# Patient Record
Sex: FEMALE | Race: WHITE | Employment: OTHER | URBAN - METROPOLITAN AREA
[De-identification: names, ages, dates, MRNs, and addresses within clinical notes are randomized per-mention and may not be internally consistent; named-entity substitution may affect disease eponyms.]

---

## 2019-04-21 ENCOUNTER — HOSPITAL ENCOUNTER (EMERGENCY)
Age: 54
Discharge: HOME | End: 2019-04-21
Attending: EMERGENCY MEDICINE

## 2019-04-21 ENCOUNTER — APPOINTMENT (OUTPATIENT)
Dept: GENERAL RADIOLOGY | Age: 54
End: 2019-04-21

## 2019-04-21 VITALS
DIASTOLIC BLOOD PRESSURE: 69 MMHG | HEIGHT: 68 IN | RESPIRATION RATE: 18 BRPM | SYSTOLIC BLOOD PRESSURE: 92 MMHG | TEMPERATURE: 97.9 F | BODY MASS INDEX: 24.1 KG/M2 | OXYGEN SATURATION: 92 % | WEIGHT: 159 LBS | HEART RATE: 92 BPM

## 2019-04-21 DIAGNOSIS — Z72.0 TOBACCO USE: ICD-10-CM

## 2019-04-21 DIAGNOSIS — J20.9 ACUTE BRONCHITIS WITH BRONCHOSPASM: Primary | ICD-10-CM

## 2019-04-21 PROCEDURE — 36415 COLL VENOUS BLD VENIPUNCTURE: CPT

## 2019-04-21 PROCEDURE — 71045 X-RAY EXAM CHEST 1 VIEW: CPT

## 2019-04-21 PROCEDURE — 6370000000 HC RX 637 (ALT 250 FOR IP): Performed by: EMERGENCY MEDICINE

## 2019-04-21 PROCEDURE — 93005 ELECTROCARDIOGRAM TRACING: CPT | Performed by: EMERGENCY MEDICINE

## 2019-04-21 PROCEDURE — 6360000002 HC RX W HCPCS: Performed by: EMERGENCY MEDICINE

## 2019-04-21 PROCEDURE — 94640 AIRWAY INHALATION TREATMENT: CPT

## 2019-04-21 PROCEDURE — 99284 EMERGENCY DEPT VISIT MOD MDM: CPT

## 2019-04-21 RX ORDER — ALBUTEROL SULFATE 2.5 MG/3ML
SOLUTION RESPIRATORY (INHALATION)
Status: DISCONTINUED
Start: 2019-04-21 | End: 2019-04-21 | Stop reason: HOSPADM

## 2019-04-21 RX ORDER — PREDNISONE 20 MG/1
60 TABLET ORAL DAILY
Qty: 12 TABLET | Refills: 0 | Status: SHIPPED | OUTPATIENT
Start: 2019-04-21 | End: 2019-04-25

## 2019-04-21 RX ORDER — ALBUTEROL SULFATE 2.5 MG/3ML
15 SOLUTION RESPIRATORY (INHALATION) ONCE
Status: COMPLETED | OUTPATIENT
Start: 2019-04-21 | End: 2019-04-21

## 2019-04-21 RX ORDER — ALBUTEROL SULFATE 90 UG/1
2-4 AEROSOL, METERED RESPIRATORY (INHALATION) EVERY 4 HOURS PRN
Qty: 1 INHALER | Refills: 0 | Status: ON HOLD | OUTPATIENT
Start: 2019-04-21 | End: 2022-08-24 | Stop reason: SDUPTHER

## 2019-04-21 RX ORDER — PREDNISONE 20 MG/1
60 TABLET ORAL ONCE
Status: COMPLETED | OUTPATIENT
Start: 2019-04-21 | End: 2019-04-21

## 2019-04-21 RX ADMIN — PREDNISONE 60 MG: 20 TABLET ORAL at 15:37

## 2019-04-21 RX ADMIN — ALBUTEROL SULFATE 15 MG/HR: 2.5 SOLUTION RESPIRATORY (INHALATION) at 15:25

## 2019-04-21 RX ADMIN — IPRATROPIUM BROMIDE 0.5 MG: 0.5 SOLUTION RESPIRATORY (INHALATION) at 15:25

## 2019-04-21 NOTE — ED PROVIDER NOTES
Triage Chief Complaint:   Shortness of Breath (cough, SOB, wheezing for a couple of days)      HPI:  Dexter Irene is a 49 y.o. female that presents complaining of cough and shortness of breath for 2 days. Patient reports a history of asthma, smokes regularly for 30 years although just tried to switch to vaping. States has now had 2 days of cough productive of white and yellow sputum with shortness of breath and wheezing. States gets some relief from albuterol. Gets worse with walking. Denies chest pain, fevers, congestion, leg swelling, calf pain, vomiting or diarrhea. Has had previous pneumothorax but states this feels nothing like that. Patient reports her normal blood pressures are 90 systolic. Past Medical History:   Diagnosis Date    Asthma     Pneumothorax      Past Surgical History:   Procedure Laterality Date    ABDOMEN SURGERY       SECTION      HYSTERECTOMY      INNER EAR SURGERY      MASTOIDECTOMY       History reviewed. No pertinent family history.   Social History     Socioeconomic History    Marital status: Single     Spouse name: Not on file    Number of children: Not on file    Years of education: Not on file    Highest education level: Not on file   Occupational History    Not on file   Social Needs    Financial resource strain: Not on file    Food insecurity:     Worry: Not on file     Inability: Not on file    Transportation needs:     Medical: Not on file     Non-medical: Not on file   Tobacco Use    Smoking status: Current Every Day Smoker     Packs/day: 0.50     Types: Cigarettes    Smokeless tobacco: Never Used   Substance and Sexual Activity    Alcohol use: No    Drug use: No    Sexual activity: Not on file   Lifestyle    Physical activity:     Days per week: Not on file     Minutes per session: Not on file    Stress: Not on file   Relationships    Social connections:     Talks on phone: Not on file     Gets together: Not on file     Attends Rastafari service: Not on file     Active member of club or organization: Not on file     Attends meetings of clubs or organizations: Not on file     Relationship status: Not on file    Intimate partner violence:     Fear of current or ex partner: Not on file     Emotionally abused: Not on file     Physically abused: Not on file     Forced sexual activity: Not on file   Other Topics Concern    Not on file   Social History Narrative    Not on file     Current Facility-Administered Medications   Medication Dose Route Frequency Provider Last Rate Last Dose    albuterol (PROVENTIL) (2.5 MG/3ML) 0.083% nebulizer solution              Current Outpatient Medications   Medication Sig Dispense Refill    albuterol sulfate HFA (PROVENTIL HFA) 108 (90 Base) MCG/ACT inhaler Inhale 2-4 puffs into the lungs every 4 hours as needed for Wheezing or Shortness of Breath (Space out to every 6 hours as symptoms improve) Space out to every 6 hours as symptoms improve.  1 Inhaler 0    predniSONE (DELTASONE) 20 MG tablet Take 3 tablets by mouth daily for 4 days 12 tablet 0    METHADONE HCL PO Take 60 mg by mouth daily      ALBUTEROL IN Inhale into the lungs       Allergies   Allergen Reactions    Penicillins Shortness Of Breath       Nursing Notes Reviewed    ROS:  General: no fevers/chills  HEENT: no congestion, no sore throat, no ear pain  Eyes: no discharge, no vision changes  Cardiac: no chest pain, no palpitations  Pulmonary: + SOB, + cough  GI: no N/V/D, no abdominal pain  : no dysuria  Musculoskeletal: no joint pain or swelling  Skin: no rash or unusual bruising  Neurologic: no headaches, no dizziness    Physical Exam:  ED Triage Vitals [04/21/19 1514]   Enc Vitals Group      /65      Pulse 62      Resp 24      Temp 99 °F (37.2 °C)      Temp Source Infrared      SpO2 (!) 88 %      Weight 159 lb (72.1 kg)      Height 5' 7.5\" (1.715 m)      Head Circumference       Peak Flow       Pain Score       Pain Loc       Pain Edu?       Excl. in [address]? My pulse oximetry interpretation is which is abnormal    GENERAL APPEARANCE: Awake and alert. Cooperative. Well appearing. HEAD: Normocephalic. Atraumatic. EYES: PERRL, normal conjunctiva  ENT: Mucous membranes are moist. Normal oropharynx  NECK: Supple. HEART: RRR. LUNGS: Respirations tachypneic, no retractions. Speaks 8-10 word sentences. Breath sounds equal bilaterally but wheezing in all lung fields  ABDOMEN: Soft. Non-tender, nondistended. No guarding or rebound. EXTREMITIES: No acute deformities. No swelling or edema  SKIN: Warm and dry. NEUROLOGICAL: Alert at baseline mental status. Fluent speech  PSYCHIATRIC: Normal mood. I have reviewed and interpreted all of the currently available lab results from this visit (if applicable):  Results for orders placed or performed during the hospital encounter of 04/21/19   EKG 12 Lead   Result Value Ref Range    Ventricular Rate 74 BPM    Atrial Rate 74 BPM    P-R Interval 132 ms    QRS Duration 80 ms    Q-T Interval 394 ms    QTc Calculation (Bazett) 437 ms    P Axis 42 degrees    R Axis 61 degrees    T Axis 56 degrees    Diagnosis Normal sinus rhythmNormal ECG         Radiographs:  [x] Radiologist's Report Reviewed:   XR CHEST PORTABLE (Preliminary result)   Result time 04/21/19 15:40:44   Preliminary result by Delma Alcantar MD (04/21/19 15:40:44)                Impression:    No evidence of edema or pneumonia. Mild left basilar atelectasis or scarring. EKG: (All EKG's are interpreted by myself in the absence of a cardiologist) normal sinus rhythm at 74 bpm.  Normal axis. Normal intervals. No ectopy. No ST or T-wave changes. As compared to January 14, 2018 there is no significant change. MDM:  52-year-old female who presents with cough and shortness of breath. Consider CHF, COPD, pneumonia, PE.   Patient has audible wheezing with decreased air exchange and hypoxia on arrival.  She was given oral prednisone and a continuous albuterol nebulizer treatment. On reevaluation she states \"I feel great\". Wheezing has improved. She does remain mildly hypoxic but was able to be weaned off oxygen with saturations at 90-93% on room air. Patient will have episodes of oxygenation saturation of 88% but then will improve to 9092% and she states does not feel short of breath and feels \"100% better\". She was ambulatory without any shortness of breath or dizziness. She has had some low blood pressures but she states this is her normal and she was asymptomatic. Patient does not want to be admitted and would like to go home. I advised albuterol 4 puffs every 4 hours as needed until symptoms improve and will be given a course of prednisone. She is instructed to follow-up with a primary care doctor for recheck or to return immediately for any new or worsening symptoms. We also discussed the benefits of smoking cessation. Clinical Impression:  1. Acute bronchitis with bronchospasm    2. Tobacco use          Disposition referral (if applicable):  [facility]  [address]  [phone]    As needed, If symptoms worsen, needing inhaler more than every 4 hours or does not help or any new symptoms such as chest pain, fevers or other concerns    [facility]) Pre-Services  [phone]  Call   For primary care doctor    [address] [place] [address]    Call   [phone] for follow-up appointment to recheck today complaints      Disposition medications (if applicable):  New Prescriptions    PREDNISONE (DELTASONE) 20 MG TABLET    Take 3 tablets by mouth daily for 4 days         Comment: Please note this report has been produced using speech recognition software and may contain errors related to that system including errors in grammar, punctuation, and spelling, as well as words and phrases that may be inappropriate.  If there are any questions or concerns please feel free to contact the dictating physician for clarification      MD Letitia Murrieta MD  04/21/19 8472

## 2019-04-21 NOTE — ED NOTES
Pt reporting post breathing tx \"I feel 100% better, almost completely normal.\" No audible wheezing heard. Oxygen saturation on room air continues to be 84-88%. Pt. Denies SOB at this time. Provider notified.       Yaa Edouard RN  04/21/19 9664

## 2019-04-21 NOTE — ED NOTES
Pt. discharged in stable position. Pt. provided with discharge instructions and prescriptions. Given opportunity to ask questions if needed and verbalized understanding. Pt. Ambulated to exit per self.         Alexander España RN  04/21/19 9631

## 2019-04-21 NOTE — ED NOTES
Pt. 82% on room air. Pt. Denies supplemental oxygen use at home. Pt. Placed on 4L nasal cannula with increased saturations of 92%. Pt. Denies COPD hx but current smoker of a pack/day with efforts of trying to quit. Pt. States she is trying to use the vap pen but has been SOB over the last week. Provider notified.       Destiny Dickson RN  04/21/19 4501

## 2019-04-21 NOTE — ED NOTES
Patient resting comfortably with no signs of distress. Denies any needs at this time. Bed locked and in lowest position with both side rails raised. Call light within reach.         Yaa Edouard RN  04/21/19 2470

## 2019-04-21 NOTE — ED NOTES
Pt. Placed on 4L nasal cannula with increased saturations of 94%     Rhona Amezquita RN  04/21/19 7868

## 2019-04-21 NOTE — ED NOTES
Pt. placed in gown and on telemetry monitor per protocol. EKG completed and shown to MD at triage. Updated vs obtained. Bed locked with side rails up x2. Call light within reach. No other needs verbalized at this time. Will continue to monitor.           Tiff Phillip RN  04/21/19 1753

## 2019-04-22 LAB
EKG ATRIAL RATE: 74 BPM
EKG DIAGNOSIS: NORMAL
EKG P AXIS: 42 DEGREES
EKG P-R INTERVAL: 132 MS
EKG Q-T INTERVAL: 394 MS
EKG QRS DURATION: 80 MS
EKG QTC CALCULATION (BAZETT): 437 MS
EKG R AXIS: 61 DEGREES
EKG T AXIS: 56 DEGREES
EKG VENTRICULAR RATE: 74 BPM

## 2019-04-22 PROCEDURE — 93010 ELECTROCARDIOGRAM REPORT: CPT | Performed by: INTERNAL MEDICINE

## 2022-08-20 ENCOUNTER — HOSPITAL ENCOUNTER (INPATIENT)
Age: 57
LOS: 3 days | Discharge: HOME | DRG: 871 | End: 2022-08-24
Attending: EMERGENCY MEDICINE | Admitting: INTERNAL MEDICINE

## 2022-08-20 ENCOUNTER — APPOINTMENT (OUTPATIENT)
Dept: CT IMAGING | Age: 57
DRG: 871 | End: 2022-08-20

## 2022-08-20 ENCOUNTER — APPOINTMENT (OUTPATIENT)
Dept: GENERAL RADIOLOGY | Age: 57
DRG: 871 | End: 2022-08-20

## 2022-08-20 DIAGNOSIS — R09.02 HYPOXIA: Primary | ICD-10-CM

## 2022-08-20 DIAGNOSIS — J44.1 COPD EXACERBATION (HCC): ICD-10-CM

## 2022-08-20 DIAGNOSIS — U07.1 COVID: ICD-10-CM

## 2022-08-20 DIAGNOSIS — R91.1 PULMONARY NODULE: ICD-10-CM

## 2022-08-20 LAB
A/G RATIO: 1.1 (ref 1.1–2.2)
ALBUMIN SERPL-MCNC: 3.7 G/DL (ref 3.4–5)
ALP BLD-CCNC: 61 U/L (ref 40–129)
ALT SERPL-CCNC: 14 U/L (ref 10–40)
ANION GAP SERPL CALCULATED.3IONS-SCNC: 9 MMOL/L (ref 3–16)
AST SERPL-CCNC: 25 U/L (ref 15–37)
BASOPHILS ABSOLUTE: 0 K/UL (ref 0–0.2)
BASOPHILS RELATIVE PERCENT: 0.5 %
BILIRUB SERPL-MCNC: <0.2 MG/DL (ref 0–1)
BUN BLDV-MCNC: 16 MG/DL (ref 7–20)
CALCIUM SERPL-MCNC: 8.8 MG/DL (ref 8.3–10.6)
CHLORIDE BLD-SCNC: 102 MMOL/L (ref 99–110)
CO2: 22 MMOL/L (ref 21–32)
CREAT SERPL-MCNC: 0.9 MG/DL (ref 0.6–1.1)
EOSINOPHILS ABSOLUTE: 0 K/UL (ref 0–0.6)
EOSINOPHILS RELATIVE PERCENT: 0.5 %
GFR AFRICAN AMERICAN: >60
GFR NON-AFRICAN AMERICAN: >60
GLUCOSE BLD-MCNC: 127 MG/DL (ref 70–99)
HCT VFR BLD CALC: 43.5 % (ref 36–48)
HEMOGLOBIN: 14.1 G/DL (ref 12–16)
LYMPHOCYTES ABSOLUTE: 1 K/UL (ref 1–5.1)
LYMPHOCYTES RELATIVE PERCENT: 30.2 %
MCH RBC QN AUTO: 29.4 PG (ref 26–34)
MCHC RBC AUTO-ENTMCNC: 32.4 G/DL (ref 31–36)
MCV RBC AUTO: 90.6 FL (ref 80–100)
MONOCYTES ABSOLUTE: 0.3 K/UL (ref 0–1.3)
MONOCYTES RELATIVE PERCENT: 9 %
NEUTROPHILS ABSOLUTE: 2 K/UL (ref 1.7–7.7)
NEUTROPHILS RELATIVE PERCENT: 59.8 %
PDW BLD-RTO: 14.3 % (ref 12.4–15.4)
PLATELET # BLD: 144 K/UL (ref 135–450)
PMV BLD AUTO: 8.6 FL (ref 5–10.5)
POTASSIUM SERPL-SCNC: 4.1 MMOL/L (ref 3.5–5.1)
PRO-BNP: 15 PG/ML (ref 0–124)
PROCALCITONIN: 0.06 NG/ML (ref 0–0.15)
RBC # BLD: 4.8 M/UL (ref 4–5.2)
REASON FOR REJECTION: NORMAL
REJECTED TEST: NORMAL
SODIUM BLD-SCNC: 133 MMOL/L (ref 136–145)
TOTAL PROTEIN: 7 G/DL (ref 6.4–8.2)
TROPONIN: <0.01 NG/ML
WBC # BLD: 3.4 K/UL (ref 4–11)

## 2022-08-20 PROCEDURE — 80053 COMPREHEN METABOLIC PANEL: CPT

## 2022-08-20 PROCEDURE — 84484 ASSAY OF TROPONIN QUANT: CPT

## 2022-08-20 PROCEDURE — 6370000000 HC RX 637 (ALT 250 FOR IP): Performed by: PHYSICIAN ASSISTANT

## 2022-08-20 PROCEDURE — 71045 X-RAY EXAM CHEST 1 VIEW: CPT

## 2022-08-20 PROCEDURE — 6360000004 HC RX CONTRAST MEDICATION: Performed by: EMERGENCY MEDICINE

## 2022-08-20 PROCEDURE — 6360000002 HC RX W HCPCS: Performed by: PHYSICIAN ASSISTANT

## 2022-08-20 PROCEDURE — 94640 AIRWAY INHALATION TREATMENT: CPT

## 2022-08-20 PROCEDURE — 99285 EMERGENCY DEPT VISIT HI MDM: CPT

## 2022-08-20 PROCEDURE — 96374 THER/PROPH/DIAG INJ IV PUSH: CPT

## 2022-08-20 PROCEDURE — 84145 PROCALCITONIN (PCT): CPT

## 2022-08-20 PROCEDURE — 71260 CT THORAX DX C+: CPT | Performed by: EMERGENCY MEDICINE

## 2022-08-20 PROCEDURE — 83880 ASSAY OF NATRIURETIC PEPTIDE: CPT

## 2022-08-20 PROCEDURE — 93005 ELECTROCARDIOGRAM TRACING: CPT | Performed by: PHYSICIAN ASSISTANT

## 2022-08-20 PROCEDURE — 85025 COMPLETE CBC W/AUTO DIFF WBC: CPT

## 2022-08-20 RX ORDER — ALBUTEROL SULFATE 2.5 MG/3ML
5 SOLUTION RESPIRATORY (INHALATION) ONCE
Status: COMPLETED | OUTPATIENT
Start: 2022-08-20 | End: 2022-08-20

## 2022-08-20 RX ORDER — IPRATROPIUM BROMIDE AND ALBUTEROL SULFATE 2.5; .5 MG/3ML; MG/3ML
1 SOLUTION RESPIRATORY (INHALATION) ONCE
Status: COMPLETED | OUTPATIENT
Start: 2022-08-20 | End: 2022-08-20

## 2022-08-20 RX ORDER — METHYLPREDNISOLONE SODIUM SUCCINATE 125 MG/2ML
125 INJECTION, POWDER, LYOPHILIZED, FOR SOLUTION INTRAMUSCULAR; INTRAVENOUS ONCE
Status: COMPLETED | OUTPATIENT
Start: 2022-08-20 | End: 2022-08-20

## 2022-08-20 RX ADMIN — ALBUTEROL SULFATE 5 MG: 2.5 SOLUTION RESPIRATORY (INHALATION) at 22:22

## 2022-08-20 RX ADMIN — IPRATROPIUM BROMIDE AND ALBUTEROL SULFATE 1 AMPULE: 2.5; .5 SOLUTION RESPIRATORY (INHALATION) at 22:22

## 2022-08-20 RX ADMIN — METHYLPREDNISOLONE SODIUM SUCCINATE 125 MG: 125 INJECTION, POWDER, FOR SOLUTION INTRAMUSCULAR; INTRAVENOUS at 22:10

## 2022-08-20 RX ADMIN — IOPAMIDOL 75 ML: 755 INJECTION, SOLUTION INTRAVENOUS at 23:22

## 2022-08-20 ASSESSMENT — PAIN DESCRIPTION - LOCATION: LOCATION: HEAD

## 2022-08-20 ASSESSMENT — PAIN - FUNCTIONAL ASSESSMENT: PAIN_FUNCTIONAL_ASSESSMENT: 0-10

## 2022-08-20 ASSESSMENT — PAIN DESCRIPTION - FREQUENCY: FREQUENCY: CONTINUOUS

## 2022-08-20 ASSESSMENT — PAIN DESCRIPTION - PAIN TYPE: TYPE: ACUTE PAIN

## 2022-08-20 ASSESSMENT — PAIN SCALES - GENERAL: PAINLEVEL_OUTOF10: 2

## 2022-08-20 ASSESSMENT — LIFESTYLE VARIABLES
HOW OFTEN DO YOU HAVE A DRINK CONTAINING ALCOHOL: NEVER
HOW MANY STANDARD DRINKS CONTAINING ALCOHOL DO YOU HAVE ON A TYPICAL DAY: PATIENT DOES NOT DRINK

## 2022-08-20 ASSESSMENT — PAIN DESCRIPTION - DESCRIPTORS: DESCRIPTORS: ACHING

## 2022-08-21 ENCOUNTER — APPOINTMENT (OUTPATIENT)
Dept: GENERAL RADIOLOGY | Age: 57
DRG: 871 | End: 2022-08-21

## 2022-08-21 PROBLEM — U07.1 COVID-19 VIRUS INFECTION: Status: ACTIVE | Noted: 2022-08-21

## 2022-08-21 PROBLEM — J44.1 COPD EXACERBATION (HCC): Status: ACTIVE | Noted: 2022-08-21

## 2022-08-21 PROBLEM — R07.9 CHEST PAIN: Status: ACTIVE | Noted: 2022-08-21

## 2022-08-21 PROBLEM — J96.01 ACUTE RESPIRATORY FAILURE WITH HYPOXIA (HCC): Status: ACTIVE | Noted: 2022-08-21

## 2022-08-21 PROBLEM — G89.4 PAIN SYNDROME, CHRONIC: Status: ACTIVE | Noted: 2022-08-21

## 2022-08-21 LAB
C-REACTIVE PROTEIN: 5.3 MG/L (ref 0–5.1)
D DIMER: 0.29 UG/ML FEU (ref 0–0.6)
EKG ATRIAL RATE: 97 BPM
EKG DIAGNOSIS: NORMAL
EKG P AXIS: 75 DEGREES
EKG P-R INTERVAL: 146 MS
EKG Q-T INTERVAL: 356 MS
EKG QRS DURATION: 80 MS
EKG QTC CALCULATION (BAZETT): 452 MS
EKG R AXIS: 52 DEGREES
EKG T AXIS: 53 DEGREES
EKG VENTRICULAR RATE: 97 BPM
GLUCOSE BLD-MCNC: 124 MG/DL (ref 70–99)
GLUCOSE BLD-MCNC: 155 MG/DL (ref 70–99)
GLUCOSE BLD-MCNC: 233 MG/DL (ref 70–99)
GLUCOSE BLD-MCNC: 469 MG/DL (ref 70–99)
LACTIC ACID, SEPSIS: 0.9 MMOL/L (ref 0.4–1.9)
PERFORMED ON: ABNORMAL
SARS-COV-2, NAAT: DETECTED

## 2022-08-21 PROCEDURE — G0378 HOSPITAL OBSERVATION PER HR: HCPCS

## 2022-08-21 PROCEDURE — 6360000002 HC RX W HCPCS: Performed by: INTERNAL MEDICINE

## 2022-08-21 PROCEDURE — 1200000000 HC SEMI PRIVATE

## 2022-08-21 PROCEDURE — 94640 AIRWAY INHALATION TREATMENT: CPT

## 2022-08-21 PROCEDURE — 83036 HEMOGLOBIN GLYCOSYLATED A1C: CPT

## 2022-08-21 PROCEDURE — 6370000000 HC RX 637 (ALT 250 FOR IP): Performed by: INTERNAL MEDICINE

## 2022-08-21 PROCEDURE — 93010 ELECTROCARDIOGRAM REPORT: CPT | Performed by: INTERNAL MEDICINE

## 2022-08-21 PROCEDURE — 86140 C-REACTIVE PROTEIN: CPT

## 2022-08-21 PROCEDURE — 2580000003 HC RX 258: Performed by: INTERNAL MEDICINE

## 2022-08-21 PROCEDURE — 2700000000 HC OXYGEN THERAPY PER DAY

## 2022-08-21 PROCEDURE — 36415 COLL VENOUS BLD VENIPUNCTURE: CPT

## 2022-08-21 PROCEDURE — 74018 RADEX ABDOMEN 1 VIEW: CPT

## 2022-08-21 PROCEDURE — 94761 N-INVAS EAR/PLS OXIMETRY MLT: CPT

## 2022-08-21 PROCEDURE — 87635 SARS-COV-2 COVID-19 AMP PRB: CPT

## 2022-08-21 PROCEDURE — 85379 FIBRIN DEGRADATION QUANT: CPT

## 2022-08-21 PROCEDURE — 83605 ASSAY OF LACTIC ACID: CPT

## 2022-08-21 RX ORDER — IPRATROPIUM BROMIDE AND ALBUTEROL SULFATE 2.5; .5 MG/3ML; MG/3ML
1 SOLUTION RESPIRATORY (INHALATION) EVERY 4 HOURS
Status: DISCONTINUED | OUTPATIENT
Start: 2022-08-21 | End: 2022-08-22

## 2022-08-21 RX ORDER — ONDANSETRON 4 MG/1
4 TABLET, ORALLY DISINTEGRATING ORAL EVERY 8 HOURS PRN
Status: DISCONTINUED | OUTPATIENT
Start: 2022-08-21 | End: 2022-08-21

## 2022-08-21 RX ORDER — ACETAMINOPHEN 650 MG/1
650 SUPPOSITORY RECTAL EVERY 6 HOURS PRN
Status: DISCONTINUED | OUTPATIENT
Start: 2022-08-21 | End: 2022-08-24 | Stop reason: HOSPADM

## 2022-08-21 RX ORDER — POLYETHYLENE GLYCOL 3350 17 G/17G
17 POWDER, FOR SOLUTION ORAL DAILY PRN
Status: DISCONTINUED | OUTPATIENT
Start: 2022-08-21 | End: 2022-08-21

## 2022-08-21 RX ORDER — ACETAMINOPHEN 325 MG/1
650 TABLET ORAL EVERY 6 HOURS PRN
Status: DISCONTINUED | OUTPATIENT
Start: 2022-08-21 | End: 2022-08-21

## 2022-08-21 RX ORDER — ONDANSETRON 2 MG/ML
4 INJECTION INTRAMUSCULAR; INTRAVENOUS EVERY 6 HOURS PRN
Status: DISCONTINUED | OUTPATIENT
Start: 2022-08-21 | End: 2022-08-24 | Stop reason: HOSPADM

## 2022-08-21 RX ORDER — ONDANSETRON 2 MG/ML
4 INJECTION INTRAMUSCULAR; INTRAVENOUS EVERY 6 HOURS PRN
Status: DISCONTINUED | OUTPATIENT
Start: 2022-08-21 | End: 2022-08-21

## 2022-08-21 RX ORDER — SODIUM CHLORIDE 9 MG/ML
INJECTION, SOLUTION INTRAVENOUS PRN
Status: DISCONTINUED | OUTPATIENT
Start: 2022-08-21 | End: 2022-08-21

## 2022-08-21 RX ORDER — METHADONE HYDROCHLORIDE 10 MG/ML
60 CONCENTRATE ORAL DAILY
Status: DISCONTINUED | OUTPATIENT
Start: 2022-08-21 | End: 2022-08-24 | Stop reason: HOSPADM

## 2022-08-21 RX ORDER — ALBUTEROL SULFATE 2.5 MG/3ML
5 SOLUTION RESPIRATORY (INHALATION) ONCE
Status: DISCONTINUED | OUTPATIENT
Start: 2022-08-21 | End: 2022-08-21

## 2022-08-21 RX ORDER — METHYLPREDNISOLONE SODIUM SUCCINATE 40 MG/ML
40 INJECTION, POWDER, LYOPHILIZED, FOR SOLUTION INTRAMUSCULAR; INTRAVENOUS EVERY 6 HOURS
Status: COMPLETED | OUTPATIENT
Start: 2022-08-21 | End: 2022-08-22

## 2022-08-21 RX ORDER — ENOXAPARIN SODIUM 100 MG/ML
40 INJECTION SUBCUTANEOUS DAILY
Status: DISCONTINUED | OUTPATIENT
Start: 2022-08-21 | End: 2022-08-21

## 2022-08-21 RX ORDER — NITROGLYCERIN 0.4 MG/1
0.4 TABLET SUBLINGUAL EVERY 5 MIN PRN
Status: DISCONTINUED | OUTPATIENT
Start: 2022-08-21 | End: 2022-08-21

## 2022-08-21 RX ORDER — ENOXAPARIN SODIUM 100 MG/ML
30 INJECTION SUBCUTANEOUS 2 TIMES DAILY
Status: DISCONTINUED | OUTPATIENT
Start: 2022-08-21 | End: 2022-08-24 | Stop reason: HOSPADM

## 2022-08-21 RX ORDER — ONDANSETRON 4 MG/1
4 TABLET, ORALLY DISINTEGRATING ORAL EVERY 8 HOURS PRN
Status: DISCONTINUED | OUTPATIENT
Start: 2022-08-21 | End: 2022-08-24 | Stop reason: HOSPADM

## 2022-08-21 RX ORDER — INSULIN LISPRO 100 [IU]/ML
14 INJECTION, SOLUTION INTRAVENOUS; SUBCUTANEOUS ONCE
Status: COMPLETED | OUTPATIENT
Start: 2022-08-21 | End: 2022-08-21

## 2022-08-21 RX ORDER — ASPIRIN 81 MG/1
81 TABLET, CHEWABLE ORAL DAILY
Status: DISCONTINUED | OUTPATIENT
Start: 2022-08-22 | End: 2022-08-21

## 2022-08-21 RX ORDER — SODIUM CHLORIDE 0.9 % (FLUSH) 0.9 %
5-40 SYRINGE (ML) INJECTION PRN
Status: DISCONTINUED | OUTPATIENT
Start: 2022-08-21 | End: 2022-08-21

## 2022-08-21 RX ORDER — ACETAMINOPHEN 650 MG/1
650 SUPPOSITORY RECTAL EVERY 6 HOURS PRN
Status: DISCONTINUED | OUTPATIENT
Start: 2022-08-21 | End: 2022-08-21

## 2022-08-21 RX ORDER — IPRATROPIUM BROMIDE AND ALBUTEROL SULFATE 2.5; .5 MG/3ML; MG/3ML
1 SOLUTION RESPIRATORY (INHALATION)
Status: DISCONTINUED | OUTPATIENT
Start: 2022-08-21 | End: 2022-08-21

## 2022-08-21 RX ORDER — SODIUM CHLORIDE 0.9 % (FLUSH) 0.9 %
5-40 SYRINGE (ML) INJECTION PRN
Status: DISCONTINUED | OUTPATIENT
Start: 2022-08-21 | End: 2022-08-24 | Stop reason: HOSPADM

## 2022-08-21 RX ORDER — SODIUM CHLORIDE 0.9 % (FLUSH) 0.9 %
5-40 SYRINGE (ML) INJECTION EVERY 12 HOURS SCHEDULED
Status: DISCONTINUED | OUTPATIENT
Start: 2022-08-21 | End: 2022-08-21

## 2022-08-21 RX ORDER — PREDNISONE 20 MG/1
40 TABLET ORAL DAILY
Status: DISCONTINUED | OUTPATIENT
Start: 2022-08-23 | End: 2022-08-24 | Stop reason: HOSPADM

## 2022-08-21 RX ORDER — POLYETHYLENE GLYCOL 3350 17 G/17G
17 POWDER, FOR SOLUTION ORAL DAILY PRN
Status: DISCONTINUED | OUTPATIENT
Start: 2022-08-21 | End: 2022-08-24 | Stop reason: HOSPADM

## 2022-08-21 RX ORDER — SODIUM CHLORIDE 9 MG/ML
INJECTION, SOLUTION INTRAVENOUS
Status: DISPENSED
Start: 2022-08-21 | End: 2022-08-21

## 2022-08-21 RX ORDER — NICOTINE 21 MG/24HR
1 PATCH, TRANSDERMAL 24 HOURS TRANSDERMAL DAILY
Status: DISCONTINUED | OUTPATIENT
Start: 2022-08-21 | End: 2022-08-24 | Stop reason: HOSPADM

## 2022-08-21 RX ORDER — INSULIN LISPRO 100 [IU]/ML
0-8 INJECTION, SOLUTION INTRAVENOUS; SUBCUTANEOUS
Status: DISCONTINUED | OUTPATIENT
Start: 2022-08-21 | End: 2022-08-24 | Stop reason: HOSPADM

## 2022-08-21 RX ORDER — ATORVASTATIN CALCIUM 80 MG/1
40 TABLET, FILM COATED ORAL NIGHTLY
Status: DISCONTINUED | OUTPATIENT
Start: 2022-08-21 | End: 2022-08-21

## 2022-08-21 RX ORDER — METHADONE HYDROCHLORIDE 10 MG/1
60 TABLET ORAL DAILY
Status: DISCONTINUED | OUTPATIENT
Start: 2022-08-21 | End: 2022-08-21

## 2022-08-21 RX ORDER — METHYLPREDNISOLONE SODIUM SUCCINATE 125 MG/2ML
125 INJECTION, POWDER, LYOPHILIZED, FOR SOLUTION INTRAMUSCULAR; INTRAVENOUS ONCE
Status: DISCONTINUED | OUTPATIENT
Start: 2022-08-21 | End: 2022-08-21

## 2022-08-21 RX ORDER — PANTOPRAZOLE SODIUM 40 MG/1
40 TABLET, DELAYED RELEASE ORAL
Status: DISCONTINUED | OUTPATIENT
Start: 2022-08-21 | End: 2022-08-24 | Stop reason: HOSPADM

## 2022-08-21 RX ORDER — ACETAMINOPHEN 325 MG/1
650 TABLET ORAL EVERY 6 HOURS PRN
Status: DISCONTINUED | OUTPATIENT
Start: 2022-08-21 | End: 2022-08-24 | Stop reason: HOSPADM

## 2022-08-21 RX ORDER — SODIUM CHLORIDE 9 MG/ML
INJECTION, SOLUTION INTRAVENOUS PRN
Status: DISCONTINUED | OUTPATIENT
Start: 2022-08-21 | End: 2022-08-24 | Stop reason: HOSPADM

## 2022-08-21 RX ORDER — INSULIN LISPRO 100 [IU]/ML
0-4 INJECTION, SOLUTION INTRAVENOUS; SUBCUTANEOUS NIGHTLY
Status: DISCONTINUED | OUTPATIENT
Start: 2022-08-21 | End: 2022-08-24 | Stop reason: HOSPADM

## 2022-08-21 RX ORDER — DEXTROSE MONOHYDRATE 100 MG/ML
INJECTION, SOLUTION INTRAVENOUS CONTINUOUS PRN
Status: DISCONTINUED | OUTPATIENT
Start: 2022-08-21 | End: 2022-08-24 | Stop reason: HOSPADM

## 2022-08-21 RX ORDER — ALBUTEROL SULFATE 2.5 MG/3ML
2.5 SOLUTION RESPIRATORY (INHALATION)
Status: DISCONTINUED | OUTPATIENT
Start: 2022-08-21 | End: 2022-08-24 | Stop reason: HOSPADM

## 2022-08-21 RX ORDER — SODIUM CHLORIDE 0.9 % (FLUSH) 0.9 %
5-40 SYRINGE (ML) INJECTION EVERY 12 HOURS SCHEDULED
Status: DISCONTINUED | OUTPATIENT
Start: 2022-08-21 | End: 2022-08-24 | Stop reason: HOSPADM

## 2022-08-21 RX ADMIN — INSULIN LISPRO 2 UNITS: 100 INJECTION, SOLUTION INTRAVENOUS; SUBCUTANEOUS at 11:51

## 2022-08-21 RX ADMIN — IPRATROPIUM BROMIDE AND ALBUTEROL SULFATE 1 AMPULE: 2.5; .5 SOLUTION RESPIRATORY (INHALATION) at 09:10

## 2022-08-21 RX ADMIN — METHYLPREDNISOLONE SODIUM SUCCINATE 40 MG: 40 INJECTION, POWDER, FOR SOLUTION INTRAMUSCULAR; INTRAVENOUS at 05:34

## 2022-08-21 RX ADMIN — Medication 2 PUFF: at 20:57

## 2022-08-21 RX ADMIN — SODIUM CHLORIDE, PRESERVATIVE FREE 10 ML: 5 INJECTION INTRAVENOUS at 08:26

## 2022-08-21 RX ADMIN — METHYLPREDNISOLONE SODIUM SUCCINATE 40 MG: 40 INJECTION, POWDER, FOR SOLUTION INTRAMUSCULAR; INTRAVENOUS at 21:34

## 2022-08-21 RX ADMIN — AZITHROMYCIN MONOHYDRATE 500 MG: 500 INJECTION, POWDER, LYOPHILIZED, FOR SOLUTION INTRAVENOUS at 05:34

## 2022-08-21 RX ADMIN — INSULIN LISPRO 14 UNITS: 100 INJECTION, SOLUTION INTRAVENOUS; SUBCUTANEOUS at 09:16

## 2022-08-21 RX ADMIN — IPRATROPIUM BROMIDE AND ALBUTEROL SULFATE 1 AMPULE: 2.5; .5 SOLUTION RESPIRATORY (INHALATION) at 12:10

## 2022-08-21 RX ADMIN — METHYLPREDNISOLONE SODIUM SUCCINATE 40 MG: 40 INJECTION, POWDER, FOR SOLUTION INTRAMUSCULAR; INTRAVENOUS at 08:27

## 2022-08-21 RX ADMIN — METHADONE HYDROCHLORIDE 60 MG: 10 CONCENTRATE ORAL at 08:45

## 2022-08-21 RX ADMIN — IPRATROPIUM BROMIDE AND ALBUTEROL SULFATE 1 AMPULE: 2.5; .5 SOLUTION RESPIRATORY (INHALATION) at 20:57

## 2022-08-21 RX ADMIN — SODIUM CHLORIDE, PRESERVATIVE FREE 10 ML: 5 INJECTION INTRAVENOUS at 21:33

## 2022-08-21 RX ADMIN — ENOXAPARIN SODIUM 30 MG: 100 INJECTION SUBCUTANEOUS at 21:34

## 2022-08-21 RX ADMIN — Medication 2 PUFF: at 09:10

## 2022-08-21 RX ADMIN — ENOXAPARIN SODIUM 30 MG: 100 INJECTION SUBCUTANEOUS at 08:26

## 2022-08-21 RX ADMIN — PANTOPRAZOLE SODIUM 40 MG: 40 TABLET, DELAYED RELEASE ORAL at 09:16

## 2022-08-21 RX ADMIN — METHYLPREDNISOLONE SODIUM SUCCINATE 40 MG: 40 INJECTION, POWDER, FOR SOLUTION INTRAMUSCULAR; INTRAVENOUS at 16:32

## 2022-08-21 RX ADMIN — IPRATROPIUM BROMIDE AND ALBUTEROL SULFATE 1 AMPULE: 2.5; .5 SOLUTION RESPIRATORY (INHALATION) at 16:36

## 2022-08-21 ASSESSMENT — PAIN SCALES - GENERAL
PAINLEVEL_OUTOF10: 0

## 2022-08-21 NOTE — ED PROVIDER NOTES
I independently saw performed a substantive portion of the visit (history, physical, and MDM) on Hailey Correa. All diagnostic, treatment, and disposition decisions were made by myself in conjunction with the advanced practice provider. I have participated in the medical decision making and directed the treatment plan and disposition of the patient. For further details of [facility] emergency department encounter, please see the advanced practice provider's documentation. Vianney Anthony MD, am the primary physician provider of record. CHIEF COMPLAINT  Chief Complaint   Patient presents with    Shortness of Breath     From home. PT states she had N/V earlier today, began having SOB and a headache. Hx of asthma, pneumothorax; took home albuterol inhaler 7-8X PTA and aleve. O2 sat 79% on RA upon arrival, up to 95% on 6L/NC. Briefly, Hailey Correa is a 65 y.o. female  who presents to the ED complaining of shortness of breath wheezing and coughing onset today, no fevers though. She has a history of asthma but has not been diagnosed formally with COPD. No history of oxygen requirement in the past.  She does smoke. She denies chest pains. FOCUSED PHYSICAL EXAMINATION  BP (!) 170/75   Pulse (!) 101   Temp 98 °F (36.7 °C) (Oral)   Resp 18   Ht 5' 7\" (1.702 m)   Wt 151 lb (68.5 kg)   SpO2 96%   BMI 23.65 kg/m²    Focused physical examination notable for no acute distress, well-appearing, well-nourished, normal speech and mentation without obvious facial droop, no obvious rash. No obvious cranial nerve deficits on my initial exam.  Poor air exchange with prolonged expiration and mild dry hacking cough with expiratory wheezes throughout. She is stable on nasal cannula oxygen but was tachypneic and hypoxic without. Minimal scattered rhonchi. Abdomen is soft nontender nondistended. No leg swelling.     The 12 lead EKG was interpreted by me as follows:  Rate: normal with a rate of 97  Rhythm: sinus  Axis: normal  Intervals: normal CT, narrow QRS, normal QTc  ST segments: no ST elevations or depressions  T waves: no abnormal inversions  Non-specific T wave changes: not present  Prior EKG comparison: EKG dated 4/21/19 is not significantly different      MDM:  Diagnostic considerations included acute coronary syndrome, pulmonary embolism, COPD/asthma, pneumonia, sepsis, pericardial tamponade, pneumothorax, CHF, thoracic aortic dissection, anxiety    ED course was notable for concern for hypoxia with COPD exacerbation. Imaging without PE or infiltrate. Do not suspect infectious process at this time based on her evaluation but rather more appear COPD/asthma presentation. She will be given nebulizer steroids and admitted with nasal cannula supplemental oxygen. She did not require BiPAP or intubation at this time based on her assessment. Also found to be COVID+ which may be exacerbating her overall symptomatology as well. No indication for abx. Is this patient to be included in the SEP-1 Core Measure? No   Exclusion criteria - the patient is NOT to be included for SEP-1 Core Measure due to:  Viral etiology found or highly suspected (including COVID-19) without concomitant bacterial infection      During the patient's ED course, the patient was given:  Medications   methylPREDNISolone sodium (SOLU-MEDROL) injection 125 mg (125 mg IntraVENous Given 8/20/22 2210)   ipratropium-albuterol (DUONEB) nebulizer solution 1 ampule (1 ampule Inhalation Given 8/20/22 2222)   albuterol (PROVENTIL) nebulizer solution 5 mg (5 mg Nebulization Given 8/20/22 2222)   iopamidol (ISOVUE-370) 76 % injection 75 mL (75 mLs IntraVENous Given 8/20/22 2322)        CLINICAL IMPRESSION  1. Hypoxia    2. COPD exacerbation (HCC)    3. Pulmonary nodule    4. COVID        DISPOSITION  Cynthia Roberts was admitted in fair condition. The plan is to admit to the hospital at this time under the hospitalist service. Hospitalist accepted the patient and will take over the patient's care. The total critical care time I independently spent while evaluating and treating this patient was 32 minutes. This excludes time spent doing separately billable procedures. This includes time at the bedside, data interpretation, medication management, obtaining critical history from collateral sources if the patient is unable to provide it directly, and physician consultation. Specifics of interventions taken and potentially life-threatening diagnostic considerations are listed above in the medical decision making. If this was a shared visit with an KELECHI, the time in this attestation is non-concurrent critical care time out of the total shared critical care time provided by the KELECHI and myself. This chart was created using Dragon dictation software. Efforts were made by me to ensure accuracy, however some errors may be present due to limitations of this technology.             Bandar Hughes MD  08/21/22 0028       Bandar Hughes MD  08/21/22 2175

## 2022-08-21 NOTE — ACP (ADVANCE CARE PLANNING)
Advanced Care Planning Note. Purpose of Encounter: Advanced care planning in light of COVID 23  Parties In Attendance: Patient  Decisional Capacity: Yes  Subjective: Patient is coughing  Objective: Cr 0.9  Goals of Care Determination: Patient wants full support (CPR, vent, surgery, HD, trach, PEG)  Plan:  IV steroids, IV Abx, O2 via NC  Code Status: Full code   Time spent on Advanced care Plannin minutes  Advanced Care Planning Documents: Completed advanced directives on chart, children share the POA.     David Adame MD  2022 1:53 PM

## 2022-08-21 NOTE — PLAN OF CARE
Problem: Discharge Planning  Goal: Discharge to home or other facility with appropriate resources  Outcome: Progressing  Flowsheets (Taken 8/21/2022 0400 by Ayanna Houston RN)  Discharge to home or other facility with appropriate resources: Identify barriers to discharge with patient and caregiver     Problem: Pain  Goal: Verbalizes/displays adequate comfort level or baseline comfort level  Outcome: Progressing

## 2022-08-21 NOTE — PLAN OF CARE
Problem: Discharge Planning  Goal: Discharge to home or other facility with appropriate resources  8/21/2022 0900 by Fermin Martinez RN  Outcome: Progressing  8/21/2022 0858 by Fermin Martinez RN  Outcome: Progressing  Flowsheets (Taken 8/21/2022 0400 by Bindu Pickering RN)  Discharge to home or other facility with appropriate resources: Identify barriers to discharge with patient and caregiver     Problem: Pain  Goal: Verbalizes/displays adequate comfort level or baseline comfort level  8/21/2022 0900 by Fermin Martinez RN  Outcome: Progressing  8/21/2022 0858 by Fermin Martinez RN  Outcome: Progressing

## 2022-08-21 NOTE — ED NOTES
Patient ambulating to bathroom, stating she doesn't not need oxygen at this time because her \"oxygen has been 98% on room air while getting the breathing treatment\".       Lester Jonas RN  08/20/22 2835

## 2022-08-21 NOTE — H&P
Hospital Medicine History & Physical      PCP: No primary care provider on file. Date of Admission: 2022    Date of Service: Pt seen/examined on 22 and Admitted to Inpatient with expected LOS greater than two midnights due to medical therapy. Chief Complaint: Shortness of breath      History Of Present Illness:  Rashaad Villa is 65 y.o. female who presented with complaint of shortness of breath. Symptom onset was acute for a time period of 1 day. The severity is described as severe. The course of his symptoms over time is worsening. The symptoms improved with rest and worsened with exertion. The patient's symptom is associated with fatigue. Rashaad Villa is 65 y.o. female with history of COPD, does not use oxygen at home and tobacco use. She smokes about 15 cigarettes a day  She now presents to the ER with complaint of shortness of breath since yesterday  Here in the emergency room her SPO2 was 79% on room air therefore she is currently placed on 6 L oxygen by nasal cannula to keep SPO2 greater than 90%  She can only walk short distances due to shortness of breath. At rest her shortness of breath is better and worse with exertion  She denies cough or wheezing  But on exam she is not moving air well in the lungs and has some expiratory wheezing  She was given multiple rounds of nebulizers in the ED but her breathing remains poor  Therefore she is admitted for further management for suspected COPD exacerbation      Past Medical History:          Diagnosis Date    Asthma     Pneumothorax        Past Surgical History:          Procedure Laterality Date    ABDOMEN SURGERY       SECTION      HYSTERECTOMY (CERVIX STATUS UNKNOWN)      INNER EAR SURGERY      MASTOIDECTOMY         Medications Prior to Admission:      Prior to Admission medications    Medication Sig Start Date End Date Taking?  Authorizing Provider   albuterol sulfate HFA (PROVENTIL HFA) 108 (90 Base) MCG/ACT inhaler Inhale 2-4 puffs into the lungs every 4 hours as needed for Wheezing or Shortness of Breath (Space out to every 6 hours as symptoms improve) Space out to every 6 hours as symptoms improve. 4/21/19   Sushila Simons MD   ALBUTEROL IN Inhale into the lungs    Historical Provider, MD   METHADONE HCL PO Take 60 mg by mouth daily    Historical Provider, MD       Allergies:  Penicillins    Social History:      The patient currently lives at home    TOBACCO:   reports that she has been smoking cigarettes. She has been smoking an average of .5 packs per day. She has never used smokeless tobacco.  ETOH:   reports no history of alcohol use. E-cigarette/Vaping       Questions Responses    E-cigarette/Vaping Use     Start Date     Passive Exposure     Quit Date     Counseling Given     Comments               Family History:      Reviewed and negative in regards to presenting illness/complaint. REVIEW OF SYSTEMS COMPLETED:   Pertinent positives as noted in the HPI. All other systems reviewed and negative. PHYSICAL EXAM PERFORMED:    /71   Pulse (!) 101   Temp 98 °F (36.7 °C) (Oral)   Resp 18   Ht 5' 7\" (1.702 m)   Wt 151 lb (68.5 kg)   SpO2 98%   BMI 23.65 kg/m²     General appearance:  No apparent distress, appears stated age and cooperative. HEENT:  Normal cephalic, atraumatic without obvious deformity. Pupils equal, round, and reactive to light. Extra ocular muscles intact. Conjunctivae/corneas clear. Neck: Supple, with full range of motion. No jugular venous distention. Trachea midline. Respiratory:  Normal respiratory effort. Clear to auscultation, bilaterally without Rales/Wheezes/Rhonchi. Cardiovascular:  Regular rate and rhythm with normal S1/S2 without murmurs, rubs or gallops. Abdomen: Soft, non-tender, non-distended with normal bowel sounds. Musculoskeletal:  No clubbing, cyanosis or edema bilaterally. Full range of motion without deformity.   Skin: Skin color, texture, turgor normal.  No rashes or lesions. Neurologic:  Neurovascularly intact without any focal sensory/motor deficits. Cranial nerves: II-XII intact, grossly non-focal.  Psychiatric:  Alert and oriented, thought content appropriate, normal insight  Capillary Refill: Brisk,3 seconds, normal  Peripheral Pulses: +2 palpable, equal bilaterally       Labs:     Recent Labs     08/20/22 2204   WBC 3.4*   HGB 14.1   HCT 43.5        Recent Labs     08/20/22 2204   *   K 4.1      CO2 22   BUN 16   CREATININE 0.9   CALCIUM 8.8     Recent Labs     08/20/22 2204   AST 25   ALT 14   BILITOT <0.2   ALKPHOS 61     No results for input(s): INR in the last 72 hours. Recent Labs     08/20/22 2204   TROPONINI <0.01       Urinalysis:      Lab Results   Component Value Date/Time    NITRU Negative 08/07/2016 09:00 PM    WBCUA 3-5 08/07/2016 09:00 PM    BACTERIA Rare 08/07/2016 09:00 PM    RBCUA 0-2 08/07/2016 09:00 PM    BLOODU Negative 08/07/2016 09:00 PM    SPECGRAV <1.005 08/07/2016 09:00 PM    GLUCOSEU Negative 08/07/2016 09:00 PM       Radiology:     CXR: I have reviewed the CXR with the following interpretation:   EKG:  I have reviewed the EKG with the following interpretation: Normal sinus rhythm    CT CHEST PULMONARY EMBOLISM W CONTRAST   Final Result   1. No pulmonary emboli. 2. Mild apical predominant emphysema, without acute focal infiltrate. 3. There are a couple pulmonary nodules, 1 of which measuring 6 mm in the   other measuring 8 mm in size. Follow-up recommendations as below. 4. Other incidental findings as above. RECOMMENDATIONS:   Multiple pulmonary nodules. Most severe: 8 mm right solid pulmonary nodule. Recommend a non-contrast Chest CT at 3-6 months, then another non-contrast   Chest CT at 18-24 months. These guidelines do not apply to immunocompromised patients and patients with   cancer. Follow up in patients with significant comorbidities as clinically   warranted.  For lung cancer screening, adhere to Lung-RADS guidelines. Reference: Radiology. 2017; 284(1):228-43. XR CHEST PORTABLE   Final Result   Increased density of the left mid lung laterally is probably due to a   confluence of shadows. However, would advise repeating exam with PA and lateral positioning for   confirmation. Consults:    None    ASSESSMENT and PLAN:    Acute respiratory failure with hypoxia -  Due to COPD exacerbation. She does not use home oxygen. he is currently requiring 6 L oxygen by nasal cannula to keep SPO2 greater than 90%. Continue oxygen supplementation as needed    COPD with acute exacerbation -  DuoNeb every 4 hours, albuterol nebs every 2 hours as needed and Dulera twice daily ordered. I have also started her on IV Solu-Medrol x 48 hours and then transition to oral prednisone ordered. Check poc glucose twice daily and if glucose elevated due to steroid then start sliding scale insulin. I have already started her on p.o. pantoprazole for GI prophylaxis while she is on steroids. IV azithromycin 500 mg x 3 days ordered for COPD exacerbation and possibly concurrent acute bronchitis    COVID-19 virus infection -  CT chest without pulmonary infiltrate. Contact and droplet isolation    Pulmonary nodule -  Given significant history of tobacco use encourage patient to follow-up    Tobacco use disorder -   Nicotine patch ordered. Advised to quit    Chronically on methadone -  Continue methadone 60 mg daily    DVT Prophylaxis: Lovenox   Diet: ADULT DIET; Regular  Code Status: Full Code    PT/OT Eval Status: PT/OT consult is not ordered     Dispo - Admit as inpatient        Sujatha Victoria MD    Thank you No primary care provider on file. for the opportunity to be involved in this patient's care. If you have any questions or concerns please feel free to contact me at [phone].

## 2022-08-21 NOTE — PROGRESS NOTES
Hospitalist Progress Note      PCP: No primary care provider on file. Date of Admission: 8/20/2022    Chief Complaint: SOB    Hospital Course: 66 yo F with COPD, tobacco abuse, chronic pain syndrome on Methadone came to ER with SOB. Admitted as inpatient for COVID 19 PNA with acute COPD exacerbation and acute respiratory failure with hypoxia. Started on IV Solumedrol and Zithromax. On 5 L O2 via NC, does not use home O2. Subjective: Patient is on 5 L NC. Coughing and SOB. No CP, HA or abdominal pain. Medications:  Reviewed    Infusion Medications    sodium chloride      sodium chloride      dextrose       Scheduled Medications    methadone  60 mg Oral Daily    sodium chloride flush  5-40 mL IntraVENous 2 times per day    methylPREDNISolone  40 mg IntraVENous Q6H    Followed by    Sahil Lu ON 8/23/2022] predniSONE  40 mg Oral Daily    azithromycin  500 mg IntraVENous Q24H    pantoprazole  40 mg Oral QAM AC    mometasone-formoterol  2 puff Inhalation BID    nicotine  1 patch TransDERmal Daily    ipratropium-albuterol  1 ampule Inhalation Q4H    enoxaparin  30 mg SubCUTAneous BID    insulin lispro  0-8 Units SubCUTAneous TID WC    insulin lispro  0-4 Units SubCUTAneous Nightly     PRN Meds: sodium chloride flush, sodium chloride, ondansetron **OR** ondansetron, polyethylene glycol, acetaminophen **OR** acetaminophen, albuterol, dextrose bolus **OR** dextrose bolus, glucagon (rDNA), dextrose      Intake/Output Summary (Last 24 hours) at 8/21/2022 1348  Last data filed at 8/21/2022 0856  Gross per 24 hour   Intake 240 ml   Output --   Net 240 ml       Physical Exam Performed:    /74   Pulse 95   Temp 97.7 °F (36.5 °C) (Oral)   Resp 16   Ht 5' 7\" (1.702 m)   Wt 151 lb (68.5 kg)   SpO2 93%   BMI 23.65 kg/m²     General appearance: No apparent distress, appears stated age and cooperative. HEENT: Pupils equal, round, and reactive to light. Conjunctivae/corneas clear.   Neck: Supple, with full range of motion. No jugular venous distention. Trachea midline. Respiratory:  Poor AE BL. Distant wheezing noted on R.  BL rhonchi  Cardiovascular: Regular rate and rhythm with normal S1/S2 without murmurs, rubs or gallops. Abdomen: Soft, non-tender, non-distended with normal bowel sounds. Musculoskeletal: No clubbing, cyanosis or edema bilaterally. Full range of motion without deformity. Skin: Skin color, texture, turgor normal.  No rashes or lesions. Neurologic:  Neurovascularly intact without any focal sensory/motor deficits. Cranial nerves: II-XII intact, grossly non-focal.  Psychiatric: Alert and oriented, thought content appropriate, normal insight  Capillary Refill: Brisk,3 seconds, normal   Peripheral Pulses: +2 palpable, equal bilaterally       Labs:   Recent Labs     08/20/22 2204   WBC 3.4*   HGB 14.1   HCT 43.5        Recent Labs     08/20/22 2204   *   K 4.1      CO2 22   BUN 16   CREATININE 0.9   CALCIUM 8.8     Recent Labs     08/20/22  2204   AST 25   ALT 14   BILITOT <0.2   ALKPHOS 61     No results for input(s): INR in the last 72 hours. Recent Labs     08/20/22 2204   TROPONINI <0.01       Urinalysis:      Lab Results   Component Value Date/Time    NITRU Negative 08/07/2016 09:00 PM    WBCUA 3-5 08/07/2016 09:00 PM    BACTERIA Rare 08/07/2016 09:00 PM    RBCUA 0-2 08/07/2016 09:00 PM    BLOODU Negative 08/07/2016 09:00 PM    SPECGRAV <1.005 08/07/2016 09:00 PM    GLUCOSEU Negative 08/07/2016 09:00 PM       Radiology:  CT CHEST PULMONARY EMBOLISM W CONTRAST   Final Result   1. No pulmonary emboli. 2. Mild apical predominant emphysema, without acute focal infiltrate. 3. There are a couple pulmonary nodules, 1 of which measuring 6 mm in the   other measuring 8 mm in size. Follow-up recommendations as below. 4. Other incidental findings as above. RECOMMENDATIONS:   Multiple pulmonary nodules. Most severe: 8 mm right solid pulmonary nodule.    Recommend a non-contrast Chest CT at 3-6 months, then another non-contrast   Chest CT at 18-24 months. These guidelines do not apply to immunocompromised patients and patients with   cancer. Follow up in patients with significant comorbidities as clinically   warranted. For lung cancer screening, adhere to Lung-RADS guidelines. Reference: Radiology. 2017; 284(1):228-43. XR CHEST PORTABLE   Final Result   Increased density of the left mid lung laterally is probably due to a   confluence of shadows. However, would advise repeating exam with PA and lateral positioning for   confirmation. XR ABDOMEN (KUB) (SINGLE AP VIEW)    (Results Pending)           Assessment/Plan:    Active Hospital Problems    Diagnosis     COVID-19 virus infection [U07.1]      Priority: Medium    COPD exacerbation ([address] [J44.1]      Priority: Medium    Acute respiratory failure with hypoxia (HCC) [J96.01]      Priority: Medium    Pain syndrome, chronic [G89.4]      Priority: Medium     Continue Solumedrol 40 mg IV q6h  Wean off O2 as tolerated  Droplet plus isolation  Counseled to stop smoking  Continue Dulera  Continue SSI  PT/OT eval  Continue Zithromax    DVT Prophylaxis: Lovenox  Diet: ADULT DIET; Regular  Code Status: Full Code    PT/OT Eval Status: Requested    Dispo - Home    Discussed with patient, nursing and CM. Hopefully home by Wednesday, +/- home O2.     Timur Ramires MD

## 2022-08-21 NOTE — ED PROVIDER NOTES
was satting at 79% on room air. She does not wear oxygen at home. Now that she is on 6 L satting in the 90s she is feeling much better. She denies chest pain, abdominal pain. She states she did have a mild headache but this got better after taking Aleve. She continues to smoke. Has history of asthma. Denies dysuria, hematuria, diarrhea, bloody stool, unilocular tenderness, recent trip or travel, recent surgery or any other symptoms. Nursing Notes were all reviewed and agreed with or any disagreements were addressed in the HPI. REVIEW OF SYSTEMS    (2-9 systems for level 4, 10 or more for level 5)     Review of Systems    Positives and Pertinent negatives as per HPI. Except as noted above in the ROS, all other systems were reviewed and negative. PAST MEDICAL HISTORY     Past Medical History:   Diagnosis Date    Asthma     Pneumothorax          SURGICAL HISTORY     Past Surgical History:   Procedure Laterality Date    ABDOMEN SURGERY       SECTION      HYSTERECTOMY (CERVIX STATUS UNKNOWN)      INNER EAR SURGERY      MASTOIDECTOMY           CURRENTMEDICATIONS       Previous Medications    ALBUTEROL IN    Inhale into the lungs    ALBUTEROL SULFATE HFA (PROVENTIL HFA) 108 (90 BASE) MCG/ACT INHALER    Inhale 2-4 puffs into the lungs every 4 hours as needed for Wheezing or Shortness of Breath (Space out to every 6 hours as symptoms improve) Space out to every 6 hours as symptoms improve. METHADONE HCL PO    Take 60 mg by mouth daily         ALLERGIES     Penicillins    FAMILYHISTORY     History reviewed. No pertinent family history.        SOCIAL HISTORY       Social History     Tobacco Use    Smoking status: Every Day     Packs/day: 0.50     Types: Cigarettes    Smokeless tobacco: Never   Substance Use Topics    Alcohol use: No    Drug use: No       SCREENINGS    Mechelle Coma Scale  Eye Opening: Spontaneous  Best Verbal Response: Oriented  Best Motor Response: Obeys commands  Mechelle Coma Scale Score: 15        PHYSICAL EXAM    (up to 7 for level 4, 8 or more for level 5)     ED Triage Vitals [08/20/22 2125]   BP Temp Temp Source Heart Rate Resp SpO2 Height Weight   (!) 170/75 98 °F (36.7 °C) Oral (!) 101 18 95 % 5' 7\" (1.702 m) 151 lb (68.5 kg)       Physical Exam  Vitals and nursing note reviewed. Constitutional:       Appearance: She is well-developed. She is not diaphoretic. HENT:      Head: Atraumatic. Nose: Nose normal.   Eyes:      General:         Right eye: No discharge. Left eye: No discharge. Cardiovascular:      Rate and Rhythm: Normal rate and regular rhythm. Heart sounds: No murmur heard. No friction rub. No gallop. Pulmonary:      Effort: Pulmonary effort is normal. No respiratory distress. Breath sounds: No stridor. Wheezing present. No rhonchi or rales. Comments: Decreased breath sounds and air movement with inspiration with some rhonchi and wheezing with expiration. No retractions or accessory muscle use. Abdominal:      General: Bowel sounds are normal. There is no distension. Palpations: Abdomen is soft. There is no mass. Tenderness: There is no abdominal tenderness. There is no guarding or rebound. Hernia: No hernia is present. Musculoskeletal:         General: No swelling. Normal range of motion. Cervical back: Normal range of motion. Skin:     General: Skin is warm and dry. Findings: No erythema or rash. Neurological:      Mental Status: She is alert and oriented to person, place, and time. Cranial Nerves: No cranial nerve deficit.    Psychiatric:         Behavior: Behavior normal.       DIAGNOSTIC RESULTS   LABS:    Labs Reviewed   CBC WITH AUTO DIFFERENTIAL - Abnormal; Notable for the following components:       Result Value    WBC 3.4 (*)     All other components within normal limits   COMPREHENSIVE METABOLIC PANEL - Abnormal; Notable for the following components:    Sodium 133 (*)     Glucose 127 (*)     All other components within normal limits   COVID-19, RAPID   TROPONIN   BRAIN NATRIURETIC PEPTIDE   PROCALCITONIN   SPECIMEN REJECTION    Narrative:     CALL  Reilly  [facility] tel. [phone],  Rejected Test Name lacmarline/Called to: Genesis Lomax, 08/20/2022 22:35, by [address] [place], SEPSIS   LACTATE, SEPSIS       When ordered only abnormal lab results are displayed. All other labs were within normal range or not returned as of this dictation. EKG: When ordered, EKG's are interpreted by the Emergency Department Physician in the absence of a cardiologist.  Please see their note for interpretation of EKG. RADIOLOGY:   Non-plain film images such as CT, Ultrasound and MRI are read by the radiologist. Plain radiographic images are visualized and preliminarily interpreted by the ED Provider with the below findings:        Interpretation per the Radiologist below, if available at the time of this note:    CT CHEST PULMONARY EMBOLISM W CONTRAST   Final Result   1. No pulmonary emboli. 2. Mild apical predominant emphysema, without acute focal infiltrate. 3. There are a couple pulmonary nodules, 1 of which measuring 6 mm in the   other measuring 8 mm in size. Follow-up recommendations as below. 4. Other incidental findings as above. RECOMMENDATIONS:   Multiple pulmonary nodules. Most severe: 8 mm right solid pulmonary nodule. Recommend a non-contrast Chest CT at 3-6 months, then another non-contrast   Chest CT at 18-24 months. These guidelines do not apply to immunocompromised patients and patients with   cancer. Follow up in patients with significant comorbidities as clinically   warranted. For lung cancer screening, adhere to Lung-RADS guidelines. Reference: Radiology. 2017; 284(1):228-43. XR CHEST PORTABLE   Final Result   Increased density of the left mid lung laterally is probably due to a   confluence of shadows.       However, would advise repeating exam with PA and lateral positioning for   confirmation. No results found. PROCEDURES   Unless otherwise noted below, none     Procedures    CRITICAL CARE TIME       CONSULTS:  IP CONSULT TO HOSPITALIST      EMERGENCY DEPARTMENT COURSE and DIFFERENTIAL DIAGNOSIS/MDM:   Vitals:    Vitals:    08/20/22 2125 08/20/22 2222   BP: (!) 170/75    Pulse: (!) 101    Resp: 18    Temp: 98 °F (36.7 °C)    TempSrc: Oral    SpO2: 95% 96%   Weight: 151 lb (68.5 kg)    Height: 5' 7\" (1.702 m)        Patient was given the following medications:  Medications   methylPREDNISolone sodium (SOLU-MEDROL) injection 125 mg (125 mg IntraVENous Given 8/20/22 2210)   ipratropium-albuterol (DUONEB) nebulizer solution 1 ampule (1 ampule Inhalation Given 8/20/22 2222)   albuterol (PROVENTIL) nebulizer solution 5 mg (5 mg Nebulization Given 8/20/22 2222)   iopamidol (ISOVUE-370) 76 % injection 75 mL (75 mLs IntraVENous Given 8/20/22 2322)         Is this patient to be included in the SEP-1 Core Measure due to severe sepsis or septic shock? No   Exclusion criteria - the patient is NOT to be included for SEP-1 Core Measure due to: Infection is not suspected    Patient presented with minimal air movement and wheezing and hypoxia. Does not wear oxygen at home. After multiple nebulizers has some mild improvement but still very tight with minimal air movement and expiratory wheezing bilaterally. She was slightly tachycardic. CT imaging reveals evidence of emphysema without any acute infiltrate. She does have some pulmonary nodules noted. Remainder of work-up is unremarkable. Low suspicion for pulmonary embolus, pneumothorax, bacterial pneumonia or other emergent etiology. Discussed with hospitalist for admission for COPD exacerbation and hypoxia. Patient was stable time of admission. She is received steroids here multiple nebulizers. FINAL IMPRESSION      1. Hypoxia    2.  COPD exacerbation (HCC)    3. Pulmonary nodule          DISPOSITION/PLAN   DISPOSITION

## 2022-08-21 NOTE — PROGRESS NOTES
Patient admitted to room, oriented to room and call light, denies pain, 02 75-83% on room air, paced on 4 L humidified o2 via nasal cannula, assisted to position of comfort, patient encouraged to call with needs, call light in reach, items within reach, will continue to monitor

## 2022-08-21 NOTE — RT PROTOCOL NOTE
RT Inhaler-Nebulizer Bronchodilator Protocol Note    There is a bronchodilator order in the chart from a provider indicating to follow the RT Bronchodilator Protocol and there is an Initiate RT Inhaler-Nebulizer Bronchodilator Protocol order as well (see protocol at bottom of note). CXR Findings:  XR CHEST PORTABLE    Result Date: 8/20/2022  Increased density of the left mid lung laterally is probably due to a confluence of shadows. However, would advise repeating exam with PA and lateral positioning for confirmation. The findings from the last RT Protocol Assessment were as follows:   History Pulmonary Disease: Chronic pulmonary disease  Respiratory Pattern: Mild dyspnea at rest, irregular pattern, or RR 21-25 bpm  Breath Sounds: Severe inspiratory and expiratory wheezing or severely diminished  Cough: Strong, productive  Indication for Bronchodilator Therapy: Decreased or absent breath sounds, On home bronchodilators, Wheezing associated with pulm disorder  Bronchodilator Assessment Score: 15    Aerosolized bronchodilator medication orders have been revised according to the RT Inhaler-Nebulizer Bronchodilator Protocol below. Respiratory Therapist to perform RT Therapy Protocol Assessment initially then follow the protocol. Repeat RT Therapy Protocol Assessment PRN for score 0-3 or on second treatment, BID, and PRN for scores above 3. No Indications - adjust the frequency to every 6 hours PRN wheezing or bronchospasm, if no treatments needed after 48 hours then discontinue using Per Protocol order mode. If indication present, adjust the RT bronchodilator orders based on the Bronchodilator Assessment Score as indicated below.   Use Inhaler orders unless patient has one or more of the following: on home nebulizer, not able to hold breath for 10 seconds, is not alert and oriented, cannot activate and use MDI correctly, or respiratory rate 25 breaths per minute or more, then use the equivalent nebulizer order(s) with same Frequency and PRN reasons based on the score. If a patient is on this medication at home then do not decrease Frequency below that used at home. 0-3 - enter or revise RT bronchodilator order(s) to equivalent RT Bronchodilator order with Frequency of every 4 hours PRN for wheezing or increased work of breathing using Per Protocol order mode. 4-6 - enter or revise RT Bronchodilator order(s) to two equivalent RT bronchodilator orders with one order with BID Frequency and one order with Frequency of every 4 hours PRN wheezing or increased work of breathing using Per Protocol order mode. 7-10 - enter or revise RT Bronchodilator order(s) to two equivalent RT bronchodilator orders with one order with TID Frequency and one order with Frequency of every 4 hours PRN wheezing or increased work of breathing using Per Protocol order mode. 11-13 - enter or revise RT Bronchodilator order(s) to one equivalent RT bronchodilator order with QID Frequency and an Albuterol order with Frequency of every 4 hours PRN wheezing or increased work of breathing using Per Protocol order mode. Greater than 13 - enter or revise RT Bronchodilator order(s) to one equivalent RT bronchodilator order with every 4 hours Frequency and an Albuterol order with Frequency of every 2 hours PRN wheezing or increased work of breathing using Per Protocol order mode. RT to enter RT Home Evaluation for COPD & MDI Assessment order using Per Protocol order mode.     Electronically signed by Sergio Zaldivar RCP on 8/21/2022 at 4:44 AM

## 2022-08-22 LAB
ANION GAP SERPL CALCULATED.3IONS-SCNC: 9 MMOL/L (ref 3–16)
BASOPHILS ABSOLUTE: 0 K/UL (ref 0–0.2)
BASOPHILS RELATIVE PERCENT: 0.2 %
BUN BLDV-MCNC: 15 MG/DL (ref 7–20)
C-REACTIVE PROTEIN: <3 MG/L (ref 0–5.1)
CALCIUM SERPL-MCNC: 8.9 MG/DL (ref 8.3–10.6)
CHLORIDE BLD-SCNC: 102 MMOL/L (ref 99–110)
CO2: 25 MMOL/L (ref 21–32)
CREAT SERPL-MCNC: 0.5 MG/DL (ref 0.6–1.1)
D DIMER: <0.27 UG/ML FEU (ref 0–0.6)
EOSINOPHILS ABSOLUTE: 0 K/UL (ref 0–0.6)
EOSINOPHILS RELATIVE PERCENT: 0 %
ESTIMATED AVERAGE GLUCOSE: 122.6 MG/DL
GFR AFRICAN AMERICAN: >60
GFR NON-AFRICAN AMERICAN: >60
GLUCOSE BLD-MCNC: 131 MG/DL (ref 70–99)
GLUCOSE BLD-MCNC: 143 MG/DL (ref 70–99)
GLUCOSE BLD-MCNC: 145 MG/DL (ref 70–99)
GLUCOSE BLD-MCNC: 187 MG/DL (ref 70–99)
GLUCOSE BLD-MCNC: 230 MG/DL (ref 70–99)
HBA1C MFR BLD: 5.9 %
HCT VFR BLD CALC: 41.8 % (ref 36–48)
HEMOGLOBIN: 13.6 G/DL (ref 12–16)
LYMPHOCYTES ABSOLUTE: 0.6 K/UL (ref 1–5.1)
LYMPHOCYTES RELATIVE PERCENT: 6.8 %
MCH RBC QN AUTO: 29.3 PG (ref 26–34)
MCHC RBC AUTO-ENTMCNC: 32.4 G/DL (ref 31–36)
MCV RBC AUTO: 90.3 FL (ref 80–100)
MONOCYTES ABSOLUTE: 0.3 K/UL (ref 0–1.3)
MONOCYTES RELATIVE PERCENT: 3 %
NEUTROPHILS ABSOLUTE: 7.8 K/UL (ref 1.7–7.7)
NEUTROPHILS RELATIVE PERCENT: 90 %
PDW BLD-RTO: 13.9 % (ref 12.4–15.4)
PERFORMED ON: ABNORMAL
PLATELET # BLD: 132 K/UL (ref 135–450)
PMV BLD AUTO: 9.3 FL (ref 5–10.5)
POTASSIUM REFLEX MAGNESIUM: 4.8 MMOL/L (ref 3.5–5.1)
PROCALCITONIN: 0.03 NG/ML (ref 0–0.15)
RBC # BLD: 4.63 M/UL (ref 4–5.2)
SODIUM BLD-SCNC: 136 MMOL/L (ref 136–145)
WBC # BLD: 8.7 K/UL (ref 4–11)

## 2022-08-22 PROCEDURE — 1200000000 HC SEMI PRIVATE

## 2022-08-22 PROCEDURE — 86140 C-REACTIVE PROTEIN: CPT

## 2022-08-22 PROCEDURE — 97535 SELF CARE MNGMENT TRAINING: CPT

## 2022-08-22 PROCEDURE — 6360000002 HC RX W HCPCS: Performed by: INTERNAL MEDICINE

## 2022-08-22 PROCEDURE — 2580000003 HC RX 258: Performed by: INTERNAL MEDICINE

## 2022-08-22 PROCEDURE — 6370000000 HC RX 637 (ALT 250 FOR IP): Performed by: INTERNAL MEDICINE

## 2022-08-22 PROCEDURE — 97165 OT EVAL LOW COMPLEX 30 MIN: CPT

## 2022-08-22 PROCEDURE — 97530 THERAPEUTIC ACTIVITIES: CPT

## 2022-08-22 PROCEDURE — 2700000000 HC OXYGEN THERAPY PER DAY

## 2022-08-22 PROCEDURE — 97116 GAIT TRAINING THERAPY: CPT

## 2022-08-22 PROCEDURE — 80048 BASIC METABOLIC PNL TOTAL CA: CPT

## 2022-08-22 PROCEDURE — 36415 COLL VENOUS BLD VENIPUNCTURE: CPT

## 2022-08-22 PROCEDURE — 94761 N-INVAS EAR/PLS OXIMETRY MLT: CPT

## 2022-08-22 PROCEDURE — 85025 COMPLETE CBC W/AUTO DIFF WBC: CPT

## 2022-08-22 PROCEDURE — 84145 PROCALCITONIN (PCT): CPT

## 2022-08-22 PROCEDURE — 94640 AIRWAY INHALATION TREATMENT: CPT

## 2022-08-22 PROCEDURE — 85379 FIBRIN DEGRADATION QUANT: CPT

## 2022-08-22 PROCEDURE — 97161 PT EVAL LOW COMPLEX 20 MIN: CPT

## 2022-08-22 RX ORDER — IPRATROPIUM BROMIDE AND ALBUTEROL SULFATE 2.5; .5 MG/3ML; MG/3ML
1 SOLUTION RESPIRATORY (INHALATION) 4 TIMES DAILY
Status: DISCONTINUED | OUTPATIENT
Start: 2022-08-22 | End: 2022-08-24 | Stop reason: HOSPADM

## 2022-08-22 RX ADMIN — IPRATROPIUM BROMIDE AND ALBUTEROL SULFATE 1 AMPULE: 2.5; .5 SOLUTION RESPIRATORY (INHALATION) at 08:27

## 2022-08-22 RX ADMIN — IPRATROPIUM BROMIDE AND ALBUTEROL SULFATE 1 AMPULE: 2.5; .5 SOLUTION RESPIRATORY (INHALATION) at 04:22

## 2022-08-22 RX ADMIN — Medication 2 PUFF: at 20:56

## 2022-08-22 RX ADMIN — IPRATROPIUM BROMIDE AND ALBUTEROL SULFATE 1 AMPULE: 2.5; .5 SOLUTION RESPIRATORY (INHALATION) at 12:25

## 2022-08-22 RX ADMIN — ENOXAPARIN SODIUM 30 MG: 100 INJECTION SUBCUTANEOUS at 09:27

## 2022-08-22 RX ADMIN — IPRATROPIUM BROMIDE AND ALBUTEROL SULFATE 1 AMPULE: 2.5; .5 SOLUTION RESPIRATORY (INHALATION) at 16:06

## 2022-08-22 RX ADMIN — IPRATROPIUM BROMIDE AND ALBUTEROL SULFATE 1 AMPULE: 2.5; .5 SOLUTION RESPIRATORY (INHALATION) at 20:55

## 2022-08-22 RX ADMIN — METHYLPREDNISOLONE SODIUM SUCCINATE 40 MG: 40 INJECTION, POWDER, FOR SOLUTION INTRAMUSCULAR; INTRAVENOUS at 21:52

## 2022-08-22 RX ADMIN — METHYLPREDNISOLONE SODIUM SUCCINATE 40 MG: 40 INJECTION, POWDER, FOR SOLUTION INTRAMUSCULAR; INTRAVENOUS at 09:28

## 2022-08-22 RX ADMIN — INSULIN LISPRO 2 UNITS: 100 INJECTION, SOLUTION INTRAVENOUS; SUBCUTANEOUS at 09:42

## 2022-08-22 RX ADMIN — METHYLPREDNISOLONE SODIUM SUCCINATE 40 MG: 40 INJECTION, POWDER, FOR SOLUTION INTRAMUSCULAR; INTRAVENOUS at 06:11

## 2022-08-22 RX ADMIN — METHADONE HYDROCHLORIDE 60 MG: 10 CONCENTRATE ORAL at 10:11

## 2022-08-22 RX ADMIN — PANTOPRAZOLE SODIUM 40 MG: 40 TABLET, DELAYED RELEASE ORAL at 06:11

## 2022-08-22 RX ADMIN — ENOXAPARIN SODIUM 30 MG: 100 INJECTION SUBCUTANEOUS at 21:51

## 2022-08-22 RX ADMIN — SODIUM CHLORIDE, PRESERVATIVE FREE 10 ML: 5 INJECTION INTRAVENOUS at 21:52

## 2022-08-22 RX ADMIN — AZITHROMYCIN MONOHYDRATE 500 MG: 500 INJECTION, POWDER, LYOPHILIZED, FOR SOLUTION INTRAVENOUS at 06:11

## 2022-08-22 RX ADMIN — Medication 2 PUFF: at 08:29

## 2022-08-22 RX ADMIN — IPRATROPIUM BROMIDE AND ALBUTEROL SULFATE 1 AMPULE: 2.5; .5 SOLUTION RESPIRATORY (INHALATION) at 00:26

## 2022-08-22 RX ADMIN — METHYLPREDNISOLONE SODIUM SUCCINATE 40 MG: 40 INJECTION, POWDER, FOR SOLUTION INTRAMUSCULAR; INTRAVENOUS at 16:53

## 2022-08-22 ASSESSMENT — PAIN SCALES - GENERAL: PAINLEVEL_OUTOF10: 0

## 2022-08-22 NOTE — RT PROTOCOL NOTE
RT Inhaler-Nebulizer Bronchodilator Protocol Note    There is a bronchodilator order in the chart from a provider indicating to follow the RT Bronchodilator Protocol and there is an Initiate RT Inhaler-Nebulizer Bronchodilator Protocol order as well (see protocol at bottom of note). CXR Findings:  XR CHEST PORTABLE    Result Date: 8/20/2022  Increased density of the left mid lung laterally is probably due to a confluence of shadows. However, would advise repeating exam with PA and lateral positioning for confirmation. The findings from the last RT Protocol Assessment were as follows:   History Pulmonary Disease: Chronic pulmonary disease  Respiratory Pattern: Dyspnea on exertion or RR 21-25 bpm  Breath Sounds: Inspiratory and expiratory or bilateral wheezing and/or rhonchi  Cough: Strong, productive  Indication for Bronchodilator Therapy: Wheezing associated with pulm disorder  Bronchodilator Assessment Score: 11    Aerosolized bronchodilator medication orders have been revised according to the RT Inhaler-Nebulizer Bronchodilator Protocol below. Respiratory Therapist to perform RT Therapy Protocol Assessment initially then follow the protocol. Repeat RT Therapy Protocol Assessment PRN for score 0-3 or on second treatment, BID, and PRN for scores above 3. No Indications - adjust the frequency to every 6 hours PRN wheezing or bronchospasm, if no treatments needed after 48 hours then discontinue using Per Protocol order mode. If indication present, adjust the RT bronchodilator orders based on the Bronchodilator Assessment Score as indicated below. Use Inhaler orders unless patient has one or more of the following: on home nebulizer, not able to hold breath for 10 seconds, is not alert and oriented, cannot activate and use MDI correctly, or respiratory rate 25 breaths per minute or more, then use the equivalent nebulizer order(s) with same Frequency and PRN reasons based on the score.   If a patient is on this medication at home then do not decrease Frequency below that used at home. 0-3 - enter or revise RT bronchodilator order(s) to equivalent RT Bronchodilator order with Frequency of every 4 hours PRN for wheezing or increased work of breathing using Per Protocol order mode. 4-6 - enter or revise RT Bronchodilator order(s) to two equivalent RT bronchodilator orders with one order with BID Frequency and one order with Frequency of every 4 hours PRN wheezing or increased work of breathing using Per Protocol order mode. 7-10 - enter or revise RT Bronchodilator order(s) to two equivalent RT bronchodilator orders with one order with TID Frequency and one order with Frequency of every 4 hours PRN wheezing or increased work of breathing using Per Protocol order mode. 11-13 - enter or revise RT Bronchodilator order(s) to one equivalent RT bronchodilator order with QID Frequency and an Albuterol order with Frequency of every 4 hours PRN wheezing or increased work of breathing using Per Protocol order mode. Greater than 13 - enter or revise RT Bronchodilator order(s) to one equivalent RT bronchodilator order with every 4 hours Frequency and an Albuterol order with Frequency of every 2 hours PRN wheezing or increased work of breathing using Per Protocol order mode. RT to enter RT Home Evaluation for COPD & MDI Assessment order using Per Protocol order mode.     Electronically signed by Bettina Zheng RCP on 8/22/2022 at 8:36 AM

## 2022-08-22 NOTE — PROGRESS NOTES
Shift assessment complete see flow sheets meds per orders see eMAR. Patient alert and oriented, still on 3.5 L of O2, cont pulse ox. The care plan and education has been reviewed and mutually agreed upon with the patient.        Electronically signed by Bogdan Encarnacion RN on 8/21/2022 at 11:44 PM

## 2022-08-22 NOTE — PROGRESS NOTES
Physician Progress Note      PATIENTDannluis angel Me  CSN #:                  [id number]  :                       1965  ADMIT DATE:       2022 9:19 PM  DISCH DATE:  RESPONDING  PROVIDER #:        Johan Puente MD          QUERY TEXT:    Pt admitted with COVID-19 and noted to have tachycardia, low WBC, PNA and   Respiratory failure. If possible, please document in progress notes and/or   discharge summary if you are evaluating and/or treating: The medical record reflects the following:  Risk Factors: COVID 19 +, presenting with tachycardia and low WBC  Clinical Indicators: , WBC 3.4 on admit, Diagnosed with COVID 19 and   COVID PNA, Diagnosed with acute respiratory failure with hypoxia. Treatment: OV Fluids, IV Zithromax, IV steroids, O2 per NC  Options provided:  -- Sepsis present on admission due to COVID-19 infection  -- Sepsis present on admission due to COVID-19 pneumonia  -- Covid-19 infection without sepsis  -- Covid-19 pneumonia without sepsis  -- Other - I will add my own diagnosis  -- Disagree - Not applicable / Not valid  -- Disagree - Clinically unable to determine / Unknown  -- Refer to Clinical Documentation Reviewer    PROVIDER RESPONSE TEXT:    This patient has sepsis which was present on admission due to COVID-19   infection.     Query created by: Christin Curry on 2022 3:58 PM      Electronically signed by:  Johan Puente MD 2022 7:22 PM

## 2022-08-22 NOTE — PROGRESS NOTES
[facility] [address]   Phone: [phone]    Physical Therapy    [x] Initial Evaluation            [] Daily Treatment Note         [x] Discharge Summary      Patient: Yovani Wakefield   : 1965   MRN: [id number]   Date of Service:  2022  Admitting Diagnosis: COPD exacerbation [facility])  Current Admission Summary: 66 yo female admitted to [facility] on 22 for increased SOB. Found to be COVID +. Hypoxic and requiring increased supplemental O2 up to 6L at admission. Past Medical History:  has a past medical history of Asthma and Pneumothorax. Past Surgical History:  has a past surgical history that includes Hysterectomy;  section; Inner ear surgery; mastoidectomy; and Abdomen surgery. Discharge Recommendations: Yovani Wakefield scored a 24/24 on the AM-PAC short mobility form. At this time, no further PT is recommended upon discharge due to pt functioning near baseline. Recommend patient returns to prior setting with prior services. DME Required For Discharge: no DME required at discharge    Precautions/Restrictions: low fall risk, Isolation precautions, .   Comment: droplet plus (COVID+)  Weight Bearing Restrictions: no restrictions  [] Right Upper Extremity  [] Left Upper Extremity [] Right Lower Extremity  [] Left Lower Extremity     Required Braces/Orthotics: no braces required   [] Right  [] Left  Positional Restrictions:no positional restrictions    Pre-Admission Information   Lives With: alone                     Type of Home: house  Home Layout: one level  Home Access: level entry  Bathroom Layout: tub/shower unit  Bathroom Equipment: grab bars in shower, grab bars around toilet  Toilet Height: standard height  Home Equipment: no prior equipment  Transfer Assistance: Independent without use of device  Ambulation Assistance:Independent without use of device  ADL Assistance: independent with all ADL's  IADL Assistance: independent with homemaking tasks  Active :        [x] Yes                 [] No  Hand Dominance: [] Left                 [] Right  Current Employment: part time employment. 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 States she just went down to the gift shop to buy [employer]. Patient educated on need to stay in her room for isolation. Pain: 0/10  Pain Interventions: not applicable       Functional Mobility  Bed Mobility  Supine to Sit: modified independent  Sit to Supine: modified independent  Scooting: Independent  Comments: HOB elevated  Transfers  Sit to stand transfer: Independent  Stand to sit transfer: Independent  Toilet transfer: Independent  Comments: Multiple sit<>stand trials from EOB and 1 trial from shower chair. Ambulation  Surface:level surface  Other Appliance: supplemental O2, 4L  Assistance: Independent  Distance: 15 ft (without O2) + 75 ft + 25 ft  Gait Mechanics: None  Comments:  Pt manages O2 line independently  Stair Mobility  Stair mobility not completed on this date. Comments:  Wheelchair Mobility:  No w/c mobility completed on this date.   Comments:  Balance  Static Sitting Balance: good(+): independent with high level dynamic balance in unsupported position  Dynamic Sitting Balance: good(+): independent with high level dynamic balance in unsupported position  Static Standing Balance: good(+): independent with high level dynamic balance in unsupported position  Dynamic Standing Balance: good(+): independent with high level dynamic balance in unsupported position  Comments:    Other Therapeutic Interventions  Pt completed shower, toileting, and dressing independently. Seated exercises for rib mobility and lung capacity: Shoulder flexion (B) in a \"Y\" with coordinated inhale/exhale x10, mild forward lean with arms forward at 90 deg completing unilateral shoulder abduction with trunk rotation to one side with coordinated inhale/exhale x10    Functional Outcomes  AM-PAC Inpatient Mobility Raw Score : 24              Cognition  WFL  Orientation:    alert and oriented x 4  Command Following:   [facility]    Education  Barriers To Learning: none (pt has financial concerns)  Patient Education: patient educated on goals, PT role and benefits, plan of care, general safety, energy conservation, disease specific education, discharge recommendations  Learning Assessment:  patient verbalizes and demonstrates understanding    Assessment  Activity Tolerance: SpO2 82% on RA upon therapist arrival (pt had O2 off for at least 45 minutes per her report). SpO2 improved to 93% on 4L with ~5 minutes of seated rest and cues for PLB. SpO2 91% on 4L with ambulation in room. SpO2 88% on 4L after showering, requiring ~1 minute to recover. Pt educated on importance of monitoring O2 at home. RN updated. Impairments Requiring Therapeutic Intervention: none - eval with same day discharge  Prognosis: good  Clinical Assessment: Pt is a 64 yo female admitted to [facility] for increased SOB found to be COVID+. At this time the patient requires increased supplemental O2 from baseline in order to maintain SpO2 >85% with ambulation. The patient recovers within a few minutes with seated rest and demonstrates good understanding of PLB. The patient is independent with all functional mobility and manages her own O2 line well. She verbalized understanding of all education provided and presents with no further acute PT needs at this time. Will discharge from caseload.   Safety Interventions: patient left in bed, call light within reach, and nurse notified    Plan  Frequency: Eval with same day discharge. No follow up required. Current Treatment Recommendations: not applicable, evaluation completed with same day discharge. Goals  Patient Goals: Go home soon   Short Term Goals:    Patient eval with same day discharge. No goals set as patient is at baseline mobility status.       Therapy Session Time      Individual Group Co-treatment   Time In     1205   Time Out     1545   Minutes     53     Timed Code Treatment Minutes:  38 Minutes  Total Treatment Minutes:  53 minutes       Electronically Signed By: BILL Colemanor, PT, DPT #[id number]

## 2022-08-22 NOTE — PROGRESS NOTES
Pt resting awake in bed. RT in previously for breathing treatment. Now on 3 liters 02. States breathing has become easier but still presents with wheezing to right> left. Able to feed self and ambulating independently in room. Pt is axox4 and able to answer questions and follow commands throughout assessment. Will continue to monitor.

## 2022-08-22 NOTE — PROGRESS NOTES
[facility] [address]   Phone: [phone]    Occupational Therapy    [x] Initial Evaluation            [] Daily Treatment Note         [x] Discharge Summary      Patient: Mya Garcia   : 1965   MRN: [id number]   Date of Service:  2022    Admitting Diagnosis:  COPD exacerbation [facility])  Current Admission Summary: 66 yo female admitted to [facility] on 22 for increased SOB. Found to be COVID +. Hypoxic and requiring increased supplemental O2 up to 6L at admission. Past Medical History:  has a past medical history of Asthma and Pneumothorax. Past Surgical History:  has a past surgical history that includes Hysterectomy;  section; Inner ear surgery; mastoidectomy; and Abdomen surgery. Discharge Recommendations: Mya Garcia scored a 24/24 on the AM-PAC ADL Inpatient form. At this time, no further OT is recommended upon discharge due to no OT needs at this time. Recommend patient returns to prior setting with prior services.        DME Required For Discharge: no DME required at discharge    Precautions/Restrictions: no restrictions  Weight Bearing Restrictions: no restrictions  [] Right Upper Extremity  [] Left Upper Extremity [] Right Lower Extremity  [] Left Lower Extremity     Required Braces/Orthotics: no braces required   [] Right  [] Left  Positional Restrictions:no positional restrictions    Pre-Admission Information   Lives With: alone    Type of Home: house  Home Layout: one level  Home Access: level entry  Bathroom Layout: tub/shower unit  Bathroom Equipment: grab bars in shower, grab bars around toilet  Toilet Height: standard height  Home Equipment: no prior equipment  Transfer Assistance: Independent without use of device  Ambulation Assistance:Independent without use of device  ADL Assistance: independent with all ADL's  IADL Assistance: independent with homemaking tasks  Active :        [x] Yes  [] No  Hand Dominance: [] Left  [] incorporate in ADL/IADL. Functional Outcomes  AM-PAC Inpatient Daily Activity Raw Score: 24    Cognition  WFL  Safety Judgement: decreased awareness of need for safety  Insights: decreased awareness of deficits  Orientation:    alert and oriented x 4  Command Following:   [facility]     Education  Barriers To Learning: none  Patient Education: patient educated on OT role and benefits, energy conservation  Learning Assessment:  patient verbalizes and demonstrates understanding    Assessment  Activity Tolerance: fair; pt ambulating in room without O2- SpO2 82%, 2.5 L NC donned and recovers to 94% after ~3minute seated rest break. Pt ambulates ~40ft in room with O2 and SpO2 92%. Educated pt on wearing O2 in shower, post shower SpO2 88% recovering to 90% with rest break/PLB. Impairments Requiring Therapeutic Intervention: none - eval with same day discharge  Prognosis: good  Clinical Assessment: Patient presents at baseline functional status. Pt noted to destat with ambulation, provided education on supplemental oxygen and energy conservation with ADL/IADL activities, pt repotrs good understanding. No further OT needs at this time  Safety Interventions: patient left in bed, call light within reach, and nurse notified    Plan  Frequency: Eval with same day discharge. No follow up required. Current Treatment Recommendations: not applicable, evaluation completed with same day discharge. Goals  Patient Goals: n/a   Short Term Goals:  Time Frame: n/a  Patient eval with same day discharge. No goals set as patient is at baseline status.     Therapy Session Time     Individual Group Co-treatment   Time In    5318   Time Out    1545   Minutes    53        Timed Code Treatment Minutes:   38  Total Treatment Minutes:  48       Electronically Signed By: Yolande Romero OT

## 2022-08-22 NOTE — PROGRESS NOTES
Hospitalist Progress Note      PCP: No primary care provider on file. Date of Admission: 8/20/2022    Chief Complaint: SOB    Hospital Course: 66 yo F with COPD, tobacco abuse, chronic pain syndrome on Methadone came to ER with SOB. Admitted as inpatient for COVID 19 PNA with acute COPD exacerbation and acute respiratory failure with hypoxia. Started on IV Solumedrol and Zithromax. On 5 L O2 via NC, does not use home O2. Subjective: Patient is on 3.5 L NC. Still coughing and a little less SOB. No CP, HA or abdominal pain. Medications:  Reviewed    Infusion Medications    sodium chloride      dextrose       Scheduled Medications    methadone  60 mg Oral Daily    sodium chloride flush  5-40 mL IntraVENous 2 times per day    methylPREDNISolone  40 mg IntraVENous Q6H    Followed by    Chester Acevedo ON 8/23/2022] predniSONE  40 mg Oral Daily    azithromycin  500 mg IntraVENous Q24H    pantoprazole  40 mg Oral QAM AC    mometasone-formoterol  2 puff Inhalation BID    nicotine  1 patch TransDERmal Daily    ipratropium-albuterol  1 ampule Inhalation Q4H    enoxaparin  30 mg SubCUTAneous BID    insulin lispro  0-8 Units SubCUTAneous TID WC    insulin lispro  0-4 Units SubCUTAneous Nightly     PRN Meds: sodium chloride flush, sodium chloride, ondansetron **OR** ondansetron, polyethylene glycol, acetaminophen **OR** acetaminophen, albuterol, dextrose bolus **OR** dextrose bolus, glucagon (rDNA), dextrose      Intake/Output Summary (Last 24 hours) at 8/22/2022 0808  Last data filed at 8/21/2022 0856  Gross per 24 hour   Intake 240 ml   Output --   Net 240 ml         Physical Exam Performed:    BP (!) 108/55   Pulse 85   Temp 98 °F (36.7 °C) (Oral)   Resp 16   Ht 5' 7\" (1.702 m)   Wt 151 lb (68.5 kg)   SpO2 96%   BMI 23.65 kg/m²     General appearance: No apparent distress, appears stated age and cooperative. HEENT: Pupils equal, round, and reactive to light. Conjunctivae/corneas clear.   Neck: Supple, with full range of motion. No jugular venous distention. Trachea midline. Respiratory:  Poor AE BL. Distant wheezing BL. BL rhonchi  Cardiovascular: Regular rate and rhythm with normal S1/S2 without murmurs, rubs or gallops. Abdomen: Soft, non-tender, non-distended with normal bowel sounds. Musculoskeletal: No clubbing, cyanosis or edema bilaterally. Full range of motion without deformity. Skin: Skin color, texture, turgor normal.  No rashes or lesions. Neurologic:  Neurovascularly intact without any focal sensory/motor deficits. Cranial nerves: II-XII intact, grossly non-focal.  Psychiatric: Alert and oriented, thought content appropriate, normal insight  Capillary Refill: Brisk,3 seconds, normal   Peripheral Pulses: +2 palpable, equal bilaterally       Labs:   Recent Labs     08/20/22 2204 08/22/22  0502   WBC 3.4* 8.7   HGB 14.1 13.6   HCT 43.5 41.8    132*       Recent Labs     08/20/22 2204 08/22/22  0502   * 136   K 4.1 4.8    102   CO2 22 25   BUN 16 15   CREATININE 0.9 0.5*   CALCIUM 8.8 8.9       Recent Labs     08/20/22  2204   AST 25   ALT 14   BILITOT <0.2   ALKPHOS 61       No results for input(s): INR in the last 72 hours. Recent Labs     08/20/22 2204   TROPONINI <0.01         Urinalysis:      Lab Results   Component Value Date/Time    NITRU Negative 08/07/2016 09:00 PM    WBCUA 3-5 08/07/2016 09:00 PM    BACTERIA Rare 08/07/2016 09:00 PM    RBCUA 0-2 08/07/2016 09:00 PM    BLOODU Negative 08/07/2016 09:00 PM    SPECGRAV <1.005 08/07/2016 09:00 PM    GLUCOSEU Negative 08/07/2016 09:00 PM       Radiology:  XR ABDOMEN (KUB) (SINGLE AP VIEW)   Final Result   Moderate stool burden throughout the colon      No significant distention of bowel loops         CT CHEST PULMONARY EMBOLISM W CONTRAST   Final Result   1. No pulmonary emboli. 2. Mild apical predominant emphysema, without acute focal infiltrate.    3. There are a couple pulmonary nodules, 1 of which measuring 6 mm in the   other measuring 8 mm in size. Follow-up recommendations as below. 4. Other incidental findings as above. RECOMMENDATIONS:   Multiple pulmonary nodules. Most severe: 8 mm right solid pulmonary nodule. Recommend a non-contrast Chest CT at 3-6 months, then another non-contrast   Chest CT at 18-24 months. These guidelines do not apply to immunocompromised patients and patients with   cancer. Follow up in patients with significant comorbidities as clinically   warranted. For lung cancer screening, adhere to Lung-RADS guidelines. Reference: Radiology. 2017; 284(1):228-43. XR CHEST PORTABLE   Final Result   Increased density of the left mid lung laterally is probably due to a   confluence of shadows. However, would advise repeating exam with PA and lateral positioning for   confirmation. Assessment/Plan:    Active Hospital Problems    Diagnosis     COVID-19 virus infection [U07.1]      Priority: Medium    COPD exacerbation ([address] [J44.1]      Priority: Medium    Acute respiratory failure with hypoxia (HCC) [J96.01]      Priority: Medium    Pain syndrome, chronic [G89.4]      Priority: Medium     Continue Solumedrol 40 mg IV q6h  Wean off O2 as tolerated  Droplet plus isolation  Counseled to stop smoking  Continue Dulera  Continue SSI  PT/OT eval  Continue Zithromax    DVT Prophylaxis: Lovenox  Diet: ADULT DIET; Regular  Code Status: Full Code    PT/OT Eval Status: Requested    Dispo - Home    Discussed with patient and nursing. Likely slow improvement. May need home O2 upon DC.     Radha Preston MD

## 2022-08-22 NOTE — PROGRESS NOTES
08/22/22 0835   RT Protocol   History Pulmonary Disease 2   Respiratory pattern 2   Breath sounds 6   Cough 1   Indications for Bronchodilator Therapy Wheezing associated with pulm disorder   Bronchodilator Assessment Score 11

## 2022-08-23 LAB
ANION GAP SERPL CALCULATED.3IONS-SCNC: 7 MMOL/L (ref 3–16)
BASOPHILS ABSOLUTE: 0 K/UL (ref 0–0.2)
BASOPHILS RELATIVE PERCENT: 0.5 %
BUN BLDV-MCNC: 19 MG/DL (ref 7–20)
C-REACTIVE PROTEIN: <3 MG/L (ref 0–5.1)
CALCIUM SERPL-MCNC: 8.9 MG/DL (ref 8.3–10.6)
CHLORIDE BLD-SCNC: 100 MMOL/L (ref 99–110)
CO2: 30 MMOL/L (ref 21–32)
CREAT SERPL-MCNC: 0.6 MG/DL (ref 0.6–1.1)
D DIMER: <0.27 UG/ML FEU (ref 0–0.6)
EOSINOPHILS ABSOLUTE: 0 K/UL (ref 0–0.6)
EOSINOPHILS RELATIVE PERCENT: 0 %
GFR AFRICAN AMERICAN: >60
GFR NON-AFRICAN AMERICAN: >60
GLUCOSE BLD-MCNC: 105 MG/DL (ref 70–99)
GLUCOSE BLD-MCNC: 110 MG/DL (ref 70–99)
GLUCOSE BLD-MCNC: 115 MG/DL (ref 70–99)
GLUCOSE BLD-MCNC: 134 MG/DL (ref 70–99)
GLUCOSE BLD-MCNC: 217 MG/DL (ref 70–99)
HCT VFR BLD CALC: 42.1 % (ref 36–48)
HEMOGLOBIN: 13.4 G/DL (ref 12–16)
LYMPHOCYTES ABSOLUTE: 0.7 K/UL (ref 1–5.1)
LYMPHOCYTES RELATIVE PERCENT: 6.9 %
MCH RBC QN AUTO: 28.9 PG (ref 26–34)
MCHC RBC AUTO-ENTMCNC: 31.8 G/DL (ref 31–36)
MCV RBC AUTO: 90.9 FL (ref 80–100)
MONOCYTES ABSOLUTE: 0.3 K/UL (ref 0–1.3)
MONOCYTES RELATIVE PERCENT: 3.1 %
NEUTROPHILS ABSOLUTE: 9.1 K/UL (ref 1.7–7.7)
NEUTROPHILS RELATIVE PERCENT: 89.5 %
PDW BLD-RTO: 14.4 % (ref 12.4–15.4)
PERFORMED ON: ABNORMAL
PLATELET # BLD: 126 K/UL (ref 135–450)
PMV BLD AUTO: 8.9 FL (ref 5–10.5)
POTASSIUM REFLEX MAGNESIUM: 4.2 MMOL/L (ref 3.5–5.1)
RBC # BLD: 4.63 M/UL (ref 4–5.2)
SODIUM BLD-SCNC: 137 MMOL/L (ref 136–145)
WBC # BLD: 10.2 K/UL (ref 4–11)

## 2022-08-23 PROCEDURE — 2700000000 HC OXYGEN THERAPY PER DAY

## 2022-08-23 PROCEDURE — 6360000002 HC RX W HCPCS: Performed by: INTERNAL MEDICINE

## 2022-08-23 PROCEDURE — 2580000003 HC RX 258: Performed by: INTERNAL MEDICINE

## 2022-08-23 PROCEDURE — 36415 COLL VENOUS BLD VENIPUNCTURE: CPT

## 2022-08-23 PROCEDURE — 6370000000 HC RX 637 (ALT 250 FOR IP): Performed by: INTERNAL MEDICINE

## 2022-08-23 PROCEDURE — 85379 FIBRIN DEGRADATION QUANT: CPT

## 2022-08-23 PROCEDURE — 80048 BASIC METABOLIC PNL TOTAL CA: CPT

## 2022-08-23 PROCEDURE — 86140 C-REACTIVE PROTEIN: CPT

## 2022-08-23 PROCEDURE — 1200000000 HC SEMI PRIVATE

## 2022-08-23 PROCEDURE — 94640 AIRWAY INHALATION TREATMENT: CPT

## 2022-08-23 PROCEDURE — 85025 COMPLETE CBC W/AUTO DIFF WBC: CPT

## 2022-08-23 PROCEDURE — 94761 N-INVAS EAR/PLS OXIMETRY MLT: CPT

## 2022-08-23 RX ORDER — GUAIFENESIN/DEXTROMETHORPHAN 100-10MG/5
5 SYRUP ORAL EVERY 4 HOURS PRN
Status: DISCONTINUED | OUTPATIENT
Start: 2022-08-23 | End: 2022-08-24 | Stop reason: HOSPADM

## 2022-08-23 RX ADMIN — IPRATROPIUM BROMIDE AND ALBUTEROL SULFATE 1 AMPULE: 2.5; .5 SOLUTION RESPIRATORY (INHALATION) at 08:35

## 2022-08-23 RX ADMIN — ENOXAPARIN SODIUM 30 MG: 100 INJECTION SUBCUTANEOUS at 21:46

## 2022-08-23 RX ADMIN — IPRATROPIUM BROMIDE AND ALBUTEROL SULFATE 1 AMPULE: 2.5; .5 SOLUTION RESPIRATORY (INHALATION) at 16:37

## 2022-08-23 RX ADMIN — IPRATROPIUM BROMIDE AND ALBUTEROL SULFATE 1 AMPULE: 2.5; .5 SOLUTION RESPIRATORY (INHALATION) at 19:36

## 2022-08-23 RX ADMIN — SODIUM CHLORIDE, PRESERVATIVE FREE 10 ML: 5 INJECTION INTRAVENOUS at 09:23

## 2022-08-23 RX ADMIN — ENOXAPARIN SODIUM 30 MG: 100 INJECTION SUBCUTANEOUS at 09:22

## 2022-08-23 RX ADMIN — METHADONE HYDROCHLORIDE 60 MG: 10 CONCENTRATE ORAL at 10:11

## 2022-08-23 RX ADMIN — Medication 2 PUFF: at 19:36

## 2022-08-23 RX ADMIN — AZITHROMYCIN MONOHYDRATE 500 MG: 500 INJECTION, POWDER, LYOPHILIZED, FOR SOLUTION INTRAVENOUS at 05:17

## 2022-08-23 RX ADMIN — PREDNISONE 40 MG: 20 TABLET ORAL at 09:22

## 2022-08-23 RX ADMIN — Medication 2 PUFF: at 08:44

## 2022-08-23 RX ADMIN — IPRATROPIUM BROMIDE AND ALBUTEROL SULFATE 1 AMPULE: 2.5; .5 SOLUTION RESPIRATORY (INHALATION) at 12:46

## 2022-08-23 RX ADMIN — SODIUM CHLORIDE, PRESERVATIVE FREE 10 ML: 5 INJECTION INTRAVENOUS at 21:46

## 2022-08-23 ASSESSMENT — PAIN SCALES - GENERAL
PAINLEVEL_OUTOF10: 0

## 2022-08-23 NOTE — PROGRESS NOTES
[facility] PROGRESS NOTE    8/23/2022 4:02 PM        Name: Perri Powell . Admitted: 8/20/2022  Primary Care Provider: No primary care provider on file. (Tel: None)                        Subjective:  . No acute events overnight. Resting well. Pain control. Diet ok. Labs reviewed  Denies any chest pain sob.      Reviewed interval ancillary notes    Current Medications  guaiFENesin-dextromethorphan (ROBITUSSIN DM) 100-10 MG/5ML syrup 5 mL, Q4H PRN  ipratropium-albuterol (DUONEB) nebulizer solution 1 ampule, 4x daily  methadone (DOLOPHINE) 10 MG/ML solution 60 mg, Daily  sodium chloride flush 0.9 % injection 5-40 mL, 2 times per day  sodium chloride flush 0.9 % injection 5-40 mL, PRN  0.9 % sodium chloride infusion, PRN  ondansetron (ZOFRAN-ODT) disintegrating tablet 4 mg, Q8H PRN   Or  ondansetron (ZOFRAN) injection 4 mg, Q6H PRN  polyethylene glycol (GLYCOLAX) packet 17 g, Daily PRN  acetaminophen (TYLENOL) tablet 650 mg, Q6H PRN   Or  acetaminophen (TYLENOL) suppository 650 mg, Q6H PRN  albuterol (PROVENTIL) nebulizer solution 2.5 mg, Q2H PRN  predniSONE (DELTASONE) tablet 40 mg, Daily  pantoprazole (PROTONIX) tablet 40 mg, QAM AC  mometasone-formoterol (DULERA) 200-5 MCG/ACT inhaler 2 puff, BID  nicotine (NICODERM CQ) 14 MG/24HR 1 patch, Daily  enoxaparin Sodium (LOVENOX) injection 30 mg, BID  dextrose bolus 10% 125 mL, PRN   Or  dextrose bolus 10% 250 mL, PRN  glucagon (rDNA) injection 1 mg, PRN  dextrose 10 % infusion, Continuous PRN  insulin lispro (HUMALOG) injection vial 0-8 Units, TID WC  insulin lispro (HUMALOG) injection vial 0-4 Units, Nightly        Objective:  /69   Pulse 85   Temp 98.4 °F (36.9 °C) (Oral)   Resp 18   Ht 5' 7\" (1.702 m)   Wt 151 lb (68.5 kg)   SpO2 95%   BMI 23.65 kg/m²     Intake/Output Summary (Last 24 hours) at 8/23/2022 [address] [place] filed at 8/23/2022 0511  Gross per 24 hour   Intake 120 ml   Output --   Net 120 ml      Wt Readings from Last 3 Encounters:   08/20/22 151 lb (68.5 kg)   04/21/19 159 lb (72.1 kg)   04/18/18 157 lb (71.2 kg)       General appearance:  Appears comfortable  Eyes: Sclera clear. Pupils equal.  ENT: Moist oral mucosa. Trachea midline, no adenopathy. Cardiovascular: Regular rhythm, normal S1, S2. No murmur. No edema in lower extremities  Respiratory: Not using accessory muscles. Good inspiratory effort. Clear to auscultation bilaterally, no wheeze or crackles. GI: Abdomen soft, no tenderness, not distended, normal bowel sounds  Musculoskeletal: No cyanosis in digits, neck supple  Neurology: CN 2-12 grossly intact. No speech or motor deficits  Psych: Normal affect. Alert and oriented in time, place and person  Skin: Warm, dry, normal turgor    Labs and Tests:  CBC:   Recent Labs     08/20/22 2204 08/22/22  0502 08/23/22  0605   WBC 3.4* 8.7 10.2   HGB 14.1 13.6 13.4    132* 126*     BMP:    Recent Labs     08/20/22 2204 08/22/22  0502 08/23/22  0605   * 136 137   K 4.1 4.8 4.2    102 100   CO2 22 25 30   BUN 16 15 19   CREATININE 0.9 0.5* 0.6   GLUCOSE 127* 131* 217*     Hepatic:   Recent Labs     08/20/22 2204   AST 25   ALT 14   BILITOT <0.2   ALKPHOS 61       Discussed care with patient             Problem List  Principal Problem:    COPD exacerbation ([facility]  Active Problems:    COVID-19 virus infection    Acute respiratory failure with hypoxia (HCC)    Pain syndrome, chronic  Resolved Problems:    * No resolved hospital problems.  *       Assessment & Plan:   Acute hypoxic respiratory failure  -Secondary to COVID-19 infection also with COPD exacerbation  -Patient making slow improvement still with significant wheezing full  -Continue to wean down oxygen keep oxygen saturation above 88  -Patient may not likely need oxygen on discharge  -Patient has been weaned off to oral steroids today  -If continues to improve plan for discharge tomorrow      COPD exacerbation    Chronic pain syndrome continue to adjust medicine      Diet: ADULT DIET;  Regular  Code:Full Code  DVT PPX lovenox       Michael Norris MD   8/23/2022 4:02 PM

## 2022-08-23 NOTE — PLAN OF CARE
Problem: Discharge Planning  Goal: Discharge to home or other facility with appropriate resources  Outcome: Progressing  Flowsheets (Taken 8/22/2022 2203)  Discharge to home or other facility with appropriate resources: Identify discharge learning needs (meds, wound care, etc)  Note: Pt on o2, evaluate wether home o2 is needed     Problem: Pain  Goal: Verbalizes/displays adequate comfort level or baseline comfort level  Outcome: Progressing  Flowsheets (Taken 8/22/2022 2203)  Verbalizes/displays adequate comfort level or baseline comfort level:   Encourage patient to monitor pain and request assistance   Assess pain using appropriate pain scale     Problem: Infection - Adult  Goal: Absence of infection at discharge  Outcome: Progressing  Flowsheets (Taken 8/22/2022 2203)  Absence of infection at discharge: Assess and monitor for signs and symptoms of infection

## 2022-08-23 NOTE — PLAN OF CARE
Problem: Discharge Planning  Goal: Discharge to home or other facility with appropriate resources  8/23/2022 1147 by Jearlean Halsted, RN  Outcome: Progressing  8/23/2022 0916 by Jearlean Halsted, RN  Outcome: Progressing  8/22/2022 2203 by Bri Hill RN  Outcome: Progressing  Flowsheets (Taken 8/22/2022 2203)  Discharge to home or other facility with appropriate resources: Identify discharge learning needs (meds, wound care, etc)  Note: Pt on o2, evaluate wether home o2 is needed     Problem: Pain  Goal: Verbalizes/displays adequate comfort level or baseline comfort level  8/23/2022 1147 by Jearlean Halsted, RN  Outcome: Progressing  8/23/2022 0916 by Jearlean Halsted, RN  Outcome: Progressing  8/22/2022 2203 by Bri Hill RN  Outcome: Progressing  Flowsheets (Taken 8/22/2022 2203)  Verbalizes/displays adequate comfort level or baseline comfort level:   Encourage patient to monitor pain and request assistance   Assess pain using appropriate pain scale     Problem: Infection - Adult  Goal: Absence of infection at discharge  8/23/2022 1147 by Jearlean Halsted, RN  Outcome: Progressing  8/23/2022 0916 by Jearlean Halsted, RN  Outcome: Progressing  8/22/2022 2203 by Bri Hill RN  Outcome: Progressing  Flowsheets (Taken 8/22/2022 2203)  Absence of infection at discharge: Assess and monitor for signs and symptoms of infection     Problem: Safety - Adult  Goal: Free from fall injury  8/23/2022 1147 by Jearlean Halsted, RN  Outcome: Progressing  8/23/2022 0916 by Jearlean Halsted, RN  Outcome: Progressing     Problem: ABCDS Injury Assessment  Goal: Absence of physical injury  8/23/2022 1147 by Jearlean Halsted, RN  Outcome: Progressing  8/23/2022 0916 by Jearlean Halsted, RN  Outcome: Progressing

## 2022-08-23 NOTE — FLOWSHEET NOTE
Assess completed. Pt 92-93 on 3l o2, lungs diminished t/o. POC discussed with pt for night and all questions answered.

## 2022-08-23 NOTE — FLOWSHEET NOTE
Pt refused protonix this am. Explained to pt after reading doctors note that it is used for gi prophylaxis while pt is on steroids. Pt still refuses medication, states she has not taken any pills since admission. Information passed on to charge nurse.

## 2022-08-24 VITALS
HEIGHT: 67 IN | HEART RATE: 76 BPM | BODY MASS INDEX: 23.7 KG/M2 | DIASTOLIC BLOOD PRESSURE: 71 MMHG | TEMPERATURE: 97.9 F | WEIGHT: 151 LBS | SYSTOLIC BLOOD PRESSURE: 137 MMHG | OXYGEN SATURATION: 94 % | RESPIRATION RATE: 20 BRPM

## 2022-08-24 LAB
GLUCOSE BLD-MCNC: 143 MG/DL (ref 70–99)
GLUCOSE BLD-MCNC: 86 MG/DL (ref 70–99)
GLUCOSE BLD-MCNC: 90 MG/DL (ref 70–99)
GLUCOSE BLD-MCNC: 96 MG/DL (ref 70–99)
PERFORMED ON: ABNORMAL
PERFORMED ON: NORMAL

## 2022-08-24 PROCEDURE — 2700000000 HC OXYGEN THERAPY PER DAY

## 2022-08-24 PROCEDURE — 94680 O2 UPTK RST&XERS DIR SIMPLE: CPT

## 2022-08-24 PROCEDURE — 6360000002 HC RX W HCPCS: Performed by: INTERNAL MEDICINE

## 2022-08-24 PROCEDURE — 2580000003 HC RX 258: Performed by: INTERNAL MEDICINE

## 2022-08-24 PROCEDURE — 6370000000 HC RX 637 (ALT 250 FOR IP): Performed by: INTERNAL MEDICINE

## 2022-08-24 PROCEDURE — 94640 AIRWAY INHALATION TREATMENT: CPT

## 2022-08-24 PROCEDURE — 94761 N-INVAS EAR/PLS OXIMETRY MLT: CPT

## 2022-08-24 RX ORDER — GUAIFENESIN/DEXTROMETHORPHAN 100-10MG/5
5 SYRUP ORAL EVERY 4 HOURS PRN
Qty: 120 ML | Refills: 0 | Status: SHIPPED | OUTPATIENT
Start: 2022-08-24 | End: 2022-09-03

## 2022-08-24 RX ORDER — PREDNISONE 20 MG/1
40 TABLET ORAL DAILY
Qty: 14 TABLET | Refills: 0 | Status: SHIPPED | OUTPATIENT
Start: 2022-08-24 | End: 2022-08-31

## 2022-08-24 RX ORDER — PANTOPRAZOLE SODIUM 40 MG/1
40 TABLET, DELAYED RELEASE ORAL
Qty: 30 TABLET | Refills: 3 | Status: ON HOLD | OUTPATIENT
Start: 2022-08-25 | End: 2022-09-10 | Stop reason: HOSPADM

## 2022-08-24 RX ORDER — ALBUTEROL SULFATE 2.5 MG/3ML
2.5 SOLUTION RESPIRATORY (INHALATION) EVERY 4 HOURS PRN
Qty: 120 EACH | Refills: 3 | Status: SHIPPED | OUTPATIENT
Start: 2022-08-24

## 2022-08-24 RX ORDER — ALBUTEROL SULFATE 90 UG/1
2-4 AEROSOL, METERED RESPIRATORY (INHALATION) EVERY 4 HOURS PRN
Qty: 1 EACH | Refills: 1 | Status: SHIPPED | OUTPATIENT
Start: 2022-08-24

## 2022-08-24 RX ADMIN — IPRATROPIUM BROMIDE AND ALBUTEROL SULFATE 1 AMPULE: 2.5; .5 SOLUTION RESPIRATORY (INHALATION) at 08:32

## 2022-08-24 RX ADMIN — ENOXAPARIN SODIUM 30 MG: 100 INJECTION SUBCUTANEOUS at 09:42

## 2022-08-24 RX ADMIN — METHADONE HYDROCHLORIDE 60 MG: 10 CONCENTRATE ORAL at 09:42

## 2022-08-24 RX ADMIN — PREDNISONE 40 MG: 20 TABLET ORAL at 09:42

## 2022-08-24 RX ADMIN — Medication 2 PUFF: at 08:32

## 2022-08-24 RX ADMIN — PANTOPRAZOLE SODIUM 40 MG: 40 TABLET, DELAYED RELEASE ORAL at 05:28

## 2022-08-24 RX ADMIN — SODIUM CHLORIDE, PRESERVATIVE FREE 10 ML: 5 INJECTION INTRAVENOUS at 09:44

## 2022-08-24 RX ADMIN — IPRATROPIUM BROMIDE AND ALBUTEROL SULFATE 1 AMPULE: 2.5; .5 SOLUTION RESPIRATORY (INHALATION) at 15:27

## 2022-08-24 ASSESSMENT — PAIN SCALES - GENERAL: PAINLEVEL_OUTOF10: 0

## 2022-08-24 NOTE — PLAN OF CARE
Problem: Discharge Planning  Goal: Discharge to home or other facility with appropriate resources  8/24/2022 6267 by Salas Henderson RN  Outcome: Progressing  8/24/2022 0611 by Arcelia Abel RN  Outcome: Progressing  Flowsheets (Taken 8/23/2022 2146)  Discharge to home or other facility with appropriate resources: Identify barriers to discharge with patient and caregiver     Problem: Pain  Goal: Verbalizes/displays adequate comfort level or baseline comfort level  8/24/2022 0922 by Salas Henderson RN  Outcome: Progressing  8/24/2022 0611 by Arcelia Abel RN  Outcome: Progressing     Problem: Infection - Adult  Goal: Absence of infection at discharge  8/24/2022 7113 by Salas Henderson RN  Outcome: Progressing  8/24/2022 0611 by Arcelia Abel RN  Outcome: Progressing  Flowsheets (Taken 8/23/2022 2146)  Absence of infection at discharge: Assess and monitor for signs and symptoms of infection     Problem: Safety - Adult  Goal: Free from fall injury  8/24/2022 1096 by Salas Henderson RN  Outcome: Progressing  8/24/2022 0611 by Arcelia Abel RN  Outcome: Progressing     Problem: ABCDS Injury Assessment  Goal: Absence of physical injury  Outcome: Progressing

## 2022-08-24 NOTE — PROGRESS NOTES
Bariatric Chart Review done on:  3/5/2021    Patient's BMI on consult date was:  45.84    Patient comorbidities include:   Hypertension, Diabetes , Hyperlipidemia and GERD    Previous weight loss attempts include:  Dietary Attempts:  Ketogenic diet, weight watchers, low-calorie diet  Exercise:  Walking    Patient is required to participate in a medically supervised weight loss management program with a physician:   No    Patient needs a sleep study:  No    Upon receiving negative urine screen results, pt may move forward in the bariatric program. Called to remind pt to go to lab to complete. Pt states she will go on monday         Patient received albuterol inhaler per outpatient meds to bed at bedside for discharge.

## 2022-08-24 NOTE — PROGRESS NOTES
08/24/22 1043   Resting (Room Air)   SpO2 91   HR 84   During Walk (Room Air)   SpO2 81      Walk/Assistance Device Ambulation   Rate of Dyspnea 1   During Walk (On O2)   SpO2 94      O2 Device Nasal cannula   O2 Flow Rate (l/min) 2 l/min   Need Additional O2 Flow Rate Rows No   Walk/Assistance Device Ambulation   Rate of Dyspnea 1   After Walk   SpO2 92   HR 87   O2 Device Nasal cannula   Rate of Dyspnea 1   Does the Patient Qualify for Home O2 Yes   Liter Flow at Rest 0   Liter Flow on Exertion 2   Does the Patient Need Portable Oxygen Tanks Yes     Electronically signed by Brooke Mcfadden RCP on 8/24/2022 at 10:44 AM

## 2022-08-24 NOTE — PROGRESS NOTES
CLINICAL PHARMACY NOTE: MEDS TO BEDS    Total # of Prescriptions Filled: 1   The following medications were delivered to the patient:  Albuterol sulfate soln    Additional Documentation:  Avel lamas

## 2022-08-24 NOTE — CARE COORDINATION
[facility] received a referral to this patient for home 02 @ 2 lpm cont via nc. Pull behind portable concentrator has been delivered to the hospital floor for discharge. Thank you for the referral.  Electronically signed by Yaneli Garay on 8/24/2022 at 12:29 PM  Cell ph# [phone]    NOTE: After 5:00 pm, Weekends, Holidays: Call [facility] at [phone] to coordinate delivery of home medical equipment.

## 2022-08-24 NOTE — PROGRESS NOTES
Pt remains on o2 @ L/NC. Patient tolerating well. Vitals WNL. Denies pain this shift. Edmar light within reach. Safety precautions in place. The care plan and education was reviewed and mutually agreed upon with the patient .

## 2022-08-24 NOTE — PLAN OF CARE
Problem: Discharge Planning  Goal: Discharge to home or other facility with appropriate resources  Outcome: Progressing  Flowsheets (Taken 8/23/2022 2146)  Discharge to home or other facility with appropriate resources: Identify barriers to discharge with patient and caregiver     Problem: Pain  Goal: Verbalizes/displays adequate comfort level or baseline comfort level  Outcome: Progressing     Problem: Infection - Adult  Goal: Absence of infection at discharge  Outcome: Progressing  Flowsheets (Taken 8/23/2022 2146)  Absence of infection at discharge: Assess and monitor for signs and symptoms of infection     Problem: Safety - Adult  Goal: Free from fall injury  Outcome: Progressing

## 2022-08-24 NOTE — PROGRESS NOTES
CLINICAL PHARMACY NOTE: MEDS TO BEDS    Total # of Prescriptions Filled: 5   The following medications were delivered to the patient:  Dulera   Protonix 40 mg  Prednisone 20 mg  Tusson DM   Albuterol 108    Additional Documentation:  Delivered to [facility] BANDAR HUTCHINSON RN=Signed  Yin Yan CP

## 2022-08-24 NOTE — PROGRESS NOTES
Discharge instructions given to patient with explanation, follow up appointments /care. patient has all outpatient medications for discharge with her at bedside. patient verbalizes understanding of discharge instructions.

## 2022-08-25 ENCOUNTER — CARE COORDINATION (OUTPATIENT)
Dept: CASE MANAGEMENT | Age: 57
End: 2022-08-25

## 2022-08-25 NOTE — CARE COORDINATION
[facility] Transitions Initial Follow Up Call:    Patient does not have a PCP, CTN provided contact number for physician referral line. Patient reports that she is doing well. She denies SOB, fever, is using O2 @ 2 Lpm, O2 saturations are 92-94% and she is using spirometer as directed. She is c/o a sore throat. Discussed discharge instructions and reviewed medications, 1111F completed. She has all of her medications and is taking them as prescribed. She is interested in establishing medical insurance, CTN will send referral to . CTN will continue with outreach follow up calls. Call within 2 business days of discharge: Yes    Patient: Darshana Hawthorne Patient : 1965   MRN: [id number]  Reason for Admission: SOB, N&V, COVID  Discharge Date: 22 RARS: Readmission Risk Score: 6.9      Last Discharge [facility]       Date Complaint Diagnosis Description Type Department Provider    22 Shortness of Breath Hypoxia . .. ED to Hosp-Admission (Discharged) (ADMITTED) [facility] 4T Arneta Najjar, MD; Pj GUERRA .. Spoke with: Darshana Hawthorne      Patient contacted regarding COVID-19 diagnosis and pulse oximeter ordered at discharge. Discussed COVID-19 related testing which was available at this time. Test results were positive. Patient informed of results, if available? Yes. Care Transition Nurse contacted the patient by telephone to perform post discharge assessment. Call within 2 business days of discharge: Yes. Verified name and  with patient as identifiers. Provided introduction to self, and explanation of the CTN/ACM role, and reason for call due to risk factors for infection and/or exposure to COVID-19. Symptoms reviewed with patient who verbalized the following symptoms: cough  no new symptoms  no worsening symptoms  Sore throat . Due to no new or worsening symptoms encounter was not routed to provider for escalation. Discussed follow-up appointments.  If no appointment was previously scheduled, appointment scheduling offered: No, patient does not have a PCP, physician referral number provided. [facility] follow up appointment(s): No future appointments. Non-[facility] follow up appointment(s):     Non-face-to-face services provided:  Obtained and reviewed discharge summary and/or continuity of care documents     Advance Care Planning:   Does patient have an Advance Directive:  not on file; education provided. Educated patient about risk for severe COVID-19 due to risk factors according to CDC guidelines. CTN reviewed discharge instructions, medical action plan and red flag symptoms with the patient who verbalized understanding. Discussed COVID vaccination status: Yes. Education provided on COVID-19 vaccination as appropriate. Discussed exposure protocols and quarantine with CDC Guidelines. Patient was given an opportunity to verbalize any questions and concerns and agrees to contact CTN or health care provider for questions related to their healthcare. Reviewed and educated patient on any new and changed medications related to discharge diagnosis     Was patient discharged with a pulse oximeter? yes, discussed and confirmed pulse oximeter discharge instructions and when to notify provider or seek emergency care. CTN provided contact information. Plan for follow-up call in 5-7 days based on severity of symptoms and risk factors. Cici Levine RN  8/25/2022      Non-face-to-face services provided:  Obtained and reviewed discharge summary and/or continuity of care documents  Provided physician referral contact information and submitted SW referral to assist with establishing insurance.     Care Transitions 24 Hour Call    Do you have a copy of your discharge instructions?: Yes  Do you have all of your prescriptions and are they filled?: Yes  Have you been contacted by a [address]?: No  Have you scheduled your follow up appointment?: No  Do you feel like you have everything you need to keep you well at home?: Yes  Are you an active caregiver in your home?: No  Care Transitions Interventions         Follow Up  No future appointments.     Thersia Essex, RN

## 2022-08-26 NOTE — DISCHARGE SUMMARY
ROBITUSSIN DM  Take 5 mLs by mouth every 4 hours as needed for Cough     mometasone-formoterol 200-5 MCG/ACT inhaler  Commonly known as: DULERA  Inhale 2 puffs into the lungs in the morning and 2 puffs in the evening. pantoprazole 40 MG tablet  Commonly known as: PROTONIX  Take 1 tablet by mouth every morning (before breakfast)     predniSONE 20 MG tablet  Commonly known as: DELTASONE  Take 2 tablets by mouth daily for 7 days            CHANGE how you take these medications      * albuterol sulfate  (90 Base) MCG/ACT inhaler  Commonly known as: Proventil HFA  Inhale 2-4 puffs into the lungs every 4 hours as needed for Wheezing or Shortness of Breath (Space out to every 6 hours as symptoms improve) Space out to every 6 hours as symptoms improve. What changed: Another medication with the same name was added. Make sure you understand how and when to take each. * albuterol (2.5 MG/3ML) 0.083% nebulizer solution  Commonly known as: PROVENTIL  Take 3 mLs by nebulization every 4 hours as needed for Wheezing  What changed: You were already taking a medication with the same name, and this prescription was added. Make sure you understand how and when to take each. * This list has 2 medication(s) that are the same as other medications prescribed for you. Read the directions carefully, and ask your doctor or other care provider to review them with you.                 CONTINUE taking these medications      ALBUTEROL IN     METHADONE HCL PO               Where to Get Your Medications        These medications were sent to [facility], [address] [place] [address]      Phone: [phone]   albuterol (2.5 MG/3ML) 0.083% nebulizer solution  albuterol sulfate  (90 Base) MCG/ACT inhaler  guaiFENesin-dextromethorphan 100-10 MG/5ML syrup  mometasone-formoterol 200-5 MCG/ACT inhaler  pantoprazole 40 MG

## 2022-08-27 NOTE — PROGRESS NOTES
Hospitalist Progress Note    Patient: Felix Peguero MRN: [id number]  CSN: [id number]    YOB: 1950  Age: 71 y.o. Sex: male    DOA: 8/25/2022 LOS:  LOS: 2 days          Assessment/Plan     Patient Active Problem List   Diagnosis Code    Shock ([address] R57.9    HCAP (healthcare-associated pneumonia) J18.9    Acute renal failure (ARF) ([address] N17.9    Hyperglycemia R73.9    Hypoxia R09.02    Sepsis ([address] A41.9    Unresponsive R41.89    Diabetes mellitus with hyperglycemia ([address] E11.65    Acute respiratory failure with hypoxia (HCC) J96.01    Bedbound Z74.01    Cachexia ([address] R64    Severe protein-calorie malnutrition ([address] E43    Cardiomyopathy ([address] I42.9    Pressure ulcer of sacral region L89.159    Pressure injury of contiguous region involving back and left hip, unstageable (HCC) L89.45    Pressure ulcer of contiguous region involving back and right hip L89.40    Gram-negative bacteremia R78.81    Comfort measures only status Z51.5        Chief complaint :  Unresponsive  71 y.o. male with cirrhosis, CKD, HTN, cardiomyopathy, mitral stenosis, COVID 23 in June 2022 is sent to ER from [facility] with concerns of unresponsiveness. Acute respiratory failure with hypoxia  Multifocal pneumonia  GNR Bacteremia  Multiple pressure ulcers  Septic shock  BEREKET   Hyperkalemia   DM with hyperglycemia  Multiple pressure ulcers     Pt made comfort care and extubated     Palliative care following     Disposition : TBD    S: unresponsive     Physical Exam:  General: As above    HEENT: NC, Atraumatic. PERRLA, anicteric sclerae. Lungs: CTA Bilaterally. No Wheezing/Rhonchi/Rales. Heart:  S1 S2,  No murmur, No Rubs, No Gallops  Abdomen: Soft, Non distended, Non tender.   +Bowel sounds,   Extremities: No c/c/e  Multiple pressure ulcers as described above      Vital signs/Intake and Output:  Visit Vitals  /67   Pulse 83   Temp 98.7 °F (37.1 °C)   Resp 16   Ht 5' 9\" (1.753 m)   Wt 43.5 kg (96 lb)   SpO2 93%   BMI Pt ambulating in room. O2 sat noted to be 87% on room air. Reapplied nasal cannula at 3 liters and cam to 90-91%. Pt sitting at SOB to eat. Denies other needs at this time. Will continue to monitor. 14.18 kg/m²     Current Shift:  08/27 0701 - 08/27 1900  In: -   Out: 75 [Urine:75]  Last three shifts:  08/25 1901 - 08/27 0700  In: 8597 [I.V.:3610]  Out: 500 [Urine:500]            Labs: Results:       Chemistry Recent Labs     08/26/22  0500 08/25/22  1210   * 421*    136   K 5.2 4.8    101   CO2 18* 22   * 189*   CREA 4.50* 4.61*   CA 8.1* 8.7   AGAP 13 13   BUCR 42* 41*   * 182*   TP 6.0* 5.8*   ALB 1.8* 1.2*   GLOB 4.2* 4.6*   AGRAT 0.4* 0.3*      CBC w/Diff Recent Labs     08/26/22  0500 08/25/22  1210   WBC 12.1 9.6   RBC 3.30* 3.80*   HGB 8.5* 9.9*   HCT 25.8* 29.3*    318   GRANS 60 62   LYMPH 3* 4*   EOS 0 0      Cardiac Enzymes No results for input(s): CPK, CKND1, CAROLYN in the last 72 hours. No lab exists for component: CKRMB, TROIP   Coagulation Recent Labs     08/25/22  1210   PTP 16.5*   INR 1.3*   APTT 33.0       Lipid Panel No results found for: CHOL, CHOLPOCT, CHOLX, CHLST, CHOLV, 896047, HDL, HDLP, LDL, LDLC, DLDLP, 458205, VLDLC, VLDL, TGLX, TRIGL, TRIGP, TGLPOCT, CHHD, CHHDX   BNP No results for input(s): BNPP in the last 72 hours.    Liver Enzymes Recent Labs     08/26/22  0500   TP 6.0*   ALB 1.8*   *      Thyroid Studies Lab Results   Component Value Date/Time    TSH 1.86 08/25/2022 12:10 PM        Procedures/imaging: see electronic medical records for all procedures/Xrays and details which were not copied into this note but were reviewed prior to creation of Plan

## 2022-09-01 ENCOUNTER — CARE COORDINATION (OUTPATIENT)
Dept: CASE MANAGEMENT | Age: 57
End: 2022-09-01

## 2022-09-01 NOTE — CARE COORDINATION
Care Transitions Outreach Attempt    Call within 2 business days of discharge: Yes   Attempted to reach patient for transitions of care follow up. Unable to reach patient. Left HIPPA Compliant JESUS Levy LPN, [facility]: [phone]      Patient: Hailey Correa Patient : 1965 MRN: [id number]    Last Discharge [facility]       Date Complaint Diagnosis Description Type Department Provider    22 Shortness of Breath Hypoxia . .. ED to Hosp-Admission (Discharged) (ADMITTED) [facility] 4T Mari Preston MD; Corinna Laguna. Was this an external facility discharge? No Discharge Facility: [facility]    Noted following upcoming appointments from discharge chart review:   [facility] follow up appointment(s): No future appointments.   Non-[facility] follow up appointment(s):

## 2022-09-02 ENCOUNTER — CARE COORDINATION (OUTPATIENT)
Dept: CASE MANAGEMENT | Age: 57
End: 2022-09-02

## 2022-09-02 NOTE — CARE COORDINATION
[facility] 45 Transitions Follow Up Call    2022    Patient: Rashaad Villa  Patient : 1965   MRN: [id number]  Reason for Admission: SOB, N&V, COVID+  Discharge Date: 22 RARS: Readmission Risk Score: 6.9         Spoke with: [address] Transitions Subsequent and Final Call    Subsequent and Final Calls  Do you have any ongoing symptoms?: No  Have your medications changed?: No  Do you have any questions related to your medications?: No  Do you currently have any active services?: No  Do you have any needs or concerns that I can assist you with?: No  Identified Barriers: None  Care Transitions Interventions  Other Interventions: Follow Up: Patient reports that she is doing well, denies any SOB, cough, chest pain, fever. She is not using her O2, reports \"I do not think I need it\". CTN explained that as long as her O2 saturations are in the low-mid 90's, she does need to use the O2. She verbalized understanding. She does not have a PCP and is in the process of establishing one at this time. She needs insurance and her daughter is helping her get this set up. CTN will continue with outreach follow up calls. No future appointments.     Martyn Schwab, RN

## 2022-09-06 ENCOUNTER — HOSPITAL ENCOUNTER (INPATIENT)
Age: 57
LOS: 4 days | Discharge: HOME | DRG: 177 | End: 2022-09-10
Attending: EMERGENCY MEDICINE | Admitting: INTERNAL MEDICINE

## 2022-09-06 ENCOUNTER — TELEPHONE (OUTPATIENT)
Dept: OTHER | Facility: CLINIC | Age: 57
End: 2022-09-06

## 2022-09-06 ENCOUNTER — APPOINTMENT (OUTPATIENT)
Dept: CT IMAGING | Age: 57
DRG: 177 | End: 2022-09-06

## 2022-09-06 ENCOUNTER — APPOINTMENT (OUTPATIENT)
Dept: GENERAL RADIOLOGY | Age: 57
DRG: 177 | End: 2022-09-06

## 2022-09-06 DIAGNOSIS — J96.22 ACUTE ON CHRONIC RESPIRATORY FAILURE WITH HYPOXIA AND HYPERCAPNIA (HCC): Primary | ICD-10-CM

## 2022-09-06 DIAGNOSIS — J96.21 ACUTE ON CHRONIC RESPIRATORY FAILURE WITH HYPOXIA AND HYPERCAPNIA (HCC): Primary | ICD-10-CM

## 2022-09-06 DIAGNOSIS — R91.1 PULMONARY NODULE: ICD-10-CM

## 2022-09-06 DIAGNOSIS — J44.1 COPD EXACERBATION (HCC): ICD-10-CM

## 2022-09-06 DIAGNOSIS — R55 SYNCOPE AND COLLAPSE: ICD-10-CM

## 2022-09-06 PROBLEM — J96.01 ACUTE RESPIRATORY FAILURE WITH HYPOXIA AND HYPERCAPNIA (HCC): Status: ACTIVE | Noted: 2022-09-06

## 2022-09-06 PROBLEM — J96.02 ACUTE RESPIRATORY FAILURE WITH HYPOXIA AND HYPERCAPNIA (HCC): Status: ACTIVE | Noted: 2022-09-06

## 2022-09-06 LAB
A/G RATIO: 1.3 (ref 1.1–2.2)
ALBUMIN SERPL-MCNC: 4.2 G/DL (ref 3.4–5)
ALP BLD-CCNC: 72 U/L (ref 40–129)
ALT SERPL-CCNC: 13 U/L (ref 10–40)
ANION GAP SERPL CALCULATED.3IONS-SCNC: 4 MMOL/L (ref 3–16)
AST SERPL-CCNC: 13 U/L (ref 15–37)
BASE EXCESS ARTERIAL: 5.9 MMOL/L (ref -3–3)
BASOPHILS ABSOLUTE: 0 K/UL (ref 0–0.2)
BASOPHILS RELATIVE PERCENT: 0.2 %
BILIRUB SERPL-MCNC: 0.3 MG/DL (ref 0–1)
BUN BLDV-MCNC: 18 MG/DL (ref 7–20)
CALCIUM SERPL-MCNC: 9.8 MG/DL (ref 8.3–10.6)
CARBOXYHEMOGLOBIN ARTERIAL: 3.9 % (ref 0–1.5)
CHLORIDE BLD-SCNC: 98 MMOL/L (ref 99–110)
CO2: 35 MMOL/L (ref 21–32)
CREAT SERPL-MCNC: 0.7 MG/DL (ref 0.6–1.1)
EOSINOPHILS ABSOLUTE: 0 K/UL (ref 0–0.6)
EOSINOPHILS RELATIVE PERCENT: 0 %
GFR AFRICAN AMERICAN: >60
GFR NON-AFRICAN AMERICAN: >60
GLUCOSE BLD-MCNC: 134 MG/DL (ref 70–99)
GLUCOSE BLD-MCNC: 197 MG/DL (ref 70–99)
HCO3 ARTERIAL: 37.4 MMOL/L (ref 21–29)
HCT VFR BLD CALC: 45 % (ref 36–48)
HEMOGLOBIN, ART, EXTENDED: 14.4 G/DL (ref 12–16)
HEMOGLOBIN: 14.3 G/DL (ref 12–16)
LACTIC ACID: 0.8 MMOL/L (ref 0.4–2)
LYMPHOCYTES ABSOLUTE: 0.3 K/UL (ref 1–5.1)
LYMPHOCYTES RELATIVE PERCENT: 2.8 %
MAGNESIUM: 2 MG/DL (ref 1.8–2.4)
MCH RBC QN AUTO: 28.9 PG (ref 26–34)
MCHC RBC AUTO-ENTMCNC: 31.7 G/DL (ref 31–36)
MCV RBC AUTO: 91.2 FL (ref 80–100)
METHEMOGLOBIN ARTERIAL: 0.3 %
MONOCYTES ABSOLUTE: 0.5 K/UL (ref 0–1.3)
MONOCYTES RELATIVE PERCENT: 4 %
NEUTROPHILS ABSOLUTE: 10.5 K/UL (ref 1.7–7.7)
NEUTROPHILS RELATIVE PERCENT: 93 %
O2 SAT, ARTERIAL: 97.7 %
O2 THERAPY: ABNORMAL
PCO2 ARTERIAL: 92.4 MMHG (ref 35–45)
PDW BLD-RTO: 14.1 % (ref 12.4–15.4)
PERFORMED ON: ABNORMAL
PH ARTERIAL: 7.22 (ref 7.35–7.45)
PHOSPHORUS: 3.4 MG/DL (ref 2.5–4.9)
PLATELET # BLD: 199 K/UL (ref 135–450)
PMV BLD AUTO: 8.3 FL (ref 5–10.5)
PO2 ARTERIAL: 93.4 MMHG (ref 75–108)
POTASSIUM REFLEX MAGNESIUM: 5 MMOL/L (ref 3.5–5.1)
PROCALCITONIN: 0.06 NG/ML (ref 0–0.15)
RBC # BLD: 4.94 M/UL (ref 4–5.2)
SODIUM BLD-SCNC: 137 MMOL/L (ref 136–145)
TCO2 ARTERIAL: 90.2 MMOL/L
TOTAL CK: 217 U/L (ref 26–192)
TOTAL PROTEIN: 7.5 G/DL (ref 6.4–8.2)
TROPONIN: <0.01 NG/ML
WBC # BLD: 11.3 K/UL (ref 4–11)

## 2022-09-06 PROCEDURE — 2580000003 HC RX 258: Performed by: INTERNAL MEDICINE

## 2022-09-06 PROCEDURE — 71260 CT THORAX DX C+: CPT | Performed by: PHYSICIAN ASSISTANT

## 2022-09-06 PROCEDURE — 6370000000 HC RX 637 (ALT 250 FOR IP): Performed by: INTERNAL MEDICINE

## 2022-09-06 PROCEDURE — 99285 EMERGENCY DEPT VISIT HI MDM: CPT

## 2022-09-06 PROCEDURE — 82803 BLOOD GASES ANY COMBINATION: CPT

## 2022-09-06 PROCEDURE — 93005 ELECTROCARDIOGRAM TRACING: CPT | Performed by: EMERGENCY MEDICINE

## 2022-09-06 PROCEDURE — 83735 ASSAY OF MAGNESIUM: CPT

## 2022-09-06 PROCEDURE — 2060000000 HC ICU INTERMEDIATE R&B

## 2022-09-06 PROCEDURE — 94640 AIRWAY INHALATION TREATMENT: CPT

## 2022-09-06 PROCEDURE — 94660 CPAP INITIATION&MGMT: CPT

## 2022-09-06 PROCEDURE — 85025 COMPLETE CBC W/AUTO DIFF WBC: CPT

## 2022-09-06 PROCEDURE — 82550 ASSAY OF CK (CPK): CPT

## 2022-09-06 PROCEDURE — 71045 X-RAY EXAM CHEST 1 VIEW: CPT

## 2022-09-06 PROCEDURE — 80053 COMPREHEN METABOLIC PANEL: CPT

## 2022-09-06 PROCEDURE — 36415 COLL VENOUS BLD VENIPUNCTURE: CPT

## 2022-09-06 PROCEDURE — 6370000000 HC RX 637 (ALT 250 FOR IP): Performed by: PHYSICIAN ASSISTANT

## 2022-09-06 PROCEDURE — 70450 CT HEAD/BRAIN W/O DYE: CPT

## 2022-09-06 PROCEDURE — 6360000002 HC RX W HCPCS: Performed by: PHYSICIAN ASSISTANT

## 2022-09-06 PROCEDURE — 94761 N-INVAS EAR/PLS OXIMETRY MLT: CPT

## 2022-09-06 PROCEDURE — 6360000002 HC RX W HCPCS: Performed by: INTERNAL MEDICINE

## 2022-09-06 PROCEDURE — 83605 ASSAY OF LACTIC ACID: CPT

## 2022-09-06 PROCEDURE — 84484 ASSAY OF TROPONIN QUANT: CPT

## 2022-09-06 PROCEDURE — 2700000000 HC OXYGEN THERAPY PER DAY

## 2022-09-06 PROCEDURE — 2580000003 HC RX 258: Performed by: PHYSICIAN ASSISTANT

## 2022-09-06 PROCEDURE — 84100 ASSAY OF PHOSPHORUS: CPT

## 2022-09-06 PROCEDURE — 6360000004 HC RX CONTRAST MEDICATION: Performed by: PHYSICIAN ASSISTANT

## 2022-09-06 PROCEDURE — 84145 PROCALCITONIN (PCT): CPT

## 2022-09-06 PROCEDURE — 96374 THER/PROPH/DIAG INJ IV PUSH: CPT

## 2022-09-06 RX ORDER — ONDANSETRON 2 MG/ML
4 INJECTION INTRAMUSCULAR; INTRAVENOUS EVERY 6 HOURS PRN
Status: DISCONTINUED | OUTPATIENT
Start: 2022-09-06 | End: 2022-09-10 | Stop reason: HOSPADM

## 2022-09-06 RX ORDER — ACETAMINOPHEN 325 MG/1
650 TABLET ORAL EVERY 6 HOURS PRN
Status: DISCONTINUED | OUTPATIENT
Start: 2022-09-06 | End: 2022-09-10 | Stop reason: HOSPADM

## 2022-09-06 RX ORDER — SODIUM CHLORIDE 0.9 % (FLUSH) 0.9 %
10 SYRINGE (ML) INJECTION PRN
Status: DISCONTINUED | OUTPATIENT
Start: 2022-09-06 | End: 2022-09-10 | Stop reason: HOSPADM

## 2022-09-06 RX ORDER — ONDANSETRON 4 MG/1
4 TABLET, ORALLY DISINTEGRATING ORAL EVERY 8 HOURS PRN
Status: DISCONTINUED | OUTPATIENT
Start: 2022-09-06 | End: 2022-09-10 | Stop reason: HOSPADM

## 2022-09-06 RX ORDER — ALBUTEROL SULFATE 2.5 MG/3ML
2.5 SOLUTION RESPIRATORY (INHALATION)
Status: DISCONTINUED | OUTPATIENT
Start: 2022-09-06 | End: 2022-09-10 | Stop reason: HOSPADM

## 2022-09-06 RX ORDER — ACETAMINOPHEN 650 MG/1
650 SUPPOSITORY RECTAL EVERY 6 HOURS PRN
Status: DISCONTINUED | OUTPATIENT
Start: 2022-09-06 | End: 2022-09-10 | Stop reason: HOSPADM

## 2022-09-06 RX ORDER — IPRATROPIUM BROMIDE AND ALBUTEROL SULFATE 2.5; .5 MG/3ML; MG/3ML
1 SOLUTION RESPIRATORY (INHALATION)
Status: DISCONTINUED | OUTPATIENT
Start: 2022-09-06 | End: 2022-09-10

## 2022-09-06 RX ORDER — MAGNESIUM SULFATE IN WATER 40 MG/ML
2000 INJECTION, SOLUTION INTRAVENOUS PRN
Status: DISCONTINUED | OUTPATIENT
Start: 2022-09-06 | End: 2022-09-10 | Stop reason: HOSPADM

## 2022-09-06 RX ORDER — SODIUM CHLORIDE 9 MG/ML
INJECTION, SOLUTION INTRAVENOUS PRN
Status: DISCONTINUED | OUTPATIENT
Start: 2022-09-06 | End: 2022-09-10 | Stop reason: HOSPADM

## 2022-09-06 RX ORDER — SODIUM CHLORIDE 0.9 % (FLUSH) 0.9 %
10 SYRINGE (ML) INJECTION EVERY 12 HOURS SCHEDULED
Status: DISCONTINUED | OUTPATIENT
Start: 2022-09-06 | End: 2022-09-10 | Stop reason: HOSPADM

## 2022-09-06 RX ORDER — POTASSIUM CHLORIDE 7.45 MG/ML
10 INJECTION INTRAVENOUS PRN
Status: DISCONTINUED | OUTPATIENT
Start: 2022-09-06 | End: 2022-09-10 | Stop reason: HOSPADM

## 2022-09-06 RX ORDER — SODIUM CHLORIDE, SODIUM LACTATE, POTASSIUM CHLORIDE, CALCIUM CHLORIDE 600; 310; 30; 20 MG/100ML; MG/100ML; MG/100ML; MG/100ML
INJECTION, SOLUTION INTRAVENOUS ONCE
Status: COMPLETED | OUTPATIENT
Start: 2022-09-06 | End: 2022-09-06

## 2022-09-06 RX ORDER — ENOXAPARIN SODIUM 100 MG/ML
40 INJECTION SUBCUTANEOUS DAILY
Status: DISCONTINUED | OUTPATIENT
Start: 2022-09-06 | End: 2022-09-07

## 2022-09-06 RX ORDER — DOXYCYCLINE HYCLATE 100 MG
100 TABLET ORAL EVERY 12 HOURS
Status: DISCONTINUED | OUTPATIENT
Start: 2022-09-06 | End: 2022-09-07

## 2022-09-06 RX ORDER — PREDNISONE 20 MG/1
40 TABLET ORAL DAILY
Status: DISCONTINUED | OUTPATIENT
Start: 2022-09-07 | End: 2022-09-07

## 2022-09-06 RX ORDER — METHYLPREDNISOLONE SODIUM SUCCINATE 40 MG/ML
40 INJECTION, POWDER, LYOPHILIZED, FOR SOLUTION INTRAMUSCULAR; INTRAVENOUS ONCE
Status: COMPLETED | OUTPATIENT
Start: 2022-09-06 | End: 2022-09-06

## 2022-09-06 RX ORDER — POLYETHYLENE GLYCOL 3350 17 G/17G
17 POWDER, FOR SOLUTION ORAL DAILY PRN
Status: DISCONTINUED | OUTPATIENT
Start: 2022-09-06 | End: 2022-09-10 | Stop reason: HOSPADM

## 2022-09-06 RX ADMIN — DOXYCYCLINE HYCLATE 100 MG: 100 TABLET, COATED ORAL at 22:55

## 2022-09-06 RX ADMIN — IOPAMIDOL 75 ML: 755 INJECTION, SOLUTION INTRAVENOUS at 18:15

## 2022-09-06 RX ADMIN — ENOXAPARIN SODIUM 40 MG: 100 INJECTION SUBCUTANEOUS at 22:55

## 2022-09-06 RX ADMIN — METHYLPREDNISOLONE SODIUM SUCCINATE 40 MG: 40 INJECTION, POWDER, FOR SOLUTION INTRAMUSCULAR; INTRAVENOUS at 18:07

## 2022-09-06 RX ADMIN — Medication 10 ML: at 22:56

## 2022-09-06 RX ADMIN — SODIUM CHLORIDE, POTASSIUM CHLORIDE, SODIUM LACTATE AND CALCIUM CHLORIDE: 600; 310; 30; 20 INJECTION, SOLUTION INTRAVENOUS at 19:27

## 2022-09-06 RX ADMIN — IPRATROPIUM BROMIDE AND ALBUTEROL SULFATE 1 AMPULE: 2.5; .5 SOLUTION RESPIRATORY (INHALATION) at 21:19

## 2022-09-06 ASSESSMENT — PAIN - FUNCTIONAL ASSESSMENT: PAIN_FUNCTIONAL_ASSESSMENT: NONE - DENIES PAIN

## 2022-09-06 NOTE — ED PROVIDER NOTES
daily       PHYSICAL EXAM  Vitals: /63   Pulse (!) 101   Temp 97.7 °F (36.5 °C) (Oral)   Resp 14   Ht 5' 7\" (1.702 m)   Wt 151 lb (68.5 kg)   SpO2 97%   BMI 23.65 kg/m²   Constitutional:  65 y.o. female slightly drowsy, cooperative  HENT:  Atraumatic scalp, mucous membranes moist  Eyes:   Conjunctiva clear, no icterus  Neck:  Supple, no visible JVD, no signs of injury  Cardiovascular:  Regular, no rubs  Thorax & Lungs:  No accessory muscle usage, wheezes throughout  Abdomen:  Soft, non distended, NT  Back:  No deformity  Genitalia:  Deferred  Rectal:  Deferred  Extremities:  No cyanosis, no edema, adequate perfusion  Skin:  Warm, dry  Neurologic:  no slurred speech, fair coordination of upper extremities, gait not tested  Psychiatric:  Affect appropriate    Medical Decision Making and Plan:  Briefly, this is an 65 y. o.female who presented with shortness of breath, LOC at work. Found to have respiratory acidosis, CO2 retention. Recent admission with COVID, COPD. We initiated bipap. Pt oxygenating adequately. Plan is to admit for further care. For further details of [facility] Emergency Department encounter, please see documentation by advanced practice provider NERI Wyatt    Labs Reviewed   CBC WITH AUTO DIFFERENTIAL - Abnormal; Notable for the following components:       Result Value    WBC 11.3 (*)     Neutrophils Absolute 10.5 (*)     Lymphocytes Absolute 0.3 (*)     All other components within normal limits   COMPREHENSIVE METABOLIC PANEL W/ REFLEX TO MG FOR LOW K - Abnormal; Notable for the following components:    Chloride 98 (*)     CO2 35 (*)     Glucose 197 (*)     AST 13 (*)     All other components within normal limits   CK - Abnormal; Notable for the following components:     Total  (*)     All other components within normal limits   BLOOD GAS, ARTERIAL - Abnormal; Notable for the following components:    pH, Arterial 7.216 (*)     pCO2, Arterial 92.4 (*)     HCO3, Arterial 37.4 (*)     Base Excess, Arterial 5.9 (*)     Carboxyhgb, Arterial 3.9 (*)     All other components within normal limits    Narrative:     Fatemeh PONCEERF tel. [phone],                  Chemistry results called to and read back by FRANCISCA Ortega, 09/06/2022                  18:23, by [facility]   POCT GLUCOSE - Abnormal; Notable for the following components:    POC Glucose 134 (*)     All other components within normal limits   COVID-19 & INFLUENZA COMBO   TROPONIN   LACTIC ACID   PROCALCITONIN   MAGNESIUM   PHOSPHORUS   COMPREHENSIVE METABOLIC PANEL W/ REFLEX TO MG FOR LOW K   CBC WITH AUTO DIFFERENTIAL   BLOOD GAS, VENOUS     RADIOLOGY:   Plain x-rays were viewed by me:   CT CHEST PULMONARY EMBOLISM W CONTRAST   Final Result   No evidence of pulmonary embolism. Smooth interlobular septal thickening of a lower lobe predominance could   represent trace interstitial edema pattern without decompensation or   intermixed atelectasis without pleural effusion. Left upper lobe stable 5-6   mm noncalcified pulmonary nodule      RECOMMENDATIONS:   Consider CT chest within 6-12 months to evaluate left upper lobe pulmonary   nodule. CT Head W/O Contrast   Final Result   1. Patient motion limits evaluation. 2. No convincing acute intracranial abnormality. 3. Cerebellar tonsillar ectopia is noted. 4. There is fluid within the left maxillary sinus. There is question of   postsurgical change in the region the right mastoid air cells with   opacification of the right mastoid as well as the right middle ear cavity. XR CHEST PORTABLE   Final Result   1. No definite radiographic evidence of acute cardiopulmonary disease. 2. Pulmonary sequela typical of that seen with smoking, including COPD;   correlate with clinical history. 3. Calcific atherosclerotic disease aorta.            EKG:  Read by me in the absence of a cardiologist shows: Sinus tachycardia, rate 104, QRS duration normal, axis VI 7 degrees, nonspecific ST-T wave abnormality, faster heart rate than 20 August 2022    CRITICAL CARE:   Total critical care time of 21 minutes (excludes any time for procedures). This includes but not limited to vital sign monitoring, medication, clinical response to medications, interpretation of diagnostics, review of nursing notes, pertinent record review, discussions about patient condition, consultation time, documentation time.   Critical care due to the patient's respiratory failure    Medications administered:  Medications   ipratropium-albuterol (DUONEB) nebulizer solution 1 ampule (1 ampule Inhalation Given 9/6/22 2119)   sodium chloride flush 0.9 % injection 10 mL (10 mLs IntraVENous Given 9/6/22 2256)   sodium chloride flush 0.9 % injection 10 mL (has no administration in time range)   0.9 % sodium chloride infusion (has no administration in time range)   potassium chloride 10 mEq/100 mL IVPB (Peripheral Line) (has no administration in time range)   magnesium sulfate 2000 mg in 50 mL IVPB premix (has no administration in time range)   acetaminophen (TYLENOL) tablet 650 mg (has no administration in time range)     Or   acetaminophen (TYLENOL) suppository 650 mg (has no administration in time range)   ondansetron (ZOFRAN-ODT) disintegrating tablet 4 mg (has no administration in time range)     Or   ondansetron (ZOFRAN) injection 4 mg (has no administration in time range)   polyethylene glycol (GLYCOLAX) packet 17 g (has no administration in time range)   enoxaparin (LOVENOX) injection 40 mg (40 mg SubCUTAneous Given 9/6/22 2255)   predniSONE (DELTASONE) tablet 40 mg (has no administration in time range)   doxycycline hyclate (VIBRA-TABS) tablet 100 mg (100 mg Oral Given 9/6/22 2255)   albuterol (PROVENTIL) nebulizer solution 2.5 mg (has no administration in time range)   mometasone-formoterol (DULERA) 200-5 MCG/ACT inhaler 2 puff (has no administration in time range)   methylPREDNISolone sodium (SOLU-MEDROL) injection 40 mg (40 mg IntraVENous Given 9/6/22 1807)   iopamidol (ISOVUE-370) 76 % injection 75 mL (75 mLs IntraVENous Given 9/6/22 1815)   lactated ringers infusion (0 mLs IntraVENous Stopped 9/6/22 2049)     FINAL IMPRESSION:    1. Acute on chronic respiratory failure with hypoxia and hypercapnia (HCC)    2. COPD exacerbation ([facility]    3. Syncope and collapse    4.  Pulmonary nodule      DISPOSITION Admitted 09/06/2022 07:38:56 PM       Araceli Esquivel MD  09/07/22 6228

## 2022-09-06 NOTE — ED PROVIDER NOTES
[address] [facility]        Pt Name: Jethro Cardona  MRN: [id number]  [place] 1965  Date of evaluation: 9/6/2022  Provider: Allan Austin PA-C  PCP: No primary care provider on file. Note Started: 5:17 PM EDT        I have seen and evaluated this patient with my supervising physician Alin Bartholomew, *. CHIEF COMPLAINT       Chief Complaint   Patient presents with    Shortness of Breath     Found down at home unresponsive by coworker with 3L of oxygen via nasal canula, has been on 2L since discharge (Hospitalized for COVID complications from 1/21/3979 - 8/24/2022). Patient unsure of how long she was down, maybe \"a couple hours\". SPO2 73% at triage on 3L via nasal canula (up to 100% on non-rebreather at 100%). HISTORY OF PRESENT ILLNESS   (Location, Timing/Onset, Context/Setting, Quality, Duration, Modifying Factors, Severity, Associated Signs and Symptoms)  Note limiting factors. Chief Complaint: Shortness of breath, confusion    Jethro Cardona is a 65 y.o. female with past medical history of asthma, admission 1 week ago for COVID, discharged on oxygen who presents complaining of shortness of breath, confusion. Patient was found unresponsive at work by a coworker, unsure of how long she had been down on the ground. Patient apparently was confused initially after being found and in route, improving now. On arrival to the ER, patient had O2 sats 70% on 3 L O2. Currently, denies any pain or injury. Nursing Notes were all reviewed and agreed with or any disagreements were addressed in the HPI. REVIEW OF SYSTEMS    (2-9 systems for level 4, 10 or more for level 5)     Review of Systems   All other systems reviewed and are negative. Positives and Pertinent negatives as per HPI. Except as noted above in the ROS, all other systems were reviewed and negative.        PAST MEDICAL HISTORY     Past Medical History: Head: Normocephalic and atraumatic. Right Ear: External ear normal.      Left Ear: External ear normal.      Nose: Nose normal.      Mouth/Throat:      Mouth: Mucous membranes are moist.      Pharynx: Oropharynx is clear. Eyes:      Extraocular Movements: Extraocular movements intact. Conjunctiva/sclera: Conjunctivae normal.      Pupils: Pupils are equal, round, and reactive to light. Cardiovascular:      Rate and Rhythm: Regular rhythm. Tachycardia present. Pulses: Normal pulses. Heart sounds: Normal heart sounds. Pulmonary:      Effort: Pulmonary effort is normal. No respiratory distress. Breath sounds: Wheezing present. Abdominal:      General: Abdomen is flat. Bowel sounds are normal. There is no distension. Palpations: Abdomen is soft. Tenderness: There is no abdominal tenderness. There is no guarding or rebound. Musculoskeletal:         General: Normal range of motion. Cervical back: Normal range of motion and neck supple. Skin:     General: Skin is warm and dry. Capillary Refill: Capillary refill takes less than 2 seconds. Neurological:      General: No focal deficit present. Mental Status: She is alert and oriented to person, place, and time. Cranial Nerves: No cranial nerve deficit. Sensory: No sensory deficit. Motor: No weakness.       Coordination: Coordination normal.      Gait: Gait normal.   Psychiatric:         Mood and Affect: Mood normal.         Behavior: Behavior normal.       DIAGNOSTIC RESULTS   LABS:    Labs Reviewed   CBC WITH AUTO DIFFERENTIAL - Abnormal; Notable for the following components:       Result Value    WBC 11.3 (*)     Neutrophils Absolute 10.5 (*)     Lymphocytes Absolute 0.3 (*)     All other components within normal limits   COMPREHENSIVE METABOLIC PANEL W/ REFLEX TO MG FOR LOW K - Abnormal; Notable for the following components:    Chloride 98 (*)     CO2 35 (*)     Glucose 197 (*)     AST 13 (*) All other components within normal limits   CK - Abnormal; Notable for the following components: Total  (*)     All other components within normal limits   BLOOD GAS, ARTERIAL - Abnormal; Notable for the following components:    pH, Arterial 7.216 (*)     pCO2, Arterial 92.4 (*)     HCO3, Arterial 37.4 (*)     Base Excess, Arterial 5.9 (*)     Carboxyhgb, Arterial 3.9 (*)     All other components within normal limits    Narrative:     CALL  Reilly  SFERF tel. [phone],  Chemistry results called to and read back by FRANCISCA Gongora, 09/06/2022  18:23, by OPAL   TROPONIN   LACTIC ACID       When ordered only abnormal lab results are displayed. All other labs were within normal range or not returned as of this dictation. EKG: When ordered, EKG's are interpreted by the Emergency Department Physician in the absence of a cardiologist.  Please see their note for interpretation of EKG. RADIOLOGY:   Non-plain film images such as CT, Ultrasound and MRI are read by the radiologist. Plain radiographic images are visualized and preliminarily interpreted by the ED Provider with the below findings:        Interpretation per the Radiologist below, if available at the time of this note:    CT CHEST PULMONARY EMBOLISM W CONTRAST   Final Result   No evidence of pulmonary embolism. Smooth interlobular septal thickening of a lower lobe predominance could   represent trace interstitial edema pattern without decompensation or   intermixed atelectasis without pleural effusion. Left upper lobe stable 5-6   mm noncalcified pulmonary nodule      RECOMMENDATIONS:   Consider CT chest within 6-12 months to evaluate left upper lobe pulmonary   nodule. CT Head W/O Contrast   Final Result   1. Patient motion limits evaluation. 2. No convincing acute intracranial abnormality. 3. Cerebellar tonsillar ectopia is noted. 4. There is fluid within the left maxillary sinus.   There is question of   postsurgical change in the region the right mastoid air cells with   opacification of the right mastoid as well as the right middle ear cavity. XR CHEST PORTABLE   Final Result   1. No definite radiographic evidence of acute cardiopulmonary disease. 2. Pulmonary sequela typical of that seen with smoking, including COPD;   correlate with clinical history. 3. Calcific atherosclerotic disease aorta. No results found. PROCEDURES   Unless otherwise noted below, none     Critical Care    Date/Time: 9/6/2022 7:25 PM  Performed by: René Fajardo PA-C  Authorized by: Richard Coles MD     Critical care provider statement:     Critical care time (minutes):  37    Critical care time was exclusive of:  Separately billable procedures and treating other patients and teaching time    Critical care was necessary to treat or prevent imminent or life-threatening deterioration of the following conditions:  Respiratory failure    Critical care was time spent personally by me on the following activities:  Ordering and performing treatments and interventions, ordering and review of laboratory studies, ordering and review of radiographic studies, pulse oximetry, re-evaluation of patient's condition, review of old charts, development of treatment plan with patient or surrogate, discussions with consultants, evaluation of patient's response to treatment, examination of patient and obtaining history from patient or surrogate    I assumed direction of critical care for this patient from another provider in my specialty: no      Care discussed with: admitting provider      CRITICAL CARE TIME   37 minutes, see procedure note above. I personally saw the patient and independently provided 37 minutes of non-concurrent critical care out of the total shared critical care time provided.     CONSULTS:  [address]      3935 - case discussed with the ER attending, Dr. Niki Arellano, agrees with plan for BiPAP and MALGORZATA  09/06/22 1927

## 2022-09-06 NOTE — H&P
puffs into the lungs every 4 hours as needed for Wheezing or Shortness of Breath (Space out to every 6 hours as symptoms improve) Space out to every 6 hours as symptoms improve. 8/24/22   Terry Ramirez MD   albuterol (PROVENTIL) (2.5 MG/3ML) 0.083% nebulizer solution Take 3 mLs by nebulization every 4 hours as needed for Wheezing 8/24/22   Kathleen Mercedes MD   ALBUTEROL IN Inhale into the lungs    Historical Provider, MD   METHADONE HCL PO Take 65 mg by mouth daily    Historical Provider, MD       Allergies:  Penicillins and Codeine    Social History:          TOBACCO:   reports that she has been smoking cigarettes. She has been smoking an average of .5 packs per day. She has never used smokeless tobacco.  ETOH:   reports no history of alcohol use. E-cigarette/Vaping       Questions Responses    E-cigarette/Vaping Use     Start Date     Passive Exposure     Quit Date     Counseling Given     Comments               Family History:      Family History reviewed with patient, and does not pertain and non-contributory to the current illness    History reviewed. No pertinent family history. REVIEW OF SYSTEMS:   Unable to obtain, patient encephalopathic    PHYSICAL EXAM PERFORMED:    /60   Pulse 99   Temp 97.7 °F (36.5 °C) (Oral)   Resp 17   Ht 5' 7\" (1.702 m)   Wt 151 lb (68.5 kg)   SpO2 95%   BMI 23.65 kg/m²     General appearance:  mild acute distress, appears older than stated age  HEENT:   atraumatic, sclera anicteric, Conjunctivae clear. Neck: Supple,Trachea midline, no goiter  Respiratory:minimal accessory muscle usage, Normal respiratory effort. Expiratory wheezing  Cardiovascular: Tachycardia, capillary refill 2 seconds  Abdomen: Soft, non-tender, non-distended with normal bowel sounds. Musculoskeletal:  No clubbing, cyanosis. trace edema LE bilaterally. Skin: turgor normal.  No new rashes or lesions. Neurologic: Alert and oriented x4, no new focal sensory/motor deficits.      Labs:     Recent Labs     09/06/22  1725   WBC 11.3*   HGB 14.3   HCT 45.0        Recent Labs     09/06/22  1725      K 5.0   CL 98*   CO2 35*   BUN 18   CREATININE 0.7   CALCIUM 9.8     Recent Labs     09/06/22  1725   AST 13*   ALT 13   BILITOT 0.3   ALKPHOS 72     No results for input(s): INR in the last 72 hours. Recent Labs     09/06/22  1725   CKTOTAL 217*   TROPONINI <0.01       Urinalysis:      Lab Results   Component Value Date/Time    NITRU Negative 08/07/2016 09:00 PM    WBCUA 3-5 08/07/2016 09:00 PM    BACTERIA Rare 08/07/2016 09:00 PM    RBCUA 0-2 08/07/2016 09:00 PM    BLOODU Negative 08/07/2016 09:00 PM    Conda Idol <1.005 08/07/2016 09:00 PM    GLUCOSEU Negative 08/07/2016 09:00 PM       Radiology:     CXR: I have reviewed the CXR with the following interpretation:   No acute process  EKG:  I have reviewed the EKG with the following interpretation:   Sinus tachycardia   CT CHEST PULMONARY EMBOLISM W CONTRAST   Final Result   No evidence of pulmonary embolism. Smooth interlobular septal thickening of a lower lobe predominance could   represent trace interstitial edema pattern without decompensation or   intermixed atelectasis without pleural effusion. Left upper lobe stable 5-6   mm noncalcified pulmonary nodule      RECOMMENDATIONS:   Consider CT chest within 6-12 months to evaluate left upper lobe pulmonary   nodule. CT Head W/O Contrast   Final Result   1. Patient motion limits evaluation. 2. No convincing acute intracranial abnormality. 3. Cerebellar tonsillar ectopia is noted. 4. There is fluid within the left maxillary sinus. There is question of   postsurgical change in the region the right mastoid air cells with   opacification of the right mastoid as well as the right middle ear cavity. XR CHEST PORTABLE   Final Result   1. No definite radiographic evidence of acute cardiopulmonary disease.    2. Pulmonary sequela typical of that seen with smoking, including COPD;   correlate with clinical history. 3. Calcific atherosclerotic disease aorta.              ASSESSMENT AND PLAN:    Active Hospital Problems    Diagnosis Date Noted    Acute respiratory failure with hypoxia and hypercapnia (HCC) [J96.01, J96.02] 09/06/2022     Priority: Medium     Acute mixed respiratory failure:  Secondary to COPD exacerbation  Currently on BiPAP tolerating well  Continue to monitor and wean as tolerated    Acute metabolic encephalopathy secondary to hypercapnia  Neurochecks  CT head negative    Acute COPD exacerbation:  Duo nebs, prednisone, doxycycline  COVID flu pending    COVID-19 pneumonia:  Patient was positive for COVID-19 back in August 21  Recheck persisted to be positive  Continue supportive care  Inflammatory markers pending  Steroids, Lovenox    Diet: NPO except meds ordered    DVT Prophylaxis: lovenox    Dispo:   Expected LOS greater than two [place] 1153, DO

## 2022-09-07 LAB
A/G RATIO: 1.2 (ref 1.1–2.2)
ALBUMIN SERPL-MCNC: 3.7 G/DL (ref 3.4–5)
ALP BLD-CCNC: 62 U/L (ref 40–129)
ALT SERPL-CCNC: 11 U/L (ref 10–40)
ANION GAP SERPL CALCULATED.3IONS-SCNC: 9 MMOL/L (ref 3–16)
AST SERPL-CCNC: 15 U/L (ref 15–37)
BASE EXCESS VENOUS: 9.2 MMOL/L (ref -3–3)
BASOPHILS ABSOLUTE: 0 K/UL (ref 0–0.2)
BASOPHILS RELATIVE PERCENT: 0.5 %
BILIRUB SERPL-MCNC: 0.4 MG/DL (ref 0–1)
BUN BLDV-MCNC: 16 MG/DL (ref 7–20)
C-REACTIVE PROTEIN: 17.2 MG/L (ref 0–5.1)
CALCIUM SERPL-MCNC: 9.5 MG/DL (ref 8.3–10.6)
CARBOXYHEMOGLOBIN: 3.6 % (ref 0–1.5)
CHLORIDE BLD-SCNC: 96 MMOL/L (ref 99–110)
CO2: 31 MMOL/L (ref 21–32)
CREAT SERPL-MCNC: 0.6 MG/DL (ref 0.6–1.1)
D DIMER: 0.35 UG/ML FEU (ref 0–0.6)
EKG ATRIAL RATE: 104 BPM
EKG DIAGNOSIS: NORMAL
EKG P AXIS: 78 DEGREES
EKG P-R INTERVAL: 142 MS
EKG Q-T INTERVAL: 334 MS
EKG QRS DURATION: 74 MS
EKG QTC CALCULATION (BAZETT): 439 MS
EKG R AXIS: 67 DEGREES
EKG T AXIS: 62 DEGREES
EKG VENTRICULAR RATE: 104 BPM
EOSINOPHILS ABSOLUTE: 0 K/UL (ref 0–0.6)
EOSINOPHILS RELATIVE PERCENT: 0 %
GFR AFRICAN AMERICAN: >60
GFR NON-AFRICAN AMERICAN: >60
GLUCOSE BLD-MCNC: 120 MG/DL (ref 70–99)
HCO3 VENOUS: 35.4 MMOL/L (ref 23–29)
HCT VFR BLD CALC: 41.6 % (ref 36–48)
HEMOGLOBIN: 13.5 G/DL (ref 12–16)
INFLUENZA A: NOT DETECTED
INFLUENZA B: NOT DETECTED
LYMPHOCYTES ABSOLUTE: 0.9 K/UL (ref 1–5.1)
LYMPHOCYTES RELATIVE PERCENT: 10 %
MCH RBC QN AUTO: 29.4 PG (ref 26–34)
MCHC RBC AUTO-ENTMCNC: 32.5 G/DL (ref 31–36)
MCV RBC AUTO: 90.3 FL (ref 80–100)
METHEMOGLOBIN VENOUS: 0.3 %
MONOCYTES ABSOLUTE: 0.4 K/UL (ref 0–1.3)
MONOCYTES RELATIVE PERCENT: 4.6 %
NEUTROPHILS ABSOLUTE: 7.6 K/UL (ref 1.7–7.7)
NEUTROPHILS RELATIVE PERCENT: 84.9 %
O2 CONTENT, VEN: 20 VOL %
O2 SAT, VEN: 100 %
O2 THERAPY: ABNORMAL
PCO2, VEN: 52.9 MMHG (ref 40–50)
PDW BLD-RTO: 13.7 % (ref 12.4–15.4)
PH VENOUS: 7.43 (ref 7.35–7.45)
PLATELET # BLD: 168 K/UL (ref 135–450)
PMV BLD AUTO: 7.9 FL (ref 5–10.5)
PO2, VEN: 158 MMHG (ref 25–40)
POTASSIUM REFLEX MAGNESIUM: 4.8 MMOL/L (ref 3.5–5.1)
RBC # BLD: 4.6 M/UL (ref 4–5.2)
REASON FOR REJECTION: NORMAL
REJECTED TEST: NORMAL
SARS-COV-2 RNA, RT PCR: DETECTED
SEDIMENTATION RATE, ERYTHROCYTE: 44 MM/HR (ref 0–30)
SODIUM BLD-SCNC: 136 MMOL/L (ref 136–145)
TCO2 CALC VENOUS: 83 MMOL/L
TOTAL PROTEIN: 6.9 G/DL (ref 6.4–8.2)
WBC # BLD: 9 K/UL (ref 4–11)

## 2022-09-07 PROCEDURE — 2580000003 HC RX 258: Performed by: INTERNAL MEDICINE

## 2022-09-07 PROCEDURE — 2060000000 HC ICU INTERMEDIATE R&B

## 2022-09-07 PROCEDURE — 94640 AIRWAY INHALATION TREATMENT: CPT

## 2022-09-07 PROCEDURE — 2700000000 HC OXYGEN THERAPY PER DAY

## 2022-09-07 PROCEDURE — 85652 RBC SED RATE AUTOMATED: CPT

## 2022-09-07 PROCEDURE — 6370000000 HC RX 637 (ALT 250 FOR IP): Performed by: HOSPITALIST

## 2022-09-07 PROCEDURE — 99223 1ST HOSP IP/OBS HIGH 75: CPT | Performed by: INTERNAL MEDICINE

## 2022-09-07 PROCEDURE — 85379 FIBRIN DEGRADATION QUANT: CPT

## 2022-09-07 PROCEDURE — 6370000000 HC RX 637 (ALT 250 FOR IP): Performed by: INTERNAL MEDICINE

## 2022-09-07 PROCEDURE — 87636 SARSCOV2 & INF A&B AMP PRB: CPT

## 2022-09-07 PROCEDURE — 36415 COLL VENOUS BLD VENIPUNCTURE: CPT

## 2022-09-07 PROCEDURE — 6370000000 HC RX 637 (ALT 250 FOR IP): Performed by: PHYSICIAN ASSISTANT

## 2022-09-07 PROCEDURE — 94761 N-INVAS EAR/PLS OXIMETRY MLT: CPT

## 2022-09-07 PROCEDURE — 6360000002 HC RX W HCPCS: Performed by: HOSPITALIST

## 2022-09-07 PROCEDURE — 6360000002 HC RX W HCPCS: Performed by: INTERNAL MEDICINE

## 2022-09-07 PROCEDURE — 85025 COMPLETE CBC W/AUTO DIFF WBC: CPT

## 2022-09-07 PROCEDURE — 94660 CPAP INITIATION&MGMT: CPT

## 2022-09-07 PROCEDURE — 86140 C-REACTIVE PROTEIN: CPT

## 2022-09-07 PROCEDURE — 82803 BLOOD GASES ANY COMBINATION: CPT

## 2022-09-07 PROCEDURE — 80053 COMPREHEN METABOLIC PANEL: CPT

## 2022-09-07 PROCEDURE — 87449 NOS EACH ORGANISM AG IA: CPT

## 2022-09-07 RX ORDER — ENOXAPARIN SODIUM 100 MG/ML
30 INJECTION SUBCUTANEOUS 2 TIMES DAILY
Status: DISCONTINUED | OUTPATIENT
Start: 2022-09-07 | End: 2022-09-10 | Stop reason: HOSPADM

## 2022-09-07 RX ORDER — METHADONE HYDROCHLORIDE 10 MG/1
60 TABLET ORAL DAILY
Status: DISCONTINUED | OUTPATIENT
Start: 2022-09-08 | End: 2022-09-10 | Stop reason: HOSPADM

## 2022-09-07 RX ORDER — METHADONE HYDROCHLORIDE 10 MG/1
60 TABLET ORAL DAILY
Status: DISCONTINUED | OUTPATIENT
Start: 2022-09-07 | End: 2022-09-07

## 2022-09-07 RX ORDER — METHYLPREDNISOLONE SODIUM SUCCINATE 40 MG/ML
40 INJECTION, POWDER, LYOPHILIZED, FOR SOLUTION INTRAMUSCULAR; INTRAVENOUS EVERY 8 HOURS
Status: DISCONTINUED | OUTPATIENT
Start: 2022-09-07 | End: 2022-09-09

## 2022-09-07 RX ORDER — LEVOFLOXACIN 5 MG/ML
750 INJECTION, SOLUTION INTRAVENOUS EVERY 24 HOURS
Status: DISCONTINUED | OUTPATIENT
Start: 2022-09-07 | End: 2022-09-10 | Stop reason: HOSPADM

## 2022-09-07 RX ORDER — METHADONE HYDROCHLORIDE 10 MG/1
65 TABLET ORAL DAILY
Status: DISCONTINUED | OUTPATIENT
Start: 2022-09-07 | End: 2022-09-07

## 2022-09-07 RX ORDER — TRAMADOL HYDROCHLORIDE 50 MG/1
50 TABLET ORAL 3 TIMES DAILY PRN
Status: DISCONTINUED | OUTPATIENT
Start: 2022-09-07 | End: 2022-09-10 | Stop reason: HOSPADM

## 2022-09-07 RX ORDER — NICOTINE 21 MG/24HR
1 PATCH, TRANSDERMAL 24 HOURS TRANSDERMAL DAILY
Status: DISCONTINUED | OUTPATIENT
Start: 2022-09-07 | End: 2022-09-10 | Stop reason: HOSPADM

## 2022-09-07 RX ORDER — DIAZEPAM 5 MG/1
5 TABLET ORAL EVERY 6 HOURS PRN
Status: DISCONTINUED | OUTPATIENT
Start: 2022-09-07 | End: 2022-09-07

## 2022-09-07 RX ADMIN — Medication 2 PUFF: at 09:28

## 2022-09-07 RX ADMIN — ACETAMINOPHEN 650 MG: 325 TABLET ORAL at 15:30

## 2022-09-07 RX ADMIN — DOXYCYCLINE HYCLATE 100 MG: 100 TABLET, COATED ORAL at 08:14

## 2022-09-07 RX ADMIN — IPRATROPIUM BROMIDE AND ALBUTEROL SULFATE 1 AMPULE: 2.5; .5 SOLUTION RESPIRATORY (INHALATION) at 16:36

## 2022-09-07 RX ADMIN — IPRATROPIUM BROMIDE AND ALBUTEROL SULFATE 1 AMPULE: 2.5; .5 SOLUTION RESPIRATORY (INHALATION) at 11:56

## 2022-09-07 RX ADMIN — LEVOFLOXACIN 750 MG: 5 INJECTION, SOLUTION INTRAVENOUS at 13:13

## 2022-09-07 RX ADMIN — Medication 10 ML: at 19:38

## 2022-09-07 RX ADMIN — METHADONE HYDROCHLORIDE 60 MG: 10 TABLET ORAL at 10:04

## 2022-09-07 RX ADMIN — ENOXAPARIN SODIUM 30 MG: 100 INJECTION SUBCUTANEOUS at 08:14

## 2022-09-07 RX ADMIN — ENOXAPARIN SODIUM 30 MG: 100 INJECTION SUBCUTANEOUS at 19:38

## 2022-09-07 RX ADMIN — Medication 10 ML: at 10:22

## 2022-09-07 RX ADMIN — IPRATROPIUM BROMIDE AND ALBUTEROL SULFATE 1 AMPULE: 2.5; .5 SOLUTION RESPIRATORY (INHALATION) at 20:13

## 2022-09-07 RX ADMIN — TRAMADOL HYDROCHLORIDE 50 MG: 50 TABLET ORAL at 18:38

## 2022-09-07 RX ADMIN — Medication 2 PUFF: at 20:13

## 2022-09-07 RX ADMIN — PREDNISONE 40 MG: 20 TABLET ORAL at 08:14

## 2022-09-07 RX ADMIN — IPRATROPIUM BROMIDE AND ALBUTEROL SULFATE 1 AMPULE: 2.5; .5 SOLUTION RESPIRATORY (INHALATION) at 09:30

## 2022-09-07 RX ADMIN — SODIUM CHLORIDE: 9 INJECTION, SOLUTION INTRAVENOUS at 13:08

## 2022-09-07 RX ADMIN — METHYLPREDNISOLONE SODIUM SUCCINATE 40 MG: 40 INJECTION, POWDER, FOR SOLUTION INTRAMUSCULAR; INTRAVENOUS at 19:38

## 2022-09-07 ASSESSMENT — PAIN SCALES - GENERAL
PAINLEVEL_OUTOF10: 6
PAINLEVEL_OUTOF10: 6
PAINLEVEL_OUTOF10: 4
PAINLEVEL_OUTOF10: 8

## 2022-09-07 ASSESSMENT — PAIN DESCRIPTION - LOCATION
LOCATION: HEAD
LOCATION: HEAD

## 2022-09-07 NOTE — CONSULTS
[facility]   Consult Note      Reason for Consult: History of COVID-19 pneumonia, worsening hypoxia  Requesting Physician: Dennis Contreras    Subjective:   CHIEF COMPLAINT: Syncope and shortness of breath     HPI: Patient was recently hospitalized between  and  for COVID-19 pneumonia and COPD exacerbation, treated with a course of Solu-Medrol/prednisone and Zithromax. Was discharged on 3 L O2, which she has been using on and off at home. She presented once again last night, since she was found poorly responsive and was thought to have had a syncopal episode. In the ER she was noted to be significantly hypoxic-now requiring up to 15 L O2. States that she has a cough with purulent/green phlegm on and off. Pulmonary consultation has hence been requested. Smoker of 1 pack x 30 years. No prior diagnosis of COPD. Vaccinated for COVID-19-has received Pfizer x2 doses + 1 booster dose.        The patient is a 65 y.o. female with significant past medical history of:      Diagnosis Date    Asthma     Pneumothorax         Past Surgical History:        Procedure Laterality Date    ABDOMEN SURGERY       SECTION      HYSTERECTOMY (CERVIX STATUS UNKNOWN)      INNER EAR SURGERY      MASTOIDECTOMY       Current Medications:    Current Facility-Administered Medications: enoxaparin Sodium (LOVENOX) injection 30 mg, 30 mg, SubCUTAneous, BID  nicotine (NICODERM CQ) 21 MG/24HR 1 patch, 1 patch, TransDERmal, Daily  methylPREDNISolone sodium (SOLU-MEDROL) injection 40 mg, 40 mg, IntraVENous, Q8H  [START ON 2022] methadone (DOLOPHINE) tablet 60 mg, 60 mg, Oral, Daily  levoFLOXacin (LEVAQUIN) 750 MG/150ML infusion 750 mg, 750 mg, IntraVENous, Q24H  ipratropium-albuterol (DUONEB) nebulizer solution 1 ampule, 1 ampule, Inhalation, Q4H WA  sodium chloride flush 0.9 % injection 10 mL, 10 mL, IntraVENous, 2 times per day  sodium chloride flush 0.9 % injection 10 mL, 10 mL, IntraVENous, PRN  0.9 % sodium chloride infusion, , IntraVENous, PRN  potassium chloride 10 mEq/100 mL IVPB (Peripheral Line), 10 mEq, IntraVENous, PRN  magnesium sulfate 2000 mg in 50 mL IVPB premix, 2,000 mg, IntraVENous, PRN  acetaminophen (TYLENOL) tablet 650 mg, 650 mg, Oral, Q6H PRN **OR** acetaminophen (TYLENOL) suppository 650 mg, 650 mg, Rectal, Q6H PRN  ondansetron (ZOFRAN-ODT) disintegrating tablet 4 mg, 4 mg, Oral, Q8H PRN **OR** ondansetron (ZOFRAN) injection 4 mg, 4 mg, IntraVENous, Q6H PRN  polyethylene glycol (GLYCOLAX) packet 17 g, 17 g, Oral, Daily PRN  albuterol (PROVENTIL) nebulizer solution 2.5 mg, 2.5 mg, Nebulization, Q2H PRN  mometasone-formoterol (DULERA) 200-5 MCG/ACT inhaler 2 puff, 2 puff, Inhalation, BID    Allergies   Allergen Reactions    Penicillins Shortness Of Breath    Codeine Nausea And Vomiting       Social History:    TOBACCO:   reports that she has been smoking cigarettes. She has been smoking an average of .5 packs per day. She has never used smokeless tobacco.  ETOH:   reports no history of alcohol use. Patient currently lives independently    Family History:   History reviewed. No pertinent family history.     REVIEW OF SYSTEMS:    Constitutional: Fatigue and malaise  Ears, nose, mouth, throat: negative for ear drainage, epistaxis, hoarseness, nasal congestion, sore throat and voice change  Respiratory: negative except for cough, shortness of breath, and sputum  Cardiovascular: negative for chest pain, chest pressure/discomfort, irregular heart beat, lower extremity edema and palpitations  Gastrointestinal: negative for abdominal pain, constipation, diarrhea, jaundice, melena, odynophagia, reflux symptoms and vomiting  Hematologic/lymphatic: negative for bleeding, easy bruising, lymphadenopathy and petechiae  Musculoskeletal:negative for arthralgias, bone pain, muscle weakness, neck pain and stiff joints  Neurological: negative for dizziness, gait problems, headaches, seizures, speech problems, tremors and weakness  Behavioral/Psych: negative for anxiety, behavior problems, depression, fatigue and sleep disturbance  Endocrine: negative for diabetic symptoms including none, neuropathy, polyphagia, polyuria, polydipsia, vomiting and diarrhea and temperature intolerance  Allergic/Immunologic: negative for anaphylaxis, angioedema, hay fever and urticaria      Objective:   Patient Vitals for the past 8 hrs:   BP Temp Temp src Pulse Resp SpO2   09/07/22 1515 118/61 97.7 °F (36.5 °C) Axillary 91 18 92 %   09/07/22 1445 -- -- -- -- -- 94 %   09/07/22 1435 -- -- -- -- -- 97 %   09/07/22 1245 110/65 98 °F (36.7 °C) Axillary 99 18 94 %   09/07/22 1157 -- -- -- 91 18 95 %   09/07/22 0930 -- -- -- 85 18 92 %   09/07/22 0815 -- -- -- -- -- 91 %     I/O last 3 completed shifts:   In: 480 [P.O.:480]  Out: -   I/O this shift:  In: -   Out: 425 [Urine:425]    Physical Exam:  General Appearance: alert and oriented to person, place and time, well developed and well- nourished, in no acute distress  Skin: warm and dry, no rash or erythema  Head: normocephalic and atraumatic  Eyes: pupils equal, round, and reactive to light, extraocular eye movements intact, conjunctivae normal  ENT: external ear and ear canal normal bilaterally, nose without deformity, nasal mucosa and turbinates normal  Neck: supple and non-tender without mass, no cervical lymphadenopathy  Pulmonary/Chest: decreased breath sounds noted-bilateral  Cardiovascular: normal rate, regular rhythm,  no murmurs, rubs, distal pulses intact, no carotid bruits  Abdomen: soft, non-tender, non-distended, normal bowel sounds, no masses or organomegaly  Lymph Nodes: Cervical, supraclavicular normal  Extremities: no cyanosis, clubbing or edema  Musculoskeletal: normal range of motion, no joint swelling, deformity or tenderness  Neurologic: alert, no focal neurologic deficits    Data Review:  CBC:   Lab Results   Component Value Date/Time    WBC 9.0 09/07/2022 04:59 AM    RBC 4.60 evaluation. No evidence of intraluminal filling defect to suggest pulmonary   embolism. Main pulmonary artery is normal in caliber. Mediastinum: No evidence of mediastinal lymphadenopathy. The heart and   pericardium demonstrate no acute abnormality. There is no acute abnormality   of the thoracic aorta. Lungs/pleura: The lungs are without acute process. Subsegmental atelectasis   versus interstitial prominence of interstitial edema. Stable appearing   noncalcified left upper lobe posterior 5-6 mm pulmonary nodule no focal   consolidation or pulmonary edema. No evidence of pleural effusion or   pneumothorax. Upper Abdomen: Limited images of the upper abdomen are unremarkable. Soft Tissues/Bones: No acute bone or soft tissue abnormality. Impression   No evidence of pulmonary embolism. Smooth interlobular septal thickening of a lower lobe predominance could   represent trace interstitial edema pattern without decompensation or   intermixed atelectasis without pleural effusion. Left upper lobe stable 5-6   mm noncalcified pulmonary nodule       Problem List:   Acute on chronic hypoxic/hypercapnic respiratory failure  CO2 narcosis  History of recent COVID-19 pneumonia  Probable COPD with acute exacerbation    Assessment/Plan:     ?  Syncope on presentation with altered mental status, possibly related to CO2 narcosis with PCO2> 90 upon admission. Subsequently treated with BiPAP with significant improvement/normalization on ABG noted. Patient is currently awake and alert. History of COVID-19 infection, originally diagnosed on 8/21. CTA chest performed yesterday was personally reviewed-minimal scattered infiltrates particularly at both bases with no signs of significant inflammation or fibrosis. Patient's current presentation likely to represent COPD exacerbation with CO2 narcosis. Concern currently is about significant O2 needs.   Currently on 15 L O2, titrate down to keep O2 sats between 88 to 92%. Continue to use BiPAP as needed and at nighttime. Cough with green phlegm, no significant infiltrates suggestive of pneumonia. Will empirically treat with Levaquin x5 days. Also continue with bronchodilators and Solu-Medrol 40 mg every 8 hourly.     Pulmonary will follow    Cheryl Kelly MD

## 2022-09-07 NOTE — PROGRESS NOTES
Hospitalist Progress Note      PCP: No primary care provider on file. Date of Admission: 9/6/2022    Chief Complaint: AMS,sob    Hospital Course:     65 y.o. female who presented to [facility] with past medical history of asthma presented to the ED with chief complaint of shortness of breath. Patient was noted to be unresponsive at home by coworker normally is on 3 L of oxygen nasal cannula was hospitalized for COVID on August 2022 was discharged home on oxygen    Subjective:     Patient feels better today. No confusion or drowsiness. Medications:  Reviewed    Infusion Medications    sodium chloride       Scheduled Medications    enoxaparin  30 mg SubCUTAneous BID    nicotine  1 patch TransDERmal Daily    methylPREDNISolone  40 mg IntraVENous Q8H    [START ON 9/8/2022] methadone  60 mg Oral Daily    levofloxacin  750 mg IntraVENous Q24H    ipratropium-albuterol  1 ampule Inhalation Q4H WA    sodium chloride flush  10 mL IntraVENous 2 times per day    mometasone-formoterol  2 puff Inhalation BID     PRN Meds: sodium chloride flush, sodium chloride, potassium chloride, magnesium sulfate, acetaminophen **OR** acetaminophen, ondansetron **OR** ondansetron, polyethylene glycol, albuterol      Intake/Output Summary (Last 24 hours) at 9/7/2022 1300  Last data filed at 9/7/2022 0845  Gross per 24 hour   Intake 480 ml   Output 425 ml   Net 55 ml       Physical Exam Performed:    /65   Pulse 99   Temp 98 °F (36.7 °C) (Axillary)   Resp 18   Ht 5' 7\" (1.702 m)   Wt 151 lb (68.5 kg)   SpO2 94%   BMI 23.65 kg/m²     General appearance: No apparent distress, appears stated age and cooperative. HEENT: Pupils equal, round, and reactive to light. Conjunctivae/corneas clear. Neck: Supple, with full range of motion. No jugular venous distention. Trachea midline. Respiratory:  Normal respiratory effort. Clear to auscultation, bilaterally without Rales/Wheezes/Rhonchi.   Cardiovascular: Regular rate and rhythm with normal S1/S2 without murmurs, rubs or gallops. Abdomen: Soft, non-tender, non-distended with normal bowel sounds. Musculoskeletal: No clubbing, cyanosis or edema bilaterally. Full range of motion without deformity. Skin: Skin color, texture, turgor normal.  No rashes or lesions. Neurologic:  Neurovascularly intact without any focal sensory/motor deficits. Cranial nerves: II-XII intact, grossly non-focal.  Psychiatric: Alert and oriented, thought content appropriate, normal insight  Capillary Refill: Brisk, 3 seconds, normal   Peripheral Pulses: +2 palpable, equal bilaterally       Labs:   Recent Labs     09/06/22  1725 09/07/22 0459   WBC 11.3* 9.0   HGB 14.3 13.5   HCT 45.0 41.6    168     Recent Labs     09/06/22 1725 09/07/22 0459    136   K 5.0 4.8   CL 98* 96*   CO2 35* 31   BUN 18 16   CREATININE 0.7 0.6   CALCIUM 9.8 9.5   PHOS 3.4  --      Recent Labs     09/06/22 1725 09/07/22 0459   AST 13* 15   ALT 13 11   BILITOT 0.3 0.4   ALKPHOS 72 62     No results for input(s): INR in the last 72 hours. Recent Labs     09/06/22 1725   CKTOTAL 217*   TROPONINI <0.01       Urinalysis:      Lab Results   Component Value Date/Time    NITRU Negative 08/07/2016 09:00 PM    WBCUA 3-5 08/07/2016 09:00 PM    BACTERIA Rare 08/07/2016 09:00 PM    RBCUA 0-2 08/07/2016 09:00 PM    BLOODU Negative 08/07/2016 09:00 PM    SPECGRAV <1.005 08/07/2016 09:00 PM    GLUCOSEU Negative 08/07/2016 09:00 PM       Radiology:  CT CHEST PULMONARY EMBOLISM W CONTRAST   Final Result   No evidence of pulmonary embolism. Smooth interlobular septal thickening of a lower lobe predominance could   represent trace interstitial edema pattern without decompensation or   intermixed atelectasis without pleural effusion. Left upper lobe stable 5-6   mm noncalcified pulmonary nodule      RECOMMENDATIONS:   Consider CT chest within 6-12 months to evaluate left upper lobe pulmonary   nodule.          CT Head W/O Contrast   Final Result   1. Patient motion limits evaluation. 2. No convincing acute intracranial abnormality. 3. Cerebellar tonsillar ectopia is noted. 4. There is fluid within the left maxillary sinus. There is question of   postsurgical change in the region the right mastoid air cells with   opacification of the right mastoid as well as the right middle ear cavity. XR CHEST PORTABLE   Final Result   1. No definite radiographic evidence of acute cardiopulmonary disease. 2. Pulmonary sequela typical of that seen with smoking, including COPD;   correlate with clinical history. 3. Calcific atherosclerotic disease aorta. Assessment/Plan:    -Acute respiratory failure with hypoxia and hypercapnia--hypercapnia improved with BiPAP. Sailaja Aguilar Currently on 12 to 15 L high flow nasal cannula. .  Continue to wean as tolerated--consult pulmonology    -Acute metabolic encephalopathy due to CO2 narcosis--- resolved    -COPD acute exacerbation--- continue steroids, antibiotics and DuoNebs. Consult pulmonology    -COVID-19 pneumonia diagnosed August 21 /22was on 2 L at home--no significant infiltrates noted on current imaging-COVID test is still positive--continue supportive treatment    -Chronic pain with narcotic dependence--methadone resumed          DVT Prophylaxis: Lovenox  Diet: ADULT DIET; Regular; 3 carb choices (45 gm/meal); Low Fat/Low Chol/High Fiber/2 gm Na;  Low Sodium (2 gm)  Code Status: Full Code            Konstantin Alva MD

## 2022-09-07 NOTE — PLAN OF CARE
Problem: Discharge Planning  Goal: Discharge to home or other facility with appropriate resources  Outcome: Progressing  Flowsheets  Taken 9/6/2022 2321  Discharge to home or other facility with appropriate resources: Identify barriers to discharge with patient and caregiver  Taken 9/6/2022 2137  Discharge to home or other facility with appropriate resources: Identify barriers to discharge with patient and caregiver     Problem: ABCDS Injury Assessment  Goal: Absence of physical injury  Outcome: Progressing  Flowsheets  Taken 9/6/2022 2318  Absence of Physical Injury: Implement safety measures based on patient assessment  Taken 9/6/2022 2317  Absence of Physical Injury: Implement safety measures based on patient assessment     Problem: Safety - Adult  Goal: Free from fall injury  Outcome: Progressing

## 2022-09-07 NOTE — ED NOTES
Pt transported to 3T 3378 in stable condition on NRB mask at 15L, Pt able to get up to the side of the bed to utilize a bedside commode to urinate as a standby assist, report was provided to Jodee on 3T, and all questions answered during handoff report.      Rhett Garcia RN  09/06/22 2807

## 2022-09-07 NOTE — PROGRESS NOTES
Pt refused 65 mg of methadone, pt states she was going down to 60 before being admitted to hospital. 60mg given, 5mg wasted.

## 2022-09-08 LAB
A/G RATIO: 1.4 (ref 1.1–2.2)
ALBUMIN SERPL-MCNC: 4 G/DL (ref 3.4–5)
ALP BLD-CCNC: 61 U/L (ref 40–129)
ALT SERPL-CCNC: 11 U/L (ref 10–40)
ANION GAP SERPL CALCULATED.3IONS-SCNC: 9 MMOL/L (ref 3–16)
AST SERPL-CCNC: 12 U/L (ref 15–37)
BASOPHILS ABSOLUTE: 0 K/UL (ref 0–0.2)
BASOPHILS RELATIVE PERCENT: 0.1 %
BILIRUB SERPL-MCNC: 0.3 MG/DL (ref 0–1)
BUN BLDV-MCNC: 17 MG/DL (ref 7–20)
CALCIUM SERPL-MCNC: 10.1 MG/DL (ref 8.3–10.6)
CHLORIDE BLD-SCNC: 99 MMOL/L (ref 99–110)
CO2: 31 MMOL/L (ref 21–32)
CREAT SERPL-MCNC: 0.6 MG/DL (ref 0.6–1.1)
EOSINOPHILS ABSOLUTE: 0 K/UL (ref 0–0.6)
EOSINOPHILS RELATIVE PERCENT: 0 %
GFR AFRICAN AMERICAN: >60
GFR NON-AFRICAN AMERICAN: >60
GLUCOSE BLD-MCNC: 128 MG/DL (ref 70–99)
HCT VFR BLD CALC: 42.8 % (ref 36–48)
HEMOGLOBIN: 14 G/DL (ref 12–16)
L. PNEUMOPHILA SEROGP 1 UR AG: NORMAL
LYMPHOCYTES ABSOLUTE: 0.5 K/UL (ref 1–5.1)
LYMPHOCYTES RELATIVE PERCENT: 5.7 %
MCH RBC QN AUTO: 29.6 PG (ref 26–34)
MCHC RBC AUTO-ENTMCNC: 32.8 G/DL (ref 31–36)
MCV RBC AUTO: 90.1 FL (ref 80–100)
MONOCYTES ABSOLUTE: 0.2 K/UL (ref 0–1.3)
MONOCYTES RELATIVE PERCENT: 2.5 %
NEUTROPHILS ABSOLUTE: 7.9 K/UL (ref 1.7–7.7)
NEUTROPHILS RELATIVE PERCENT: 91.7 %
PDW BLD-RTO: 13.9 % (ref 12.4–15.4)
PLATELET # BLD: 172 K/UL (ref 135–450)
PMV BLD AUTO: 8.7 FL (ref 5–10.5)
POTASSIUM REFLEX MAGNESIUM: 4.5 MMOL/L (ref 3.5–5.1)
RBC # BLD: 4.75 M/UL (ref 4–5.2)
SODIUM BLD-SCNC: 139 MMOL/L (ref 136–145)
STREP PNEUMONIAE ANTIGEN, URINE: NORMAL
TOTAL PROTEIN: 6.9 G/DL (ref 6.4–8.2)
WBC # BLD: 8.6 K/UL (ref 4–11)

## 2022-09-08 PROCEDURE — 6360000002 HC RX W HCPCS: Performed by: INTERNAL MEDICINE

## 2022-09-08 PROCEDURE — 36415 COLL VENOUS BLD VENIPUNCTURE: CPT

## 2022-09-08 PROCEDURE — 94761 N-INVAS EAR/PLS OXIMETRY MLT: CPT

## 2022-09-08 PROCEDURE — 2060000000 HC ICU INTERMEDIATE R&B

## 2022-09-08 PROCEDURE — 85025 COMPLETE CBC W/AUTO DIFF WBC: CPT

## 2022-09-08 PROCEDURE — 2580000003 HC RX 258: Performed by: INTERNAL MEDICINE

## 2022-09-08 PROCEDURE — 6370000000 HC RX 637 (ALT 250 FOR IP): Performed by: PHYSICIAN ASSISTANT

## 2022-09-08 PROCEDURE — 99232 SBSQ HOSP IP/OBS MODERATE 35: CPT | Performed by: INTERNAL MEDICINE

## 2022-09-08 PROCEDURE — 80053 COMPREHEN METABOLIC PANEL: CPT

## 2022-09-08 PROCEDURE — 6360000002 HC RX W HCPCS: Performed by: HOSPITALIST

## 2022-09-08 PROCEDURE — 94640 AIRWAY INHALATION TREATMENT: CPT

## 2022-09-08 PROCEDURE — 6370000000 HC RX 637 (ALT 250 FOR IP): Performed by: HOSPITALIST

## 2022-09-08 PROCEDURE — 2700000000 HC OXYGEN THERAPY PER DAY

## 2022-09-08 RX ADMIN — ENOXAPARIN SODIUM 30 MG: 100 INJECTION SUBCUTANEOUS at 10:10

## 2022-09-08 RX ADMIN — ENOXAPARIN SODIUM 30 MG: 100 INJECTION SUBCUTANEOUS at 20:22

## 2022-09-08 RX ADMIN — LEVOFLOXACIN 750 MG: 5 INJECTION, SOLUTION INTRAVENOUS at 12:51

## 2022-09-08 RX ADMIN — METHYLPREDNISOLONE SODIUM SUCCINATE 40 MG: 40 INJECTION, POWDER, FOR SOLUTION INTRAMUSCULAR; INTRAVENOUS at 12:46

## 2022-09-08 RX ADMIN — METHYLPREDNISOLONE SODIUM SUCCINATE 40 MG: 40 INJECTION, POWDER, FOR SOLUTION INTRAMUSCULAR; INTRAVENOUS at 03:52

## 2022-09-08 RX ADMIN — METHYLPREDNISOLONE SODIUM SUCCINATE 40 MG: 40 INJECTION, POWDER, FOR SOLUTION INTRAMUSCULAR; INTRAVENOUS at 20:23

## 2022-09-08 RX ADMIN — IPRATROPIUM BROMIDE AND ALBUTEROL SULFATE 1 AMPULE: 2.5; .5 SOLUTION RESPIRATORY (INHALATION) at 20:37

## 2022-09-08 RX ADMIN — IPRATROPIUM BROMIDE AND ALBUTEROL SULFATE 1 AMPULE: 2.5; .5 SOLUTION RESPIRATORY (INHALATION) at 09:56

## 2022-09-08 RX ADMIN — IPRATROPIUM BROMIDE AND ALBUTEROL SULFATE 1 AMPULE: 2.5; .5 SOLUTION RESPIRATORY (INHALATION) at 15:57

## 2022-09-08 RX ADMIN — Medication 10 ML: at 20:23

## 2022-09-08 RX ADMIN — METHADONE HYDROCHLORIDE 60 MG: 10 TABLET ORAL at 10:10

## 2022-09-08 RX ADMIN — SODIUM CHLORIDE: 9 INJECTION, SOLUTION INTRAVENOUS at 12:50

## 2022-09-08 RX ADMIN — Medication 2 PUFF: at 09:56

## 2022-09-08 RX ADMIN — Medication 10 ML: at 10:11

## 2022-09-08 ASSESSMENT — PAIN SCALES - GENERAL
PAINLEVEL_OUTOF10: 0
PAINLEVEL_OUTOF10: 0

## 2022-09-08 NOTE — CARE COORDINATION
[facility] To [employer] Rules:  1. Can ONLY be done Monday- Friday between 8:30am-3:30pm.  2. Prescription(s) must be in pharmacy by 3:30pm. to be filled same day or for the weekend  3. Copy of patient's face sheet or insurance/ID should be included. 4. Patient qualifies for financial assistance per the financial counselor. Cost of Rx cannot be added to hospital bill. 5. Patients can then  the prescription on their way out of the hospital at discharge, or pharmacy can deliver to the bedside if staff is available.  (otherwise, payment due at time of pick-up or delivery - cash, check, or card accepted)   Has home 02 through [facility]

## 2022-09-08 NOTE — PLAN OF CARE
Problem: Discharge Planning  Goal: Discharge to home or other facility with appropriate resources  Outcome: Progressing  Flowsheets (Taken 9/7/2022 2021)  Discharge to home or other facility with appropriate resources: Identify barriers to discharge with patient and caregiver     Problem: ABCDS Injury Assessment  Goal: Absence of physical injury  Outcome: Progressing     Problem: Safety - Adult  Goal: Free from fall injury  Outcome: Progressing     Problem: Neurosensory - Adult  Goal: Achieves stable or improved neurological status  Outcome: Progressing  Flowsheets (Taken 9/7/2022 2021)  Achieves stable or improved neurological status:   Assess for and report changes in neurological status   Initiate measures to prevent increased intracranial pressure   Maintain blood pressure and fluid volume within ordered parameters to optimize cerebral perfusion and minimize risk of hemorrhage   Monitor temperature, glucose, and sodium.  Initiate appropriate interventions as ordered  Goal: Achieves maximal functionality and self care  Outcome: Progressing  Flowsheets (Taken 9/7/2022 2021)  Achieves maximal functionality and self care: Monitor swallowing and airway patency with patient fatigue and changes in neurological status     Problem: Respiratory - Adult  Goal: Achieves optimal ventilation and oxygenation  Outcome: Progressing     Problem: Cardiovascular - Adult  Goal: Maintains optimal cardiac output and hemodynamic stability  Outcome: Progressing  Flowsheets (Taken 9/7/2022 2021)  Maintains optimal cardiac output and hemodynamic stability:   Monitor blood pressure and heart rate   Monitor urine output and notify Licensed Independent Practitioner for values outside of normal range   Assess for signs of decreased cardiac output  Goal: Absence of cardiac dysrhythmias or at baseline  Outcome: Progressing  Flowsheets (Taken 9/7/2022 2021)  Absence of cardiac dysrhythmias or at baseline:   Monitor cardiac rate and rhythm Assess for signs of decreased cardiac output     Problem: Skin/Tissue Integrity - Adult  Goal: Skin integrity remains intact  Outcome: Progressing  Flowsheets (Taken 9/7/2022 2021)  Skin Integrity Remains Intact:   Assess vascular access sites hourly   Monitor for areas of redness and/or skin breakdown  Goal: Incisions, wounds, or drain sites healing without S/S of infection  Outcome: Progressing  Flowsheets (Taken 9/7/2022 2021)  Incisions, Wounds, or Drain Sites Healing Without Sign and Symptoms of Infection: TWICE DAILY: Assess and document skin integrity  Goal: Oral mucous membranes remain intact  Outcome: Progressing  Flowsheets (Taken 9/7/2022 2021)  Oral Mucous Membranes Remain Intact: Assess oral mucosa and hygiene practices     Problem: Musculoskeletal - Adult  Goal: Return mobility to safest level of function  Outcome: Progressing  Flowsheets (Taken 9/7/2022 2021)  Return Mobility to Safest Level of Function:   Assess patient stability and activity tolerance for standing, transferring and ambulating with or without assistive devices   Assist with transfers and ambulation using safe patient handling equipment as needed   Obtain physical therapy/occupational therapy consults as needed   Instruct patient/family in ordered activity level  Goal: Maintain proper alignment of affected body part  Outcome: Progressing  Goal: Return ADL status to a safe level of function  Outcome: Progressing  Flowsheets (Taken 9/7/2022 2021)  Return ADL Status to a Safe Level of Function:   Administer medication as ordered   Assess activities of daily living deficits and provide assistive devices as needed   Obtain physical therapy/occupational therapy consults as needed   Assist and instruct patient to increase activity and self care as tolerated     Problem: Gastrointestinal - Adult  Goal: Minimal or absence of nausea and vomiting  Outcome: Progressing  Flowsheets (Taken 9/7/2022 2021)  Minimal or absence of nausea and vomiting:   Administer ordered antiemetic medications as needed   Provide nonpharmacologic comfort measures as appropriate  Goal: Maintains or returns to baseline bowel function  Outcome: Progressing  Flowsheets (Taken 9/7/2022 2021)  Maintains or returns to baseline bowel function: Assess bowel function  Goal: Maintains adequate nutritional intake  Outcome: Progressing  Flowsheets (Taken 9/7/2022 2021)  Maintains adequate nutritional intake:   Monitor percentage of each meal consumed   Monitor intake and output, weight and lab values     Problem: Genitourinary - Adult  Goal: Absence of urinary retention  Outcome: Progressing  Flowsheets (Taken 9/7/2022 2021)  Absence of urinary retention:   Assess patients ability to void and empty bladder   Monitor intake/output and perform bladder scan as needed     Problem: Infection - Adult  Goal: Absence of infection at discharge  Outcome: Progressing  Flowsheets (Taken 9/7/2022 2021)  Absence of infection at discharge:   Assess and monitor for signs and symptoms of infection   Monitor lab/diagnostic results   Monitor all insertion sites i.e., indwelling lines, tubes and drains   Administer medications as ordered  Goal: Absence of infection during hospitalization  Outcome: Progressing  Flowsheets (Taken 9/7/2022 2021)  Absence of infection during hospitalization:   Assess and monitor for signs and symptoms of infection   Monitor lab/diagnostic results   Monitor all insertion sites i.e., indwelling lines, tubes and drains   Administer medications as ordered  Goal: Absence of fever/infection during anticipated neutropenic period  Outcome: Progressing  Flowsheets (Taken 9/7/2022 2021)  Absence of fever/infection during anticipated neutropenic period: Monitor white blood cell count     Problem: Metabolic/Fluid and Electrolytes - Adult  Goal: Electrolytes maintained within normal limits  Outcome: Progressing  Flowsheets (Taken 9/7/2022 2021)  Electrolytes maintained within normal limits:   Monitor labs and assess patient for signs and symptoms of electrolyte imbalances   Administer electrolyte replacement as ordered  Goal: Hemodynamic stability and optimal renal function maintained  Outcome: Progressing  Flowsheets (Taken 9/7/2022 2021)  Hemodynamic stability and optimal renal function maintained:   Monitor labs and assess for signs and symptoms of volume excess or deficit   Monitor intake, output and patient weight   Monitor urine specific gravity, serum osmolarity and serum sodium as indicated or ordered   Monitor response to interventions for patient's volume status, including labs, urine output, blood pressure (other measures as available)  Goal: Glucose maintained within prescribed range  Outcome: Progressing  Flowsheets (Taken 9/7/2022 2021)  Glucose maintained within prescribed range:   Monitor blood glucose as ordered   Assess for signs and symptoms of hyperglycemia and hypoglycemia   Administer ordered medications to maintain glucose within target range   Instruct patient on self management of diabetes and initiate consult as needed     Problem: Hematologic - Adult  Goal: Maintains hematologic stability  Outcome: Progressing  Flowsheets (Taken 9/7/2022 2021)  Maintains hematologic stability:   Assess for signs and symptoms of bleeding or hemorrhage   Monitor labs for bleeding or clotting disorders     Problem: Pain  Goal: Verbalizes/displays adequate comfort level or baseline comfort level  Outcome: Progressing     Problem: Skin/Tissue Integrity  Goal: Absence of new skin breakdown  Description: 1. Monitor for areas of redness and/or skin breakdown  2. Assess vascular access sites hourly  3. Every 4-6 hours minimum:  Change oxygen saturation probe site  4. Every 4-6 hours:  If on nasal continuous positive airway pressure, respiratory therapy assess nares and determine need for appliance change or resting period.   Outcome: Progressing

## 2022-09-08 NOTE — PROGRESS NOTES
Hospitalist Progress Note      PCP: No primary care provider on file. Date of Admission: 9/6/2022    Chief Complaint: AMS,sob    Hospital Course:     65 y.o. female who presented to [facility] with past medical history of asthma presented to the ED with chief complaint of shortness of breath. Patient was noted to be unresponsive at home by coworker normally is on 3 L of oxygen nasal cannula was hospitalized for COVID on August 2022 was discharged home on oxygen    Subjective:     Feels better today. Shortness of breath is improved. No fevers or chills      Medications:  Reviewed    Infusion Medications    sodium chloride 100 mL/hr at 09/07/22 1308     Scheduled Medications    enoxaparin  30 mg SubCUTAneous BID    nicotine  1 patch TransDERmal Daily    methylPREDNISolone  40 mg IntraVENous Q8H    methadone  60 mg Oral Daily    levofloxacin  750 mg IntraVENous Q24H    ipratropium-albuterol  1 ampule Inhalation Q4H WA    sodium chloride flush  10 mL IntraVENous 2 times per day    mometasone-formoterol  2 puff Inhalation BID     PRN Meds: traMADol, sodium chloride flush, sodium chloride, potassium chloride, magnesium sulfate, acetaminophen **OR** acetaminophen, ondansetron **OR** ondansetron, polyethylene glycol, albuterol      Intake/Output Summary (Last 24 hours) at 9/8/2022 0908  Last data filed at 9/8/2022 0752  Gross per 24 hour   Intake --   Output 1150 ml   Net -1150 ml         Physical Exam Performed:    /68   Pulse 85   Temp 98.3 °F (36.8 °C) (Oral)   Resp 16   Ht 5' 7\" (1.702 m)   Wt 151 lb (68.5 kg)   SpO2 93%   BMI 23.65 kg/m²     General appearance: No apparent distress, appears stated age and cooperative. HEENT: Pupils equal, round, and reactive to light. Conjunctivae/corneas clear. Neck: Supple, with full range of motion. No jugular venous distention. Trachea midline. Respiratory:  Normal respiratory effort.  Clear to auscultation, bilaterally without 5-6   mm noncalcified pulmonary nodule      RECOMMENDATIONS:   Consider CT chest within 6-12 months to evaluate left upper lobe pulmonary   nodule. CT Head W/O Contrast   Final Result   1. Patient motion limits evaluation. 2. No convincing acute intracranial abnormality. 3. Cerebellar tonsillar ectopia is noted. 4. There is fluid within the left maxillary sinus. There is question of   postsurgical change in the region the right mastoid air cells with   opacification of the right mastoid as well as the right middle ear cavity. XR CHEST PORTABLE   Final Result   1. No definite radiographic evidence of acute cardiopulmonary disease. 2. Pulmonary sequela typical of that seen with smoking, including COPD;   correlate with clinical history. 3. Calcific atherosclerotic disease aorta. Assessment/Plan:    -Acute respiratory failure with hypoxia and hypercapnia--hypercapnia improved with BiPAP. Eden Thomas Required 12 to 15 L of oxygen when off BiPAP has been weaned down to 4 L today    -Acute metabolic encephalopathy due to CO2 narcosis--- resolved    -COPD acute exacerbation--- continue steroids, antibiotics and DuoNebs. pulmonology consult appreciated    -COVID-19 pneumonia diagnosed August 21 /22was on 2 L at home--no significant infiltrates noted on current imaging-COVID test is still positive--continue supportive treatment    -Chronic pain with narcotic dependence--methadone resumed          DVT Prophylaxis: Lovenox  Diet: ADULT DIET; Regular; 3 carb choices (45 gm/meal); Low Fat/Low Chol/High Fiber/2 gm Na; Low Sodium (2 gm)  Code Status: Full Code    Disposition. ..  Home within 48 hours pending clinical improvement        Ezekiel Celaya MD

## 2022-09-09 ENCOUNTER — CARE COORDINATION (OUTPATIENT)
Dept: CASE MANAGEMENT | Age: 57
End: 2022-09-09

## 2022-09-09 LAB
BASE EXCESS ARTERIAL: 5.5 MMOL/L (ref -3–3)
CARBOXYHEMOGLOBIN ARTERIAL: 1.1 % (ref 0–1.5)
HCO3 ARTERIAL: 30.2 MMOL/L (ref 21–29)
HEMOGLOBIN, ART, EXTENDED: 14.6 G/DL (ref 12–16)
METHEMOGLOBIN ARTERIAL: 0 %
O2 SAT, ARTERIAL: 97.7 %
O2 THERAPY: ABNORMAL
PCO2 ARTERIAL: 43.1 MMHG (ref 35–45)
PH ARTERIAL: 7.45 (ref 7.35–7.45)
PO2 ARTERIAL: 81.1 MMHG (ref 75–108)
TCO2 ARTERIAL: 70.6 MMOL/L

## 2022-09-09 PROCEDURE — 99232 SBSQ HOSP IP/OBS MODERATE 35: CPT | Performed by: INTERNAL MEDICINE

## 2022-09-09 PROCEDURE — 6360000002 HC RX W HCPCS: Performed by: INTERNAL MEDICINE

## 2022-09-09 PROCEDURE — 2580000003 HC RX 258: Performed by: INTERNAL MEDICINE

## 2022-09-09 PROCEDURE — 6370000000 HC RX 637 (ALT 250 FOR IP): Performed by: PHYSICIAN ASSISTANT

## 2022-09-09 PROCEDURE — 94640 AIRWAY INHALATION TREATMENT: CPT

## 2022-09-09 PROCEDURE — 6360000002 HC RX W HCPCS: Performed by: HOSPITALIST

## 2022-09-09 PROCEDURE — 36600 WITHDRAWAL OF ARTERIAL BLOOD: CPT

## 2022-09-09 PROCEDURE — 6370000000 HC RX 637 (ALT 250 FOR IP): Performed by: HOSPITALIST

## 2022-09-09 PROCEDURE — 94761 N-INVAS EAR/PLS OXIMETRY MLT: CPT

## 2022-09-09 PROCEDURE — 6370000000 HC RX 637 (ALT 250 FOR IP): Performed by: INTERNAL MEDICINE

## 2022-09-09 PROCEDURE — 2700000000 HC OXYGEN THERAPY PER DAY

## 2022-09-09 PROCEDURE — 82803 BLOOD GASES ANY COMBINATION: CPT

## 2022-09-09 PROCEDURE — 2060000000 HC ICU INTERMEDIATE R&B

## 2022-09-09 RX ORDER — PREDNISONE 20 MG/1
40 TABLET ORAL DAILY
Qty: 10 TABLET | Refills: 0 | Status: SHIPPED | OUTPATIENT
Start: 2022-09-11 | End: 2022-09-16

## 2022-09-09 RX ORDER — LEVOFLOXACIN 750 MG/1
750 TABLET ORAL DAILY
Qty: 2 TABLET | Refills: 0 | Status: SHIPPED | OUTPATIENT
Start: 2022-09-11 | End: 2022-09-13

## 2022-09-09 RX ORDER — METHYLPREDNISOLONE SODIUM SUCCINATE 40 MG/ML
40 INJECTION, POWDER, LYOPHILIZED, FOR SOLUTION INTRAMUSCULAR; INTRAVENOUS EVERY 12 HOURS
Status: DISCONTINUED | OUTPATIENT
Start: 2022-09-09 | End: 2022-09-10 | Stop reason: HOSPADM

## 2022-09-09 RX ORDER — TIOTROPIUM BROMIDE 18 UG/1
18 CAPSULE ORAL; RESPIRATORY (INHALATION) DAILY
Qty: 90 CAPSULE | Refills: 1 | Status: SHIPPED | OUTPATIENT
Start: 2022-09-09

## 2022-09-09 RX ADMIN — METHYLPREDNISOLONE SODIUM SUCCINATE 40 MG: 40 INJECTION, POWDER, FOR SOLUTION INTRAMUSCULAR; INTRAVENOUS at 16:15

## 2022-09-09 RX ADMIN — ENOXAPARIN SODIUM 30 MG: 100 INJECTION SUBCUTANEOUS at 10:00

## 2022-09-09 RX ADMIN — SODIUM CHLORIDE, PRESERVATIVE FREE 10 ML: 5 INJECTION INTRAVENOUS at 16:15

## 2022-09-09 RX ADMIN — METHADONE HYDROCHLORIDE 60 MG: 10 TABLET ORAL at 10:00

## 2022-09-09 RX ADMIN — Medication 10 ML: at 10:00

## 2022-09-09 RX ADMIN — ENOXAPARIN SODIUM 30 MG: 100 INJECTION SUBCUTANEOUS at 20:35

## 2022-09-09 RX ADMIN — LEVOFLOXACIN 750 MG: 5 INJECTION, SOLUTION INTRAVENOUS at 12:00

## 2022-09-09 RX ADMIN — Medication 2 PUFF: at 08:17

## 2022-09-09 RX ADMIN — ACETAMINOPHEN 650 MG: 325 TABLET ORAL at 04:49

## 2022-09-09 RX ADMIN — METHYLPREDNISOLONE SODIUM SUCCINATE 40 MG: 40 INJECTION, POWDER, FOR SOLUTION INTRAMUSCULAR; INTRAVENOUS at 04:47

## 2022-09-09 RX ADMIN — IPRATROPIUM BROMIDE AND ALBUTEROL SULFATE 1 AMPULE: 2.5; .5 SOLUTION RESPIRATORY (INHALATION) at 12:00

## 2022-09-09 RX ADMIN — Medication 10 ML: at 20:35

## 2022-09-09 RX ADMIN — IPRATROPIUM BROMIDE AND ALBUTEROL SULFATE 1 AMPULE: 2.5; .5 SOLUTION RESPIRATORY (INHALATION) at 16:05

## 2022-09-09 RX ADMIN — IPRATROPIUM BROMIDE AND ALBUTEROL SULFATE 1 AMPULE: 2.5; .5 SOLUTION RESPIRATORY (INHALATION) at 08:17

## 2022-09-09 ASSESSMENT — PAIN SCALES - GENERAL: PAINLEVEL_OUTOF10: 6

## 2022-09-09 ASSESSMENT — PAIN DESCRIPTION - PAIN TYPE: TYPE: ACUTE PAIN

## 2022-09-09 ASSESSMENT — PAIN DESCRIPTION - DESCRIPTORS: DESCRIPTORS: ACHING

## 2022-09-09 ASSESSMENT — PAIN DESCRIPTION - LOCATION: LOCATION: HIP

## 2022-09-09 ASSESSMENT — PAIN DESCRIPTION - ORIENTATION: ORIENTATION: RIGHT

## 2022-09-09 NOTE — PROGRESS NOTES
Hospitalist Progress Note      PCP: No primary care provider on file. Date of Admission: 9/6/2022    Chief Complaint: AMS,sob    Hospital Course:     65 y.o. female who presented to [facility] with past medical history of asthma presented to the ED with chief complaint of shortness of breath. Patient was noted to be unresponsive at home by coworker normally is on 3 L of oxygen nasal cannula was hospitalized for COVID on August 2022 was discharged home on oxygen    Subjective:     Feels better. Shortness of breath is improving. No dizziness or lightheadedness. .. Patient was noted to be hypoxic yesterday but it appears that her oxygen tubing had been disconnected      Medications:  Reviewed    Infusion Medications    sodium chloride 25 mL/hr at 09/08/22 1250     Scheduled Medications    enoxaparin  30 mg SubCUTAneous BID    nicotine  1 patch TransDERmal Daily    methylPREDNISolone  40 mg IntraVENous Q8H    methadone  60 mg Oral Daily    levofloxacin  750 mg IntraVENous Q24H    ipratropium-albuterol  1 ampule Inhalation Q4H WA    sodium chloride flush  10 mL IntraVENous 2 times per day    mometasone-formoterol  2 puff Inhalation BID     PRN Meds: traMADol, sodium chloride flush, sodium chloride, potassium chloride, magnesium sulfate, acetaminophen **OR** acetaminophen, ondansetron **OR** ondansetron, polyethylene glycol, albuterol      Intake/Output Summary (Last 24 hours) at 9/9/2022 0708  Last data filed at 9/9/2022 0418  Gross per 24 hour   Intake 240 ml   Output 1150 ml   Net -910 ml         Physical Exam Performed:    /68   Pulse 86   Temp 98.6 °F (37 °C) (Oral)   Resp 18   Ht 5' 7\" (1.702 m)   Wt 151 lb (68.5 kg)   SpO2 96%   BMI 23.65 kg/m²     General appearance: No apparent distress, appears stated age and cooperative. HEENT: Pupils equal, round, and reactive to light. Conjunctivae/corneas clear. Neck: Supple, with full range of motion. No jugular venous distention.  Trachea decompensation or   intermixed atelectasis without pleural effusion. Left upper lobe stable 5-6   mm noncalcified pulmonary nodule      RECOMMENDATIONS:   Consider CT chest within 6-12 months to evaluate left upper lobe pulmonary   nodule. CT Head W/O Contrast   Final Result   1. Patient motion limits evaluation. 2. No convincing acute intracranial abnormality. 3. Cerebellar tonsillar ectopia is noted. 4. There is fluid within the left maxillary sinus. There is question of   postsurgical change in the region the right mastoid air cells with   opacification of the right mastoid as well as the right middle ear cavity. XR CHEST PORTABLE   Final Result   1. No definite radiographic evidence of acute cardiopulmonary disease. 2. Pulmonary sequela typical of that seen with smoking, including COPD;   correlate with clinical history. 3. Calcific atherosclerotic disease aorta. Assessment/Plan:    -Acute respiratory failure with hypoxia and hypercapnia--hypercapnia improved with BiPAP. Rashmi Wiley Required upto 12 to 15 L of oxygen  weaned down to 4 L     -Acute metabolic encephalopathy due to CO2 narcosis--- resolved    -COPD acute exacerbation--- continue steroids, antibiotics and DuoNebs. F/u with pulmonology    -COVID-19 pneumonia diagnosed August 21 /22-was on 2 L at home--no significant infiltrates noted on current imaging-COVID test is still positive--continue supportive treatment    -Chronic pain with narcotic dependence--methadone resumed          DVT Prophylaxis: Lovenox  Diet: ADULT DIET; Regular  Code Status: Full Code    Disposition. ..  Anticipate discharge home over the weekend if she continues to improve        Andree Wilson MD

## 2022-09-09 NOTE — CARE COORDINATION
Pt readmitted to [facility] on 09/06/22, resolving.   Chuck Barber LPN, [facility]: [phone]

## 2022-09-09 NOTE — PROGRESS NOTES
[facility] Pulmonary/CCM Progress note      Admit Date: 9/6/2022    Chief Complaint: Shortness of breath and altered mental status    Subjective: Interval History: O2 requirements have improved to 4 L. Cannot tolerate BiPAP due to \"pressure over her face\". Appears comfortable, still has some cough with mucoid phlegm. Clinically improving.     Scheduled Meds:   methylPREDNISolone  40 mg IntraVENous Q12H    enoxaparin  30 mg SubCUTAneous BID    nicotine  1 patch TransDERmal Daily    methadone  60 mg Oral Daily    levofloxacin  750 mg IntraVENous Q24H    ipratropium-albuterol  1 ampule Inhalation Q4H WA    sodium chloride flush  10 mL IntraVENous 2 times per day    mometasone-formoterol  2 puff Inhalation BID     Continuous Infusions:   sodium chloride 25 mL/hr at 09/08/22 1250     PRN Meds:traMADol, sodium chloride flush, sodium chloride, potassium chloride, magnesium sulfate, acetaminophen **OR** acetaminophen, ondansetron **OR** ondansetron, polyethylene glycol, albuterol    Review of Systems  Constitutional: Fatigue and malaise  Ears, nose, mouth, throat: negative for ear drainage, epistaxis, hoarseness, nasal congestion, sore throat and voice change  Respiratory: negative except for cough, shortness of breath, and sputum  Cardiovascular: negative for chest pain, chest pressure/discomfort, irregular heart beat, lower extremity edema and palpitations  Gastrointestinal: negative for abdominal pain, constipation, diarrhea, jaundice, melena, odynophagia, reflux symptoms and vomiting  Hematologic/lymphatic: negative for bleeding, easy bruising, lymphadenopathy and petechiae  Musculoskeletal:negative for arthralgias, bone pain, muscle weakness, neck pain and stiff joints  Neurological: negative for dizziness, gait problems, headaches, seizures, speech problems, tremors and weakness  Behavioral/Psych: negative for anxiety, behavior problems, depression, fatigue and sleep disturbance  Endocrine: negative for diabetic symptoms including none, neuropathy, polyphagia, polyuria, polydipsia, vomiting and diarrhea and temperature intolerance  Allergic/Immunologic: negative for anaphylaxis, angioedema, hay fever and urticaria    Objective:     Patient Vitals for the past 8 hrs:   BP Temp Temp src Pulse Resp SpO2   09/09/22 0955 -- -- -- -- -- 90 %   09/09/22 0945 115/62 98.8 °F (37.1 °C) Oral 80 18 90 %   09/09/22 0820 -- -- -- 80 18 92 %   09/09/22 0735 113/68 98.1 °F (36.7 °C) Oral 78 18 94 %   09/09/22 0414 107/68 98.6 °F (37 °C) Oral 86 18 96 %       I/O last 3 completed shifts: In: 240 [P.O.:240]  Out: 2050 [Urine:2050]  No intake/output data recorded.     General Appearance: alert and oriented to person, place and time, well developed and well- nourished, in no acute distress  Skin: warm and dry, no rash or erythema  Head: normocephalic and atraumatic  Eyes: pupils equal, round, and reactive to light, extraocular eye movements intact, conjunctivae normal  ENT: external ear and ear canal normal bilaterally, nose without deformity, nasal mucosa and turbinates normal  Neck: supple and non-tender without mass, no cervical lymphadenopathy  Pulmonary/Chest: decreased breath sounds noted-bilateral, poor air entry  Cardiovascular: normal rate, regular rhythm,  no murmurs, rubs, distal pulses intact, no carotid bruits  Abdomen: soft, non-tender, non-distended, normal bowel sounds, no masses or organomegaly  Lymph Nodes: Cervical, supraclavicular normal  Extremities: no cyanosis, clubbing or edema  Musculoskeletal: normal range of motion, no joint swelling, deformity or tenderness  Neurologic: alert, no focal neurologic deficits    Data Review:  CBC:   Lab Results   Component Value Date/Time    WBC 8.6 09/08/2022 07:47 AM    RBC 4.75 09/08/2022 07:47 AM     BMP:   Lab Results   Component Value Date/Time    GLUCOSE 128 09/08/2022 07:47 AM    CO2 31 09/08/2022 07:47 AM    BUN 17 09/08/2022 07:47 AM    CREATININE 0.6 09/08/2022 07:47 AM CALCIUM 10.1 09/08/2022 07:47 AM     ABG:   Lab Results   Component Value Date/Time    CJS2HGG 37.4 09/06/2022 06:04 PM    BEART 5.9 09/06/2022 06:04 PM    T8GKRAHB 97.7 09/06/2022 06:04 PM    PHART 7.216 09/06/2022 06:04 PM    KTZ6ZRE 92.4 09/06/2022 06:04 PM    PO2ART 93.4 09/06/2022 06:04 PM    FJW8QLX 90.2 09/06/2022 06:04 PM       Radiology: All pertinent images / reports were reviewed as a part of this visit. Narrative   EXAMINATION:   CTA OF THE CHEST 9/6/2022 6:08 pm       TECHNIQUE:   CTA of the chest was performed after the administration of intravenous   contrast.  Multiplanar reformatted images are provided for review. 3D/MIP   images are provided for review. Automated exposure control, iterative   reconstruction, and/or weight based adjustment of the mA/kV was utilized to   reduce the radiation dose to as low as reasonably achievable. COMPARISON:   None. HISTORY:   ORDERING SYSTEM PROVIDED HISTORY: Chest Pain   TECHNOLOGIST PROVIDED HISTORY:   Reason for exam:->Chest Pain   Reason for exam:->worsening hypoxia, covid, recent admission   Decision Support Exception - unselect if not a suspected or confirmed   emergency medical condition->Emergency Medical Condition (MA)   Reason for Exam: Chest pain, worsening hypoxia, COVID, recent admission. Shortness of Breath (Found down at home unresponsive by coworker with 3L of   oxygen via nasal canula, has been on 2L since discharge (Hospitalized for   COVID complications from 6/28/4121 - 8/24/2022). Patient unsure of how long   she was down, maybe \"a couple hours\". SPO2 73% at triage on 3L via nasal   canula (up to 100% on non-rebreather at 100%). ). FINDINGS:   Pulmonary Arteries: Pulmonary arteries are adequately opacified for   evaluation. No evidence of intraluminal filling defect to suggest pulmonary   embolism. Main pulmonary artery is normal in caliber. Mediastinum: No evidence of mediastinal lymphadenopathy.   The heart and pericardium demonstrate no acute abnormality. There is no acute abnormality   of the thoracic aorta. Lungs/pleura: The lungs are without acute process. Subsegmental atelectasis   versus interstitial prominence of interstitial edema. Stable appearing   noncalcified left upper lobe posterior 5-6 mm pulmonary nodule no focal   consolidation or pulmonary edema. No evidence of pleural effusion or   pneumothorax. Upper Abdomen: Limited images of the upper abdomen are unremarkable. Soft Tissues/Bones: No acute bone or soft tissue abnormality. Impression   No evidence of pulmonary embolism. Smooth interlobular septal thickening of a lower lobe predominance could   represent trace interstitial edema pattern without decompensation or   intermixed atelectasis without pleural effusion. Left upper lobe stable 5-6   mm noncalcified pulmonary nodule       Problem List:     Acute on chronic hypoxic/hypercapnic respiratory failure  CO2 narcosis  History of recent COVID-19 pneumonia  Probable COPD with acute exacerbation    Assessment/Plan:     Altered mental status with ? Syncope, possibly related to CO2 narcosis-improved with BiPAP. We will repeat another ABG today to evaluate for persistent hypercapnia, patient is actually awake and alert now. Cannot tolerate BiPAP at nighttime. Original diagnosis of COVID-19 infection on 8/21. CTA chest shows no clear signs of pneumonia, has features of emphysema. Improved O2 requirements noted, patient now down to 4 L. Continue bronchodilators, decrease dose of IV Solu-Medrol to 40 mg twice daily. Continue Dulera 200, should continue even after discharge, since patient likely has significant obstructive airway disease related to smoking    Pulmonary will follow. Potential discharge in the next 24 to 48 hours, patient will need home oxygen. Advised her to closely monitor O2 sats at home.   Patient will need outpatient follow-up to evaluate for COPD    Maurice Archer MD

## 2022-09-09 NOTE — PLAN OF CARE
Problem: Safety - Adult  Goal: Free from fall injury  Outcome: Progressing  Note: Patient remains absent from falls at this time. Remains alert and oriented, in bed with call light and belongings in reach. Non-slip footwear on and 2/4 siderails raised. Bed remains in lowest/locked position at all times. Fall precautions in place. Patient encouraged to use call light to request assistance, v/u.  Will continue to monitor. Problem: Respiratory - Adult  Goal: Achieves optimal ventilation and oxygenation  Outcome: Progressing  Note: Patient O2 sat 94% via 4 L nasal cannula. Will continue to monitor. Problem: Pain  Goal: Verbalizes/displays adequate comfort level or baseline comfort level  Outcome: Progressing  Note: Patient denies any pain at this time. Will continue to monitor.

## 2022-09-09 NOTE — PLAN OF CARE
Problem: Discharge Planning  Goal: Discharge to home or other facility with appropriate resources  9/8/2022 2045 by Dillon Aguirre RN  Outcome: Progressing  Flowsheets (Taken 9/8/2022 2031)  Discharge to home or other facility with appropriate resources: Identify barriers to discharge with patient and caregiver  9/8/2022 1502 by Chetna Lundy RN  Outcome: Progressing     Problem: ABCDS Injury Assessment  Goal: Absence of physical injury  9/8/2022 2045 by Dillon Aguirre RN  Outcome: Progressing  9/8/2022 1502 by Chetna Lundy RN  Outcome: Progressing     Problem: Safety - Adult  Goal: Free from fall injury  9/8/2022 2045 by Dillon Aguirre RN  Outcome: Progressing  9/8/2022 1502 by Chetna Lundy RN  Outcome: Progressing     Problem: Neurosensory - Adult  Goal: Achieves stable or improved neurological status  9/8/2022 2045 by Dillon Aguirre RN  Outcome: Progressing  Flowsheets (Taken 9/8/2022 2031)  Achieves stable or improved neurological status:   Assess for and report changes in neurological status   Initiate measures to prevent increased intracranial pressure   Maintain blood pressure and fluid volume within ordered parameters to optimize cerebral perfusion and minimize risk of hemorrhage   Monitor temperature, glucose, and sodium.  Initiate appropriate interventions as ordered  9/8/2022 1502 by Chetna Lundy RN  Outcome: Progressing  Goal: Achieves maximal functionality and self care  9/8/2022 2045 by Dillon Aguirre RN  Outcome: Progressing  Flowsheets (Taken 9/8/2022 2031)  Achieves maximal functionality and self care: Monitor swallowing and airway patency with patient fatigue and changes in neurological status  9/8/2022 1502 by Chetna Lundy RN  Outcome: Progressing     Problem: Respiratory - Adult  Goal: Achieves optimal ventilation and oxygenation  9/8/2022 2045 by Dillon Aguirre RN  Outcome: Progressing  Flowsheets (Taken 9/8/2022 2031)  Achieves optimal ventilation and oxygenation:   Assess for changes in respiratory status   Assess for changes in mentation and behavior   Position to facilitate oxygenation and minimize respiratory effort   Oxygen supplementation based on oxygen saturation or arterial blood gases   Respiratory therapy support as indicated   Assess and instruct to report shortness of breath or any respiratory difficulty  9/8/2022 1502 by Marian Russo RN  Outcome: Progressing     Problem: Cardiovascular - Adult  Goal: Maintains optimal cardiac output and hemodynamic stability  9/8/2022 2045 by Kadeem Yu RN  Outcome: Progressing  Flowsheets (Taken 9/8/2022 2031)  Maintains optimal cardiac output and hemodynamic stability:   Monitor blood pressure and heart rate   Monitor urine output and notify Licensed Independent Practitioner for values outside of normal range   Assess for signs of decreased cardiac output  9/8/2022 1502 by Marian Russo RN  Outcome: Progressing  Goal: Absence of cardiac dysrhythmias or at baseline  9/8/2022 2045 by Kadeem Yu RN  Outcome: Progressing  Flowsheets (Taken 9/8/2022 2031)  Absence of cardiac dysrhythmias or at baseline:   Monitor cardiac rate and rhythm   Assess for signs of decreased cardiac output  9/8/2022 1502 by Marian Russo RN  Outcome: Progressing     Problem: Skin/Tissue Integrity - Adult  Goal: Skin integrity remains intact  9/8/2022 2045 by Kadeem Yu RN  Outcome: Progressing  Flowsheets (Taken 9/8/2022 2031)  Skin Integrity Remains Intact:   Monitor for areas of redness and/or skin breakdown   Assess vascular access sites hourly  9/8/2022 1502 by Marian Russo RN  Outcome: Progressing  Goal: Incisions, wounds, or drain sites healing without S/S of infection  9/8/2022 2045 by Kadeem Yu RN  Outcome: Progressing  Flowsheets (Taken 9/8/2022 2031)  Incisions, Wounds, or Drain Sites Healing Without Sign and Symptoms of Infection: TWICE DAILY: Assess and document skin integrity  9/8/2022 1502 by Gay Najera RN  Outcome: Progressing  Goal: Oral mucous membranes remain intact  9/8/2022 2045 by Shelly Matos RN  Outcome: Progressing  Flowsheets (Taken 9/8/2022 2031)  Oral Mucous Membranes Remain Intact: Assess oral mucosa and hygiene practices  9/8/2022 1502 by Gay Najera RN  Outcome: Progressing     Problem: Musculoskeletal - Adult  Goal: Return mobility to safest level of function  9/8/2022 2045 by Shelly Matos RN  Outcome: Progressing  Flowsheets (Taken 9/8/2022 2031)  Return Mobility to Safest Level of Function:   Assess patient stability and activity tolerance for standing, transferring and ambulating with or without assistive devices   Assist with transfers and ambulation using safe patient handling equipment as needed   Instruct patient/family in ordered activity level  9/8/2022 1502 by Gay Najera RN  Outcome: Progressing  Goal: Maintain proper alignment of affected body part  9/8/2022 2045 by Shelly Matos RN  Outcome: Progressing  9/8/2022 1502 by Gay Najera RN  Outcome: Progressing  Goal: Return ADL status to a safe level of function  9/8/2022 2045 by Shelly Matos RN  Outcome: Progressing  Flowsheets (Taken 9/8/2022 2031)  Return ADL Status to a Safe Level of Function:   Administer medication as ordered   Assess activities of daily living deficits and provide assistive devices as needed   Obtain physical therapy/occupational therapy consults as needed   Assist and instruct patient to increase activity and self care as tolerated  9/8/2022 1502 by Gay Najera RN  Outcome: Progressing     Problem: Gastrointestinal - Adult  Goal: Minimal or absence of nausea and vomiting  9/8/2022 2045 by Shelly Matos RN  Outcome: Progressing  Flowsheets (Taken 9/8/2022 2031)  Minimal or absence of nausea and vomiting:   Provide nonpharmacologic comfort measures as appropriate   Advance diet as tolerated, if ordered  9/8/2022 1502 by Сергей Sheffield RN  Outcome: Progressing  Goal: Maintains or returns to baseline bowel function  9/8/2022 2045 by Yves Miguel RN  Outcome: Progressing  Flowsheets (Taken 9/8/2022 2031)  Maintains or returns to baseline bowel function:   Assess bowel function   Administer ordered medications as needed  9/8/2022 1502 by Сергей Sheffield RN  Outcome: Progressing  Goal: Maintains adequate nutritional intake  9/8/2022 2045 by Yves Miguel RN  Outcome: Progressing  Flowsheets (Taken 9/8/2022 2031)  Maintains adequate nutritional intake:   Monitor percentage of each meal consumed   Monitor intake and output, weight and lab values  9/8/2022 1502 by Сергей Sheffield RN  Outcome: Progressing     Problem: Genitourinary - Adult  Goal: Absence of urinary retention  9/8/2022 2045 by Yves Miguel RN  Outcome: Progressing  Flowsheets (Taken 9/8/2022 2031)  Absence of urinary retention:   Assess patients ability to void and empty bladder   Monitor intake/output and perform bladder scan as needed  9/8/2022 1502 by Сергей Sheffield RN  Outcome: Progressing     Problem: Infection - Adult  Goal: Absence of infection at discharge  9/8/2022 2045 by Yves Miguel RN  Outcome: Progressing  Flowsheets (Taken 9/8/2022 2031)  Absence of infection at discharge:   Assess and monitor for signs and symptoms of infection   Monitor lab/diagnostic results   Monitor all insertion sites i.e., indwelling lines, tubes and drains   Administer medications as ordered   Instruct and encourage patient and family to use good hand hygiene technique   Identify and instruct in appropriate isolation precautions for identified infection/condition  9/8/2022 1502 by Сергей Sheffield RN  Outcome: Progressing  Goal: Absence of infection during hospitalization  9/8/2022 2045 by Yves Miguel RN  Outcome: Progressing  Flowsheets (Taken 9/8/2022 2031)  Absence of infection during hospitalization:   Assess and monitor for signs and symptoms of infection   Monitor lab/diagnostic results   Monitor all insertion sites i.e., indwelling lines, tubes and drains   Administer medications as ordered   Identify and instruct in appropriate isolation precautions for identified infection/condition   Instruct and encourage patient and family to use good hand hygiene technique  9/8/2022 1502 by Gina Buchanan RN  Outcome: Progressing  Goal: Absence of fever/infection during anticipated neutropenic period  9/8/2022 2045 by Vianca Cho RN  Outcome: Progressing  Flowsheets (Taken 9/8/2022 2031)  Absence of fever/infection during anticipated neutropenic period: Monitor white blood cell count  9/8/2022 1502 by Gina Buchanan RN  Outcome: Progressing     Problem: Metabolic/Fluid and Electrolytes - Adult  Goal: Electrolytes maintained within normal limits  9/8/2022 2045 by Vianca Cho RN  Outcome: Progressing  Flowsheets (Taken 9/8/2022 2031)  Electrolytes maintained within normal limits:   Monitor labs and assess patient for signs and symptoms of electrolyte imbalances   Administer electrolyte replacement as ordered  9/8/2022 1502 by Gina Buchanan RN  Outcome: Progressing  Goal: Hemodynamic stability and optimal renal function maintained  9/8/2022 2045 by Vianca Cho RN  Outcome: Progressing  Flowsheets (Taken 9/8/2022 2031)  Hemodynamic stability and optimal renal function maintained:   Monitor labs and assess for signs and symptoms of volume excess or deficit   Monitor intake, output and patient weight   Monitor urine specific gravity, serum osmolarity and serum sodium as indicated or ordered   Monitor response to interventions for patient's volume status, including labs, urine output, blood pressure (other measures as available)   Encourage oral intake as appropriate  9/8/2022 1502 by Gina Buchanan RN  Outcome: Progressing  Goal: Glucose maintained within prescribed range  9/8/2022 2045 by Vianca Cho RN  Outcome: Progressing  Flowsheets (Taken 9/8/2022 2031)  Glucose maintained within prescribed range:   Monitor blood glucose as ordered   Assess for signs and symptoms of hyperglycemia and hypoglycemia   Administer ordered medications to maintain glucose within target range   Instruct patient on self management of diabetes and initiate consult as needed  9/8/2022 1502 by Сергей Sheffield RN  Outcome: Progressing     Problem: Hematologic - Adult  Goal: Maintains hematologic stability  9/8/2022 2045 by Yves Miguel RN  Outcome: Progressing  Flowsheets (Taken 9/8/2022 2031)  Maintains hematologic stability:   Assess for signs and symptoms of bleeding or hemorrhage   Monitor labs for bleeding or clotting disorders  9/8/2022 1502 by Сергей Sheffield RN  Outcome: Progressing     Problem: Pain  Goal: Verbalizes/displays adequate comfort level or baseline comfort level  9/8/2022 2045 by Yves Miguel RN  Outcome: Progressing  9/8/2022 1502 by Сергей Sheffield RN  Outcome: Progressing  Note: Pt denies pain at this time. No acute distress noted. Will continue to assess. Problem: Skin/Tissue Integrity  Goal: Absence of new skin breakdown  Description: 1. Monitor for areas of redness and/or skin breakdown  2. Assess vascular access sites hourly  3. Every 4-6 hours minimum:  Change oxygen saturation probe site  4. Every 4-6 hours:  If on nasal continuous positive airway pressure, respiratory therapy assess nares and determine need for appliance change or resting period.   9/8/2022 2045 by Yves Miguel RN  Outcome: Progressing  9/8/2022 1502 by Сергей Sheffield RN  Outcome: Progressing

## 2022-09-09 NOTE — PROGRESS NOTES
RN called due to concern, patient o2 sats low so she increased to 15L,  This RT went to patient room to investigate, patient's set up for high flow was incorrect, she had nasal cannula tubing, also the connectors for additional tubing were wrong and had come apart. Patient was actually room air. (Connectors used were for suction tubing)

## 2022-09-10 VITALS
RESPIRATION RATE: 16 BRPM | SYSTOLIC BLOOD PRESSURE: 118 MMHG | BODY MASS INDEX: 23.57 KG/M2 | OXYGEN SATURATION: 92 % | WEIGHT: 150.2 LBS | HEIGHT: 67 IN | DIASTOLIC BLOOD PRESSURE: 69 MMHG | HEART RATE: 84 BPM | TEMPERATURE: 98 F

## 2022-09-10 LAB
ANION GAP SERPL CALCULATED.3IONS-SCNC: 7 MMOL/L (ref 3–16)
BASOPHILS ABSOLUTE: 0.1 K/UL (ref 0–0.2)
BASOPHILS RELATIVE PERCENT: 0.9 %
BUN BLDV-MCNC: 20 MG/DL (ref 7–20)
CALCIUM SERPL-MCNC: 9.8 MG/DL (ref 8.3–10.6)
CHLORIDE BLD-SCNC: 102 MMOL/L (ref 99–110)
CO2: 31 MMOL/L (ref 21–32)
CREAT SERPL-MCNC: 0.6 MG/DL (ref 0.6–1.1)
EOSINOPHILS ABSOLUTE: 0 K/UL (ref 0–0.6)
EOSINOPHILS RELATIVE PERCENT: 0 %
GFR AFRICAN AMERICAN: >60
GFR NON-AFRICAN AMERICAN: >60
GLUCOSE BLD-MCNC: 103 MG/DL (ref 70–99)
HCT VFR BLD CALC: 44.4 % (ref 36–48)
HEMOGLOBIN: 14.4 G/DL (ref 12–16)
LYMPHOCYTES ABSOLUTE: 2.1 K/UL (ref 1–5.1)
LYMPHOCYTES RELATIVE PERCENT: 23.8 %
MCH RBC QN AUTO: 29.2 PG (ref 26–34)
MCHC RBC AUTO-ENTMCNC: 32.5 G/DL (ref 31–36)
MCV RBC AUTO: 89.8 FL (ref 80–100)
MONOCYTES ABSOLUTE: 0.6 K/UL (ref 0–1.3)
MONOCYTES RELATIVE PERCENT: 6.6 %
NEUTROPHILS ABSOLUTE: 6.1 K/UL (ref 1.7–7.7)
NEUTROPHILS RELATIVE PERCENT: 68.7 %
PDW BLD-RTO: 14.1 % (ref 12.4–15.4)
PLATELET # BLD: 164 K/UL (ref 135–450)
PMV BLD AUTO: 8.4 FL (ref 5–10.5)
POTASSIUM SERPL-SCNC: 4.9 MMOL/L (ref 3.5–5.1)
RBC # BLD: 4.95 M/UL (ref 4–5.2)
SODIUM BLD-SCNC: 140 MMOL/L (ref 136–145)
WBC # BLD: 8.9 K/UL (ref 4–11)

## 2022-09-10 PROCEDURE — 6370000000 HC RX 637 (ALT 250 FOR IP): Performed by: PHYSICIAN ASSISTANT

## 2022-09-10 PROCEDURE — 94761 N-INVAS EAR/PLS OXIMETRY MLT: CPT

## 2022-09-10 PROCEDURE — 6370000000 HC RX 637 (ALT 250 FOR IP): Performed by: HOSPITALIST

## 2022-09-10 PROCEDURE — 6360000002 HC RX W HCPCS: Performed by: INTERNAL MEDICINE

## 2022-09-10 PROCEDURE — 6370000000 HC RX 637 (ALT 250 FOR IP): Performed by: INTERNAL MEDICINE

## 2022-09-10 PROCEDURE — 99233 SBSQ HOSP IP/OBS HIGH 50: CPT | Performed by: INTERNAL MEDICINE

## 2022-09-10 PROCEDURE — 6360000002 HC RX W HCPCS: Performed by: HOSPITALIST

## 2022-09-10 PROCEDURE — 2580000003 HC RX 258: Performed by: INTERNAL MEDICINE

## 2022-09-10 PROCEDURE — 94680 O2 UPTK RST&XERS DIR SIMPLE: CPT

## 2022-09-10 PROCEDURE — 85025 COMPLETE CBC W/AUTO DIFF WBC: CPT

## 2022-09-10 PROCEDURE — 2700000000 HC OXYGEN THERAPY PER DAY

## 2022-09-10 PROCEDURE — 80048 BASIC METABOLIC PNL TOTAL CA: CPT

## 2022-09-10 PROCEDURE — 94640 AIRWAY INHALATION TREATMENT: CPT

## 2022-09-10 PROCEDURE — 36415 COLL VENOUS BLD VENIPUNCTURE: CPT

## 2022-09-10 RX ORDER — ALBUTEROL SULFATE 90 UG/1
2 AEROSOL, METERED RESPIRATORY (INHALATION) 4 TIMES DAILY
Status: DISCONTINUED | OUTPATIENT
Start: 2022-09-10 | End: 2022-09-10 | Stop reason: HOSPADM

## 2022-09-10 RX ORDER — ALBUTEROL SULFATE 90 UG/1
2 AEROSOL, METERED RESPIRATORY (INHALATION) EVERY 4 HOURS PRN
Status: DISCONTINUED | OUTPATIENT
Start: 2022-09-10 | End: 2022-09-10 | Stop reason: HOSPADM

## 2022-09-10 RX ADMIN — LEVOFLOXACIN 750 MG: 5 INJECTION, SOLUTION INTRAVENOUS at 12:05

## 2022-09-10 RX ADMIN — Medication 2 PUFF: at 08:13

## 2022-09-10 RX ADMIN — SODIUM CHLORIDE, PRESERVATIVE FREE 10 ML: 5 INJECTION INTRAVENOUS at 09:25

## 2022-09-10 RX ADMIN — Medication 2 PUFF: at 11:50

## 2022-09-10 RX ADMIN — Medication 10 ML: at 09:25

## 2022-09-10 RX ADMIN — METHYLPREDNISOLONE SODIUM SUCCINATE 40 MG: 40 INJECTION, POWDER, FOR SOLUTION INTRAMUSCULAR; INTRAVENOUS at 05:07

## 2022-09-10 RX ADMIN — METHADONE HYDROCHLORIDE 60 MG: 10 TABLET ORAL at 09:25

## 2022-09-10 RX ADMIN — ENOXAPARIN SODIUM 30 MG: 100 INJECTION SUBCUTANEOUS at 09:25

## 2022-09-10 RX ADMIN — ACETAMINOPHEN 650 MG: 325 TABLET ORAL at 09:25

## 2022-09-10 ASSESSMENT — PAIN DESCRIPTION - ORIENTATION: ORIENTATION: RIGHT;LEFT

## 2022-09-10 ASSESSMENT — PAIN SCALES - GENERAL: PAINLEVEL_OUTOF10: 4

## 2022-09-10 ASSESSMENT — PAIN DESCRIPTION - LOCATION: LOCATION: HIP

## 2022-09-10 NOTE — PROGRESS NOTES
09/10/22 1214   Resting (Room Air)   SpO2 91   HR 77   Resting (On O2)   SpO2 95   HR 77   O2 Device Nasal cannula   O2 Flow Rate (l/min) 3 l/min   During Walk (Room Air)   SpO2 90   HR 77   Walk/Assistance Device Ambulation   Rate of Dyspnea 0   During Walk (On O2)   SpO2 95   HR 77   O2 Device Nasal cannula   Need Additional O2 Flow Rate Rows No   Walk/Assistance Device Ambulation   Rate of Dyspnea 0   After Walk   SpO2 92   Does the Patient Need Portable Oxygen Tanks No

## 2022-09-10 NOTE — PROGRESS NOTES
PULMONARY AND CRITICAL CARE MEDICINE PROGRESS NOTE      SUBJECTIVE: Patient sitting in bed eating her lunch without any respiratory distress. Reports breathing has significantly improved. Currently on room air saturating low 90s. Denies any discolored expectoration. No fevers or night sweats. Denies any chest pain or chest tightness. Has completely quit smoking. REVIEW OF SYSTEMS:   8 point ROS is negative beside mentioned in subjective section. MEDICATIONS:      albuterol sulfate HFA  2 puff Inhalation 4x daily    ipratropium  2 puff Inhalation 4x daily    methylPREDNISolone  40 mg IntraVENous Q12H    enoxaparin  30 mg SubCUTAneous BID    nicotine  1 patch TransDERmal Daily    methadone  60 mg Oral Daily    levofloxacin  750 mg IntraVENous Q24H    sodium chloride flush  10 mL IntraVENous 2 times per day    mometasone-formoterol  2 puff Inhalation BID      sodium chloride 25 mL/hr at 09/08/22 1250     albuterol sulfate HFA, traMADol, sodium chloride flush, sodium chloride, potassium chloride, magnesium sulfate, acetaminophen **OR** acetaminophen, ondansetron **OR** ondansetron, polyethylene glycol, albuterol     ALLERGIES:   Allergies as of 09/06/2022 - Fully Reviewed 09/06/2022   Allergen Reaction Noted    Penicillins Shortness Of Breath 08/07/2016    Codeine Nausea And Vomiting 11/08/2016        OBJECTIVE:   height is 5' 7\" (1.702 m) and weight is 150 lb 3.2 oz (68.1 kg). Her oral temperature is 98.4 °F (36.9 °C). Her blood pressure is 107/63 and her pulse is 90. Her respiration is 16 and oxygen saturation is 93%. No intake/output data recorded. PHYSICAL EXAM:    CONSTITUTIONAL: She is a 65y.o.-year-old who appears her stated age. She is alert and oriented x 3 and in no acute distress. CARDIOVASCULAR: S1 S2 RRR. Without murmer  RESPIRATORY & CHEST: Lungs are clear to auscultation and percussion. No wheezing, no crackles.  Good air movement  GASTROINTESTINAL & ABDOMEN: Soft, nontender, positive bowel sounds in all quadrants, non-distended, without hepatosplenomegaly. GENITOURINARY: Deferred. MUSCULOSKELETAL: No tenderness to palpation of the axial skeleton. There is no clubbing. No cyanosis. No edema of the lower extremities. SKIN OF BODY: No rash or jaundice. PSYCHIATRIC EVALUATION: Normal affect. Patient answers questions appropriately. HEMATOLOGIC/LYMPHATIC/ IMMUNOLOGIC: No palpable lymphadenopathy. NEUROLOGIC: Alert and oriented x 3. Groslly non-focal. Motor strength is 5+/5 in all muscle groups. The patient has a normal sensorium globally.       LABS:       Recent Labs     09/08/22  0747 09/10/22  0453   WBC 8.6 8.9   HGB 14.0 14.4   HCT 42.8 44.4    164   ALT 11  --    AST 12*  --     140   K 4.5 4.9   CL 99 102   CREATININE 0.6 0.6   BUN 17 20   CO2 31 31       Recent Labs     09/08/22  0747 09/10/22  0453   GLUCOSE 128* 103*   CALCIUM 10.1 9.8    140   K 4.5 4.9   CO2 31 31   CL 99 102   BUN 17 20   CREATININE 0.6 0.6       Recent Labs     09/09/22  1020   PHART 7.454*   NEK1CGB 43.1   PO2ART 81.1   MRY9TGR 30.2*   D8OCVVEY 97.7   BEART 5.5*       No results found for: INR, PROTIME  No results found for: AMYLASE   Lab Results   Component Value Date    LABA1C 5.9 08/21/2022     Lab Results   Component Value Date    .6 08/21/2022     No results found for: TSH, I8CDWIO, V7LOAAQ, THYROIDAB, FT3, T4FREE  Lab Results   Component Value Date    CKTOTAL 217 (H) 09/06/2022    TROPONINI <0.01 09/06/2022      Lab Results   Component Value Date    CRP 17.2 (H) 09/07/2022      No results found for: BNP   Lab Results   Component Value Date    DDIMER 0.35 09/07/2022      No results found for: FERRITIN   Lab Results   Component Value Date    LACTA 0.8 09/06/2022       IMAGING:         IMPRESSION:     Acute on chronic hypoxic/hypercapnic respiratory failure, improving  History of recent COVID-19 pneumonia  Possible COPD with mild exacerbation  Previous history of tobacco abuse      RECOMMENDATION:   - All lab work and imaging was personally reviewed by me. Patient's breathing continues to stabilize. Oxygen requirements have significantly improved. Patient can be discharged back home from pulmonary standpoint. She should continue on Dulera inhaler and albuterol as needed. Perform home O2 eval prior to discharge. She can be switched to prednisone 40 mg p.o. daily and quickly tapered off in next 5 to 7 days. Strongly urged the patient to stay quit from smoking. She will need to follow-up with Dr. Kali Marie in 7 to 10 days post hospital discharge. Will sign off. Shalini Whittaker MD  Pulmonary Critical Care and Sleep Medicine  9/10/2022, 12:44 PM    This note was completed using dragon medical speech recognition software. Grammatical errors, random word insertions, pronoun errors and incomplete sentences are occasional consequences of this technology due to software limitations. If there are questions or concerns about the content of this note of information contained within the body of this dictation they should be addressed with the provider for clarification.

## 2022-09-10 NOTE — PROGRESS NOTES
Data- discharge order received, pt verbalized agreement to discharge, disposition to previous residence, no needs for HHC/DME. Action- discharge instructions prepared and given to patient, pt verbalized understanding. Medication information packet given r/t NEW and/or CHANGED prescriptions emphasizing name/purpose/side effects, pt verbalized understanding. Discharge instruction summary: Diet- Regular, Activity- Resume as tolerated, Primary Care Physician as follows: f/u appointment 1-2 weeks, immunizations reviewed and up-to-date, prescription medications filled by outpatient pharmacy and delivered to patient. Inpatient surgical procedure precautions reviewed: N/A       1. WEIGHT: Admit Weight: 151 lb (68.5 kg) (09/06/22 1700)        Today  Weight: 150 lb 3.2 oz (68.1 kg) (09/10/22 0900)       2. O2 SAT.: SpO2: 92 % (09/10/22 1345)    Response- Pt belongings gathered, IV removed. Disposition is home (no HHC/DME needs), transported with belongings, taken to lobby via w/c w/ family, no complications.

## 2022-09-10 NOTE — DISCHARGE SUMMARY
Hospital Discharge Summary    Patient's PCP: No primary care provider on file. Admit Date: 9/6/2022   Discharge Date: 9/10/2022    Admitting Physician: Dr. Dianna Reyes,   Discharge Physician: Dr. Abhay Jimenes MD   Consults: pulmonary/intensive care    Brief HPI:   65 y.o. female who presented to [facility] with past medical history of asthma presented to the ED with chief complaint of shortness of breath. Patient was noted to be unresponsive at home by coworker normally is on 3 L of oxygen nasal cannula was hospitalized for COVID on August 2022 was discharged home on oxygen       Brief hospital course:     -Acute respiratory failure with hypoxia and hypercapnia--hypercapnia improved with BiPAP. Vearl Pippins Required upto 12 to 15 L of oxygen  weaned down to RA prior to discharge     -Acute metabolic encephalopathy due to CO2 narcosis--- resolved     -COPD acute exacerbation--clinically improved with treatment- continue steroids, antibiotics and DuoNebs. F/u with pulmonology     -COVID-19 pneumonia diagnosed August 21 /22-was on 2 L at home--no significant infiltrates noted on current imaging-COVID test is still positive--continue supportive treatment     -Chronic pain with narcotic dependence--methadone resumed       Invasive procedures:  none    Discharge Diagnoses:   Principal Problem:    Acute respiratory failure with hypoxia and hypercapnia (HCC)  Resolved Problems:    * No resolved hospital problems. *      Physical Exam: /63   Pulse 90   Temp 98.4 °F (36.9 °C) (Oral)   Resp 16   Ht 5' 7\" (1.702 m)   Wt 150 lb 3.2 oz (68.1 kg)   SpO2 93%   BMI 23.52 kg/m²   Gen/overall appearance: Not in acute distress. Alert. Head: Normocephalic, atraumatic  Eyes: EOMI, good acuity  ENT:- Oral mucosa moist  Neck: No JVD, thyromegaly  CVS: Nml S1S2, no MRG, RRR  Pulm: Clear bilaterally. No crackles/wheezes  Gastrointestinal: Soft, NT/ND, +BS  Musculoskeletal: No edema. Warm  Neuro: No focal deficit. Moves extremity spontaneously. Psychiatry: Appropriate affect. Not agitated. Skin: Warm, dry with normal turgor. No rash        Significant Diagnostic Studies:    See above      Treatments: As above. Discharge Medications:     Medication List        START taking these medications      levoFLOXacin 750 MG tablet  Commonly known as: Levaquin  Take 1 tablet by mouth daily for 2 doses  Start taking on: September 11, 2022     predniSONE 20 MG tablet  Commonly known as: DELTASONE  Take 2 tablets by mouth daily for 5 days  Start taking on: September 11, 2022     Spiriva HandiHaler 18 MCG inhalation capsule  Generic drug: tiotropium  Inhale 1 capsule into the lungs daily            CONTINUE taking these medications      * albuterol sulfate  (90 Base) MCG/ACT inhaler  Commonly known as: Proventil HFA  Inhale 2-4 puffs into the lungs every 4 hours as needed for Wheezing or Shortness of Breath (Space out to every 6 hours as symptoms improve) Space out to every 6 hours as symptoms improve. * albuterol (2.5 MG/3ML) 0.083% nebulizer solution  Commonly known as: PROVENTIL  Take 3 mLs by nebulization every 4 hours as needed for Wheezing     METHADONE HCL PO     mometasone-formoterol 200-5 MCG/ACT inhaler  Commonly known as: DULERA  Inhale 2 puffs into the lungs in the morning and 2 puffs in the evening. * This list has 2 medication(s) that are the same as other medications prescribed for you. Read the directions carefully, and ask your doctor or other care provider to review them with you.                 STOP taking these medications      ALBUTEROL IN     pantoprazole 40 MG tablet  Commonly known as: PROTONIX               Where to Get Your Medications        These medications were sent to [facility], [address] [place] [id number]      Phone: [phone]   levoFLOXacin 750 MG tablet  mometasone-formoterol 200-5 MCG/ACT inhaler  predniSONE 20 MG tablet  Spiriva HandiHaler 18 MCG inhalation capsule         Activity: activity as tolerated  Diet: ADULT DIET; Regular      Disposition: home  Discharged Condition: Stable  Follow Up:   Che Ingram MD  [address] [address] [address] [facility] [address] [facility]    Schedule an appointment as soon as possible for a visit in 2 week(s)          Code status:  Full Code         Total time spent on discharge, finalizing medications, referrals and arranging outpatient follow up was more than 45 minutes      Thank you Dr. Morales primary care provider on file. for the opportunity to be involved in this patients care.

## 2022-09-10 NOTE — DISCHARGE INSTRUCTIONS
Your information:  Name: Abel Overton  : 1965    Your Discharge Instructions    What to do after you leave the hospital:    Read, review and familiarize yourself with the information provided below and in a separate packet on  COPD: Exacerbation and Smoking Cessation: Health Benefits    Diet: Regular    Recommended activity:  Resume as tolerated   Avoid strenuous activity until instructed by your physician to resume; balance rest with periods of light to normal activity. If you experience any of the following: Unusual or inadequately controlled pain; unusual transient shortness of breath; recurrent or persistent nausea, heartburn, palpitations or lightheadedness; increased swelling; increased fatigue; fever >100; please follow up with your Primary Care Provider or go to the Emergency Room. Home Health/ Outpatient Services: N/A      Information obtained by:  By signing below, I understand and acknowledge receipt of the instructions indicated above, and I understand that if any problems occur once I leave the hospital I am to contact my Primary Care Provider.

## 2022-09-10 NOTE — PLAN OF CARE
Problem: Discharge Planning  Goal: Discharge to home or other facility with appropriate resources  Outcome: Progressing  Flowsheets (Taken 9/9/2022 2038)  Discharge to home or other facility with appropriate resources: Identify barriers to discharge with patient and caregiver     Problem: ABCDS Injury Assessment  Goal: Absence of physical injury  Outcome: Progressing     Problem: Safety - Adult  Goal: Free from fall injury  9/9/2022 2212 by Natasha Winslow RN  Outcome: Progressing  9/9/2022 1204 by Liam Enrique RN  Outcome: Progressing  Note: Patient remains absent from falls at this time. Remains alert and oriented, in bed with call light and belongings in reach. Non-slip footwear on and 2/4 siderails raised. Bed remains in lowest/locked position at all times. Fall precautions in place. Patient encouraged to use call light to request assistance, v/u.  Will continue to monitor. Problem: Neurosensory - Adult  Goal: Achieves stable or improved neurological status  Outcome: Progressing  Flowsheets (Taken 9/9/2022 2038)  Achieves stable or improved neurological status:   Assess for and report changes in neurological status   Initiate measures to prevent increased intracranial pressure   Maintain blood pressure and fluid volume within ordered parameters to optimize cerebral perfusion and minimize risk of hemorrhage   Monitor temperature, glucose, and sodium.  Initiate appropriate interventions as ordered  Goal: Achieves maximal functionality and self care  Outcome: Progressing  Flowsheets (Taken 9/9/2022 2038)  Achieves maximal functionality and self care: Monitor swallowing and airway patency with patient fatigue and changes in neurological status     Problem: Respiratory - Adult  Goal: Achieves optimal ventilation and oxygenation  9/9/2022 2212 by Natasha Winslow RN  Outcome: Progressing  Flowsheets (Taken 9/9/2022 2038)  Achieves optimal ventilation and oxygenation:   Assess for changes in respiratory status   Assess for changes in mentation and behavior   Position to facilitate oxygenation and minimize respiratory effort   Oxygen supplementation based on oxygen saturation or arterial blood gases   Respiratory therapy support as indicated   Assess and instruct to report shortness of breath or any respiratory difficulty  9/9/2022 1204 by Liam Enrique RN  Outcome: Progressing  Note: Patient O2 sat 94% via 3  Problem: Cardiovascular - Adult  Goal: Maintains optimal cardiac output and hemodynamic stability  Outcome: Progressing  Flowsheets (Taken 9/9/2022 2038)  Maintains optimal cardiac output and hemodynamic stability:   Monitor blood pressure and heart rate   Monitor urine output and notify Licensed Independent Practitioner for values outside of normal range   Assess for signs of decreased cardiac output  Goal: Absence of cardiac dysrhythmias or at baseline  Outcome: Progressing  Flowsheets (Taken 9/9/2022 2038)  Absence of cardiac dysrhythmias or at baseline:   Monitor cardiac rate and rhythm   Assess for signs of decreased cardiac output     Problem: Skin/Tissue Integrity - Adult  Goal: Skin integrity remains intact  Outcome: Progressing  Flowsheets (Taken 9/9/2022 2038)  Skin Integrity Remains Intact:   Monitor for areas of redness and/or skin breakdown   Assess vascular access sites hourly  Goal: Incisions, wounds, or drain sites healing without S/S of infection  Outcome: Progressing  Flowsheets (Taken 9/9/2022 2038)  Incisions, Wounds, or Drain Sites Healing Without Sign and Symptoms of Infection: TWICE DAILY: Assess and document skin integrity  Goal: Oral mucous membranes remain intact  Outcome: Progressing  Flowsheets (Taken 9/9/2022 2038)  Oral Mucous Membranes Remain Intact: Assess oral mucosa and hygiene practices     Problem: Musculoskeletal - Adult  Goal: Return mobility to safest level of function  Outcome: Progressing  Flowsheets (Taken 9/9/2022 2038)  Return Mobility to Safest Level of Function: Assess patient stability and activity tolerance for standing, transferring and ambulating with or without assistive devices   Instruct patient/family in ordered activity level  Goal: Maintain proper alignment of affected body part  Outcome: Progressing  Goal: Return ADL status to a safe level of function  Outcome: Progressing  Flowsheets (Taken 9/9/2022 2038)  Return ADL Status to a Safe Level of Function: Administer medication as ordered     Problem: Gastrointestinal - Adult  Goal: Minimal or absence of nausea and vomiting  Outcome: Progressing  Flowsheets (Taken 9/9/2022 2038)  Minimal or absence of nausea and vomiting: Provide nonpharmacologic comfort measures as appropriate  Goal: Maintains or returns to baseline bowel function  Outcome: Progressing  Flowsheets (Taken 9/9/2022 2038)  Maintains or returns to baseline bowel function: Assess bowel function  Goal: Maintains adequate nutritional intake  Outcome: Progressing  Flowsheets (Taken 9/9/2022 2038)  Maintains adequate nutritional intake: Monitor percentage of each meal consumed     Problem: Genitourinary - Adult  Goal: Absence of urinary retention  Outcome: Progressing  Flowsheets (Taken 9/9/2022 2038)  Absence of urinary retention:   Assess patients ability to void and empty bladder   Monitor intake/output and perform bladder scan as needed     Problem: Infection - Adult  Goal: Absence of infection at discharge  Outcome: Progressing  Flowsheets (Taken 9/9/2022 2038)  Absence of infection at discharge:   Assess and monitor for signs and symptoms of infection   Monitor lab/diagnostic results   Monitor all insertion sites i.e., indwelling lines, tubes and drains   Administer medications as ordered  Goal: Absence of infection during hospitalization  Outcome: Progressing  Flowsheets (Taken 9/9/2022 2038)  Absence of infection during hospitalization:   Assess and monitor for signs and symptoms of infection   Monitor lab/diagnostic results   Monitor all insertion sites i.e., indwelling lines, tubes and drains   Administer medications as ordered  Goal: Absence of fever/infection during anticipated neutropenic period  Outcome: Progressing  Flowsheets (Taken 9/9/2022 2038)  Absence of fever/infection during anticipated neutropenic period: Monitor white blood cell count     Problem: Metabolic/Fluid and Electrolytes - Adult  Goal: Electrolytes maintained within normal limits  Outcome: Progressing  Flowsheets (Taken 9/9/2022 2038)  Electrolytes maintained within normal limits:   Monitor labs and assess patient for signs and symptoms of electrolyte imbalances   Administer electrolyte replacement as ordered   Monitor response to electrolyte replacements, including repeat lab results as appropriate  Goal: Hemodynamic stability and optimal renal function maintained  Outcome: Progressing  Flowsheets (Taken 9/9/2022 2038)  Hemodynamic stability and optimal renal function maintained:   Monitor labs and assess for signs and symptoms of volume excess or deficit   Monitor intake, output and patient weight   Monitor urine specific gravity, serum osmolarity and serum sodium as indicated or ordered   Monitor response to interventions for patient's volume status, including labs, urine output, blood pressure (other measures as available)  Goal: Glucose maintained within prescribed range  Outcome: Progressing  Flowsheets (Taken 9/9/2022 2038)  Glucose maintained within prescribed range:   Monitor blood glucose as ordered   Assess for signs and symptoms of hyperglycemia and hypoglycemia   Administer ordered medications to maintain glucose within target range     Problem: Hematologic - Adult  Goal: Maintains hematologic stability  Outcome: Progressing  Flowsheets (Taken 9/9/2022 2038)  Maintains hematologic stability:   Assess for signs and symptoms of bleeding or hemorrhage   Monitor labs for bleeding or clotting disorders     Problem: Pain  Goal: Verbalizes/displays adequate comfort level or baseline comfort level  9/9/2022 2212 by Selene Mueller, RN  Outcome: Progressing  9/9/2022 1204 by Michelle Erickson RN  Outcome: Progressing  Note: Patient denies any pain at this time. Will continue to monitor. Problem: Skin/Tissue Integrity  Goal: Absence of new skin breakdown  Description: 1. Monitor for areas of redness and/or skin breakdown  2. Assess vascular access sites hourly  3. Every 4-6 hours minimum:  Change oxygen saturation probe site  4. Every 4-6 hours:  If on nasal continuous positive airway pressure, respiratory therapy assess nares and determine need for appliance change or resting period. Outcome: Progressing   L nasal cannula. Will continue to monitor.

## 2022-09-12 ENCOUNTER — CARE COORDINATION (OUTPATIENT)
Dept: CASE MANAGEMENT | Age: 57
End: 2022-09-12

## 2022-09-12 NOTE — CARE COORDINATION
[facility] Transitions Initial Follow Up Call:    Patient will call Dr. Jona Serra to schedule follow up appointments. CTN emphasized the importance of the follow up after COVID, patient agrees & verbalizes understanding. Patient report that she is doing well, denies any cough, fever, SOB. O2 saturations are staying around 95% with no O2, she is wearing it at night. Discussed discharge instructions and reviewed medications, she has all of her medications and is taking them as prescribed. She inquired about where to get a COVID test, CTN referred her to Urgent Care. She wants to establish with a PCP, CTN provided physician referral line phone number. CTN will continue with outreach follow up calls. Call within 2 business days of discharge: Yes    Patient: Hailey Correa Patient : 1965   MRN: [id number]  Reason for Admission: Acute respiratory failure with hypoxia and hypercapnia; Acute metabolic encephalopathy ; COPD exac; COVID  Discharge Date: 9/10/22 RARS: Readmission Risk Score: 12      Last Discharge [facility]       Date Complaint Diagnosis Description Type Department Provider    22 Shortness of Breath Acute on chronic respiratory failure with hypoxia and hypercapnia ([facility] [address] . .. ED to Hosp-Admission (Discharged) (ADMITTED) CHON 3T Tavo Castro MD; Tiffany Nunn. .. Spoke with: Hailey Sunny      Transitions of Care Initial Call    Was this an external facility discharge? No Discharge Facility:     Challenges to be reviewed by the provider   Additional needs identified to be addressed with provider: No  none             Method of communication with provider : none    Advance Care Planning:   Does patient have an Advance Directive: not on file; education provided. Care Transition Nurse contacted the patient by telephone to perform post hospital discharge assessment. Verified name and  with patient as identifiers.  Provided introduction to self, and explanation of the CTN role. CTN reviewed discharge instructions, medical action plan and red flags with patient who verbalized understanding. Patient given an opportunity to ask questions and does not have any further questions or concerns at this time. Were discharge instructions available to patient? Yes. Reviewed appropriate site of care based on symptoms and resources available to patient including: PCP  Urgent care clinics  When to call 911. The patient agrees to contact the PCP office for questions related to their healthcare. Medication reconciliation was performed with patient, who verbalizes understanding of administration of home medications. Advised obtaining a 90-day supply of all daily and as-needed medications. Was patient discharged with a pulse oximeter? no    CTN provided contact information. Plan for follow-up call in 5-7 days based on severity of symptoms and risk factors. Plan for next call: symptom management-.  self management-.  follow up appointment-. Non-face-to-face services provided:  Obtained and reviewed discharge summary and/or continuity of care documents    Care Transitions 24 Hour Call    Do you have a copy of your discharge instructions?: Yes  Do you have all of your prescriptions and are they filled?: Yes  Have you been contacted by a [address]?: No  Have you scheduled your follow up appointment?: No  Do you feel like you have everything you need to keep you well at home?: Yes  Are you an active caregiver in your home?: No  Care Transitions Interventions         Follow Up  No future appointments.     Tracy Aquino RN

## 2022-09-19 ENCOUNTER — CARE COORDINATION (OUTPATIENT)
Dept: CASE MANAGEMENT | Age: 57
End: 2022-09-19

## 2022-09-19 NOTE — CARE COORDINATION
[facility]  Transitions Follow Up Call    2022    Patient: Lali Max  Patient : 1965   MRN: <[id number]>  Reason for Admission: Acute respiratory failure with hypoxia and hypercapnia; Acute metabolic encephalopathy ; COPD exac; COVID  Discharge Date: 9/10/22 RARS: Readmission Risk Score: 12         Spoke with: Lali Max who states that she is doing pretty good. Patient states that she will be calling today to schedule HFU visits with Dr Che Ingram and Dr Fallon Perry. Patient denies cp, sob, cough, dizziness, headache, n/v, diarrhea, abdominal pains, fever, or chills. Patient report that appetite and fluid intake is good and denies any problems with bowel or bladder. Patient is taking all medications as ordered. Patient states that she is still using the O2 at bedtime @ 1.5 liters. She states when she gets well she will have a sleep study done she thinks she has sleep apnea. Denies any needs at this time. Patient instructed to continue to monitor s/s, reporting any that may present to MD immediately for early intervention. Agreeable to f/u calls. Patient contacted regarding COVID-19 diagnosis. Discussed COVID-19 related testing which was available at this time. Test results were positive. Patient informed of results, if available? Yes    LPN Care Coordinator contacted the patient by telephone to perform follow-up assessment. Verified name and  with patient as identifiers. Patient has following risk factors of: COPD  acute respiratory failure. Patient Active Problem List   Diagnosis    COVID-19 virus infection    COPD exacerbation ([facility] [address]    Acute respiratory failure with hypoxia (HCC)    Pain syndrome, chronic    Acute respiratory failure with hypoxia and hypercapnia (HCC)      Symptoms reviewed with patient who verbalized the following symptoms: no new symptoms  no worsening symptoms.    Due to no new or worsening symptoms encounter was not routed to provider for escalation. Educated patient about risk for severe COVID-19 due to risk factors according to CDC guidelines. LPN CC reviewed discharge instructions, medical action plan and red flag symptoms with the patient who verbalized understanding. Discussed COVID vaccination status: No. Education provided on COVID-19 vaccination as appropriate. Discussed exposure protocols and quarantine with CDC Guidelines. Patient was given an opportunity to verbalize any questions and concerns and agrees to contact LPN CC or health care provider for questions related to their healthcare. Was patient discharged with a pulse oximeter? no    LPN CC provided contact information. Plan for follow-up call in 5-7 days based on severity of symptoms and risk factors. Care Transitions Subsequent and Final Call    Subsequent and Final Calls  Care Transitions Interventions  Other Interventions: Follow Up  No future appointments.     Yumiko Foreman LPN

## 2022-09-21 NOTE — PROGRESS NOTES
Physician Progress Note      Kyra Chacon  CSN #:                  [id number]  :                       1965  ADMIT DATE:       2022 5:00 PM  [address] DATE:        9/10/2022 3:00 PM  RESPONDING  PROVIDER #:        Corinne Cárdenas MD          QUERY TEXT:    Pt admitted with Acute respiratory failure. Pt noted to have COPD acute   exacerbation and COVID-19. If possible, please document in progress notes and   discharge summary the relationship, if any, between Acute respiratory failure,   COPD acute exacerbation, and/or COVID-19. The medical record reflects the following:  Risk Factors: Acute respiratory failure, COPD acute exacerbation, COVID-19,   PMH Asthma    Clinical Indicators: Discharge summary documented \"Acute respiratory failure   with hypoxia and hypercapnia--hypercapnia improved with BiPAP. Required upto   12 to 15 L of oxygen  weaned down to RA prior to discharge. Acute metabolic   encephalopathy due to CO2 narcosis- resolved. COPD acute   exacerbation--clinically improved with treatment- continue steroids,   antibiotics and DuoNebs. -COVID-19 pneumonia diagnosed -was on 2   L at home--no significant infiltrates noted on current imaging-COVID test is   still positive. \"    Treatment: Supplemental oxygen, BIPAP, supportive treatment for COVID-19,   Pulmonary consult, Steroids, Antibiotics, DuoNebs  Options provided:  -- Acute respiratory failure due to COPD acute exacerbation and COVID-19  -- Acute respiratory failure due to COVID-19  -- Acute respiratory failure unrelated to COVID-19  -- Other - I will add my own diagnosis  -- Disagree - Not applicable / Not valid  -- Disagree - Clinically unable to determine / Unknown  -- Refer to Clinical Documentation Reviewer    PROVIDER RESPONSE TEXT:    This patient has Acute respiratory failure due to COPD acute exacerbation and   COVID-19.     Query created by: Lissa Welsh on 2022 7:41 PM      Electronically signed by:   Flory Castorena MD 9/21/2022 2:51 PM

## 2022-09-22 ENCOUNTER — TELEPHONE (OUTPATIENT)
Dept: CASE MANAGEMENT | Age: 57
End: 2022-09-22

## 2022-09-22 NOTE — TELEPHONE ENCOUNTER
Pt without PCP. Has not made f/u appt w/ Dr Gaby Adamson. Letter sent to pt reminding her to f/u with MD for recent Chest CT incidental nodules.

## 2022-09-26 ENCOUNTER — CARE COORDINATION (OUTPATIENT)
Dept: CASE MANAGEMENT | Age: 57
End: 2022-09-26

## 2022-10-03 ENCOUNTER — CARE COORDINATION (OUTPATIENT)
Dept: CASE MANAGEMENT | Age: 57
End: 2022-10-03

## 2022-10-06 ENCOUNTER — CARE COORDINATION (OUTPATIENT)
Dept: CASE MANAGEMENT | Age: 57
End: 2022-10-06

## 2022-10-06 NOTE — CARE COORDINATION
[facility] Care Transitions Follow Up Call    Care Transition Nurse contacted the patient by telephone. Verified name and  with patient as identifiers. Patient: Zulma Piña  Patient : 1965   MRN: [id number]  Reason for Admission: Acute respiratory failure with hypoxia and hypercapnia; Acute metabolic encephalopathy ; COPD exac; COVID  Discharge Date: 9/10/22 RARS: Readmission Risk Score: 12      Needs to be reviewed by the provider   Additional needs identified to be addressed with provider: No  none             Method of communication with provider: none. Patient reports that she is doing well, O2 saturations in the upper 90's, wearing O2 @ night, denies any SOB, cough, fever. She is taking all of her medications as prescribed and will follow up with pulmonology next week. She is concerned about post-CT letter requesting that she follow up with pulmonologist, CTN encouraged her to try to think positive and go to her follow up with Dr. Emir Mishra. He will be able to give her exact information. She is upset because her dad passed away from [address] [employer] in his 63's. CTN will reach out to her after this pulmonology visit. Follow Up  Future Appointments   Date Time Provider Charity Balderas   10/11/2022  1:45 PM Ras Spencer MD PUL[facility]     [facility] follow up appointment(s):     Care Transition Nurse reviewed red flags with patient and discussed any barriers to care and/or understanding of plan of care after discharge. Discussed appropriate site of care based on symptoms and resources available to patient including: PCP  When to call 911. The patient agrees to contact the PCP office for questions related to their healthcare. Advance Care Planning:   not on file; education provided.      Patients top risk factors for readmission: financial  Interventions to address risk factors: Assistance in accessing community resources-Submitted SW referral.    Offered patient enrollment in the Remote Patient Monitoring (RPM) program for in-home monitoring: Patient is not eligible for RPM program, no PCP, uninsured     Care Transitions Subsequent and Final Call    Subsequent and Final Calls  Do you have any ongoing symptoms?: No  Have your medications changed?: No  Do you have any questions related to your medications?: No  Do you currently have any active services?: No  Do you have any needs or concerns that I can assist you with?: No  Identified Barriers: None  Care Transitions Interventions  Other Interventions:             Care Transition Nurse provided contact information for future needs. Plan for follow-up call in 5-7 days based on severity of symptoms and risk factors.   Plan for next call: symptom management-.  self management-.  follow-up appointment-.    Saul Mehta RN

## 2022-10-07 ENCOUNTER — CARE COORDINATION (OUTPATIENT)
Dept: CARE COORDINATION | Age: 57
End: 2022-10-07

## 2022-10-07 NOTE — CARE COORDINATION
Call to patient to initiate referral for insurance concerns. Pt reported that she unable to afford insurance through her employer. She looked into private insurance and was unsure how much she would have to come out of pocket. Informed patient of the Affordable Care Act. She was unsure if she had applied or was denied due to income. SAMARIA researched local agents to assist with enrolling in insurance and to answer any related questions. SAMARIA sent three resources and JUNG link by text.

## 2022-10-11 ENCOUNTER — OFFICE VISIT (OUTPATIENT)
Dept: PULMONOLOGY | Age: 57
End: 2022-10-11

## 2022-10-11 VITALS
HEART RATE: 78 BPM | DIASTOLIC BLOOD PRESSURE: 80 MMHG | BODY MASS INDEX: 23.54 KG/M2 | WEIGHT: 150 LBS | SYSTOLIC BLOOD PRESSURE: 124 MMHG | OXYGEN SATURATION: 93 % | HEIGHT: 67 IN

## 2022-10-11 DIAGNOSIS — R93.89 ABNORMAL CT OF THE CHEST: ICD-10-CM

## 2022-10-11 DIAGNOSIS — Z86.16 HISTORY OF COVID-19: Primary | ICD-10-CM

## 2022-10-11 DIAGNOSIS — J44.9 COPD, SEVERITY TO BE DETERMINED (HCC): ICD-10-CM

## 2022-10-11 PROCEDURE — 99214 OFFICE O/P EST MOD 30 MIN: CPT | Performed by: INTERNAL MEDICINE

## 2022-10-11 ASSESSMENT — ENCOUNTER SYMPTOMS
RHINORRHEA: 0
STRIDOR: 0
WHEEZING: 0
DIARRHEA: 0
ANAL BLEEDING: 0
CHEST TIGHTNESS: 0
CONSTIPATION: 0
SINUS PRESSURE: 0
ABDOMINAL DISTENTION: 0
SORE THROAT: 0
BLOOD IN STOOL: 0
SHORTNESS OF BREATH: 1
VOICE CHANGE: 0
COUGH: 1
CHOKING: 0
ABDOMINAL PAIN: 0
APNEA: 0
BACK PAIN: 0

## 2022-10-11 NOTE — PROGRESS NOTES
Emiliano Busby    YOB: 1965     Date of Service:  10/11/2022     Chief Complaint   Patient presents with    Follow-Up from Hospital         HPI was recently hospitalized  and 9/10 for acute on chronic hypoxic/hypercapnic respiratory failure with CO2 narcosis thought to be related to COPD exacerbation. Recent history of COVID-19 infection. Overall patient has recovered quite well, with minimal shortness of breath and cough. Very concerned that she could have lung malignancy. Allergies   Allergen Reactions    Penicillins Shortness Of Breath    Codeine Nausea And Vomiting     Outpatient Medications Marked as Taking for the 10/11/22 encounter (Office Visit) with Lina Gibbs MD   Medication Sig Dispense Refill    mometasone-formoterol (DULERA) 200-5 MCG/ACT inhaler Inhale 2 puffs into the lungs in the morning and 2 puffs in the evening. 1 each 2    tiotropium (SPIRIVA HANDIHALER) 18 MCG inhalation capsule Inhale 1 capsule into the lungs daily 90 capsule 1    albuterol sulfate HFA (PROVENTIL HFA) 108 (90 Base) MCG/ACT inhaler Inhale 2-4 puffs into the lungs every 4 hours as needed for Wheezing or Shortness of Breath (Space out to every 6 hours as symptoms improve) Space out to every 6 hours as symptoms improve.  1 each 1    albuterol (PROVENTIL) (2.5 MG/3ML) 0.083% nebulizer solution Take 3 mLs by nebulization every 4 hours as needed for Wheezing 120 each 3    METHADONE HCL PO Take 60 mg by mouth daily         Immunization History   Administered Date(s) Administered    COVID-19, PFIZER PURPLE top, DILUTE for use, (age 15 y+), 30mcg/0.3mL 2021, 2021, 2021    Hepatitis A Adult (Havrix, [employer]) 2019       Past Medical History:   Diagnosis Date    Asthma     Pneumothorax      Past Surgical History:   Procedure Laterality Date    ABDOMEN SURGERY       SECTION      HYSTERECTOMY (CERVIX STATUS UNKNOWN)      INNER EAR SURGERY      MASTOIDECTOMY       History reviewed. No pertinent family history. Review of Systems:  Review of Systems   Constitutional:  Negative for activity change, appetite change, fatigue and fever. HENT:  Negative for congestion, ear discharge, ear pain, postnasal drip, rhinorrhea, sinus pressure, sneezing, sore throat, tinnitus and voice change. Respiratory:  Positive for cough and shortness of breath. Negative for apnea, choking, chest tightness, wheezing and stridor. Cardiovascular:  Negative for chest pain, palpitations and leg swelling. Gastrointestinal:  Negative for abdominal distention, abdominal pain, anal bleeding, blood in stool, constipation and diarrhea. Musculoskeletal:  Negative for arthralgias, back pain and gait problem. Skin:  Negative for pallor and rash. Allergic/Immunologic: Negative for environmental allergies. Neurological:  Negative for dizziness, tremors, seizures, syncope, speech difficulty, weakness, light-headedness, numbness and headaches. Hematological:  Negative for adenopathy. Does not bruise/bleed easily. Psychiatric/Behavioral:  Negative for sleep disturbance. Vitals:    10/11/22 1401   BP: 124/80   Pulse: 78   SpO2: 93%   Weight: 150 lb (68 kg)   Height: 5' 7\" (1.702 m)     No flowsheet data found. Body mass index is 23.49 kg/m². Wt Readings from Last 3 Encounters:   10/11/22 150 lb (68 kg)   09/10/22 150 lb 3.2 oz (68.1 kg)   08/20/22 151 lb (68.5 kg)     BP Readings from Last 3 Encounters:   10/11/22 124/80   09/10/22 118/69   08/24/22 137/71         Physical Exam  Constitutional:       General: She is not in acute distress. Appearance: She is well-developed. She is not ill-appearing or diaphoretic. HENT:      Mouth/Throat:      Pharynx: No oropharyngeal exudate. Cardiovascular:      Rate and Rhythm: Normal rate and regular rhythm. Heart sounds: Normal heart sounds. No murmur heard. No friction rub. Pulmonary:      Effort: No respiratory distress.       Breath sounds: Rales present. No wheezing or rhonchi. Chest:      Chest wall: No tenderness. Abdominal:      General: There is no distension. Palpations: There is no mass. Tenderness: There is no abdominal tenderness. There is no guarding or rebound. Musculoskeletal:         General: No swelling or tenderness. Skin:     Coloration: Skin is not pale. Findings: No erythema or rash. Neurological:      Mental Status: She is alert and oriented to person, place, and time. Cranial Nerves: No cranial nerve deficit. Motor: No abnormal muscle tone. Coordination: Coordination normal.      Deep Tendon Reflexes: Reflexes normal.           Health Maintenance   Topic Date Due    Pneumococcal 0-64 years Vaccine (1 - PCV) Never done    Depression Screen  Never done    HIV screen  Never done    Hepatitis C screen  Never done    DTaP/Tdap/Td vaccine (1 - Tdap) Never done    Cervical cancer screen  Never done    Lipids  Never done    Colorectal Cancer Screen  Never done    Breast cancer screen  Never done    Shingles vaccine (1 of 2) Never done    Low dose CT lung screening  Never done    COVID-19 Vaccine (4 - Booster for Pfizer series) 04/23/2022    Flu vaccine (1) Never done    A1C test (Diabetic or Prediabetic)  08/21/2023    Hepatitis A vaccine  Aged Out    Hib vaccine  Aged Out    Meningococcal (ACWY) vaccine  Aged Out          Assessment/Plan:     Diagnosis Orders   1. History of COVID-19  Full PFT Study With Bronchodilator    CT CHEST WO CONTRAST      2. COPD, severity to be determined    Patient has recent history of COVID-19 pneumonia and COPD exacerbation, complicated by CO2 narcosis. Required high flow nasal cannula, which has been weaned off-patient states that she only uses 1-1/2 L O2 at nighttime only. For hypercapnia, patient was attempted to be treated with BiPAP during the hospitalization which she did not tolerate well. Currently using Dulera 200 and albuterol inhaler as needed. We will obtain complete PFT study to evaluate for degree of obstructive airway disease. Patient has quit smoking completely since her recent hospitalization in September. Congratulated her and encouraged her to stay off cigarettes. And currently does not have any symptoms or signs of COPD exacerbation. CTA chest from 9/6 revealed no PE and bibasal infiltrates which could represent prior COVID-19 infection. No suspicious nodules with concerns for malignancy. This was reassured to the patient. Will repeat CT imaging in early January to confirm resolution of these infiltrates. Home O2 could possibly be discontinued during next visit. Return in about 3 months (around 1/11/2023).

## 2022-12-31 ENCOUNTER — APPOINTMENT (OUTPATIENT)
Dept: GENERAL RADIOLOGY | Age: 57
End: 2022-12-31

## 2022-12-31 ENCOUNTER — HOSPITAL ENCOUNTER (EMERGENCY)
Age: 57
Discharge: HOME | End: 2022-12-31

## 2022-12-31 ENCOUNTER — HOSPITAL ENCOUNTER (INPATIENT)
Age: 57
LOS: 6 days | Discharge: HOME | End: 2023-01-06
Attending: EMERGENCY MEDICINE

## 2022-12-31 VITALS
HEART RATE: 92 BPM | DIASTOLIC BLOOD PRESSURE: 100 MMHG | BODY MASS INDEX: 24.9 KG/M2 | SYSTOLIC BLOOD PRESSURE: 157 MMHG | WEIGHT: 159 LBS | RESPIRATION RATE: 18 BRPM | TEMPERATURE: 98.1 F | OXYGEN SATURATION: 96 %

## 2022-12-31 DIAGNOSIS — J44.1 COPD EXACERBATION (HCC): Primary | ICD-10-CM

## 2022-12-31 LAB
A/G RATIO: 1.7 (ref 1.1–2.2)
ALBUMIN SERPL-MCNC: 4.5 G/DL (ref 3.4–5)
ALP BLD-CCNC: 70 U/L (ref 40–129)
ALT SERPL-CCNC: 11 U/L (ref 10–40)
ANION GAP SERPL CALCULATED.3IONS-SCNC: 5 MMOL/L (ref 3–16)
AST SERPL-CCNC: 13 U/L (ref 15–37)
BASE EXCESS VENOUS: 3.3 MMOL/L (ref -3–3)
BASOPHILS ABSOLUTE: 0.3 K/UL (ref 0–0.2)
BASOPHILS RELATIVE PERCENT: 5.7 %
BILIRUB SERPL-MCNC: <0.2 MG/DL (ref 0–1)
BUN BLDV-MCNC: 8 MG/DL (ref 7–20)
CALCIUM SERPL-MCNC: 9.7 MG/DL (ref 8.3–10.6)
CARBOXYHEMOGLOBIN: 5.5 % (ref 0–1.5)
CHLORIDE BLD-SCNC: 99 MMOL/L (ref 99–110)
CO2: 32 MMOL/L (ref 21–32)
CREAT SERPL-MCNC: 0.7 MG/DL (ref 0.6–1.1)
EOSINOPHILS ABSOLUTE: 0.1 K/UL (ref 0–0.6)
EOSINOPHILS RELATIVE PERCENT: 0.9 %
GFR SERPL CREATININE-BSD FRML MDRD: >60 ML/MIN/{1.73_M2}
GLUCOSE BLD-MCNC: 91 MG/DL (ref 70–99)
HCO3 VENOUS: 27.8 MMOL/L (ref 23–29)
HCT VFR BLD CALC: 43.9 % (ref 36–48)
HEMOGLOBIN: 14.1 G/DL (ref 12–16)
LYMPHOCYTES ABSOLUTE: 1.2 K/UL (ref 1–5.1)
LYMPHOCYTES RELATIVE PERCENT: 21.9 %
MCH RBC QN AUTO: 28.8 PG (ref 26–34)
MCHC RBC AUTO-ENTMCNC: 32.2 G/DL (ref 31–36)
MCV RBC AUTO: 89.4 FL (ref 80–100)
METHEMOGLOBIN VENOUS: <0 %
MONOCYTES ABSOLUTE: 0.4 K/UL (ref 0–1.3)
MONOCYTES RELATIVE PERCENT: 6.6 %
NEUTROPHILS ABSOLUTE: 3.6 K/UL (ref 1.7–7.7)
NEUTROPHILS RELATIVE PERCENT: 64.9 %
O2 CONTENT, VEN: 20 VOL %
O2 SAT, VEN: 99 %
O2 THERAPY: ABNORMAL
PCO2, VEN: 40.8 MMHG (ref 40–50)
PDW BLD-RTO: 13.6 % (ref 12.4–15.4)
PH VENOUS: 7.44 (ref 7.35–7.45)
PLATELET # BLD: 193 K/UL (ref 135–450)
PMV BLD AUTO: 8.4 FL (ref 5–10.5)
PO2, VEN: 83.4 MMHG (ref 25–40)
POTASSIUM SERPL-SCNC: 3.6 MMOL/L (ref 3.5–5.1)
RAPID INFLUENZA  B AGN: NEGATIVE
RAPID INFLUENZA A AGN: NEGATIVE
RBC # BLD: 4.91 M/UL (ref 4–5.2)
SARS-COV-2, NAAT: NOT DETECTED
SODIUM BLD-SCNC: 136 MMOL/L (ref 136–145)
TCO2 CALC VENOUS: 65 MMOL/L
TOTAL PROTEIN: 7.1 G/DL (ref 6.4–8.2)
TROPONIN: <0.01 NG/ML
WBC # BLD: 5.6 K/UL (ref 4–11)

## 2022-12-31 PROCEDURE — 94640 AIRWAY INHALATION TREATMENT: CPT

## 2022-12-31 PROCEDURE — 2700000000 HC OXYGEN THERAPY PER DAY

## 2022-12-31 PROCEDURE — 36415 COLL VENOUS BLD VENIPUNCTURE: CPT

## 2022-12-31 PROCEDURE — 94761 N-INVAS EAR/PLS OXIMETRY MLT: CPT

## 2022-12-31 PROCEDURE — 82803 BLOOD GASES ANY COMBINATION: CPT

## 2022-12-31 PROCEDURE — 1200000000 HC SEMI PRIVATE

## 2022-12-31 PROCEDURE — 80053 COMPREHEN METABOLIC PANEL: CPT

## 2022-12-31 PROCEDURE — 6360000002 HC RX W HCPCS: Performed by: PHYSICIAN ASSISTANT

## 2022-12-31 PROCEDURE — 84484 ASSAY OF TROPONIN QUANT: CPT

## 2022-12-31 PROCEDURE — 71045 X-RAY EXAM CHEST 1 VIEW: CPT

## 2022-12-31 PROCEDURE — 93005 ELECTROCARDIOGRAM TRACING: CPT | Performed by: EMERGENCY MEDICINE

## 2022-12-31 PROCEDURE — 85025 COMPLETE CBC W/AUTO DIFF WBC: CPT

## 2022-12-31 PROCEDURE — 2580000003 HC RX 258: Performed by: EMERGENCY MEDICINE

## 2022-12-31 PROCEDURE — 96372 THER/PROPH/DIAG INJ SC/IM: CPT

## 2022-12-31 PROCEDURE — 94644 CONT INHLJ TX 1ST HOUR: CPT

## 2022-12-31 PROCEDURE — 87635 SARS-COV-2 COVID-19 AMP PRB: CPT

## 2022-12-31 PROCEDURE — 6360000002 HC RX W HCPCS: Performed by: EMERGENCY MEDICINE

## 2022-12-31 PROCEDURE — 6370000000 HC RX 637 (ALT 250 FOR IP): Performed by: PHYSICIAN ASSISTANT

## 2022-12-31 PROCEDURE — 99285 EMERGENCY DEPT VISIT HI MDM: CPT

## 2022-12-31 PROCEDURE — 87804 INFLUENZA ASSAY W/OPTIC: CPT

## 2022-12-31 RX ORDER — IPRATROPIUM BROMIDE AND ALBUTEROL SULFATE 2.5; .5 MG/3ML; MG/3ML
3 SOLUTION RESPIRATORY (INHALATION)
Status: DISCONTINUED | OUTPATIENT
Start: 2022-12-31 | End: 2022-12-31 | Stop reason: HOSPADM

## 2022-12-31 RX ORDER — ALBUTEROL SULFATE 2.5 MG/3ML
15 SOLUTION RESPIRATORY (INHALATION) ONCE
Status: COMPLETED | OUTPATIENT
Start: 2022-12-31 | End: 2022-12-31

## 2022-12-31 RX ORDER — AZITHROMYCIN 500 MG/1
500 INJECTION, POWDER, LYOPHILIZED, FOR SOLUTION INTRAVENOUS ONCE
Status: DISCONTINUED | OUTPATIENT
Start: 2022-12-31 | End: 2022-12-31 | Stop reason: RX

## 2022-12-31 RX ORDER — METHYLPREDNISOLONE SODIUM SUCCINATE 125 MG/2ML
125 INJECTION, POWDER, LYOPHILIZED, FOR SOLUTION INTRAMUSCULAR; INTRAVENOUS ONCE
Status: DISCONTINUED | OUTPATIENT
Start: 2022-12-31 | End: 2022-12-31

## 2022-12-31 RX ORDER — PREDNISONE 20 MG/1
40 TABLET ORAL DAILY
Qty: 20 TABLET | Refills: 0 | Status: ON HOLD | OUTPATIENT
Start: 2022-12-31 | End: 2023-01-04 | Stop reason: HOSPADM

## 2022-12-31 RX ORDER — METHYLPREDNISOLONE SODIUM SUCCINATE 125 MG/2ML
125 INJECTION, POWDER, LYOPHILIZED, FOR SOLUTION INTRAMUSCULAR; INTRAVENOUS ONCE
Status: COMPLETED | OUTPATIENT
Start: 2022-12-31 | End: 2022-12-31

## 2022-12-31 RX ORDER — AZITHROMYCIN 500 MG/1
500 TABLET, FILM COATED ORAL DAILY
Qty: 3 TABLET | Refills: 0 | Status: ON HOLD | OUTPATIENT
Start: 2022-12-31 | End: 2023-01-04 | Stop reason: HOSPADM

## 2022-12-31 RX ADMIN — AZITHROMYCIN MONOHYDRATE 500 MG: 500 INJECTION, POWDER, LYOPHILIZED, FOR SOLUTION INTRAVENOUS at 21:35

## 2022-12-31 RX ADMIN — METHYLPREDNISOLONE SODIUM SUCCINATE 125 MG: 125 INJECTION, POWDER, FOR SOLUTION INTRAMUSCULAR; INTRAVENOUS at 16:39

## 2022-12-31 RX ADMIN — ALBUTEROL SULFATE 15 MG: 2.5 SOLUTION RESPIRATORY (INHALATION) at 21:58

## 2022-12-31 RX ADMIN — IPRATROPIUM BROMIDE AND ALBUTEROL SULFATE 3 AMPULE: .5; 2.5 SOLUTION RESPIRATORY (INHALATION) at 16:25

## 2022-12-31 ASSESSMENT — LIFESTYLE VARIABLES: HOW OFTEN DO YOU HAVE A DRINK CONTAINING ALCOHOL: NEVER

## 2022-12-31 ASSESSMENT — PAIN - FUNCTIONAL ASSESSMENT: PAIN_FUNCTIONAL_ASSESSMENT: NONE - DENIES PAIN

## 2022-12-31 NOTE — ED PROVIDER NOTES
EKG:  Read by me in the absence of a cardiologist shows:  Sinus rhythm, normal rate, normal conduction intervals, normal axis, no acute injury pattern, no prior EKG available for comparison     I did not perform face-to-face evaluation and was not otherwise involved in this patient's care. Please see PA note for further information on this visit.         Bakari Barragan MD  12/31/22 1802

## 2022-12-31 NOTE — DISCHARGE INSTRUCTIONS
-Follow up with lung doctor and primary doctor.   -Come back if you feel worse. -Take steroids and antibiotics.

## 2022-12-31 NOTE — ED PROVIDER NOTES
[facility]        Pt Name: Jethro Cardona  MRN: [id number]  [place] 1965  Date of evaluation: 2022  Provider: NERI Vela  PCP: No primary care provider on file. Note Started: 5:11 PM EST       KELECHI. I have evaluated this patient. My supervising physician was available for consultation. CHIEF COMPLAINT       Chief Complaint   Patient presents with    Shortness of Breath     Pt reports SOB, states she feels she has chest tightness on inspiration        HISTORY OF PRESENT ILLNESS      Chief Complaint: Shortness of breath    Jethro Cardona is a 65 y.o. female who presents shortness of breath. Ongoing x2 days. Reports chest tightness worse on inspiration. Has tried loosening her clothing without success. Has albuterol inhalers, and breathing treatments at home. Has tried these with limited improvement. History of COPD. Feels similar to her previous COPD exacerbations. She reports increased sputum production, as well as yellowish sputum. Denies nausea, vomiting. No belly pain or back pain. No leg swelling. No chest pain. REVIEW OF SYSTEMS       10 system ROS was performed and was negative except as noted in HPI.      PAST MEDICAL HISTORY     Past Medical History:   Diagnosis Date    Asthma     Pneumothorax        SURGICAL HISTORY     Past Surgical History:   Procedure Laterality Date    ABDOMEN SURGERY       SECTION      HYSTERECTOMY (CERVIX STATUS UNKNOWN)      INNER EAR SURGERY      MASTOIDECTOMY         CURRENTMEDICATIONS       Previous Medications    ALBUTEROL (PROVENTIL) (2.5 MG/3ML) 0.083% NEBULIZER SOLUTION    Take 3 mLs by nebulization every 4 hours as needed for Wheezing    ALBUTEROL SULFATE HFA (PROVENTIL HFA) 108 (90 BASE) MCG/ACT INHALER    Inhale 2-4 puffs into the lungs every 4 hours as needed for Wheezing or Shortness of Breath (Space out to every 6 hours as symptoms improve) Space out to every 6 hours as symptoms improve. METHADONE HCL PO    Take 60 mg by mouth daily    MOMETASONE-FORMOTEROL (DULERA) 200-5 MCG/ACT INHALER    Inhale 2 puffs into the lungs in the morning and 2 puffs in the evening. TIOTROPIUM (SPIRIVA HANDIHALER) 18 MCG INHALATION CAPSULE    Inhale 1 capsule into the lungs daily       ALLERGIES     Penicillins and Codeine    FAMILYHISTORY     History reviewed. No pertinent family history. SOCIAL HISTORY       Social History     Tobacco Use    Smoking status: Every Day     Packs/day: 1.00     Years: 32.00     Pack years: 32.00     Types: Cigarettes    Smokeless tobacco: Never   Vaping Use    Vaping Use: Never used   Substance Use Topics    Alcohol use: No    Drug use: No       SCREENINGS    [place] Coma Scale  Eye Opening: Spontaneous  Best Verbal Response: Oriented  Best Motor Response: Obeys commands  [place] Coma Scale Score: 15      Is this patient to be included in the SEP-1 Core Measure due to severe sepsis or septic shock? No   Exclusion criteria - the patient is NOT to be included for SEP-1 Core Measure due to:  2+ SIRS criteria are not met      PHYSICAL EXAM     Vitals: BP (!) 157/100   Pulse 92   Temp 98.1 °F (36.7 °C)   Resp 18   Wt 159 lb (72.1 kg)   SpO2 96%   BMI 24.90 kg/m²    General: awake, alert, no apparent distress  Pupils: equal, reactive  Eyes: EOM intact, conjunctiva clear, no discharge  Head: Non-traumatic  Neck: Supple  Mouth: Moist, no oral lesions, no tonsillar enlargement  Heart: Rate as noted, regular rhythm, no murmur or rubs. Chest/Lungs: Bilateral coarse wheeze  Abdomen: soft, nondistended, no tenderness to palpation   Back: No midline tenderness. No CVA tenderness  Extremities:  cap refill <2 UE/LE, no tenderness of calves, no edema  Neuro: Moving all extremities, no facial droop, no slurred speech, answers questions appropriately. Skin: Warm.  No visible rash, lesions, or bruising       DIAGNOSTIC RESULTS   LABS:    Labs Reviewed   CBC WITH AUTO DIFFERENTIAL - Abnormal; Notable for the following components:       Result Value    Basophils Absolute 0.3 (*)     All other components within normal limits   COMPREHENSIVE METABOLIC PANEL - Abnormal; Notable for the following components:    AST 13 (*)     All other components within normal limits   COVID-19, RAPID   RAPID INFLUENZA A/B ANTIGENS   TROPONIN       EKG: When ordered, EKG's are interpreted by the Emergency Department Physician in the absence of a cardiologist.  Please see their note for interpretation of EKG. RADIOLOGY:   XR CHEST PORTABLE   Final Result   Minimal left basilar atelectasis or scarring. Changes consistent with COPD. XR CHEST PORTABLE    Result Date: 12/31/2022  EXAMINATION: ONE XRAY VIEW OF THE CHEST 12/31/2022 4:07 pm COMPARISON: 09/06/2022 HISTORY: ORDERING SYSTEM PROVIDED HISTORY: Short of breath TECHNOLOGIST PROVIDED HISTORY: Reason for exam:->Short of breath Reason for Exam: Shortness of Breath (Pt reports SOB, states she feels she has chest tightness on inspiration ) FINDINGS: Cardiac silhouette is normal.  There is mild thoracic aortic calcification. Hilar contours are normal.  There is mild streak like linear opacity in the left lung base. No consolidation is identified. Costophrenic angles are mildly irregular, suggestive of fibrosis. Lungs are hyperinflated. No pneumothorax. Minimal left basilar atelectasis or scarring. Changes consistent with COPD. PROCEDURES       CRITICAL CARE TIME   I personally saw the patient and independently provided 0 minutes of non-concurrent critical care time out of the total critical care time provided. This excludes time spent doing separately billable procedures. This includes time at the bedside, data interpretation, medication management, obtaining critical history from collateral sources if the patient is unable to provide it directly, and physician consultation.   Specifics of interventions taken and potentially life-threatening diagnostic considerations are listed above in the medical decision making. CONSULTS   None    ED COURSE and MEDICAL DECISIONS MAKING:   Vitals:    Vitals:    12/31/22 1535 12/31/22 1624   BP: (!) 157/100    Pulse: 92    Resp: 22 18   Temp: 98.1 °F (36.7 °C)    SpO2: 94% 96%   Weight: 159 lb (72.1 kg)      MEDICATIONS:  Medications   ipratropium-albuterol (DUONEB) nebulizer solution 3 ampule (3 ampules Inhalation Given 12/31/22 1625)   methylPREDNISolone sodium (SOLU-MEDROL) injection 125 mg (125 mg IntraMUSCular Given 12/31/22 1639)           This 50-year-old female presents with shortness of breath. She has a COPD exacerbation. X-ray shows no pneumonia. Labs are unremarkable. Vital signs remained stable. She improved with breathing treatments. Given steroids in the emergency department, and sent home on antibiotics because of sputum, and steroids because of the exacerbation. She knows to return to the ED should her symptoms change or worsen. She will follow-up with her primary care provider, and her pulmonologist early next week. FINAL IMPRESSION      1. COPD exacerbation ([address]          DISPOSITION/PLAN   DISPOSITION Decision To Discharge 12/31/2022 05:05:09 PM      PATIENT REFERRED TO:  No follow-up provider specified.     DISCHARGE MEDICATIONS:  New Prescriptions    AZITHROMYCIN (ZITHROMAX) 500 MG TABLET    Take 1 tablet by mouth daily for 3 days    PREDNISONE (DELTASONE) 20 MG TABLET    Take 2 tablets by mouth daily for 5 days       DISCONTINUED MEDICATIONS:  Discontinued Medications    No medications on file            NERI Bass (electronically signed)        NERI Haley  12/31/22 8036

## 2023-01-01 LAB
ANION GAP SERPL CALCULATED.3IONS-SCNC: 11 MMOL/L (ref 3–16)
BASOPHILS ABSOLUTE: 0 K/UL (ref 0–0.2)
BASOPHILS RELATIVE PERCENT: 0.4 %
BUN BLDV-MCNC: 11 MG/DL (ref 7–20)
CALCIUM SERPL-MCNC: 9.9 MG/DL (ref 8.3–10.6)
CHLORIDE BLD-SCNC: 102 MMOL/L (ref 99–110)
CO2: 27 MMOL/L (ref 21–32)
CREAT SERPL-MCNC: 0.7 MG/DL (ref 0.6–1.1)
EKG ATRIAL RATE: 89 BPM
EKG ATRIAL RATE: 94 BPM
EKG DIAGNOSIS: NORMAL
EKG DIAGNOSIS: NORMAL
EKG P AXIS: 70 DEGREES
EKG P AXIS: 71 DEGREES
EKG P-R INTERVAL: 148 MS
EKG P-R INTERVAL: 148 MS
EKG Q-T INTERVAL: 346 MS
EKG Q-T INTERVAL: 386 MS
EKG QRS DURATION: 78 MS
EKG QRS DURATION: 82 MS
EKG QTC CALCULATION (BAZETT): 432 MS
EKG QTC CALCULATION (BAZETT): 469 MS
EKG R AXIS: 54 DEGREES
EKG R AXIS: 59 DEGREES
EKG T AXIS: 49 DEGREES
EKG T AXIS: 59 DEGREES
EKG VENTRICULAR RATE: 89 BPM
EKG VENTRICULAR RATE: 94 BPM
EOSINOPHILS ABSOLUTE: 0 K/UL (ref 0–0.6)
EOSINOPHILS RELATIVE PERCENT: 0 %
GFR SERPL CREATININE-BSD FRML MDRD: >60 ML/MIN/{1.73_M2}
GLUCOSE BLD-MCNC: 156 MG/DL (ref 70–99)
HCT VFR BLD CALC: 43.6 % (ref 36–48)
HEMOGLOBIN: 14.3 G/DL (ref 12–16)
LYMPHOCYTES ABSOLUTE: 0.6 K/UL (ref 1–5.1)
LYMPHOCYTES RELATIVE PERCENT: 10 %
MCH RBC QN AUTO: 29.5 PG (ref 26–34)
MCHC RBC AUTO-ENTMCNC: 32.9 G/DL (ref 31–36)
MCV RBC AUTO: 89.8 FL (ref 80–100)
MONOCYTES ABSOLUTE: 0.1 K/UL (ref 0–1.3)
MONOCYTES RELATIVE PERCENT: 2.1 %
NEUTROPHILS ABSOLUTE: 5.4 K/UL (ref 1.7–7.7)
NEUTROPHILS RELATIVE PERCENT: 87.5 %
PDW BLD-RTO: 13.8 % (ref 12.4–15.4)
PLATELET # BLD: 189 K/UL (ref 135–450)
PMV BLD AUTO: 8.8 FL (ref 5–10.5)
POTASSIUM REFLEX MAGNESIUM: 4 MMOL/L (ref 3.5–5.1)
RBC # BLD: 4.85 M/UL (ref 4–5.2)
SODIUM BLD-SCNC: 140 MMOL/L (ref 136–145)
WBC # BLD: 6.2 K/UL (ref 4–11)

## 2023-01-01 PROCEDURE — 94640 AIRWAY INHALATION TREATMENT: CPT

## 2023-01-01 PROCEDURE — 94761 N-INVAS EAR/PLS OXIMETRY MLT: CPT

## 2023-01-01 PROCEDURE — 6370000000 HC RX 637 (ALT 250 FOR IP): Performed by: INTERNAL MEDICINE

## 2023-01-01 PROCEDURE — 2580000003 HC RX 258: Performed by: INTERNAL MEDICINE

## 2023-01-01 PROCEDURE — 6360000002 HC RX W HCPCS: Performed by: INTERNAL MEDICINE

## 2023-01-01 PROCEDURE — 93010 ELECTROCARDIOGRAM REPORT: CPT | Performed by: INTERNAL MEDICINE

## 2023-01-01 PROCEDURE — 80048 BASIC METABOLIC PNL TOTAL CA: CPT

## 2023-01-01 PROCEDURE — 85025 COMPLETE CBC W/AUTO DIFF WBC: CPT

## 2023-01-01 PROCEDURE — 36415 COLL VENOUS BLD VENIPUNCTURE: CPT

## 2023-01-01 PROCEDURE — 2700000000 HC OXYGEN THERAPY PER DAY

## 2023-01-01 PROCEDURE — 6370000000 HC RX 637 (ALT 250 FOR IP): Performed by: PHYSICIAN ASSISTANT

## 2023-01-01 PROCEDURE — 1200000000 HC SEMI PRIVATE

## 2023-01-01 RX ORDER — ACETAMINOPHEN 325 MG/1
650 TABLET ORAL EVERY 6 HOURS PRN
Status: DISCONTINUED | OUTPATIENT
Start: 2023-01-01 | End: 2023-01-06 | Stop reason: HOSPADM

## 2023-01-01 RX ORDER — IPRATROPIUM BROMIDE AND ALBUTEROL SULFATE 2.5; .5 MG/3ML; MG/3ML
1 SOLUTION RESPIRATORY (INHALATION) EVERY 4 HOURS
Status: DISCONTINUED | OUTPATIENT
Start: 2023-01-01 | End: 2023-01-02

## 2023-01-01 RX ORDER — METHYLPREDNISOLONE SODIUM SUCCINATE 40 MG/ML
40 INJECTION, POWDER, LYOPHILIZED, FOR SOLUTION INTRAMUSCULAR; INTRAVENOUS EVERY 6 HOURS
Status: DISPENSED | OUTPATIENT
Start: 2023-01-01 | End: 2023-01-03

## 2023-01-01 RX ORDER — PREDNISONE 20 MG/1
40 TABLET ORAL DAILY
Status: DISCONTINUED | OUTPATIENT
Start: 2023-01-03 | End: 2023-01-03

## 2023-01-01 RX ORDER — SODIUM CHLORIDE 0.9 % (FLUSH) 0.9 %
5-40 SYRINGE (ML) INJECTION EVERY 12 HOURS SCHEDULED
Status: DISCONTINUED | OUTPATIENT
Start: 2023-01-01 | End: 2023-01-06 | Stop reason: HOSPADM

## 2023-01-01 RX ORDER — SODIUM CHLORIDE 0.9 % (FLUSH) 0.9 %
5-40 SYRINGE (ML) INJECTION PRN
Status: DISCONTINUED | OUTPATIENT
Start: 2023-01-01 | End: 2023-01-06 | Stop reason: HOSPADM

## 2023-01-01 RX ORDER — ONDANSETRON 2 MG/ML
4 INJECTION INTRAMUSCULAR; INTRAVENOUS EVERY 6 HOURS PRN
Status: DISCONTINUED | OUTPATIENT
Start: 2023-01-01 | End: 2023-01-06 | Stop reason: HOSPADM

## 2023-01-01 RX ORDER — ENOXAPARIN SODIUM 100 MG/ML
40 INJECTION SUBCUTANEOUS DAILY
Status: DISCONTINUED | OUTPATIENT
Start: 2023-01-01 | End: 2023-01-06 | Stop reason: HOSPADM

## 2023-01-01 RX ORDER — ONDANSETRON 4 MG/1
4 TABLET, ORALLY DISINTEGRATING ORAL EVERY 8 HOURS PRN
Status: DISCONTINUED | OUTPATIENT
Start: 2023-01-01 | End: 2023-01-06 | Stop reason: HOSPADM

## 2023-01-01 RX ORDER — NICOTINE 21 MG/24HR
1 PATCH, TRANSDERMAL 24 HOURS TRANSDERMAL DAILY
Status: DISCONTINUED | OUTPATIENT
Start: 2023-01-01 | End: 2023-01-06 | Stop reason: HOSPADM

## 2023-01-01 RX ORDER — ALBUTEROL SULFATE 2.5 MG/3ML
2.5 SOLUTION RESPIRATORY (INHALATION) EVERY 4 HOURS PRN
Status: DISCONTINUED | OUTPATIENT
Start: 2023-01-01 | End: 2023-01-06 | Stop reason: HOSPADM

## 2023-01-01 RX ORDER — SODIUM CHLORIDE 9 MG/ML
INJECTION, SOLUTION INTRAVENOUS PRN
Status: DISCONTINUED | OUTPATIENT
Start: 2023-01-01 | End: 2023-01-06 | Stop reason: HOSPADM

## 2023-01-01 RX ORDER — ACETAMINOPHEN 650 MG/1
650 SUPPOSITORY RECTAL EVERY 6 HOURS PRN
Status: DISCONTINUED | OUTPATIENT
Start: 2023-01-01 | End: 2023-01-06 | Stop reason: HOSPADM

## 2023-01-01 RX ORDER — POLYETHYLENE GLYCOL 3350 17 G/17G
17 POWDER, FOR SOLUTION ORAL DAILY PRN
Status: DISCONTINUED | OUTPATIENT
Start: 2023-01-01 | End: 2023-01-06 | Stop reason: HOSPADM

## 2023-01-01 RX ORDER — METHADONE HYDROCHLORIDE 10 MG/1
60 TABLET ORAL DAILY
Status: DISCONTINUED | OUTPATIENT
Start: 2023-01-01 | End: 2023-01-06 | Stop reason: HOSPADM

## 2023-01-01 RX ORDER — AZITHROMYCIN 250 MG/1
500 TABLET, FILM COATED ORAL DAILY
Status: COMPLETED | OUTPATIENT
Start: 2023-01-01 | End: 2023-01-02

## 2023-01-01 RX ADMIN — METHADONE HYDROCHLORIDE 60 MG: 10 TABLET ORAL at 08:34

## 2023-01-01 RX ADMIN — Medication 10 ML: at 21:35

## 2023-01-01 RX ADMIN — ENOXAPARIN SODIUM 40 MG: 100 INJECTION SUBCUTANEOUS at 08:33

## 2023-01-01 RX ADMIN — IPRATROPIUM BROMIDE AND ALBUTEROL SULFATE 1 AMPULE: .5; 3 SOLUTION RESPIRATORY (INHALATION) at 04:40

## 2023-01-01 RX ADMIN — IPRATROPIUM BROMIDE AND ALBUTEROL SULFATE 1 AMPULE: .5; 3 SOLUTION RESPIRATORY (INHALATION) at 23:49

## 2023-01-01 RX ADMIN — IPRATROPIUM BROMIDE AND ALBUTEROL SULFATE 1 AMPULE: .5; 3 SOLUTION RESPIRATORY (INHALATION) at 11:09

## 2023-01-01 RX ADMIN — IPRATROPIUM BROMIDE AND ALBUTEROL SULFATE 1 AMPULE: .5; 3 SOLUTION RESPIRATORY (INHALATION) at 20:19

## 2023-01-01 RX ADMIN — Medication 2 PUFF: at 20:20

## 2023-01-01 RX ADMIN — AZITHROMYCIN MONOHYDRATE 500 MG: 250 TABLET ORAL at 08:33

## 2023-01-01 RX ADMIN — IPRATROPIUM BROMIDE AND ALBUTEROL SULFATE 1 AMPULE: .5; 3 SOLUTION RESPIRATORY (INHALATION) at 15:29

## 2023-01-01 RX ADMIN — Medication 10 ML: at 08:34

## 2023-01-01 RX ADMIN — METHYLPREDNISOLONE SODIUM SUCCINATE 40 MG: 40 INJECTION, POWDER, FOR SOLUTION INTRAMUSCULAR; INTRAVENOUS at 08:33

## 2023-01-01 RX ADMIN — IPRATROPIUM BROMIDE AND ALBUTEROL SULFATE 1 AMPULE: .5; 3 SOLUTION RESPIRATORY (INHALATION) at 07:30

## 2023-01-01 RX ADMIN — Medication 2 PUFF: at 07:30

## 2023-01-01 RX ADMIN — METHYLPREDNISOLONE SODIUM SUCCINATE 40 MG: 40 INJECTION, POWDER, FOR SOLUTION INTRAMUSCULAR; INTRAVENOUS at 15:26

## 2023-01-01 RX ADMIN — ACETAMINOPHEN 650 MG: 325 TABLET ORAL at 21:31

## 2023-01-01 RX ADMIN — METHYLPREDNISOLONE SODIUM SUCCINATE 40 MG: 40 INJECTION, POWDER, FOR SOLUTION INTRAMUSCULAR; INTRAVENOUS at 21:32

## 2023-01-01 ASSESSMENT — PAIN SCALES - GENERAL
PAINLEVEL_OUTOF10: 6
PAINLEVEL_OUTOF10: 6

## 2023-01-01 ASSESSMENT — PAIN DESCRIPTION - DESCRIPTORS: DESCRIPTORS: ACHING

## 2023-01-01 ASSESSMENT — PAIN DESCRIPTION - LOCATION: LOCATION: HIP

## 2023-01-01 ASSESSMENT — PAIN DESCRIPTION - ORIENTATION: ORIENTATION: RIGHT

## 2023-01-01 NOTE — ED PROVIDER NOTES
[facility] Emergency Department      Pt Name: Mikki Downey  MRN: [id number]  [place] 1965  Date of evaluation: 2022  Provider: Ruslan Nicole MD  I independently performed a history and physical on Mikki Downey. All diagnostic, treatment, and disposition decisions were made by myself in conjunction with the advanced practice provider. HPI: Mikki Downey presented with   Chief Complaint   Patient presents with    Shortness of Breath     Pt recently discharged around [phone], came back due to still suffering from SOB, her meds have not helped, and presents tachypneic. Mikki Downey has a past medical history of Asthma and Pneumothorax. She has a past surgical history that includes Hysterectomy;  section; Inner ear surgery; mastoidectomy; and Abdomen surgery. No current facility-administered medications on file prior to encounter. Current Outpatient Medications on File Prior to Encounter   Medication Sig Dispense Refill    predniSONE (DELTASONE) 20 MG tablet Take 2 tablets by mouth daily for 5 days 20 tablet 0    azithromycin (ZITHROMAX) 500 MG tablet Take 1 tablet by mouth daily for 3 days 3 tablet 0    mometasone-formoterol (DULERA) 200-5 MCG/ACT inhaler Inhale 2 puffs into the lungs in the morning and 2 puffs in the evening. 1 each 2    tiotropium (SPIRIVA HANDIHALER) 18 MCG inhalation capsule Inhale 1 capsule into the lungs daily 90 capsule 1    albuterol sulfate HFA (PROVENTIL HFA) 108 (90 Base) MCG/ACT inhaler Inhale 2-4 puffs into the lungs every 4 hours as needed for Wheezing or Shortness of Breath (Space out to every 6 hours as symptoms improve) Space out to every 6 hours as symptoms improve.  1 each 1    albuterol (PROVENTIL) (2.5 MG/3ML) 0.083% nebulizer solution Take 3 mLs by nebulization every 4 hours as needed for Wheezing 120 each 3    [DISCONTINUED] pantoprazole (PROTONIX) 40 MG tablet Take 1 tablet by mouth every morning (before breakfast) (Patient not taking: Reported on 9/6/2022) 30 tablet 3    METHADONE HCL PO Take 60 mg by mouth daily      [DISCONTINUED] ALBUTEROL IN Inhale into the lungs       PHYSICAL EXAM  Vitals: /81   Pulse 89   Temp 98.4 °F (36.9 °C) (Oral)   Resp 12   Ht 5' 7\" (1.702 m)   Wt 159 lb (72.1 kg)   SpO2 99%   BMI 24.90 kg/m²   Constitutional:  65 y.o. female alert, cooperative  HENT:  Atraumatic scalp, mucous membranes moist  Eyes:   Conjunctiva clear, no icterus  Neck:  Supple, no visible JVD, no signs of injury  Cardiovascular:  Regular, no rubs  Thorax & Lungs:  No accessory muscle usage, wheezing, rhonchi  Abdomen:  Soft, non distended, NT  Back:  No deformity  Genitalia:  Deferred  Rectal:  Deferred  Extremities:  No cyanosis, no edema, adequate perfusion  Skin:  Exposed areas warm, dry  Neurologic:  Alert, no slurred speech  Psychiatric:  Affect appropriate    MEDICAL DECISION MAKING:  Briefly, this is an 65 y. o.female who presented with shortness of breath. History of COPD. Seen in this ER earlier in the day and discharged. Went home and felt like she could not breathe so came back immediately. Marked bronchospasm on exam.  She has mild hypoxia on room air. I reviewed her results from earlier tonight. Her blood gas shows a normal pH. She only feels a little improved after respiratory treatment but does actually sound better on exam.  She is oxygenating well with supplemental oxygen. We will admit for further respiratory care. For further details of [facility] Emergency Department encounter, please see documentation by advanced practice provider Landrum Gitelman, PA.      Labs Reviewed   BLOOD GAS, VENOUS - Abnormal; Notable for the following components:       Result Value    pO2, Santiago 83.4 (*)     Base Excess, Santiago 3.3 (*)     Carboxyhemoglobin 5.5 (*)     All other components within normal limits     RADIOLOGY:   Plain x-rays were viewed by me: XR CHEST PORTABLE    Result Date: 12/31/2022  EXAMINATION: ONE XRAY VIEW OF THE CHEST 12/31/2022 4:07 pm COMPARISON: 09/06/2022 HISTORY: ORDERING SYSTEM PROVIDED HISTORY: Short of breath TECHNOLOGIST PROVIDED HISTORY: Reason for exam:->Short of breath Reason for Exam: Shortness of Breath (Pt reports SOB, states she feels she has chest tightness on inspiration ) FINDINGS: Cardiac silhouette is normal.  There is mild thoracic aortic calcification. Hilar contours are normal.  There is mild streak like linear opacity in the left lung base. No consolidation is identified. Costophrenic angles are mildly irregular, suggestive of fibrosis. Lungs are hyperinflated. No pneumothorax. Minimal left basilar atelectasis or scarring. Changes consistent with COPD. EKG:  Read by me in the absence of a cardiologist shows:  Sinus rhythm, normal rate, normal conduction intervals, normal axis, no acute injury pattern, no major change from prior study      CRITICAL CARE:  Total critical care time of 31 minutes (excludes any time for procedures). This includes but not limited to vital sign monitoring, medication, clinical response to medications, interpretation of diagnostics, review of nursing notes, pertinent record review, discussions about patient condition, consultation time, documentation time. Critical care due to the patient's COPD exacerbation. Vitals:    12/31/22 2033 12/31/22 2034 12/31/22 2035 12/31/22 2200   BP: 139/81      Pulse:  96 93 89   Resp:    12   Temp:       TempSrc:       SpO2:  (!) 89% 93% 99%   Weight:       Height:         Medications administered:  Medications   albuterol (PROVENTIL) nebulizer solution 15 mg (15 mg Nebulization Given 12/31/22 2158)   azithromycin (ZITHROMAX) 500 mg in dextrose 5 % 250 mL (vial-mate) IVPB (500 mg IntraVENous New Bag 12/31/22 2135)     FINAL IMPRESSION:    1.  COPD exacerbation ([facility] [address]        DISPOSITION Admitted 12/31/2022 09:05:53 PM       Araceli Almendarez MD  12/31/22 6931

## 2023-01-01 NOTE — PROGRESS NOTES
Hospitalist Progress Note      PCP: No primary care provider on file. Date of Admission: 12/31/2022    LOS: 1    Chief Complaint:   Chief Complaint   Patient presents with    Shortness of Breath     Pt recently discharged around [phone], came back due to still suffering from SOB, her meds have not helped, and presents tachypneic. Case Summary:   69-year-old lady with history of COPD who presented with progressively worsening shortness of breath and scanty dry cough, nasal congestion with stuffiness found to have acute exacerbation of COPD with hypoxia      Active Hospital Problems    Diagnosis Date Noted    Acute exacerbation of chronic obstructive pulmonary disease (COPD) ([facility] [J44.1] 12/31/2022     Priority: Medium    Acute respiratory failure with hypoxia ([facility] [J96.01] 08/21/2022     Priority: Medium         Principal Problem:    Acute exacerbation of chronic obstructive pulmonary disease (COPD) ([facility]  Active Problems:    Acute respiratory failure with hypoxia (HCC)  Resolved Problems:    * No resolved hospital problems. *    Much improved air movement this morning though still with some expiratory wheezing.   Better aeration throughout bilateral lungs  - Continue breathing therapy with cardiopulmonary protocol  - Continue IV steroids for another day and switch to p.o. tomorrow  - Continue antibiotic coverage  - Continue O2 supplementation and wean off as tolerated with target sats greater than 90%      Medications:  Reviewed  Infusion Medications    sodium chloride       Scheduled Medications    azithromycin  500 mg Oral Daily    methadone  60 mg Oral Daily    mometasone-formoterol  2 puff Inhalation BID    sodium chloride flush  5-40 mL IntraVENous 2 times per day    enoxaparin  40 mg SubCUTAneous Daily    ipratropium-albuterol  1 ampule Inhalation Q4H    methylPREDNISolone  40 mg IntraVENous Q6H    Followed by    Azeem Montiel ON 1/3/2023] predniSONE  40 mg Oral Daily     PRN Meds: albuterol, sodium chloride flush, sodium chloride, ondansetron **OR** ondansetron, polyethylene glycol, acetaminophen **OR** acetaminophen      DVT Prophylaxis: Subcut enoxaparin  Diet: ADULT DIET; Regular  Code Status: Full Code    Dispo: Anticipate discharge tomorrow if continues to remain stable and improved    ____________________________________________________________________________    Subjective:   Overnight Events:   Uneventful overnight  Improved respiratory status and feels much better      Physical Exam:  /66   Pulse 98   Temp 97.9 °F (36.6 °C) (Oral)   Resp 16   Ht 5' 7\" (1.702 m)   Wt 159 lb (72.1 kg)   SpO2 90%   BMI 24.90 kg/m²   General appearance: No apparent distress, appears stated age and cooperative. HEENT: Normocephalic, atraumatic, MMM, No sclera icterus/conjuctival palor  Neck: Supple, no thyromegally. No jugular venous distention. Respiratory: Improved air movement with expiratory wheezing no rhonchi or rales  Cardiovascular: S1/S2 without murmurs, rubs or gallops. RRR  Abdomen: Soft, non-tender, non-distended, bowel sounds present. Musculoskeletal: No clubbing, cyanosis or edema bilaterally. Skin: Skin color, texture, turgor normal.  No rashes or lesions.   Neurologic:  Cranial nerves: II-XII intact, KIMI, No focal sensory/motor deficits  Psychiatric: Alert and oriented, thought content appropriate        Intake/Output Summary (Last 24 hours) at 1/1/2023 1156  Last data filed at 1/1/2023 0830  Gross per 24 hour   Intake 240 ml   Output --   Net 240 ml       Labs:   Recent Labs     12/31/22  1613 01/01/23  0518   WBC 5.6 6.2   HGB 14.1 14.3   HCT 43.9 43.6    189      Recent Labs     12/31/22  1613 01/01/23  0518    140   K 3.6 4.0   CL 99 102   CO2 32 27   BUN 8 11   CREATININE 0.7 0.7   CALCIUM 9.7 9.9   AST 13*  --    ALT 11  --    BILITOT <0.2  --    ALKPHOS 70  --      Recent Labs     12/31/22  1613   TROPONINI <0.01       Urinalysis:    Lab Results   Component Value Date/Time    NITRU Negative 08/07/2016 09:00 PM    WBCUA 3-5 08/07/2016 09:00 PM    BACTERIA Rare 08/07/2016 09:00 PM    RBCUA 0-2 08/07/2016 09:00 PM    BLOODU Negative 08/07/2016 09:00 PM    SPECGRAV <1.005 08/07/2016 09:00 PM    GLUCOSEU Negative 08/07/2016 09:00 PM       Radiology:  No orders to display           Nichole Og MD      Please excuse brevity and/or typos. This report was transcribed using voice recognition software. Every effort was made to ensure accuracy, however, inadvertent computerized transcription errors may be present.

## 2023-01-01 NOTE — H&P
Hospital Medicine History & Physical      PCP: No primary care provider on file. Date of Admission: 2022    Date of Service: Pt seen/examined on 2022  and Admitted to Inpatient with expected LOS greater than two midnights due to medical therapy. Chief Complaint:    Chief Complaint   Patient presents with    Shortness of Breath     Pt recently discharged around [phone], came back due to still suffering from SOB, her meds have not helped, and presents tachypneic. History Of Present Illness: The patient is a 65 y.o. female with history of COPD who was seen earlier in the ER for mild COPD exacerbation sent home but returned this evening. Patient continues to complain of shortness of breath with chest tightness and difficulty with expiration. No significant cough or production. No fevers or chills. No diarrhea. She reports associated nasal stuffiness and congestion starting this morning. She denies any recent sick contacts. In the ER she was noted to having oxygen saturations of 87 to 89% on room air with activity requiring O2 supplementation of 2 L  Chest x-ray with clear lung fields  Laboratory work-up unremarkable    Past Medical History:        Diagnosis Date    Asthma     Pneumothorax        Past Surgical History:        Procedure Laterality Date    ABDOMEN SURGERY       SECTION      HYSTERECTOMY (CERVIX STATUS UNKNOWN)      INNER EAR SURGERY      MASTOIDECTOMY         Medications Prior to Admission:    Prior to Admission medications    Medication Sig Start Date End Date Taking? Authorizing Provider   predniSONE (DELTASONE) 20 MG tablet Take 2 tablets by mouth daily for 5 days 22  NERI Al   [facility]) 500 MG tablet Take 1 tablet by mouth daily for 3 days 12/31/22 1/3/23  NERI Al   mometasone-formoterol [facility] [place]) 200-5 MCG/ACT inhaler Inhale 2 puffs into the lungs in the morning and 2 puffs in the evening.  22 Edie Corey MD   tiotropium (Fredy Martinis) 18 MCG inhalation capsule Inhale 1 capsule into the lungs daily 9/9/22   Edie Corey MD   albuterol sulfate HFA (PROVENTIL HFA) 108 (90 Base) MCG/ACT inhaler Inhale 2-4 puffs into the lungs every 4 hours as needed for Wheezing or Shortness of Breath (Space out to every 6 hours as symptoms improve) Space out to every 6 hours as symptoms improve. 8/24/22   Terry Ramirez MD   albuterol (PROVENTIL) (2.5 MG/3ML) 0.083% nebulizer solution Take 3 mLs by nebulization every 4 hours as needed for Wheezing 8/24/22   Pat Thapa MD   pantoprazole (PROTONIX) 40 MG tablet Take 1 tablet by mouth every morning (before breakfast)  Patient not taking: Reported on 9/6/2022 8/25/22 9/10/22  Pat Thapa MD   METHADONE HCL PO Take 60 mg by mouth daily    Historical Provider, MD   ALBUTEROL IN Inhale into the lungs  9/10/22  Historical Provider, MD       Allergies:  Penicillins and Codeine    Social History:  The patient currently lives alone    TOBACCO:   reports that she has been smoking cigarettes. She has a 32.00 pack-year smoking history. She has never used smokeless tobacco.  ETOH:   reports no history of alcohol use. Family History:  Reviewed in detail and negative for DM, Early CAD, Cancer, CVA. Positive as follows:    History reviewed. No pertinent family history. REVIEW OF SYSTEMS:   Positive for nasal stuffiness, shortness of breath, chest tightness, difficulty with expiration and as noted in the HPI. All other systems reviewed and negative. PHYSICAL EXAM:    /81   Pulse 93   Temp 98.4 °F (36.9 °C) (Oral)   Resp 22   Ht 5' 7\" (1.702 m)   Wt 159 lb (72.1 kg)   SpO2 93%   BMI 24.90 kg/m²     General appearance: No apparent distress appears stated age and cooperative. HEENT Normal cephalic, atraumatic without obvious deformity. Pupils equal, round, and reactive to light. Extra ocular muscles intact. Conjunctivae/corneas clear.   Neck: Supple, No jugular venous distention/bruits. Lungs: Clear to auscultation, bilaterally without Rales/Wheezes/Rhonchi with good respiratory effort. Heart: Regular rate and rhythm with Normal S1/S2 without murmurs, rubs or gallops  Abdomen: Soft, non-tender or non-distended without rigidity or guarding and positive bowel sounds  Extremities: No clubbing, cyanosis, or edema bilaterally. Skin: Skin color, texture, turgor normal.  No rashes or lesions. Neurologic: Alert and oriented X 3, neurovascularly intact with sensory/motor intact upper extremities/lower extremities, bilaterally. Cranial nerves: II-XII intact, grossly non-focal.  Mental status: Alert, oriented, thought content appropriate.         CBC   Recent Labs     12/31/22  1613   WBC 5.6   HGB 14.1   HCT 43.9         RENAL  Recent Labs     12/31/22  1613      K 3.6   CL 99   CO2 32   BUN 8   CREATININE 0.7     LFT'S  Recent Labs     12/31/22  1613   AST 13*   ALT 11   BILITOT <0.2   ALKPHOS 70       CARDIAC ENZYMES  Recent Labs     12/31/22  1613   TROPONINI <0.01         ABG    Lab Results   Component Value Date/Time    MFP9WWZ 30.2 09/09/2022 10:20 AM    BEART 5.5 09/09/2022 10:20 AM    A1BSDTOW 97.7 09/09/2022 10:20 AM    PHART 7.454 09/09/2022 10:20 AM    YOD6EIC 43.1 09/09/2022 10:20 AM    PO2ART 81.1 09/09/2022 10:20 AM    UCC6SYB 70.6 09/09/2022 10:20 AM           Active Hospital Problems    Diagnosis Date Noted    Acute exacerbation of chronic obstructive pulmonary disease (COPD) ([facility] [address] [J44.1] 12/31/2022     Priority: Medium    Acute respiratory failure with hypoxia ([facility] [address] [J96.01] 08/21/2022     Priority: Medium         ASSESSMENT/PLAN:  52-year-old lady with history of COPD who presented with progressively worsening shortness of breath and scanty dry cough, nasal congestion with stuffiness found to have acute exacerbation of COPD with hypoxia    Plan:  -Continue on breathing therapy with scheduled and PRN bronchodilators and cardiopulmonary protocol  -Systemic IV steroids  -Antibiotic coverage  - Oxygen supplementation with target saturations greater than 90% and wean as tolerated          DVT Prophylaxis: Subcut enoxaparin  Diet: General diet  Code Status: Full code         Gemini Lau MD    Thank you No primary care provider on file. for the opportunity to be involved in this patient's care. If you have any questions or concerns please feel free to contact me at [phone].

## 2023-01-01 NOTE — PROGRESS NOTES
01/01/23 0414   RT Protocol   History Pulmonary Disease 2   Respiratory pattern 6   Breath sounds 8   Cough 1   Indications for Bronchodilator Therapy Decreased or absent breath sounds   Bronchodilator Assessment Score 17

## 2023-01-01 NOTE — RT PROTOCOL NOTE
RT Inhaler-Nebulizer Bronchodilator Protocol Note    There is a bronchodilator order in the chart from a provider indicating to follow the RT Bronchodilator Protocol and there is an Initiate RT Inhaler-Nebulizer Bronchodilator Protocol order as well (see protocol at bottom of note). CXR Findings:  XR CHEST PORTABLE    Result Date: 12/31/2022  Minimal left basilar atelectasis or scarring. Changes consistent with COPD. The findings from the last RT Protocol Assessment were as follows:   History Pulmonary Disease: Chronic pulmonary disease  Respiratory Pattern: Use of accessory muscles, prolonged exhalation, or RR 26-30 bpm  Breath Sounds: Severe inspiratory and expiratory wheezing or severely diminished  Cough: Strong, productive  Indication for Bronchodilator Therapy: Decreased or absent breath sounds  Bronchodilator Assessment Score: 17    Aerosolized bronchodilator medication orders have been revised according to the RT Inhaler-Nebulizer Bronchodilator Protocol below. Respiratory Therapist to perform RT Therapy Protocol Assessment initially then follow the protocol. Repeat RT Therapy Protocol Assessment PRN for score 0-3 or on second treatment, BID, and PRN for scores above 3. No Indications - adjust the frequency to every 6 hours PRN wheezing or bronchospasm, if no treatments needed after 48 hours then discontinue using Per Protocol order mode. If indication present, adjust the RT bronchodilator orders based on the Bronchodilator Assessment Score as indicated below. Use Inhaler orders unless patient has one or more of the following: on home nebulizer, not able to hold breath for 10 seconds, is not alert and oriented, cannot activate and use MDI correctly, or respiratory rate 25 breaths per minute or more, then use the equivalent nebulizer order(s) with same Frequency and PRN reasons based on the score.   If a patient is on this medication at home then do not decrease Frequency below that used at home. 0-3 - enter or revise RT bronchodilator order(s) to equivalent RT Bronchodilator order with Frequency of every 4 hours PRN for wheezing or increased work of breathing using Per Protocol order mode. 4-6 - enter or revise RT Bronchodilator order(s) to two equivalent RT bronchodilator orders with one order with BID Frequency and one order with Frequency of every 4 hours PRN wheezing or increased work of breathing using Per Protocol order mode. 7-10 - enter or revise RT Bronchodilator order(s) to two equivalent RT bronchodilator orders with one order with TID Frequency and one order with Frequency of every 4 hours PRN wheezing or increased work of breathing using Per Protocol order mode. 11-13 - enter or revise RT Bronchodilator order(s) to one equivalent RT bronchodilator order with QID Frequency and an Albuterol order with Frequency of every 4 hours PRN wheezing or increased work of breathing using Per Protocol order mode. Greater than 13 - enter or revise RT Bronchodilator order(s) to one equivalent RT bronchodilator order with every 4 hours Frequency and an Albuterol order with Frequency of every 2 hours PRN wheezing or increased work of breathing using Per Protocol order mode. RT to enter RT Home Evaluation for COPD & MDI Assessment order using Per Protocol order mode.     Electronically signed by IGOR DRISCOLL RCP on 1/1/2023 at 4:15 AM

## 2023-01-01 NOTE — ED NOTES
PT ambulated to bathroom at this time w/o assistance. After ambulating back to room, oxygen saturation dropped to 89% on RA. Placed on 2L O2/NC at this time which brought saturation up to 93%.      Cherelle Payne RN  12/31/22 2029       Cherelle Payne RN  12/31/22 2037

## 2023-01-01 NOTE — ED PROVIDER NOTES
[address] [facility]        Pt Name: Jethro Cardona  MRN: [id number]  [place] 1965  Date of evaluation: 2022  Provider: NERI Vela  PCP: No primary care provider on file. Note Started: 9:21 PM EST        I have seen and evaluated this patient with my supervising physician Alexa Leos, [facility] 9523       Chief Complaint   Patient presents with    Shortness of Breath     Pt recently discharged around [phone], came back due to still suffering from SOB, her meds have not helped, and presents tachypneic. HISTORY OF PRESENT ILLNESS      Chief Complaint: Shortness of breath    Jethro Cardona is a 65 y.o. female who presents shortness of breath. Seen previously in the emergency department, treated for COPD exacerbation. And discharged. She reports feeling better, but then symptoms returned, worse than previous. She reports chest tightness, no chest pain. No fevers or chills. She reports sputum production, and discoloration. No belly pain or back pain. No headache or sore throat. REVIEW OF SYSTEMS       10 system ROS was performed and was negative except as noted in HPI. PAST MEDICAL HISTORY     Past Medical History:   Diagnosis Date    Asthma     Pneumothorax        SURGICAL HISTORY     Past Surgical History:   Procedure Laterality Date    ABDOMEN SURGERY       SECTION      HYSTERECTOMY (CERVIX STATUS UNKNOWN)      INNER EAR SURGERY      MASTOIDECTOMY         CURRENTMEDICATIONS       Previous Medications    ALBUTEROL (PROVENTIL) (2.5 MG/3ML) 0.083% NEBULIZER SOLUTION    Take 3 mLs by nebulization every 4 hours as needed for Wheezing    ALBUTEROL SULFATE HFA (PROVENTIL HFA) 108 (90 BASE) MCG/ACT INHALER    Inhale 2-4 puffs into the lungs every 4 hours as needed for Wheezing or Shortness of Breath (Space out to every 6 hours as symptoms improve) Space out to every 6 hours as symptoms improve. AZITHROMYCIN (ZITHROMAX) 500 MG TABLET    Take 1 tablet by mouth daily for 3 days    METHADONE HCL PO    Take 60 mg by mouth daily    MOMETASONE-FORMOTEROL (DULERA) 200-5 MCG/ACT INHALER    Inhale 2 puffs into the lungs in the morning and 2 puffs in the evening. PREDNISONE (DELTASONE) 20 MG TABLET    Take 2 tablets by mouth daily for 5 days    TIOTROPIUM (SPIRIVA HANDIHALER) 18 MCG INHALATION CAPSULE    Inhale 1 capsule into the lungs daily       ALLERGIES     Penicillins and Codeine    FAMILYHISTORY     History reviewed. No pertinent family history. SOCIAL HISTORY       Social History     Tobacco Use    Smoking status: Every Day     Packs/day: 1.00     Years: 32.00     Pack years: 32.00     Types: Cigarettes    Smokeless tobacco: Never   Vaping Use    Vaping Use: Never used   Substance Use Topics    Alcohol use: No    Drug use: No       SCREENINGS    Mechelle Coma Scale  Eye Opening: Spontaneous  Best Verbal Response: Oriented  Best Motor Response: Obeys commands  [place] Coma Scale Score: 15      Is this patient to be included in the SEP-1 Core Measure due to severe sepsis or septic shock? No   Exclusion criteria - the patient is NOT to be included for SEP-1 Core Measure due to:  2+ SIRS criteria are not met      PHYSICAL EXAM     Vitals: /81   Pulse 89   Temp 98.4 °F (36.9 °C) (Oral)   Resp 12   Ht 5' 7\" (1.702 m)   Wt 159 lb (72.1 kg)   SpO2 99%   BMI 24.90 kg/m²    General: awake, alert, no apparent distress  Pupils: equal, reactive  Eyes: EOM intact, conjunctiva clear, no discharge  Head: Non-traumatic  Neck: Supple  Mouth: Moist, no oral lesions, no tonsillar enlargement  Heart: Rate as noted, regular rhythm, no murmur or rubs. Chest/Lungs: Coarse lung sounds bilaterally  Abdomen: soft, nondistended, no tenderness to palpation   Back: No midline tenderness.  No CVA tenderness  Extremities:  cap refill <2 UE/LE, no tenderness of calves, no edema  Neuro: Moving all extremities, no facial droop, no slurred speech, answers questions appropriately. Skin: Warm. No visible rash, lesions, or bruising       DIAGNOSTIC RESULTS   LABS:    Labs Reviewed   BLOOD GAS, VENOUS - Abnormal; Notable for the following components:       Result Value    pO2, Santiago 83.4 (*)     Base Excess, Santiago 3.3 (*)     Carboxyhemoglobin 5.5 (*)     All other components within normal limits       EKG: When ordered, EKG's are interpreted by the Emergency Department Physician in the absence of a cardiologist.  Please see their note for interpretation of EKG. RADIOLOGY:   No orders to display     XR CHEST PORTABLE    Result Date: 12/31/2022  EXAMINATION: ONE XRAY VIEW OF THE CHEST 12/31/2022 4:07 pm COMPARISON: 09/06/2022 HISTORY: ORDERING SYSTEM PROVIDED HISTORY: Short of breath TECHNOLOGIST PROVIDED HISTORY: Reason for exam:->Short of breath Reason for Exam: Shortness of Breath (Pt reports SOB, states she feels she has chest tightness on inspiration ) FINDINGS: Cardiac silhouette is normal.  There is mild thoracic aortic calcification. Hilar contours are normal.  There is mild streak like linear opacity in the left lung base. No consolidation is identified. Costophrenic angles are mildly irregular, suggestive of fibrosis. Lungs are hyperinflated. No pneumothorax. Minimal left basilar atelectasis or scarring. Changes consistent with COPD. PROCEDURES       CRITICAL CARE TIME   I personally saw the patient and independently provided 0 minutes of non-concurrent critical care time out of the total critical care time provided. This excludes time spent doing separately billable procedures. This includes time at the bedside, data interpretation, medication management, obtaining critical history from collateral sources if the patient is unable to provide it directly, and physician consultation.   Specifics of interventions taken and potentially life-threatening diagnostic considerations are listed above in the medical decision making. CONSULTS   None    ED COURSE and MEDICAL DECISIONS MAKING:   Vitals:    Vitals:    12/31/22 2033 12/31/22 2034 12/31/22 2035 12/31/22 2200   BP: 139/81      Pulse:  96 93 89   Resp:    12   Temp:       TempSrc:       SpO2:  (!) 89% 93% 99%   Weight:       Height:         MEDICATIONS:  Medications   albuterol (PROVENTIL) nebulizer solution 15 mg (15 mg Nebulization Given 12/31/22 2158)   azithromycin (ZITHROMAX) 500 mg in dextrose 5 % 250 mL (vial-mate) IVPB (500 mg IntraVENous New Bag 12/31/22 2135)           This 27-year-old female presents with COPD exacerbation. I saw and treated her earlier in the afternoon. Her laboratory work-up at that time was unremarkable. On repeat visit we had a VBG, which is nonconcerning. Vitals remained stable. Treated her with albuterol, and started IV antibiotics. Will admit for failed outpatient COPD treatment. FINAL IMPRESSION      1. COPD exacerbation ([facility] [address]          DISPOSITION/PLAN   DISPOSITION Admitted 12/31/2022 09:05:53 PM      PATIENT REFERRED TO:  No follow-up provider specified.     DISCHARGE MEDICATIONS:  New Prescriptions    No medications on file       DISCONTINUED MEDICATIONS:  Discontinued Medications    No medications on file            NERI Hemphill (electronically signed)        [place], [place]  12/31/22 8793

## 2023-01-02 PROCEDURE — 2580000003 HC RX 258: Performed by: INTERNAL MEDICINE

## 2023-01-02 PROCEDURE — 6370000000 HC RX 637 (ALT 250 FOR IP): Performed by: PHYSICIAN ASSISTANT

## 2023-01-02 PROCEDURE — 6360000002 HC RX W HCPCS: Performed by: INTERNAL MEDICINE

## 2023-01-02 PROCEDURE — 94640 AIRWAY INHALATION TREATMENT: CPT

## 2023-01-02 PROCEDURE — 6370000000 HC RX 637 (ALT 250 FOR IP): Performed by: NURSE PRACTITIONER

## 2023-01-02 PROCEDURE — 94761 N-INVAS EAR/PLS OXIMETRY MLT: CPT

## 2023-01-02 PROCEDURE — 2700000000 HC OXYGEN THERAPY PER DAY

## 2023-01-02 PROCEDURE — 1200000000 HC SEMI PRIVATE

## 2023-01-02 PROCEDURE — 6370000000 HC RX 637 (ALT 250 FOR IP): Performed by: INTERNAL MEDICINE

## 2023-01-02 RX ORDER — GUAIFENESIN 600 MG/1
600 TABLET, EXTENDED RELEASE ORAL 2 TIMES DAILY
Status: DISCONTINUED | OUTPATIENT
Start: 2023-01-02 | End: 2023-01-02

## 2023-01-02 RX ORDER — IPRATROPIUM BROMIDE AND ALBUTEROL SULFATE 2.5; .5 MG/3ML; MG/3ML
1 SOLUTION RESPIRATORY (INHALATION) EVERY 4 HOURS
Status: DISCONTINUED | OUTPATIENT
Start: 2023-01-02 | End: 2023-01-02

## 2023-01-02 RX ORDER — GUAIFENESIN 600 MG/1
1200 TABLET, EXTENDED RELEASE ORAL 2 TIMES DAILY
Status: DISCONTINUED | OUTPATIENT
Start: 2023-01-02 | End: 2023-01-06 | Stop reason: HOSPADM

## 2023-01-02 RX ORDER — IPRATROPIUM BROMIDE AND ALBUTEROL SULFATE 2.5; .5 MG/3ML; MG/3ML
1 SOLUTION RESPIRATORY (INHALATION) EVERY 4 HOURS
Status: DISCONTINUED | OUTPATIENT
Start: 2023-01-02 | End: 2023-01-05

## 2023-01-02 RX ORDER — IPRATROPIUM BROMIDE AND ALBUTEROL SULFATE 2.5; .5 MG/3ML; MG/3ML
1 SOLUTION RESPIRATORY (INHALATION)
Status: DISCONTINUED | OUTPATIENT
Start: 2023-01-02 | End: 2023-01-02

## 2023-01-02 RX ADMIN — GUAIFENESIN 600 MG: 600 TABLET, EXTENDED RELEASE ORAL at 21:10

## 2023-01-02 RX ADMIN — ACETAMINOPHEN 650 MG: 325 TABLET ORAL at 20:39

## 2023-01-02 RX ADMIN — IPRATROPIUM BROMIDE AND ALBUTEROL SULFATE 1 AMPULE: .5; 3 SOLUTION RESPIRATORY (INHALATION) at 07:27

## 2023-01-02 RX ADMIN — Medication 10 ML: at 20:39

## 2023-01-02 RX ADMIN — Medication 2 PUFF: at 07:27

## 2023-01-02 RX ADMIN — METHYLPREDNISOLONE SODIUM SUCCINATE 40 MG: 40 INJECTION, POWDER, FOR SOLUTION INTRAMUSCULAR; INTRAVENOUS at 08:21

## 2023-01-02 RX ADMIN — ENOXAPARIN SODIUM 40 MG: 100 INJECTION SUBCUTANEOUS at 08:20

## 2023-01-02 RX ADMIN — GUAIFENESIN 600 MG: 600 TABLET, EXTENDED RELEASE ORAL at 20:39

## 2023-01-02 RX ADMIN — Medication 40 ML: at 08:22

## 2023-01-02 RX ADMIN — IPRATROPIUM BROMIDE AND ALBUTEROL SULFATE 1 AMPULE: .5; 3 SOLUTION RESPIRATORY (INHALATION) at 14:32

## 2023-01-02 RX ADMIN — METHADONE HYDROCHLORIDE 60 MG: 10 TABLET ORAL at 08:19

## 2023-01-02 RX ADMIN — Medication 2 PUFF: at 20:02

## 2023-01-02 RX ADMIN — GUAIFENESIN 600 MG: 600 TABLET, EXTENDED RELEASE ORAL at 13:06

## 2023-01-02 RX ADMIN — IPRATROPIUM BROMIDE AND ALBUTEROL SULFATE 1 AMPULE: .5; 3 SOLUTION RESPIRATORY (INHALATION) at 11:30

## 2023-01-02 RX ADMIN — METHYLPREDNISOLONE SODIUM SUCCINATE 40 MG: 40 INJECTION, POWDER, FOR SOLUTION INTRAMUSCULAR; INTRAVENOUS at 13:08

## 2023-01-02 RX ADMIN — IPRATROPIUM BROMIDE AND ALBUTEROL SULFATE 1 AMPULE: .5; 3 SOLUTION RESPIRATORY (INHALATION) at 03:38

## 2023-01-02 RX ADMIN — METHYLPREDNISOLONE SODIUM SUCCINATE 40 MG: 40 INJECTION, POWDER, FOR SOLUTION INTRAMUSCULAR; INTRAVENOUS at 02:31

## 2023-01-02 RX ADMIN — AZITHROMYCIN MONOHYDRATE 500 MG: 250 TABLET ORAL at 08:21

## 2023-01-02 RX ADMIN — METHYLPREDNISOLONE SODIUM SUCCINATE 40 MG: 40 INJECTION, POWDER, FOR SOLUTION INTRAMUSCULAR; INTRAVENOUS at 20:39

## 2023-01-02 RX ADMIN — IPRATROPIUM BROMIDE AND ALBUTEROL SULFATE 1 AMPULE: .5; 3 SOLUTION RESPIRATORY (INHALATION) at 20:02

## 2023-01-02 ASSESSMENT — PAIN SCALES - GENERAL
PAINLEVEL_OUTOF10: 4
PAINLEVEL_OUTOF10: 0

## 2023-01-02 ASSESSMENT — PAIN DESCRIPTION - ORIENTATION: ORIENTATION: RIGHT;LEFT

## 2023-01-02 ASSESSMENT — PAIN DESCRIPTION - LOCATION: LOCATION: HIP

## 2023-01-02 NOTE — PROGRESS NOTES
Hospitalist Progress Note      PCP: No primary care provider on file. Date of Admission: 12/31/2022    LOS: 2    Chief Complaint:   Chief Complaint   Patient presents with    Shortness of Breath     Pt recently discharged around [phone], came back due to still suffering from SOB, her meds have not helped, and presents tachypneic. Case Summary:   27-year-old lady with history of COPD who presented with progressively worsening shortness of breath and scanty dry cough, nasal congestion with stuffiness found to have acute exacerbation of COPD and acute respiratory failure with hypoxia      Active Hospital Problems    Diagnosis Date Noted    Acute exacerbation of chronic obstructive pulmonary disease (COPD) ([facility] [J44.1] 12/31/2022     Priority: Medium    Acute respiratory failure with hypoxia ([facility] [J96.01] 08/21/2022     Priority: Medium         Principal Problem:    Acute exacerbation of chronic obstructive pulmonary disease (COPD) ([facility]  Active Problems:    Acute respiratory failure with hypoxia (HCC)  Resolved Problems:    * No resolved hospital problems. *  Reports no chest tightness today compared to yesterday. Increased wheezing today.   Cough with difficult expectoration  - Continue breathing therapy cardiopulmonary protocol  - Continue antibiotics  - Continue steroids  - We will add Mucinex  - Consult pulmonology        Medications:  Reviewed  Infusion Medications    sodium chloride       Scheduled Medications    guaiFENesin  600 mg Oral BID    ipratropium-albuterol  1 ampule Inhalation Q4H    methadone  60 mg Oral Daily    mometasone-formoterol  2 puff Inhalation BID    sodium chloride flush  5-40 mL IntraVENous 2 times per day    enoxaparin  40 mg SubCUTAneous Daily    methylPREDNISolone  40 mg IntraVENous Q6H    Followed by    Lis Rossi ON 1/3/2023] predniSONE  40 mg Oral Daily    nicotine  1 patch TransDERmal Daily     PRN Meds: albuterol, sodium chloride flush, sodium chloride, ondansetron **OR** ondansetron, polyethylene glycol, acetaminophen **OR** acetaminophen      DVT Prophylaxis: Subcut enoxaparin  Diet: ADULT DIET; Regular  Code Status: Full Code    Dispo: Anticipate discharge in 1 to 2 days    ____________________________________________________________________________    Subjective:   Overnight Events:   Reports no shortness of breath this morning with chest tightness and wheezing  Patient is anxious as she feels her symptoms are worsening    Physical Exam:  /65   Pulse 85   Temp 97.9 °F (36.6 °C) (Oral)   Resp 18   Ht 5' 7\" (1.702 m)   Wt 159 lb (72.1 kg)   SpO2 92%   BMI 24.90 kg/m²   General appearance: No apparent distress, appears stated age and cooperative. HEENT: Normocephalic, atraumatic, MMM, No sclera icterus/conjuctival palor  Neck: Supple, no thyromegally. No jugular venous distention. Respiratory: No prolonged expiratory wheezing compared to the last few days with more air movement  Cardiovascular: S1/S2 without murmurs, rubs or gallops. RRR  Abdomen: Soft, non-tender, non-distended, bowel sounds present. Musculoskeletal: No clubbing, cyanosis or edema bilaterally. Skin: Skin color, texture, turgor normal.  No rashes or lesions.   Neurologic:  Cranial nerves: II-XII intact, KIMI, No focal sensory/motor deficits  Psychiatric: Alert and oriented, anxious      Intake/Output Summary (Last 24 hours) at 1/2/2023 1144  Last data filed at 1/2/2023 0832  Gross per 24 hour   Intake 360 ml   Output --   Net 360 ml       Labs:   Recent Labs     12/31/22  1613 01/01/23  0518   WBC 5.6 6.2   HGB 14.1 14.3   HCT 43.9 43.6    189      Recent Labs     12/31/22  1613 01/01/23  0518    140   K 3.6 4.0   CL 99 102   CO2 32 27   BUN 8 11   CREATININE 0.7 0.7   CALCIUM 9.7 9.9   AST 13*  --    ALT 11  --    BILITOT <0.2  --    ALKPHOS 70  --      Recent Labs     12/31/22  1613   TROPONINI <0.01       Urinalysis:    Lab Results   Component Value Date/Time    NITRU Negative 08/07/2016 09:00 PM    WBCUA 3-5 08/07/2016 09:00 PM    BACTERIA Rare 08/07/2016 09:00 PM    RBCUA 0-2 08/07/2016 09:00 PM    BLOODU Negative 08/07/2016 09:00 PM    [address] <1.005 08/07/2016 09:00 PM    GLUCOSEU Negative 08/07/2016 09:00 PM       Radiology:  No orders to display           Pepew MD Massiel      Please excuse brevity and/or typos. This report was transcribed using voice recognition software. Every effort was made to ensure accuracy, however, inadvertent computerized transcription errors may be present.

## 2023-01-02 NOTE — PROGRESS NOTES
01/02/23 0341   RT Protocol   History Pulmonary Disease 2   Respiratory pattern 0   Breath sounds 6   Cough 0   Bronchodilator Assessment Score 8

## 2023-01-02 NOTE — PROGRESS NOTES
Clinical Pharmacy Note  Patient receives methadone from [facility] in [facility]. Phone #: [phone] then press 4. Confirmed with nurse last dose 60 mg on 12/19/22 with a 13 day supply.     Que Gordillo, PharmD  PGY-1 Pharmacy Resident  [id number]

## 2023-01-02 NOTE — RT PROTOCOL NOTE
RT Inhaler-Nebulizer Bronchodilator Protocol Note    There is a bronchodilator order in the chart from a provider indicating to follow the RT Bronchodilator Protocol and there is an Initiate RT Inhaler-Nebulizer Bronchodilator Protocol order as well (see protocol at bottom of note). CXR Findings:  XR CHEST PORTABLE    Result Date: 12/31/2022  Minimal left basilar atelectasis or scarring. Changes consistent with COPD. The findings from the last RT Protocol Assessment were as follows:   History Pulmonary Disease: Chronic pulmonary disease  Respiratory Pattern: Regular pattern and RR 12-20 bpm  Breath Sounds: Inspiratory and expiratory or bilateral wheezing and/or rhonchi  Cough: Strong, spontaneous, non-productive  Indication for Bronchodilator Therapy: Decreased or absent breath sounds  Bronchodilator Assessment Score: 8    Aerosolized bronchodilator medication orders have been revised according to the RT Inhaler-Nebulizer Bronchodilator Protocol below. Respiratory Therapist to perform RT Therapy Protocol Assessment initially then follow the protocol. Repeat RT Therapy Protocol Assessment PRN for score 0-3 or on second treatment, BID, and PRN for scores above 3. No Indications - adjust the frequency to every 6 hours PRN wheezing or bronchospasm, if no treatments needed after 48 hours then discontinue using Per Protocol order mode. If indication present, adjust the RT bronchodilator orders based on the Bronchodilator Assessment Score as indicated below. Use Inhaler orders unless patient has one or more of the following: on home nebulizer, not able to hold breath for 10 seconds, is not alert and oriented, cannot activate and use MDI correctly, or respiratory rate 25 breaths per minute or more, then use the equivalent nebulizer order(s) with same Frequency and PRN reasons based on the score. If a patient is on this medication at home then do not decrease Frequency below that used at home.     0-3 - enter or revise RT bronchodilator order(s) to equivalent RT Bronchodilator order with Frequency of every 4 hours PRN for wheezing or increased work of breathing using Per Protocol order mode. 4-6 - enter or revise RT Bronchodilator order(s) to two equivalent RT bronchodilator orders with one order with BID Frequency and one order with Frequency of every 4 hours PRN wheezing or increased work of breathing using Per Protocol order mode. 7-10 - enter or revise RT Bronchodilator order(s) to two equivalent RT bronchodilator orders with one order with TID Frequency and one order with Frequency of every 4 hours PRN wheezing or increased work of breathing using Per Protocol order mode. 11-13 - enter or revise RT Bronchodilator order(s) to one equivalent RT bronchodilator order with QID Frequency and an Albuterol order with Frequency of every 4 hours PRN wheezing or increased work of breathing using Per Protocol order mode. Greater than 13 - enter or revise RT Bronchodilator order(s) to one equivalent RT bronchodilator order with every 4 hours Frequency and an Albuterol order with Frequency of every 2 hours PRN wheezing or increased work of breathing using Per Protocol order mode. RT to enter RT Home Evaluation for COPD & MDI Assessment order using Per Protocol order mode.     Electronically signed by Yoav Rojas RCP on 1/2/2023 at 3:42 AM

## 2023-01-02 NOTE — RT PROTOCOL NOTE
RT Inhaler-Nebulizer Bronchodilator Protocol Note    There is a bronchodilator order in the chart from a provider indicating to follow the RT Bronchodilator Protocol and there is an Initiate RT Inhaler-Nebulizer Bronchodilator Protocol order as well (see protocol at bottom of note). CXR Findings:  XR CHEST PORTABLE    Result Date: 12/31/2022  Minimal left basilar atelectasis or scarring. Changes consistent with COPD. The findings from the last RT Protocol Assessment were as follows:   History Pulmonary Disease: Chronic pulmonary disease  Respiratory Pattern: Dyspnea on exertion or RR 21-25 bpm  Breath Sounds: Intermittent or unilateral wheezes  Cough: Strong, productive  Indication for Bronchodilator Therapy: Decreased or absent breath sounds  Bronchodilator Assessment Score: 9    Aerosolized bronchodilator medication orders have been revised according to the RT Inhaler-Nebulizer Bronchodilator Protocol below. Respiratory Therapist to perform RT Therapy Protocol Assessment initially then follow the protocol. Repeat RT Therapy Protocol Assessment PRN for score 0-3 or on second treatment, BID, and PRN for scores above 3. No Indications - adjust the frequency to every 6 hours PRN wheezing or bronchospasm, if no treatments needed after 48 hours then discontinue using Per Protocol order mode. If indication present, adjust the RT bronchodilator orders based on the Bronchodilator Assessment Score as indicated below. Use Inhaler orders unless patient has one or more of the following: on home nebulizer, not able to hold breath for 10 seconds, is not alert and oriented, cannot activate and use MDI correctly, or respiratory rate 25 breaths per minute or more, then use the equivalent nebulizer order(s) with same Frequency and PRN reasons based on the score. If a patient is on this medication at home then do not decrease Frequency below that used at home.     0-3 - enter or revise RT bronchodilator order(s) to equivalent RT Bronchodilator order with Frequency of every 4 hours PRN for wheezing or increased work of breathing using Per Protocol order mode. 4-6 - enter or revise RT Bronchodilator order(s) to two equivalent RT bronchodilator orders with one order with BID Frequency and one order with Frequency of every 4 hours PRN wheezing or increased work of breathing using Per Protocol order mode. 7-10 - enter or revise RT Bronchodilator order(s) to two equivalent RT bronchodilator orders with one order with TID Frequency and one order with Frequency of every 4 hours PRN wheezing or increased work of breathing using Per Protocol order mode. 11-13 - enter or revise RT Bronchodilator order(s) to one equivalent RT bronchodilator order with QID Frequency and an Albuterol order with Frequency of every 4 hours PRN wheezing or increased work of breathing using Per Protocol order mode. Greater than 13 - enter or revise RT Bronchodilator order(s) to one equivalent RT bronchodilator order with every 4 hours Frequency and an Albuterol order with Frequency of every 2 hours PRN wheezing or increased work of breathing using Per Protocol order mode. RT to enter RT Home Evaluation for COPD & MDI Assessment order using Per Protocol order mode.     Electronically signed by Nagi Weldon RCP on 1/1/2023 at 9:36 PM

## 2023-01-02 NOTE — PROGRESS NOTES
Assessment complete. VSS. Patient sitting in bed. Respirations labored, wheezing present. Call light in reach. Fall precautions in place. No needs expressed at this time.

## 2023-01-03 PROCEDURE — 87205 SMEAR GRAM STAIN: CPT

## 2023-01-03 PROCEDURE — 6360000002 HC RX W HCPCS: Performed by: INTERNAL MEDICINE

## 2023-01-03 PROCEDURE — 6370000000 HC RX 637 (ALT 250 FOR IP): Performed by: INTERNAL MEDICINE

## 2023-01-03 PROCEDURE — 94640 AIRWAY INHALATION TREATMENT: CPT

## 2023-01-03 PROCEDURE — 6370000000 HC RX 637 (ALT 250 FOR IP): Performed by: NURSE PRACTITIONER

## 2023-01-03 PROCEDURE — 6370000000 HC RX 637 (ALT 250 FOR IP): Performed by: PHYSICIAN ASSISTANT

## 2023-01-03 PROCEDURE — 87070 CULTURE OTHR SPECIMN AEROBIC: CPT

## 2023-01-03 PROCEDURE — 1200000000 HC SEMI PRIVATE

## 2023-01-03 PROCEDURE — 99223 1ST HOSP IP/OBS HIGH 75: CPT | Performed by: INTERNAL MEDICINE

## 2023-01-03 PROCEDURE — 2580000003 HC RX 258: Performed by: INTERNAL MEDICINE

## 2023-01-03 PROCEDURE — 2700000000 HC OXYGEN THERAPY PER DAY

## 2023-01-03 PROCEDURE — 94761 N-INVAS EAR/PLS OXIMETRY MLT: CPT

## 2023-01-03 RX ORDER — METHYLPREDNISOLONE SODIUM SUCCINATE 40 MG/ML
40 INJECTION, POWDER, LYOPHILIZED, FOR SOLUTION INTRAMUSCULAR; INTRAVENOUS EVERY 8 HOURS
Status: DISCONTINUED | OUTPATIENT
Start: 2023-01-03 | End: 2023-01-04

## 2023-01-03 RX ORDER — ARFORMOTEROL TARTRATE 15 UG/2ML
15 SOLUTION RESPIRATORY (INHALATION) 2 TIMES DAILY
Status: DISCONTINUED | OUTPATIENT
Start: 2023-01-03 | End: 2023-01-06 | Stop reason: HOSPADM

## 2023-01-03 RX ORDER — BUDESONIDE 0.5 MG/2ML
0.5 INHALANT ORAL 2 TIMES DAILY
Status: DISCONTINUED | OUTPATIENT
Start: 2023-01-03 | End: 2023-01-06 | Stop reason: HOSPADM

## 2023-01-03 RX ADMIN — IPRATROPIUM BROMIDE AND ALBUTEROL SULFATE 1 AMPULE: .5; 3 SOLUTION RESPIRATORY (INHALATION) at 01:07

## 2023-01-03 RX ADMIN — Medication 10 ML: at 20:21

## 2023-01-03 RX ADMIN — METHADONE HYDROCHLORIDE 60 MG: 10 TABLET ORAL at 08:16

## 2023-01-03 RX ADMIN — BUDESONIDE 500 MCG: 0.5 SUSPENSION RESPIRATORY (INHALATION) at 19:54

## 2023-01-03 RX ADMIN — METHYLPREDNISOLONE SODIUM SUCCINATE 40 MG: 40 INJECTION, POWDER, FOR SOLUTION INTRAMUSCULAR; INTRAVENOUS at 11:42

## 2023-01-03 RX ADMIN — GUAIFENESIN 1200 MG: 600 TABLET, EXTENDED RELEASE ORAL at 08:15

## 2023-01-03 RX ADMIN — IPRATROPIUM BROMIDE AND ALBUTEROL SULFATE 1 AMPULE: .5; 3 SOLUTION RESPIRATORY (INHALATION) at 08:28

## 2023-01-03 RX ADMIN — IPRATROPIUM BROMIDE AND ALBUTEROL SULFATE 1 AMPULE: .5; 3 SOLUTION RESPIRATORY (INHALATION) at 12:16

## 2023-01-03 RX ADMIN — ENOXAPARIN SODIUM 40 MG: 100 INJECTION SUBCUTANEOUS at 08:19

## 2023-01-03 RX ADMIN — IPRATROPIUM BROMIDE AND ALBUTEROL SULFATE 1 AMPULE: .5; 3 SOLUTION RESPIRATORY (INHALATION) at 04:39

## 2023-01-03 RX ADMIN — IPRATROPIUM BROMIDE AND ALBUTEROL SULFATE 1 AMPULE: .5; 3 SOLUTION RESPIRATORY (INHALATION) at 23:38

## 2023-01-03 RX ADMIN — GUAIFENESIN 1200 MG: 600 TABLET, EXTENDED RELEASE ORAL at 20:19

## 2023-01-03 RX ADMIN — IPRATROPIUM BROMIDE AND ALBUTEROL SULFATE 1 AMPULE: .5; 3 SOLUTION RESPIRATORY (INHALATION) at 16:03

## 2023-01-03 RX ADMIN — Medication 2 PUFF: at 08:28

## 2023-01-03 RX ADMIN — METHYLPREDNISOLONE SODIUM SUCCINATE 40 MG: 40 INJECTION, POWDER, FOR SOLUTION INTRAMUSCULAR; INTRAVENOUS at 20:19

## 2023-01-03 RX ADMIN — ARFORMOTEROL TARTRATE 15 MCG: 15 SOLUTION RESPIRATORY (INHALATION) at 19:54

## 2023-01-03 RX ADMIN — PREDNISONE 40 MG: 20 TABLET ORAL at 08:14

## 2023-01-03 RX ADMIN — ACETAMINOPHEN 650 MG: 325 TABLET ORAL at 20:20

## 2023-01-03 RX ADMIN — IPRATROPIUM BROMIDE AND ALBUTEROL SULFATE 1 AMPULE: .5; 3 SOLUTION RESPIRATORY (INHALATION) at 19:54

## 2023-01-03 ASSESSMENT — PAIN SCALES - GENERAL: PAINLEVEL_OUTOF10: 0

## 2023-01-03 ASSESSMENT — PAIN DESCRIPTION - ORIENTATION: ORIENTATION: RIGHT;LEFT

## 2023-01-03 ASSESSMENT — PAIN DESCRIPTION - LOCATION: LOCATION: HIP

## 2023-01-03 NOTE — CARE COORDINATION
Discharge Planning Note:    Chart reviewed and it appears that patient has minimal needs for discharge at this time. Discussed with patients nurse and requested that case management be notified if discharge needs are identified.     - Current discharge plan is for the patient to return home. - Patient currently has Home O2 through [facility]. Baseline O2 requirement is 1L at night. Case management will continue to follow progress and update discharge plan as needed.       Risk of Readmission Score: 11%    BRITTNEY GomezN RN    [facility]  Phone: [phone]

## 2023-01-03 NOTE — PROGRESS NOTES
8:44 AM  Morning assessment complete and am meds given. Patient with a productive cough and lots of wheezing. She reports shortness of breath on excerption. She is currently on 3.5 liters of oxygen. Respiratory called for breathing treatment. The care plan and education has been reviewed and mutually agreed upon with the patient. 11:36 AM  Updated patient with plan of care and sputum culture sent to lab.

## 2023-01-03 NOTE — PLAN OF CARE
Problem: Discharge Planning  Goal: Discharge to home or other facility with appropriate resources  1/3/2023 0216 by Shakira Colón RN  Outcome: Progressing  Flowsheets (Taken 1/2/2023 2030)  Discharge to home or other facility with appropriate resources: Refer to discharge planning if patient needs post-hospital services based on physician order or complex needs related to functional status, cognitive ability or social support system  1/2/2023 1246 by Eva Church RN  Outcome: Progressing     Problem: ABCDS Injury Assessment  Goal: Absence of physical injury  1/3/2023 0216 by Shakira Colón RN  Outcome: Progressing  1/2/2023 1246 by Eva Church RN  Outcome: Progressing     Problem: Pain  Goal: Verbalizes/displays adequate comfort level or baseline comfort level  1/3/2023 0216 by Shakira Colón RN  Outcome: Progressing  1/2/2023 1246 by Eva Church RN  Outcome: Progressing     Problem: Respiratory - Adult  Goal: Achieves optimal ventilation and oxygenation  Outcome: Progressing

## 2023-01-03 NOTE — CONSULTS
PULMONARY AND CRITICAL CARE MEDICINE CONSULTATION NOTE    CONSULTING PHYSICIAN:  Thom Larkin MD    ADMISSION DATE: 12/31/2022  ADMISSION DIAGNOSIS: Acute exacerbation of chronic obstructive pulmonary disease (COPD) ([facility] [J44.1]  COPD exacerbation ([facility]) [J44.1]    REASON FOR CONSULT:   Chief Complaint   Patient presents with    Shortness of Breath     Pt recently discharged around [phone], came back due to still suffering from SOB, her meds have not helped, and presents tachypneic. DATE OF CONSULT: 12/31/2022    HISTORY OF PRESENT ILLNESS: 65y.o. year old female with a past medical history significant for COPD on nocturnal oxygen therapy, chronic active tobacco abuse, recent COVID-19 infection presented to the hospital with worsening shortness of breath with chest tightness. Patient is well-known to pulmonary practice as she follows with my colleague Dr. Bethany Trent. She is known to have COPD and has been on nocturnal oxygen support. In the previous admissions which seem to be in both hypoxic and hypercapnic respiratory failure. She was planned to get started on BiPAP therapy but she did not tolerate it well. Reports that for the last few days she has been having increased shortness of breath along with occasional wheezing and difficulty exhaling. She lives alone and she got very scared and came to the hospital.  Continues to smoke about 4 to 5 cigarettes a day. Working hard to quit. Recently had COVID-19 infection which led to COPD exacerbation. He uses oxygen at a flow 1 to 1.5 L a minute only at nighttime. At the time of interview, she was sitting in bed in mild respiratory distress. Reports that her breathing is improved but is not back to its baseline. Currently on 3.5 L/min of oxygen supplementation saturating in mid 90s. Started on Mucinex overnight and is now able to expectorate whitish to yellowish mucus. Denies any hemoptysis, fevers, night sweats.     REVIEW OF SYSTEMS: CONSTITUTIONAL SYMPTOMS: The patient denies fever, fatigue, night sweats, weight loss or weight gain. HEENT: No vision changes. No tinnitus, Denies sinus pain. No hoarseness, or dysphagia. NECK: Patient denies swelling in the neck. CARDIOVASCULAR: Denies chest pain, palpitation, syncope. RESPIRATORY: As per HPI. GASTROINTESTINAL: Denies nausea, abdominal pain or change in bowel function. GENITOURINARY: Denies obstructive symptoms. No history of incontinence. BREASTS: No masses or lumps in the breasts. SKIN: No rashes or itching. MUSCULOSKELETAL: Denies weakness or bone pain. NEUROLOGICAL: No headaches or seizures. PSYCHIATRIC: Denies mood swings or depression. ENDOCRINE: Denies heat or cold intolerance or excessive thirst.  HEMATOLOGIC/LYMPHATIC: Denies easy bruising or lymph node swelling. ALLERGIC/IMMUNOLOGIC: No environmental allergies. PAST MEDICAL HISTORY:   Past Medical History:   Diagnosis Date    Asthma     Pneumothorax        PAST SURGICAL HISTORY:   Past Surgical History:   Procedure Laterality Date    ABDOMEN SURGERY       SECTION      HYSTERECTOMY (CERVIX STATUS UNKNOWN)      INNER EAR SURGERY      MASTOIDECTOMY         SOCIAL HISTORY:   Social History     Tobacco Use    Smoking status: Every Day     Packs/day: 1.00     Years: 32.00     Pack years: 32.00     Types: Cigarettes    Smokeless tobacco: Never   Vaping Use    Vaping Use: Never used   Substance Use Topics    Alcohol use: No    Drug use: No       FAMILY HISTORY: History reviewed. No pertinent family history. MEDICATIONS:     No current facility-administered medications on file prior to encounter.      Current Outpatient Medications on File Prior to Encounter   Medication Sig Dispense Refill    predniSONE (DELTASONE) 20 MG tablet Take 2 tablets by mouth daily for 5 days 20 tablet 0    azithromycin (ZITHROMAX) 500 MG tablet Take 1 tablet by mouth daily for 3 days 3 tablet 0    mometasone-formoterol (DULERA) 200-5 MCG/ACT inhaler Inhale 2 puffs into the lungs in the morning and 2 puffs in the evening. 1 each 2    tiotropium (SPIRIVA HANDIHALER) 18 MCG inhalation capsule Inhale 1 capsule into the lungs daily (Patient not taking: Reported on 1/1/2023) 90 capsule 1    albuterol sulfate HFA (PROVENTIL HFA) 108 (90 Base) MCG/ACT inhaler Inhale 2-4 puffs into the lungs every 4 hours as needed for Wheezing or Shortness of Breath (Space out to every 6 hours as symptoms improve) Space out to every 6 hours as symptoms improve. 1 each 1    albuterol (PROVENTIL) (2.5 MG/3ML) 0.083% nebulizer solution Take 3 mLs by nebulization every 4 hours as needed for Wheezing 120 each 3    [DISCONTINUED] pantoprazole (PROTONIX) 40 MG tablet Take 1 tablet by mouth every morning (before breakfast) (Patient not taking: Reported on 9/6/2022) 30 tablet 3    METHADONE HCL PO Take 60 mg by mouth daily      [DISCONTINUED] ALBUTEROL IN Inhale into the lungs          methylPREDNISolone  40 mg IntraVENous Q8H    ipratropium-albuterol  1 ampule Inhalation Q4H    guaiFENesin  1,200 mg Oral BID    methadone  60 mg Oral Daily    mometasone-formoterol  2 puff Inhalation BID    sodium chloride flush  5-40 mL IntraVENous 2 times per day    enoxaparin  40 mg SubCUTAneous Daily    nicotine  1 patch TransDERmal Daily      sodium chloride       albuterol, sodium chloride flush, sodium chloride, ondansetron **OR** ondansetron, polyethylene glycol, acetaminophen **OR** acetaminophen      ALLERGIES:   Allergies as of 12/31/2022 - Fully Reviewed 12/31/2022   Allergen Reaction Noted    Penicillins Shortness Of Breath 08/07/2016    Codeine Nausea And Vomiting 11/08/2016      OBJECTIVE:   height is 5' 7\" (1.702 m) and weight is 159 lb (72.1 kg). Her oral temperature is 97.8 °F (36.6 °C). Her blood pressure is 165/90 (abnormal) and her pulse is 85. Her respiration is 20 and oxygen saturation is 95%. No intake/output data recorded.      PHYSICAL EXAM:    CONSTITUTIONAL: She is a 65y.o.-year-old who appears her stated age. She is alert and oriented x 3 and in no acute distress. HEENT: PERRLA, EOMI. No scleral icterus. No thrush, atraumatic, normocephalic. NECK: Supple, without cervical or supraclavicular lymphadenopathy:  CARDIOVASCULAR: S1 S2 RRR. Without murmer. No JVD or hepatojugular reflux seen. RESPIRATORY & CHEST: Bilateral prolonged exhalation heard. Bilateral scattered wheezing heard. GASTROINTESTINAL & ABDOMEN: Soft, nontender, positive bowel sounds in all quadrants, non-distended, without hepatosplenomegaly. GENITOURINARY: No gross anatomical abnormalities seen. MUSCULOSKELETAL: No tenderness to palpation of the axial skeleton. There is no clubbing. No cyanosis. No edema of the lower extremities. SKIN OF BODY: No rash or jaundice. PSYCHIATRIC EVALUATION: Normal affect. Patient answers questions appropriately. HEMATOLOGIC/LYMPHATIC/ IMMUNOLOGIC: No palpable lymphadenopathy. NEUROLOGIC: Alert and oriented x 3. Groslly non-focal. Motor strength is 5+/5 in all muscle groups. The patient has a normal sensorium globally. LABS:  Recent Labs     12/31/22  1613 01/01/23  0518   WBC 5.6 6.2   HGB 14.1 14.3   HCT 43.9 43.6    189   ALT 11  --    AST 13*  --     140   K 3.6 4.0   CL 99 102   CREATININE 0.7 0.7   BUN 8 11   CO2 32 27       Recent Labs     12/31/22  1613 01/01/23  0518   GLUCOSE 91 156*   CALCIUM 9.7 9.9    140   K 3.6 4.0   CO2 32 27   CL 99 102   BUN 8 11   CREATININE 0.7 0.7       No results for input(s): PHART, WVP6LIJ, PO2ART, LQN0EDW, A7QTNWFG, BEART, L1QJPZQZ in the last 72 hours.     No results found for: INR, PROTIME  No results found for: AMYLASE   Lab Results   Component Value Date    LABA1C 5.9 08/21/2022     Lab Results   Component Value Date    .6 08/21/2022     No results found for: TSH, O7KINHL, I4WXKXY, THYROIDAB, FT3, T4FREE  Lab Results   Component Value Date    CKTOTAL 217 (H) 09/06/2022    TROPONINI <0.01 12/31/2022      Lab Results   Component Value Date    CRP 17.2 (H) 09/07/2022      No results found for: BNP   Lab Results   Component Value Date    DDIMER 0.35 09/07/2022      No results found for: FERRITIN   Lab Results   Component Value Date    LACTA 0.8 09/06/2022           IMAGING:    Narrative   EXAMINATION:   ONE XRAY VIEW OF THE CHEST       12/31/2022 4:07 pm           FINDINGS:   Cardiac silhouette is normal.  There is mild thoracic aortic calcification. Hilar contours are normal.  There is mild streak like linear opacity in the   left lung base. No consolidation is identified. Costophrenic angles are   mildly irregular, suggestive of fibrosis. Lungs are hyperinflated. No   pneumothorax. Impression   Minimal left basilar atelectasis or scarring. Changes consistent with COPD. IMPRESSION:     COPD of unknown severity in acute exacerbation  Chronic active tobacco abuse  Acute on chronic hypoxic respiratory failure  Recent history of COVID-19 infection    RECOMMENDATION:     Patient has again presented to the hospital with shortness of breath, chest tightness and occasional wheezing. She has a history of COPD and has had frequent exacerbations. Recently also had COVID-19 infection which led to both COPD flareup as well as acute hypoxic/hypercapnic respiratory failure. Post hospital discharge she was doing well when she started having worsening shortness of breath. Most likely precipitated by restarting smoking. I personally reviewed the chest imaging done during this admission. Does not show any focal consolidation, pneumothorax, pleural effusions or pulm edema. Still having some shortness of breath. I will change her to Pulmicort and Brovana nebulization around-the-clock. Continue with DuoNeb nebulization as before. She should get IV Solu-Medrol for 1 more day. I will decrease the dosage to 40 mg every 8 hours today.   By tomorrow she can be changed to prednisone 40 mg p.o. daily and then slowly tapered off. Continue with Mucinex. Send sputum for culture. Encourage incentive spirometry. Titrate oxygen flow to maintain SPO2 between 88 to 92%. She will need home O2 eval prior to discharge. Thank you for your consultation. We will continue to follow the patient. Dusty Rosenthal MD  Pulmonary Critical Care and Sleep Medicine  12/31/2022, 11:22 AM    This note was completed using dragon medical speech recognition software. Grammatical errors, random word insertions, pronoun errors and incomplete sentences are occasional consequences of this technology due to software limitations. If there are questions or concerns about the content of this note of information contained within the body of this dictation they should be addressed with the provider for clarification.

## 2023-01-03 NOTE — PROGRESS NOTES
[facility] PROGRESS NOTE    1/3/2023 8:27 AM        Name: Lali Max . Admitted: 12/31/2022  Primary Care Provider: No primary care provider on file. (Tel: None)      Subjective:  . Admitted with COPD exacerbation  Continues to smoke  Does not have insurance or ability to pay for medications. On methadone due to previous opiate use       Reviewed interval ancillary notes    Current Medications  ipratropium-albuterol (DUONEB) nebulizer solution 1 ampule, Q4H  guaiFENesin (MUCINEX) extended release tablet 1,200 mg, BID  albuterol (PROVENTIL) nebulizer solution 2.5 mg, Q4H PRN  methadone (DOLOPHINE) tablet 60 mg, Daily  mometasone-formoterol (DULERA) 200-5 MCG/ACT inhaler 2 puff, BID  sodium chloride flush 0.9 % injection 5-40 mL, 2 times per day  sodium chloride flush 0.9 % injection 5-40 mL, PRN  0.9 % sodium chloride infusion, PRN  ondansetron (ZOFRAN-ODT) disintegrating tablet 4 mg, Q8H PRN   Or  ondansetron (ZOFRAN) injection 4 mg, Q6H PRN  polyethylene glycol (GLYCOLAX) packet 17 g, Daily PRN  enoxaparin (LOVENOX) injection 40 mg, Daily  acetaminophen (TYLENOL) tablet 650 mg, Q6H PRN   Or  acetaminophen (TYLENOL) suppository 650 mg, Q6H PRN  predniSONE (DELTASONE) tablet 40 mg, Daily  nicotine (NICODERM CQ) 21 MG/24HR 1 patch, Daily        Objective:  BP (!) 165/90   Pulse 85   Temp 97.8 °F (36.6 °C) (Oral)   Resp 20   Ht 5' 7\" (1.702 m)   Wt 159 lb (72.1 kg)   SpO2 96%   BMI 24.90 kg/m²     Intake/Output Summary (Last 24 hours) at 1/3/2023 0884  Last data filed at 1/2/2023 0353  Gross per 24 hour   Intake 360 ml   Output --   Net 360 ml      Wt Readings from Last 3 Encounters:   12/31/22 159 lb (72.1 kg)   12/31/22 159 lb (72.1 kg)   10/11/22 150 lb (68 kg)       General appearance:  Appears comfortable, alert and pleasant   Eyes: Sclera clear. Pupils equal.  ENT: Moist oral mucosa.  Trachea midline, no adenopathy. Cardiovascular: Regular rhythm, normal S1, S2. No murmur. No edema in lower extremities  Respiratory: Not using accessory muscles at rest, lungs with scattered wheezes all lung fields. GI: Abdomen soft, no tenderness, not distended, normal bowel sounds  Musculoskeletal: No cyanosis in digits, neck supple  Neurology: CN 2-12 grossly intact. No speech or motor deficits  Psych: Normal affect. Alert and oriented in time, place and person  Skin: Warm, dry, normal turgor    Labs and Tests:  CBC:   Recent Labs     01/01/23  0518   WBC 6.2   HGB 14.3        BMP:    Recent Labs     12/31/22  1613 01/01/23  0518    140   K 3.6 4.0   CL 99 102   CO2 32 27   BUN 8 11   CREATININE 0.7 0.7   GLUCOSE 91 156*     Hepatic:   Recent Labs     12/31/22  1613   AST 13*   ALT 11   BILITOT <0.2   ALKPHOS 70     Chest xray:  Impression   Minimal left basilar atelectasis or scarring. Changes consistent with COPD. Problem List  Principal Problem:    Acute exacerbation of chronic obstructive pulmonary disease (COPD) ([facility]  Active Problems:    Acute respiratory failure with hypoxia (HCC)  Resolved Problems:    * No resolved hospital problems. *       Assessment & Plan:   Acute hypoxic respiratory failure due to COPD exacerbation:  continue with IV solu medrol, nebs and supplemental oxygen. Will attempt to wean off. Tobacco use:  cessation encouraged  Continue daily methadone  IS and ambulation encouraged. She has met with financial counselor. Will get meds filled for free prior to her d/c home with family , anticipate she will need another 24 hours. Diet: ADULT DIET;  Regular  Code:Full Code  DVT PPX      MELISSA Beltrán CNP   1/3/2023 8:27 AM

## 2023-01-03 NOTE — CARE COORDINATION
Discharge Planning Note:    Chart reviewed and it appears that patient has minimal needs for discharge at this time. Please reach out to case management if any discharge needs are identified. Consult placed to financial counselor d/t self-pay listed     Case management will continue to follow progress and update discharge plan as needed.     Electronically signed by Santos Isaacs RN on 1/3/2023 at 8:29 AM

## 2023-01-03 NOTE — PLAN OF CARE
Problem: Discharge Planning  Goal: Discharge to home or other facility with appropriate resources  1/3/2023 0847 by Chinmay Pratt RN  Outcome: Progressing  1/3/2023 0216 by Mattie Mckeon RN  Outcome: Progressing  Flowsheets (Taken 1/2/2023 2030)  Discharge to home or other facility with appropriate resources: Refer to discharge planning if patient needs post-hospital services based on physician order or complex needs related to functional status, cognitive ability or social support system     Problem: ABCDS Injury Assessment  Goal: Absence of physical injury  1/3/2023 0847 by Chinmay Pratt RN  Outcome: Progressing  1/3/2023 0216 by Mattie Mckeon RN  Outcome: Progressing     Problem: Pain  Goal: Verbalizes/displays adequate comfort level or baseline comfort level  1/3/2023 0847 by Chinmay Pratt RN  Outcome: Progressing  1/3/2023 0216 by Mattie Mckeon RN  Outcome: Progressing     Problem: Respiratory - Adult  Goal: Achieves optimal ventilation and oxygenation  1/3/2023 0847 by Chinmay Pratt RN  Outcome: Progressing  1/3/2023 0216 by Mattie Mckeon RN  Outcome: Progressing     Problem: Discharge Planning  Goal: Discharge to home or other facility with appropriate resources  1/3/2023 0847 by Chinmay Pratt RN  Outcome: Progressing  1/3/2023 0216 by Mattie Mckeon RN  Outcome: Progressing  Flowsheets (Taken 1/2/2023 2030)  Discharge to home or other facility with appropriate resources: Refer to discharge planning if patient needs post-hospital services based on physician order or complex needs related to functional status, cognitive ability or social support system     Problem: ABCDS Injury Assessment  Goal: Absence of physical injury  1/3/2023 0847 by Chinmay Pratt RN  Outcome: Progressing  1/3/2023 0216 by Mattie Mckeon RN  Outcome: Progressing     Problem: Pain  Goal: Verbalizes/displays adequate comfort level or baseline comfort level  1/3/2023 0847 by Marissa Sen Perfecto Pickard RN  Outcome: Progressing  1/3/2023 0216 by Sudarshan Seth RN  Outcome: Progressing     Problem: Respiratory - Adult  Goal: Achieves optimal ventilation and oxygenation  1/3/2023 0847 by Sanket Ceja RN  Outcome: Progressing  1/3/2023 0216 by Sudarshan Seth RN  Outcome: Progressing

## 2023-01-04 PROCEDURE — 94761 N-INVAS EAR/PLS OXIMETRY MLT: CPT

## 2023-01-04 PROCEDURE — 99233 SBSQ HOSP IP/OBS HIGH 50: CPT | Performed by: INTERNAL MEDICINE

## 2023-01-04 PROCEDURE — 94669 MECHANICAL CHEST WALL OSCILL: CPT

## 2023-01-04 PROCEDURE — 1200000000 HC SEMI PRIVATE

## 2023-01-04 PROCEDURE — 6360000002 HC RX W HCPCS: Performed by: INTERNAL MEDICINE

## 2023-01-04 PROCEDURE — 6370000000 HC RX 637 (ALT 250 FOR IP): Performed by: NURSE PRACTITIONER

## 2023-01-04 PROCEDURE — 94680 O2 UPTK RST&XERS DIR SIMPLE: CPT

## 2023-01-04 PROCEDURE — 6370000000 HC RX 637 (ALT 250 FOR IP): Performed by: INTERNAL MEDICINE

## 2023-01-04 PROCEDURE — 6370000000 HC RX 637 (ALT 250 FOR IP): Performed by: PHYSICIAN ASSISTANT

## 2023-01-04 PROCEDURE — 2700000000 HC OXYGEN THERAPY PER DAY

## 2023-01-04 PROCEDURE — 94640 AIRWAY INHALATION TREATMENT: CPT

## 2023-01-04 RX ORDER — BUDESONIDE 0.5 MG/2ML
0.5 INHALANT ORAL 2 TIMES DAILY
Qty: 60 EACH | Refills: 3 | Status: SHIPPED | OUTPATIENT
Start: 2023-01-04

## 2023-01-04 RX ORDER — GUAIFENESIN 600 MG/1
1200 TABLET, EXTENDED RELEASE ORAL 2 TIMES DAILY
Qty: 120 TABLET | Refills: 0 | Status: SHIPPED | OUTPATIENT
Start: 2023-01-04

## 2023-01-04 RX ORDER — PREDNISONE 20 MG/1
40 TABLET ORAL DAILY
Status: DISCONTINUED | OUTPATIENT
Start: 2023-01-04 | End: 2023-01-05

## 2023-01-04 RX ORDER — IPRATROPIUM BROMIDE AND ALBUTEROL SULFATE 2.5; .5 MG/3ML; MG/3ML
3 SOLUTION RESPIRATORY (INHALATION) EVERY 4 HOURS
Qty: 360 ML | Refills: 3 | Status: SHIPPED | OUTPATIENT
Start: 2023-01-04

## 2023-01-04 RX ORDER — ARFORMOTEROL TARTRATE 15 UG/2ML
15 SOLUTION RESPIRATORY (INHALATION) 2 TIMES DAILY
Qty: 120 ML | Refills: 3 | Status: SHIPPED | OUTPATIENT
Start: 2023-01-04

## 2023-01-04 RX ORDER — PREDNISONE 10 MG/1
TABLET ORAL
Qty: 30 TABLET | Refills: 0 | Status: SHIPPED | OUTPATIENT
Start: 2023-01-04 | End: 2023-01-16

## 2023-01-04 RX ADMIN — IPRATROPIUM BROMIDE AND ALBUTEROL SULFATE 1 AMPULE: .5; 3 SOLUTION RESPIRATORY (INHALATION) at 20:13

## 2023-01-04 RX ADMIN — BUDESONIDE 500 MCG: 0.5 SUSPENSION RESPIRATORY (INHALATION) at 08:38

## 2023-01-04 RX ADMIN — ARFORMOTEROL TARTRATE 15 MCG: 15 SOLUTION RESPIRATORY (INHALATION) at 08:38

## 2023-01-04 RX ADMIN — GUAIFENESIN 1200 MG: 600 TABLET, EXTENDED RELEASE ORAL at 22:41

## 2023-01-04 RX ADMIN — GUAIFENESIN 1200 MG: 600 TABLET, EXTENDED RELEASE ORAL at 08:32

## 2023-01-04 RX ADMIN — ACETAMINOPHEN 650 MG: 325 TABLET ORAL at 22:45

## 2023-01-04 RX ADMIN — METHYLPREDNISOLONE SODIUM SUCCINATE 40 MG: 40 INJECTION, POWDER, FOR SOLUTION INTRAMUSCULAR; INTRAVENOUS at 04:38

## 2023-01-04 RX ADMIN — IPRATROPIUM BROMIDE AND ALBUTEROL SULFATE 1 AMPULE: .5; 3 SOLUTION RESPIRATORY (INHALATION) at 03:51

## 2023-01-04 RX ADMIN — IPRATROPIUM BROMIDE AND ALBUTEROL SULFATE 1 AMPULE: .5; 3 SOLUTION RESPIRATORY (INHALATION) at 08:38

## 2023-01-04 RX ADMIN — ARFORMOTEROL TARTRATE 15 MCG: 15 SOLUTION RESPIRATORY (INHALATION) at 20:25

## 2023-01-04 RX ADMIN — BUDESONIDE 500 MCG: 0.5 SUSPENSION RESPIRATORY (INHALATION) at 20:14

## 2023-01-04 RX ADMIN — METHADONE HYDROCHLORIDE 60 MG: 10 TABLET ORAL at 08:32

## 2023-01-04 RX ADMIN — ENOXAPARIN SODIUM 40 MG: 100 INJECTION SUBCUTANEOUS at 08:32

## 2023-01-04 RX ADMIN — IPRATROPIUM BROMIDE AND ALBUTEROL SULFATE 1 AMPULE: .5; 3 SOLUTION RESPIRATORY (INHALATION) at 23:47

## 2023-01-04 RX ADMIN — IPRATROPIUM BROMIDE AND ALBUTEROL SULFATE 1 AMPULE: .5; 3 SOLUTION RESPIRATORY (INHALATION) at 11:47

## 2023-01-04 RX ADMIN — PREDNISONE 40 MG: 20 TABLET ORAL at 08:32

## 2023-01-04 RX ADMIN — IPRATROPIUM BROMIDE AND ALBUTEROL SULFATE 1 AMPULE: .5; 3 SOLUTION RESPIRATORY (INHALATION) at 17:32

## 2023-01-04 ASSESSMENT — PAIN SCALES - GENERAL
PAINLEVEL_OUTOF10: 4
PAINLEVEL_OUTOF10: 0

## 2023-01-04 ASSESSMENT — PAIN DESCRIPTION - LOCATION: LOCATION: HIP

## 2023-01-04 NOTE — DISCHARGE INSTR - COC
Continuity of Care Form    Patient Name: Cesar Onofre   :  1965  MRN:  [id number]    Admit date:  2022  Discharge date:  ***    Code Status Order: Full Code   Advance Directives:     Admitting Physician:  No admitting provider for patient encounter. PCP: No primary care provider on file.     Discharging Nurse: [facility] Unit/Room#: 7FC-6251/7390-96  Discharging Unit Phone Number: ***    Emergency Contact:   Extended Emergency Contact Information  Primary Emergency Contact: Rajendra Rich Phone: [phone]  Mobile Phone: [phone]  Relation: Child  Secondary Emergency Contact: [address] [place] [address] Avenue  Mobile Phone: [phone]  Relation: Child    Past Surgical History:  Past Surgical History:   Procedure Laterality Date    ABDOMEN SURGERY       SECTION      HYSTERECTOMY (CERVIX STATUS UNKNOWN)      INNER EAR SURGERY      MASTOIDECTOMY         Immunization History:   Immunization History   Administered Date(s) Administered    COVID-19, PFIZER PURPLE top, DILUTE for use, (age 15 y+), 30mcg/0.3mL 2021, 2021, 2021    Hepatitis A Adult (Havrix, Vaqta) 2019       Active Problems:  Patient Active Problem List   Diagnosis Code    COVID-19 virus infection U07.1    COPD exacerbation ([address] J44.1    Acute respiratory failure with hypoxia ([address] J96.01    Pain syndrome, chronic G89.4    Acute respiratory failure with hypoxia and hypercapnia (HCC) J96.01, J96.02    Acute exacerbation of chronic obstructive pulmonary disease (COPD) ([facility] J44.1       Isolation/Infection:   Isolation            No Isolation          Patient Infection Status       Infection Onset Added Last Indicated Last Indicated By Review Planned Expiration Resolved Resolved By    None active    Resolved    COVID-19 (Rule Out) 22 COVID-19, Rapid (Ordered)   22 Rule-Out Test Resulted    COVID-19 22 COVID-19 & Influenza Combo   22  COVID-19 (Rule Out) 22 COVID-19 & Influenza Combo (Ordered)   22 Rule-Out Test Resulted    COVID-19 22 COVID-19, Rapid   22     COVID-19 (Rule Out) 22 COVID-19, Rapid (Ordered)   22 Rule-Out Test Resulted            Nurse Assessment:  Last Vital Signs: /61   Pulse 99   Temp 98.4 °F (36.9 °C) (Oral)   Resp 18   Ht 5' 7\" (1.702 m)   Wt 159 lb (72.1 kg)   SpO2 90%   BMI 24.90 kg/m²     Last documented pain score (0-10 scale): Pain Level: 0  Last Weight:   Wt Readings from Last 1 Encounters:   22 159 lb (72.1 kg)     Mental Status:  {IP PT MENTAL STATUS:61570}    IV Access:  { ANU IV ACCESS:885947759}    Nursing Mobility/ADLs:  Walking   {CHP DME ZVTA:140675164}  Transfer  {CHP DME KEVL:374624611}  Bathing  {CHP DME YIBD:961970987}  Dressing  {CHP DME CAJR:180592224}  Toileting  {CHP DME IYPS:922105705}  Feeding  {CHP DME GGAW:337593293}  Med Admin  {P DME XVVH:237738545}  Med Delivery   {[facility] [place] [facility] to the [facility] MED Delivery:488996275}    Wound Care Documentation and Therapy:        Elimination:  Continence: Bowel: {YES / XQ:02053}  Bladder: {YES / EZ:92007}  Urinary Catheter: {Urinary Catheter:290024374}   Colostomy/Ileostomy/Ileal Conduit: {YES / JK:61907}       Date of Last BM: ***  No intake or output data in the 24 hours ending 23 1053  No intake/output data recorded.     Safety Concerns:     [address] [employer] Safety Concerns:420320714}    Impairments/Disabilities:      [address] [employer] Impairments/Disabilities:898687906}    Nutrition Therapy:  Current Nutrition Therapy:   [employer] Diet List:484372512}    Routes of Feeding: {CHP DME Other Feedings:633125301}  Liquids: {Slp liquid thickness:33490}  Daily Fluid Restriction: {CHP DME Yes amt example:224197320}  Last Modified Barium Swallow with Video (Video Swallowing Test): {Done Not Done BGNA:697517662}    Treatments at the Time of Hospital Discharge:   Respiratory Treatments: ***  Oxygen Therapy:  {Therapy; copd oxygen:02658}  Ventilator:    {MH CC Vent TTDV:180102591}    Rehab Therapies: {THERAPEUTIC INTERVENTION:5169487882}  Weight Bearing Status/Restrictions: [address] CC Weight Bearin}  Other Medical Equipment (for information only, NOT a DME order):  {EQUIPMENT:636138660}  Other Treatments: ***    Patient's personal belongings (please select all that are sent with patient):  {CHP DME Belongings:829819658}    RN SIGNATURE:  {Esignature:504027011}    CASE MANAGEMENT/SOCIAL WORK SECTION    Inpatient Status Date:     Readmission Risk Assessment Score: 10  Readmission Risk              Risk of Unplanned Readmission:  15             The [address]  [address] [facility]  Phone: [phone]    Date:  2023  Time:  1:15pm - arrive 20 min early, bring ID  With:    Dr. Bertrand Hale    ** please call clinic if unable to make this appointment or to reschedule**    / signature: Electronically signed by Skip Dubois RN on 23 at 10:54 AM EST    PHYSICIAN SECTION    Prognosis: Fair    Condition at Discharge: Stable    Rehab Potential (if transferring to Rehab): Good    Recommended Labs or Other Treatments After Discharge: home oxygen therapy     Physician Certification: I certify the above information and transfer of Cynthia Payment  is necessary for the continuing treatment of the diagnosis listed and that she requires Home Care for greater 30 days.      Update Admission H&P: No change in H&P    PHYSICIAN SIGNATURE:  Electronically signed by MELISSA Warren CNP on 23 at 3:44 PM EST

## 2023-01-04 NOTE — PLAN OF CARE
Problem: Discharge Planning  Goal: Discharge to home or other facility with appropriate resources  1/4/2023 0843 by Mercedes Lau RN  Outcome: Progressing  1/4/2023 0035 by Cam Herrera RN  Outcome: Progressing  Flowsheets (Taken 1/3/2023 2015)  Discharge to home or other facility with appropriate resources: Identify barriers to discharge with patient and caregiver     Problem: ABCDS Injury Assessment  Goal: Absence of physical injury  1/4/2023 0843 by Mercedes Lau RN  Outcome: Progressing  1/4/2023 0035 by Cam Herrera RN  Outcome: Progressing     Problem: Pain  Goal: Verbalizes/displays adequate comfort level or baseline comfort level  1/4/2023 0843 by Mercedes Lau RN  Outcome: Progressing  1/4/2023 0035 by Cam Herrera RN  Outcome: Progressing     Problem: Respiratory - Adult  Goal: Achieves optimal ventilation and oxygenation  1/4/2023 0843 by Mercedes Lau RN  Outcome: Progressing  1/4/2023 0035 by Cam Herrera RN  Outcome: Progressing  Flowsheets (Taken 1/3/2023 2015)  Achieves optimal ventilation and oxygenation: Assess for changes in respiratory status     Problem: Discharge Planning  Goal: Discharge to home or other facility with appropriate resources  1/4/2023 0843 by Mercedes Lau RN  Outcome: Progressing  1/4/2023 0035 by Cam Herrera RN  Outcome: Progressing  Flowsheets (Taken 1/3/2023 2015)  Discharge to home or other facility with appropriate resources: Identify barriers to discharge with patient and caregiver     Problem: ABCDS Injury Assessment  Goal: Absence of physical injury  1/4/2023 0843 by Mercedes Lau RN  Outcome: Progressing  1/4/2023 0035 by Cam Herrera RN  Outcome: Progressing     Problem: Pain  Goal: Verbalizes/displays adequate comfort level or baseline comfort level  1/4/2023 0843 by Mercedes Lau RN  Outcome: Progressing  1/4/2023 0035 by Cam Herrera RN  Outcome: Progressing     Problem: Respiratory - Adult  Goal: Achieves optimal ventilation and oxygenation  1/4/2023 0843 by Ryan Quintana RN  Outcome: Progressing  1/4/2023 0035 by Abdias Moura RN  Outcome: Progressing  Flowsheets (Taken 1/3/2023 2015)  Achieves optimal ventilation and oxygenation: Assess for changes in respiratory status

## 2023-01-04 NOTE — PLAN OF CARE
Problem: Discharge Planning  Goal: Discharge to home or other facility with appropriate resources  Outcome: Progressing  Flowsheets (Taken 1/3/2023 2015)  Discharge to home or other facility with appropriate resources: Identify barriers to discharge with patient and caregiver     Problem: ABCDS Injury Assessment  Goal: Absence of physical injury  Outcome: Progressing     Problem: Pain  Goal: Verbalizes/displays adequate comfort level or baseline comfort level  Outcome: Progressing     Problem: Respiratory - Adult  Goal: Achieves optimal ventilation and oxygenation  Outcome: Progressing  Flowsheets (Taken 1/3/2023 2015)  Achieves optimal ventilation and oxygenation: Assess for changes in respiratory status

## 2023-01-04 NOTE — PROGRESS NOTES
8:39 AM  Morning assessment complete and am meds given. Patient sats 89-91% on 2 liters n/c. Still with wheezing, but seems improved from yesterday. Respiratory in to give breathing treatments.
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
## 2023-01-04 NOTE — CARE COORDINATION
Discharge Planning Note:    KALEE spoke to Khloe Allan at [facility] regarding O2 equipment,  Richard Alexandra reported that pt has a pull behind concentrator from a previous admission that she did not exchange for a regular home concentrator. KALEE discussed this with pt and advised that the pull behind unit will need to be brought to the hospital before DC and exchanged. Pt VU and reported that she was told that she would not leave till tomorrow because the pulmonologist said they wanted her to have 3 doses of a medication. CM confirmed pt's address listed on chart, pt plans to stay with a friend because she is worried about being alone after this most recent event. KALEE spoke to Alpa to try and clarify the DC plan, Jensen Goes also attempting to confirm DC plan with pulm. KALEE spoke with Khloe Allan again and provided update from discussion with pt. Khloe Allan will work on O2 needs. Pt will need and updated DME O2 order. KALEE messaged DANITA [facility]vel[facility] for DME O2 orders.      Electronically signed by Deepali Gongora RN on 1/4/2023 at 1:04 PM

## 2023-01-04 NOTE — PROGRESS NOTES
01/04/23 1221   Resting (Room Air)   SpO2 87   HR 88   Resting (On O2)   SpO2 92   HR 89   O2 Flow Rate (l/min) 2 l/min   During Walk (Room Air)   Comments ambulation not done   After Walk   Comments patient has oxygen at home   Does the Patient Qualify for Home O2 Yes   Liter Flow at Rest 2   Does the Patient Need Portable Oxygen Tanks Yes

## 2023-01-04 NOTE — PROGRESS NOTES
[facility] PROGRESS NOTE    1/4/2023 7:36 AM        Name: Cynthia Roberts . Admitted: 12/31/2022  Primary Care Provider: No primary care provider on file. (Tel: None)      Subjective:  . Seen this am after she was found shopping in the "[employer] shop  Reports congested productive cough and MEYER  Metanebs added per pulmonary     Admitted with COPD exacerbation  Continues to smoke  Does not have insurance or ability to pay for medications.   On methadone due to previous opiate use       Reviewed interval ancillary notes    Current Medications  predniSONE (DELTASONE) tablet 40 mg, Daily  budesonide (PULMICORT) nebulizer suspension 500 mcg, BID  Arformoterol Tartrate (BROVANA) nebulizer solution 15 mcg, BID  ipratropium-albuterol (DUONEB) nebulizer solution 1 ampule, Q4H  guaiFENesin (MUCINEX) extended release tablet 1,200 mg, BID  albuterol (PROVENTIL) nebulizer solution 2.5 mg, Q4H PRN  methadone (DOLOPHINE) tablet 60 mg, Daily  sodium chloride flush 0.9 % injection 5-40 mL, 2 times per day  sodium chloride flush 0.9 % injection 5-40 mL, PRN  0.9 % sodium chloride infusion, PRN  ondansetron (ZOFRAN-ODT) disintegrating tablet 4 mg, Q8H PRN   Or  ondansetron (ZOFRAN) injection 4 mg, Q6H PRN  polyethylene glycol (GLYCOLAX) packet 17 g, Daily PRN  enoxaparin (LOVENOX) injection 40 mg, Daily  acetaminophen (TYLENOL) tablet 650 mg, Q6H PRN   Or  acetaminophen (TYLENOL) suppository 650 mg, Q6H PRN  nicotine (NICODERM CQ) 21 MG/24HR 1 patch, Daily      Objective:  /61   Pulse 73   Temp 98.1 °F (36.7 °C) (Oral)   Resp 16   Ht 5' 7\" (1.702 m)   Wt 159 lb (72.1 kg)   SpO2 95%   BMI 24.90 kg/m²   No intake or output data in the 24 hours ending 01/04/23 0736     Wt Readings from Last 3 Encounters:   12/31/22 159 lb (72.1 kg)   12/31/22 159 lb (72.1 kg)   10/11/22 150 lb (68 kg)       General appearance:  Appears comfortable, alert and pleasant , she is MEYER  Eyes: Sclera clear. Pupils equal.  ENT: Moist oral mucosa. Trachea midline, no adenopathy. Cardiovascular: Regular rhythm, normal S1, S2. No murmur. No edema in lower extremities  Respiratory: Not using accessory muscles at rest, lungs with scattered wheezes all lung fields. Productive cough clear sputum noted   GI: Abdomen soft, no tenderness, not distended, normal bowel sounds  Musculoskeletal: No cyanosis in digits, neck supple  Neurology: CN 2-12 grossly intact. No speech or motor deficits  Psych: Normal affect. Alert and oriented in time, place and person  Skin: Warm, dry, normal turgor    Labs and Tests:  CBC:   No results for input(s): WBC, HGB, PLT in the last 72 hours. BMP:    No results for input(s): NA, K, CL, CO2, BUN, CREATININE, GLUCOSE in the last 72 hours. Hepatic:   No results for input(s): AST, ALT, ALB, BILITOT, ALKPHOS in the last 72 hours. Chest xray:  Impression   Minimal left basilar atelectasis or scarring. Changes consistent with COPD. Problem List  Principal Problem:    Acute exacerbation of chronic obstructive pulmonary disease (COPD) ([facility] [address]  Active Problems:    Acute respiratory failure with hypoxia ([facility])  Resolved Problems:    * No resolved hospital problems. *       Assessment & Plan:   Acute hypoxic respiratory failure due to COPD exacerbation:  continue with steroids, nebs and supplemental oxygen. Acapella and metanebs added per pulmonary. Tobacco use:  cessation encouraged  Continue daily methadone  IS and ambulation encouraged. She has met with financial counselor. Will get meds filled for free prior to her d/c home  I placed d/c order and sent meds to pharmacy. ..  pulmonary requests another day of hospital care. She does have existed home oxygen in place but uses only at hs. Will add de sat screen         Diet: ADULT DIET;  Regular  Code:Full Code  DVT PPX      MELISSA Wolff CNP   1/4/2023 7:36 AM

## 2023-01-04 NOTE — CARE COORDINATION
Discharge Planning Note:    Pt is anticipated to DC today pending home O2 evaluation. If home O2 is needed, updated orders to be sent to [facility]. Pt is active w/[facility]    Pt has no PCP and therefore scheduled to f/u with PCP at the clinic. The [address] [facility] [address]  [address] [facility]  Phone: [phone]    Date:  January 12, 2023  Time:  1:15pm - arrive 20 min early, bring ID  With:    Dr. Jay Starr appointment information has been placed on [address] [place] [address]    Electronically signed by Rajeev June RN on 1/4/2023 at 10:53 AM

## 2023-01-05 LAB
ANION GAP SERPL CALCULATED.3IONS-SCNC: 10 MMOL/L (ref 3–16)
BASE EXCESS ARTERIAL: 5.5 MMOL/L (ref -3–3)
BASOPHILS ABSOLUTE: 0 K/UL (ref 0–0.2)
BASOPHILS RELATIVE PERCENT: 0.3 %
BUN BLDV-MCNC: 15 MG/DL (ref 7–20)
CALCIUM SERPL-MCNC: 8.8 MG/DL (ref 8.3–10.6)
CARBOXYHEMOGLOBIN ARTERIAL: 1.6 % (ref 0–1.5)
CHLORIDE BLD-SCNC: 101 MMOL/L (ref 99–110)
CO2: 27 MMOL/L (ref 21–32)
CREAT SERPL-MCNC: 0.7 MG/DL (ref 0.6–1.1)
CULTURE, RESPIRATORY: NORMAL
EOSINOPHILS ABSOLUTE: 0 K/UL (ref 0–0.6)
EOSINOPHILS RELATIVE PERCENT: 0.5 %
GFR SERPL CREATININE-BSD FRML MDRD: >60 ML/MIN/{1.73_M2}
GLUCOSE BLD-MCNC: 120 MG/DL (ref 70–99)
GRAM STAIN RESULT: NORMAL
HCO3 ARTERIAL: 31.7 MMOL/L (ref 21–29)
HCT VFR BLD CALC: 45.1 % (ref 36–48)
HEMOGLOBIN, ART, EXTENDED: 14.6 G/DL (ref 12–16)
HEMOGLOBIN: 14.4 G/DL (ref 12–16)
LYMPHOCYTES ABSOLUTE: 1.3 K/UL (ref 1–5.1)
LYMPHOCYTES RELATIVE PERCENT: 14.7 %
MCH RBC QN AUTO: 28.5 PG (ref 26–34)
MCHC RBC AUTO-ENTMCNC: 32 G/DL (ref 31–36)
MCV RBC AUTO: 89.1 FL (ref 80–100)
METHEMOGLOBIN ARTERIAL: 0.3 %
MONOCYTES ABSOLUTE: 0.6 K/UL (ref 0–1.3)
MONOCYTES RELATIVE PERCENT: 6.7 %
NEUTROPHILS ABSOLUTE: 7 K/UL (ref 1.7–7.7)
NEUTROPHILS RELATIVE PERCENT: 77.8 %
O2 SAT, ARTERIAL: 96.1 %
O2 THERAPY: ABNORMAL
PCO2 ARTERIAL: 51 MMHG (ref 35–45)
PDW BLD-RTO: 13.4 % (ref 12.4–15.4)
PH ARTERIAL: 7.4 (ref 7.35–7.45)
PLATELET # BLD: 183 K/UL (ref 135–450)
PMV BLD AUTO: 8.5 FL (ref 5–10.5)
PO2 ARTERIAL: 71 MMHG (ref 75–108)
POTASSIUM SERPL-SCNC: 3.8 MMOL/L (ref 3.5–5.1)
PROCALCITONIN: 0.04 NG/ML (ref 0–0.15)
RBC # BLD: 5.06 M/UL (ref 4–5.2)
SODIUM BLD-SCNC: 138 MMOL/L (ref 136–145)
TCO2 ARTERIAL: 74.4 MMOL/L
WBC # BLD: 9 K/UL (ref 4–11)

## 2023-01-05 PROCEDURE — 6360000002 HC RX W HCPCS: Performed by: INTERNAL MEDICINE

## 2023-01-05 PROCEDURE — 99233 SBSQ HOSP IP/OBS HIGH 50: CPT | Performed by: INTERNAL MEDICINE

## 2023-01-05 PROCEDURE — 36415 COLL VENOUS BLD VENIPUNCTURE: CPT

## 2023-01-05 PROCEDURE — 6370000000 HC RX 637 (ALT 250 FOR IP): Performed by: INTERNAL MEDICINE

## 2023-01-05 PROCEDURE — 6370000000 HC RX 637 (ALT 250 FOR IP): Performed by: PHYSICIAN ASSISTANT

## 2023-01-05 PROCEDURE — 80048 BASIC METABOLIC PNL TOTAL CA: CPT

## 2023-01-05 PROCEDURE — 94761 N-INVAS EAR/PLS OXIMETRY MLT: CPT

## 2023-01-05 PROCEDURE — 84145 PROCALCITONIN (PCT): CPT

## 2023-01-05 PROCEDURE — 85025 COMPLETE CBC W/AUTO DIFF WBC: CPT

## 2023-01-05 PROCEDURE — 94669 MECHANICAL CHEST WALL OSCILL: CPT

## 2023-01-05 PROCEDURE — 2580000003 HC RX 258: Performed by: INTERNAL MEDICINE

## 2023-01-05 PROCEDURE — 36600 WITHDRAWAL OF ARTERIAL BLOOD: CPT

## 2023-01-05 PROCEDURE — 2700000000 HC OXYGEN THERAPY PER DAY

## 2023-01-05 PROCEDURE — 94640 AIRWAY INHALATION TREATMENT: CPT

## 2023-01-05 PROCEDURE — 6370000000 HC RX 637 (ALT 250 FOR IP): Performed by: NURSE PRACTITIONER

## 2023-01-05 PROCEDURE — 82803 BLOOD GASES ANY COMBINATION: CPT

## 2023-01-05 PROCEDURE — 1200000000 HC SEMI PRIVATE

## 2023-01-05 RX ORDER — IPRATROPIUM BROMIDE AND ALBUTEROL SULFATE 2.5; .5 MG/3ML; MG/3ML
1 SOLUTION RESPIRATORY (INHALATION) 4 TIMES DAILY
Status: DISCONTINUED | OUTPATIENT
Start: 2023-01-06 | End: 2023-01-06 | Stop reason: HOSPADM

## 2023-01-05 RX ORDER — METHYLPREDNISOLONE SODIUM SUCCINATE 40 MG/ML
40 INJECTION, POWDER, LYOPHILIZED, FOR SOLUTION INTRAMUSCULAR; INTRAVENOUS EVERY 12 HOURS
Status: DISCONTINUED | OUTPATIENT
Start: 2023-01-05 | End: 2023-01-06 | Stop reason: HOSPADM

## 2023-01-05 RX ADMIN — IPRATROPIUM BROMIDE AND ALBUTEROL SULFATE 1 AMPULE: .5; 3 SOLUTION RESPIRATORY (INHALATION) at 11:01

## 2023-01-05 RX ADMIN — ACETAMINOPHEN 650 MG: 325 TABLET ORAL at 21:37

## 2023-01-05 RX ADMIN — METHYLPREDNISOLONE SODIUM SUCCINATE 40 MG: 40 INJECTION, POWDER, FOR SOLUTION INTRAMUSCULAR; INTRAVENOUS at 21:03

## 2023-01-05 RX ADMIN — GUAIFENESIN 1200 MG: 600 TABLET, EXTENDED RELEASE ORAL at 21:03

## 2023-01-05 RX ADMIN — ENOXAPARIN SODIUM 40 MG: 100 INJECTION SUBCUTANEOUS at 09:40

## 2023-01-05 RX ADMIN — BUDESONIDE 500 MCG: 0.5 SUSPENSION RESPIRATORY (INHALATION) at 20:23

## 2023-01-05 RX ADMIN — IPRATROPIUM BROMIDE AND ALBUTEROL SULFATE 1 AMPULE: .5; 3 SOLUTION RESPIRATORY (INHALATION) at 08:29

## 2023-01-05 RX ADMIN — GUAIFENESIN 1200 MG: 600 TABLET, EXTENDED RELEASE ORAL at 09:40

## 2023-01-05 RX ADMIN — METHADONE HYDROCHLORIDE 60 MG: 10 TABLET ORAL at 09:39

## 2023-01-05 RX ADMIN — BUDESONIDE 500 MCG: 0.5 SUSPENSION RESPIRATORY (INHALATION) at 08:30

## 2023-01-05 RX ADMIN — Medication 10 ML: at 21:03

## 2023-01-05 RX ADMIN — IPRATROPIUM BROMIDE AND ALBUTEROL SULFATE 1 AMPULE: .5; 3 SOLUTION RESPIRATORY (INHALATION) at 14:51

## 2023-01-05 RX ADMIN — ARFORMOTEROL TARTRATE 15 MCG: 15 SOLUTION RESPIRATORY (INHALATION) at 20:23

## 2023-01-05 RX ADMIN — Medication 10 ML: at 09:41

## 2023-01-05 RX ADMIN — PREDNISONE 40 MG: 20 TABLET ORAL at 09:41

## 2023-01-05 RX ADMIN — IPRATROPIUM BROMIDE AND ALBUTEROL SULFATE 1 AMPULE: .5; 3 SOLUTION RESPIRATORY (INHALATION) at 20:23

## 2023-01-05 RX ADMIN — IPRATROPIUM BROMIDE AND ALBUTEROL SULFATE 1 AMPULE: .5; 3 SOLUTION RESPIRATORY (INHALATION) at 03:45

## 2023-01-05 ASSESSMENT — PAIN DESCRIPTION - DESCRIPTORS: DESCRIPTORS: ACHING

## 2023-01-05 ASSESSMENT — PAIN DESCRIPTION - LOCATION: LOCATION: GENERALIZED

## 2023-01-05 ASSESSMENT — PAIN SCALES - GENERAL
PAINLEVEL_OUTOF10: 4
PAINLEVEL_OUTOF10: 0

## 2023-01-05 ASSESSMENT — PAIN - FUNCTIONAL ASSESSMENT: PAIN_FUNCTIONAL_ASSESSMENT: ACTIVITIES ARE NOT PREVENTED

## 2023-01-05 NOTE — DISCHARGE SUMMARY
[facility]    Patient Demographics    Patient. Abel Overton  Date of Birth. 1965  MRN. [id number]     Primary care provider. No primary care provider on file. (Tel: None)    Admit date: 12/31/2022    Discharge date 1/6/2023  Note Date: 1/4/2023     Reason for Hospitalization. Chief Complaint   Patient presents with    Shortness of Breath     Pt recently discharged around [employer], came back due to still suffering from SOB, her meds have not helped, and presents tachypneic. Significant Findings. Principal Problem:    Acute exacerbation of chronic obstructive pulmonary disease (COPD) ([address]  Active Problems:    Acute respiratory failure with hypoxia ([facility])  Resolved Problems:    * No resolved hospital problems. *       Problems and results from this hospitalization that need follow up. Stop smoking   Follow up with pulmonary     Significant test results and incidental findings. Chest xray:    Minimal left basilar atelectasis or scarring. Changes consistent with COPD. Invasive procedures and treatments. [facility] Course. The patient is an active smoker who was admitted from home with increased shortness of breath and wheezing. She was admitted and followed by pulmonary. She was treated for  hypoxic respiratory failure due to COPD Exacerbation. She was treated with metanebs, steroids and supplemental oxygen. She was unable to be weaned off oxygen and was d/c home with 2 liters. ( She had been using 1 liter of oxygen at  only )     She did received her daily methadone during her stay. She met with financial counselor and her scripts were filled for free prior to her d/c home. She was encouraged to follow up with Pulmonary after discharge. Consults.   IP CONSULT TO PULMONOLOGY  IP CONSULT TO FINANCIAL COUNSELOR    Physical examination on discharge day. BP (!) 152/84   Pulse 88   Temp 97.9 °F (36.6 °C)   Resp 18   Ht 5' 7\" (1.702 m)   Wt 159 lb (72.1 kg)   SpO2 92%   BMI 24.90 kg/m²   General appearance. Alert. Looks comfortable but chronically ill  HEENT. Sclera clear. Moist mucus membranes. Cardiovascular. Regular rate and rhythm, normal S1, S2. No murmur. Respiratory. Not using accessory muscles. Clear to auscultation bilaterally, no wheeze. Gastrointestinal. Abdomen soft, non-tender, not distended, normal bowel sounds  Neurology. Facial symmetry. No speech deficits. Moving all extremities equally. Extremities. No edema in lower extremities. Skin. Warm, dry, normal turgor    Condition at time of discharge stable     Medication instructions provided to patient at discharge. Medication List        START taking these medications      arformoterol tartrate 15 MCG/2ML Nebu  Commonly known as: BROVANA  Take 2 mLs by nebulization in the morning and 2 mLs in the evening. budesonide 0.5 MG/2ML nebulizer suspension  Commonly known as: PULMICORT  Take 2 mLs by nebulization in the morning and 2 mLs in the evening. guaiFENesin 600 MG extended release tablet  Commonly known as: MUCINEX  Take 2 tablets by mouth 2 times daily     ipratropium-albuterol 0.5-2.5 (3) MG/3ML Soln nebulizer solution  Commonly known as: DUONEB  Inhale 3 mLs into the lungs every 4 hours            CHANGE how you take these medications      predniSONE 10 MG tablet  Commonly known as: DELTASONE  Take 4 tablets by mouth daily for 3 days, THEN 3 tablets daily for 3 days, THEN 2 tablets daily for 3 days, THEN 1 tablet daily for 3 days. Start taking on: January 4, 2023  What changed:   medication strength  See the new instructions.             CONTINUE taking these medications      * albuterol sulfate  (90 Base) MCG/ACT inhaler  Commonly known as: Proventil HFA  Inhale 2-4 puffs into the lungs every 4 hours as needed for Wheezing or Shortness of Breath (Space out to every 6 hours as symptoms improve) Space out to every 6 hours as symptoms improve. * albuterol (2.5 MG/3ML) 0.083% nebulizer solution  Commonly known as: PROVENTIL  Take 3 mLs by nebulization every 4 hours as needed for Wheezing     METHADONE HCL PO     mometasone-formoterol 200-5 MCG/ACT inhaler  Commonly known as: DULERA  Inhale 2 puffs into the lungs in the morning and 2 puffs in the evening. * This list has 2 medication(s) that are the same as other medications prescribed for you. Read the directions carefully, and ask your doctor or other care provider to review them with you. STOP taking these medications      ALBUTEROL IN     azithromycin 500 MG tablet  Commonly known as: ZITHROMAX     pantoprazole 40 MG tablet  Commonly known as: PROTONIX     Spiriva HandiHaler 18 MCG inhalation capsule  Generic drug: tiotropium               Where to Get Your Medications        These medications were sent to [facility], [address] [place] [address]      Phone: [phone]   arformoterol tartrate 15 MCG/2ML Nebu  budesonide 0.5 MG/2ML nebulizer suspension  guaiFENesin 600 MG extended release tablet  ipratropium-albuterol 0.5-2.5 (3) MG/3ML Soln nebulizer solution  predniSONE 10 MG tablet         Discharge recommendations given to patient. Follow Up. in 1 week   Disposition. home  Activity. activity as tolerated  Diet: ADULT DIET; Regular      Spent 35 minutes in discharge process.     Signed:  MELISSA Coughlin CNP     1/6/2023 8:10 AM

## 2023-01-05 NOTE — CARE COORDINATION
Discharge Planning Note:    KALEE spoke with Lalo Howell at [facility] regarding Home O2. Pt advised yesterday by this KALEE that pull-behind concentrator must be brought to hospital and returned to [facility]. Pt will be set up with appropriate equipment. Lalo Howell from [facility] to call pt to discuss the above and cost of home O2. Electronically signed by Sammie Ramon RN on 1/5/2023 at 10:50 AM      Geraldo    Whitney at [facility] was informed by patent that she would be staying another 2-3 days. KALEE called Tanika who confirmed that pulm team did not feel she was clinically ready today. KALEE updated Whitney at [facility].      When pt is ready for DC, will require the following:  Home O2 eval  DME O2 order  Call to [facility]  Electronically signed by Sammie Ramon RN on 1/5/2023 at 11:10 AM

## 2023-01-05 NOTE — PROGRESS NOTES
[facility] PROGRESS NOTE    1/5/2023 2:14 PM        Name: Mikki Downey . Admitted: 12/31/2022  Primary Care Provider: No primary care provider on file. (Tel: None)      Subjective:  . Admitted with COPD exacerbation  Continues to smoke  Seen this am while sleeping  Respirations easy with no wheezing    She is MEYER while up ,  rare wheezes noted     Does not have insurance or ability to pay for medications.   On methadone due to previous opiate use       Reviewed interval ancillary notes    Current Medications  methylPREDNISolone sodium (SOLU-MEDROL) injection 40 mg, Q12H  budesonide (PULMICORT) nebulizer suspension 500 mcg, BID  Arformoterol Tartrate (BROVANA) nebulizer solution 15 mcg, BID  ipratropium-albuterol (DUONEB) nebulizer solution 1 ampule, Q4H  guaiFENesin (MUCINEX) extended release tablet 1,200 mg, BID  albuterol (PROVENTIL) nebulizer solution 2.5 mg, Q4H PRN  methadone (DOLOPHINE) tablet 60 mg, Daily  sodium chloride flush 0.9 % injection 5-40 mL, 2 times per day  sodium chloride flush 0.9 % injection 5-40 mL, PRN  0.9 % sodium chloride infusion, PRN  ondansetron (ZOFRAN-ODT) disintegrating tablet 4 mg, Q8H PRN   Or  ondansetron (ZOFRAN) injection 4 mg, Q6H PRN  polyethylene glycol (GLYCOLAX) packet 17 g, Daily PRN  enoxaparin (LOVENOX) injection 40 mg, Daily  acetaminophen (TYLENOL) tablet 650 mg, Q6H PRN   Or  acetaminophen (TYLENOL) suppository 650 mg, Q6H PRN  nicotine (NICODERM CQ) 21 MG/24HR 1 patch, Daily      Objective:  /81   Pulse (!) 119   Temp 98 °F (36.7 °C) (Oral)   Resp 22   Ht 5' 7\" (1.702 m)   Wt 159 lb (72.1 kg)   SpO2 90%   BMI 24.90 kg/m²   No intake or output data in the 24 hours ending 01/05/23 1414     Wt Readings from Last 3 Encounters:   12/31/22 159 lb (72.1 kg)   12/31/22 159 lb (72.1 kg)   10/11/22 150 lb (68 kg)       General appearance:  Appears comfortable, alert and pleasant , she is MEYER  Eyes: Sclera clear. Pupils equal.  ENT: Moist oral mucosa. Trachea midline, no adenopathy. Cardiovascular: Regular rhythm, normal S1, S2. No murmur. No edema in lower extremities  Respiratory: Not using accessory muscles at rest, lungs with rare wheezes. GI: Abdomen soft, no tenderness, not distended, normal bowel sounds  Musculoskeletal: No cyanosis in digits, neck supple  Neurology: CN 2-12 grossly intact. No speech or motor deficits  Psych: Normal affect. Alert and oriented in time, place and person  Skin: Warm, dry, normal turgor    Labs and Tests:  CBC:   Recent Labs     01/05/23  1158   WBC 9.0   HGB 14.4          BMP:    Recent Labs     01/05/23  1158      K 3.8      CO2 27   BUN 15   CREATININE 0.7   GLUCOSE 120*       Hepatic:   No results for input(s): AST, ALT, ALB, BILITOT, ALKPHOS in the last 72 hours. Chest xray:  Impression   Minimal left basilar atelectasis or scarring. Changes consistent with COPD. Problem List  Principal Problem:    Acute exacerbation of chronic obstructive pulmonary disease (COPD) ([facility]  Active Problems:    Acute respiratory failure with hypoxia ([facility])  Resolved Problems:    * No resolved hospital problems. *       Assessment & Plan:   Acute hypoxic respiratory failure due to COPD exacerbation:  continue with steroids, nebs and supplemental oxygen. Acapella and metanebs added per pulmonary on Wednesday   Tobacco use:  cessation encouraged  Continue daily methadone  IS and ambulation encouraged. She has met with financial counselor. Will get meds filled for free prior to her d/c home  I placed d/c order and sent meds to pharmacy. ..  pulmonary requests another day of hospital care. I did discuss plan of care with pulmonary. Anticipate d/c home in the AM       Diet: ADULT DIET;  Regular  Code:Full Code  DVT PPX      MELISSA Haley CNP   1/5/2023 2:14 PM

## 2023-01-05 NOTE — PROGRESS NOTES
PULMONARY AND CRITICAL CARE MEDICINE PROGRESS NOTE    Subjective: Patient sitting in bed in no apparent respiratory distress. Reports that every time she walks she gets short of breath. Still having wheezing. Oxygen saturation drops when she walks. Is very tearful and reports that she is scared to go home as she lives alone. REVIEW OF SYSTEMS:     CONSTITUTIONAL SYMPTOMS: The patient denies fever, fatigue, night sweats, weight loss or weight gain. HEENT: No vision changes. No tinnitus, Denies sinus pain. No hoarseness, or dysphagia. NECK: Patient denies swelling in the neck. CARDIOVASCULAR: Denies chest pain, palpitation, syncope. RESPIRATORY: As per HPI. GASTROINTESTINAL: Denies nausea, abdominal pain or change in bowel function. GENITOURINARY: Denies obstructive symptoms. No history of incontinence. BREASTS: No masses or lumps in the breasts. SKIN: No rashes or itching. MUSCULOSKELETAL: Denies weakness or bone pain. NEUROLOGICAL: No headaches or seizures. PSYCHIATRIC: Denies mood swings or depression. ENDOCRINE: Denies heat or cold intolerance or excessive thirst.  HEMATOLOGIC/LYMPHATIC: Denies easy bruising or lymph node swelling. ALLERGIC/IMMUNOLOGIC: No environmental allergies. PAST MEDICAL HISTORY:   Past Medical History:   Diagnosis Date    Asthma     Pneumothorax        PAST SURGICAL HISTORY:   Past Surgical History:   Procedure Laterality Date    ABDOMEN SURGERY       SECTION      HYSTERECTOMY (CERVIX STATUS UNKNOWN)      INNER EAR SURGERY      MASTOIDECTOMY         SOCIAL HISTORY:   Social History     Tobacco Use    Smoking status: Every Day     Packs/day: 1.00     Years: 32.00     Pack years: 32.00     Types: Cigarettes    Smokeless tobacco: Never   Vaping Use    Vaping Use: Never used   Substance Use Topics    Alcohol use: No    Drug use: No       FAMILY HISTORY: History reviewed. No pertinent family history.     MEDICATIONS:     No current facility-administered medications on file prior to encounter. Current Outpatient Medications on File Prior to Encounter   Medication Sig Dispense Refill    mometasone-formoterol (DULERA) 200-5 MCG/ACT inhaler Inhale 2 puffs into the lungs in the morning and 2 puffs in the evening. 1 each 2    albuterol sulfate HFA (PROVENTIL HFA) 108 (90 Base) MCG/ACT inhaler Inhale 2-4 puffs into the lungs every 4 hours as needed for Wheezing or Shortness of Breath (Space out to every 6 hours as symptoms improve) Space out to every 6 hours as symptoms improve. 1 each 1    albuterol (PROVENTIL) (2.5 MG/3ML) 0.083% nebulizer solution Take 3 mLs by nebulization every 4 hours as needed for Wheezing 120 each 3    [DISCONTINUED] pantoprazole (PROTONIX) 40 MG tablet Take 1 tablet by mouth every morning (before breakfast) (Patient not taking: Reported on 9/6/2022) 30 tablet 3    METHADONE HCL PO Take 60 mg by mouth daily      [DISCONTINUED] ALBUTEROL IN Inhale into the lungs          methylPREDNISolone  40 mg IntraVENous Q12H    budesonide  0.5 mg Nebulization BID    arformoterol tartrate  15 mcg Nebulization BID    ipratropium-albuterol  1 ampule Inhalation Q4H    guaiFENesin  1,200 mg Oral BID    methadone  60 mg Oral Daily    sodium chloride flush  5-40 mL IntraVENous 2 times per day    enoxaparin  40 mg SubCUTAneous Daily    nicotine  1 patch TransDERmal Daily      sodium chloride       albuterol, sodium chloride flush, sodium chloride, ondansetron **OR** ondansetron, polyethylene glycol, acetaminophen **OR** acetaminophen      ALLERGIES:   Allergies as of 12/31/2022 - Fully Reviewed 12/31/2022   Allergen Reaction Noted    Penicillins Shortness Of Breath 08/07/2016    Codeine Nausea And Vomiting 11/08/2016      OBJECTIVE:   height is 5' 7\" (1.702 m) and weight is 159 lb (72.1 kg). Her oral temperature is 98 °F (36.7 °C). Her blood pressure is 138/81 and her pulse is 119 (abnormal).  Her respiration is 22 and oxygen saturation is 90%.   No intake/output data recorded. PHYSICAL EXAM:    CONSTITUTIONAL: She is a 65y.o.-year-old who appears her stated age. She is alert and oriented x 3 and in no acute distress. HEENT: PERRLA, EOMI. No scleral icterus. No thrush, atraumatic, normocephalic. NECK: Supple, without cervical or supraclavicular lymphadenopathy:  CARDIOVASCULAR: S1 S2 RRR. Without murmer. No JVD or hepatojugular reflux seen. RESPIRATORY & CHEST: Bilateral scattered wheezing heard. This has decreased in last few days. GASTROINTESTINAL & ABDOMEN: Soft, nontender, positive bowel sounds in all quadrants, non-distended, without hepatosplenomegaly. GENITOURINARY: No gross anatomical abnormalities seen. MUSCULOSKELETAL: No tenderness to palpation of the axial skeleton. There is no clubbing. No cyanosis. No edema of the lower extremities. SKIN OF BODY: No rash or jaundice. PSYCHIATRIC EVALUATION: Normal affect. Patient answers questions appropriately. HEMATOLOGIC/LYMPHATIC/ IMMUNOLOGIC: No palpable lymphadenopathy. NEUROLOGIC: Alert and oriented x 3. Groslly non-focal. Motor strength is 5+/5 in all muscle groups. The patient has a normal sensorium globally. LABS:  No results for input(s): WBC, HGB, HCT, PLT, CHOL, TRIG, HDL, LDLDIRECT, ALT, AST, NA, K, CL, CREATININE, BUN, CO2, TSH, PSA, INR, GLUF, MICROALBUR in the last 72 hours. Invalid input(s): HGBA1C      No results for input(s): GLUCOSE, CALCIUM, NA, K, CO2, CL, BUN, CREATININE in the last 72 hours. No results for input(s): PHART, BUT9XIQ, PO2ART, OSS2IRA, I5LGAHUC, BEART, A3VBRTEG in the last 72 hours.     No results found for: INR, PROTIME  No results found for: AMYLASE   Lab Results   Component Value Date    LABA1C 5.9 08/21/2022     Lab Results   Component Value Date    .6 08/21/2022     No results found for: TSH, W7ZRQHS, X7ZLFAB, THYROIDAB, FT3, T4FREE  Lab Results   Component Value Date    CKTOTAL 217 (H) 09/06/2022    TROPONINI <0.01 12/31/2022      Lab Results   Component Value Date    CRP 17.2 (H) 09/07/2022      No results found for: BNP   Lab Results   Component Value Date    DDIMER 0.35 09/07/2022      No results found for: FERRITIN   Lab Results   Component Value Date    LACTA 0.8 09/06/2022           IMAGING:    Narrative   EXAMINATION:   ONE XRAY VIEW OF THE CHEST       12/31/2022 4:07 pm           FINDINGS:   Cardiac silhouette is normal.  There is mild thoracic aortic calcification. Hilar contours are normal.  There is mild streak like linear opacity in the   left lung base. No consolidation is identified. Costophrenic angles are   mildly irregular, suggestive of fibrosis. Lungs are hyperinflated. No   pneumothorax. Impression   Minimal left basilar atelectasis or scarring. Changes consistent with COPD. IMPRESSION:     COPD of unknown severity in acute exacerbation  Chronic active tobacco abuse  Acute on chronic hypoxic respiratory failure  Recent history of COVID-19 infection    RECOMMENDATION:     Patient's respiratory status is slowly improving. As per the patient its not back to her baseline yet. Wheezing is decreased. Every time she walks she gets more short of breath and quickly desaturates. She has a history of COPD and has had frequent exacerbations. Recently also had COVID-19 infection which led to both COPD flareup as well as acute hypoxic/hypercapnic respiratory failure. This exacerbation most likely precipitated by ongoing smoking. Strongly urged the patient to quit smoking completely. I will give her Solu-Medrol again for 3 doses before changing it back to prednisone 40 mg p.o. daily. Can continue with Pulmicort, Brovana nebulization around-the-clock today. Also getting DuoNeb nebulization. At the time of discharge she can be reverted back to her home inhaler therapy. Continue with Acapella as well as MetaNeb therapy daily 8 hours  to help her expectorate.   No growth on cultures. She will need to continue using oxygen to maintain SPO2 between 88 to 92%. Home O2 eval prior to discharge. Hopefully she will be able to get discharged back home by tomorrow. Lencho Andrews MD  Pulmonary Critical Care and Sleep Medicine  12/31/2022, 12:06 PM    This note was completed using dragon medical speech recognition software. Grammatical errors, random word insertions, pronoun errors and incomplete sentences are occasional consequences of this technology due to software limitations. If there are questions or concerns about the content of this note of information contained within the body of this dictation they should be addressed with the provider for clarification.

## 2023-01-05 NOTE — PROGRESS NOTES
2230: Assessment complete, VSS, lungs diminished, pt SOB with ambulation, still non-productive cough, 95% on 2L NC, discussed care plan, pt mutually agrees, call light and belongings in reach, no other needs expressed at this time.   Carlos Arzola RN

## 2023-01-06 VITALS
TEMPERATURE: 98.6 F | HEIGHT: 67 IN | RESPIRATION RATE: 20 BRPM | DIASTOLIC BLOOD PRESSURE: 79 MMHG | BODY MASS INDEX: 24.96 KG/M2 | SYSTOLIC BLOOD PRESSURE: 132 MMHG | WEIGHT: 159 LBS | OXYGEN SATURATION: 92 % | HEART RATE: 92 BPM

## 2023-01-06 PROCEDURE — 94680 O2 UPTK RST&XERS DIR SIMPLE: CPT

## 2023-01-06 PROCEDURE — 6370000000 HC RX 637 (ALT 250 FOR IP): Performed by: INTERNAL MEDICINE

## 2023-01-06 PROCEDURE — 6370000000 HC RX 637 (ALT 250 FOR IP): Performed by: NURSE PRACTITIONER

## 2023-01-06 PROCEDURE — 6360000002 HC RX W HCPCS: Performed by: INTERNAL MEDICINE

## 2023-01-06 PROCEDURE — 94761 N-INVAS EAR/PLS OXIMETRY MLT: CPT

## 2023-01-06 PROCEDURE — 6370000000 HC RX 637 (ALT 250 FOR IP): Performed by: PHYSICIAN ASSISTANT

## 2023-01-06 PROCEDURE — 99233 SBSQ HOSP IP/OBS HIGH 50: CPT | Performed by: INTERNAL MEDICINE

## 2023-01-06 PROCEDURE — 2700000000 HC OXYGEN THERAPY PER DAY

## 2023-01-06 PROCEDURE — 94640 AIRWAY INHALATION TREATMENT: CPT

## 2023-01-06 PROCEDURE — 94669 MECHANICAL CHEST WALL OSCILL: CPT

## 2023-01-06 RX ORDER — ALBUTEROL SULFATE 90 UG/1
2-4 AEROSOL, METERED RESPIRATORY (INHALATION) EVERY 4 HOURS PRN
Qty: 1 EACH | Refills: 1 | Status: SHIPPED | OUTPATIENT
Start: 2023-01-06

## 2023-01-06 RX ADMIN — ARFORMOTEROL TARTRATE 15 MCG: 15 SOLUTION RESPIRATORY (INHALATION) at 08:43

## 2023-01-06 RX ADMIN — BUDESONIDE 500 MCG: 0.5 SUSPENSION RESPIRATORY (INHALATION) at 08:43

## 2023-01-06 RX ADMIN — GUAIFENESIN 1200 MG: 600 TABLET, EXTENDED RELEASE ORAL at 09:05

## 2023-01-06 RX ADMIN — METHADONE HYDROCHLORIDE 60 MG: 10 TABLET ORAL at 09:05

## 2023-01-06 RX ADMIN — ENOXAPARIN SODIUM 40 MG: 100 INJECTION SUBCUTANEOUS at 09:05

## 2023-01-06 RX ADMIN — IPRATROPIUM BROMIDE AND ALBUTEROL SULFATE 1 AMPULE: .5; 3 SOLUTION RESPIRATORY (INHALATION) at 15:45

## 2023-01-06 RX ADMIN — METHYLPREDNISOLONE SODIUM SUCCINATE 40 MG: 40 INJECTION, POWDER, FOR SOLUTION INTRAMUSCULAR; INTRAVENOUS at 09:05

## 2023-01-06 RX ADMIN — IPRATROPIUM BROMIDE AND ALBUTEROL SULFATE 1 AMPULE: .5; 3 SOLUTION RESPIRATORY (INHALATION) at 08:43

## 2023-01-06 NOTE — PROGRESS NOTES
01/06/23 1522   Resting (Room Air)   SpO2 91   HR 88   During Walk (Room Air)   SpO2 87   HR 99   Walk/Assistance Device Ambulation   Rate of Dyspnea 1   During Walk (On O2)   SpO2 93      O2 Device Nasal cannula   O2 Flow Rate (l/min) 2 l/min   Walk/Assistance Device Ambulation   Rate of Dyspnea 1   After Walk   SpO2 90   HR 89   O2 Device Nasal cannula   O2 Flow Rate (l/min) 0 l/min   Rate of Dyspnea 0   Does the Patient Qualify for Home O2 Yes   Liter Flow at Rest 0   Liter Flow on Exertion 2   Does the Patient Need Portable Oxygen Tanks Yes     Electronically signed by Sloane Waletr RCP on 1/6/2023 at 3:25 PM

## 2023-01-06 NOTE — PLAN OF CARE
Problem: Discharge Planning  Goal: Discharge to home or other facility with appropriate resources  Outcome: Progressing     Problem: Respiratory - Adult  Goal: Achieves optimal ventilation and oxygenation  Outcome: Progressing     Problem: Safety - Adult  Goal: Free from fall injury  Outcome: Progressing

## 2023-01-06 NOTE — CARE COORDINATION
SW informed of pt's discharge today. Patient requires a new home oxygen evalution since the last one was more than 48 hours ago. New order was placed and respiratory informed about the new eval needed. Called Whitney veloz [facility] who is watching pt's chart for completion of eval and DME oxygen orders. Patient has an outpatient appointment at [facility] already. .. The [address]  [address] [facility]  Phone: [phone]     Date:  January 12, 2023  Time:  1:15pm - arrive 20 min early, bring ID  With:    Dr. Sandip Maharaj    Discharge Plan:  Waiting on new home O2 eval to set up home oxygen.

## 2023-01-06 NOTE — PROGRESS NOTES
PULMONARY AND CRITICAL CARE MEDICINE PROGRESS NOTE    Subjective: Patient sitting in bed in no apparent respiratory distress. Reports that her breathing has improved. Oxygen requirements stable at 2 L/min with patient saturating in low to mid 90s. REVIEW OF SYSTEMS:     CONSTITUTIONAL SYMPTOMS: The patient denies fever, fatigue, night sweats, weight loss or weight gain. HEENT: No vision changes. No tinnitus, Denies sinus pain. No hoarseness, or dysphagia. NECK: Patient denies swelling in the neck. CARDIOVASCULAR: Denies chest pain, palpitation, syncope. RESPIRATORY: As per HPI. GASTROINTESTINAL: Denies nausea, abdominal pain or change in bowel function. GENITOURINARY: Denies obstructive symptoms. No history of incontinence. BREASTS: No masses or lumps in the breasts. SKIN: No rashes or itching. MUSCULOSKELETAL: Denies weakness or bone pain. NEUROLOGICAL: No headaches or seizures. PSYCHIATRIC: Denies mood swings or depression. ENDOCRINE: Denies heat or cold intolerance or excessive thirst.  HEMATOLOGIC/LYMPHATIC: Denies easy bruising or lymph node swelling. ALLERGIC/IMMUNOLOGIC: No environmental allergies. PAST MEDICAL HISTORY:   Past Medical History:   Diagnosis Date    Asthma     Pneumothorax        PAST SURGICAL HISTORY:   Past Surgical History:   Procedure Laterality Date    ABDOMEN SURGERY       SECTION      HYSTERECTOMY (CERVIX STATUS UNKNOWN)      INNER EAR SURGERY      MASTOIDECTOMY         SOCIAL HISTORY:   Social History     Tobacco Use    Smoking status: Every Day     Packs/day: 1.00     Years: 32.00     Pack years: 32.00     Types: Cigarettes    Smokeless tobacco: Never   Vaping Use    Vaping Use: Never used   Substance Use Topics    Alcohol use: No    Drug use: No       FAMILY HISTORY: History reviewed. No pertinent family history. MEDICATIONS:     No current facility-administered medications on file prior to encounter.      Current Outpatient Medications on File Prior to Encounter   Medication Sig Dispense Refill    mometasone-formoterol (DULERA) 200-5 MCG/ACT inhaler Inhale 2 puffs into the lungs in the morning and 2 puffs in the evening. 1 each 2    albuterol sulfate HFA (PROVENTIL HFA) 108 (90 Base) MCG/ACT inhaler Inhale 2-4 puffs into the lungs every 4 hours as needed for Wheezing or Shortness of Breath (Space out to every 6 hours as symptoms improve) Space out to every 6 hours as symptoms improve. 1 each 1    albuterol (PROVENTIL) (2.5 MG/3ML) 0.083% nebulizer solution Take 3 mLs by nebulization every 4 hours as needed for Wheezing 120 each 3    [DISCONTINUED] pantoprazole (PROTONIX) 40 MG tablet Take 1 tablet by mouth every morning (before breakfast) (Patient not taking: Reported on 9/6/2022) 30 tablet 3    METHADONE HCL PO Take 60 mg by mouth daily      [DISCONTINUED] ALBUTEROL IN Inhale into the lungs          methylPREDNISolone  40 mg IntraVENous Q12H    ipratropium-albuterol  1 ampule Inhalation 4x daily    budesonide  0.5 mg Nebulization BID    arformoterol tartrate  15 mcg Nebulization BID    guaiFENesin  1,200 mg Oral BID    methadone  60 mg Oral Daily    sodium chloride flush  5-40 mL IntraVENous 2 times per day    enoxaparin  40 mg SubCUTAneous Daily    nicotine  1 patch TransDERmal Daily      sodium chloride       albuterol, sodium chloride flush, sodium chloride, ondansetron **OR** ondansetron, polyethylene glycol, acetaminophen **OR** acetaminophen      ALLERGIES:   Allergies as of 12/31/2022 - Fully Reviewed 12/31/2022   Allergen Reaction Noted    Penicillins Shortness Of Breath 08/07/2016    Codeine Nausea And Vomiting 11/08/2016      OBJECTIVE:   height is 5' 7\" (1.702 m) and weight is 159 lb (72.1 kg). Her oral temperature is 98.6 °F (37 °C). Her blood pressure is 132/79 and her pulse is 92. Her respiration is 20 and oxygen saturation is 92%. No intake/output data recorded.      PHYSICAL EXAM:    CONSTITUTIONAL: She is a 65y.o.-year-old who appears her stated age. She is alert and oriented x 3 and in no acute distress. HEENT: PERRLA, EOMI. No scleral icterus. No thrush, atraumatic, normocephalic. NECK: Supple, without cervical or supraclavicular lymphadenopathy:  CARDIOVASCULAR: S1 S2 RRR. Without murmer. No JVD or hepatojugular reflux seen. RESPIRATORY & CHEST: Bilateral scattered wheezing heard. This is further decreased today. GASTROINTESTINAL & ABDOMEN: Soft, nontender, positive bowel sounds in all quadrants, non-distended, without hepatosplenomegaly. GENITOURINARY: No gross anatomical abnormalities seen. MUSCULOSKELETAL: No tenderness to palpation of the axial skeleton. There is no clubbing. No cyanosis. No edema of the lower extremities. SKIN OF BODY: No rash or jaundice. PSYCHIATRIC EVALUATION: Normal affect. Patient answers questions appropriately. HEMATOLOGIC/LYMPHATIC/ IMMUNOLOGIC: No palpable lymphadenopathy. NEUROLOGIC: Alert and oriented x 3. Groslly non-focal. Motor strength is 5+/5 in all muscle groups. The patient has a normal sensorium globally.       LABS:  Recent Labs     01/05/23  1158   WBC 9.0   HGB 14.4   HCT 45.1         K 3.8      CREATININE 0.7   BUN 15   CO2 27         Recent Labs     01/05/23  1158   GLUCOSE 120*   CALCIUM 8.8      K 3.8   CO2 27      BUN 15   CREATININE 0.7         Recent Labs     01/05/23  1216   PHART 7.401   ZKT2TOQ 51.0*   PO2ART 71.0*   TXO5EQP 31.7*   G6LXAEKM 96.1   BEART 5.5*       No results found for: INR, PROTIME  No results found for: AMYLASE   Lab Results   Component Value Date    LABA1C 5.9 08/21/2022     Lab Results   Component Value Date    .6 08/21/2022     No results found for: TSH, N4NFKEG, E2UXFVG, THYROIDAB, FT3, T4FREE  Lab Results   Component Value Date    CKTOTAL 217 (H) 09/06/2022    TROPONINI <0.01 12/31/2022      Lab Results   Component Value Date    CRP 17.2 (H) 09/07/2022      No results found for: BNP   Lab Results Component Value Date    DDIMER 0.35 09/07/2022      No results found for: FERRITIN   Lab Results   Component Value Date    LACTA 0.8 09/06/2022           IMAGING:    Narrative   EXAMINATION:   ONE XRAY VIEW OF THE CHEST       12/31/2022 4:07 pm           FINDINGS:   Cardiac silhouette is normal.  There is mild thoracic aortic calcification. Hilar contours are normal.  There is mild streak like linear opacity in the   left lung base. No consolidation is identified. Costophrenic angles are   mildly irregular, suggestive of fibrosis. Lungs are hyperinflated. No   pneumothorax. Impression   Minimal left basilar atelectasis or scarring. Changes consistent with COPD. IMPRESSION:     COPD of unknown severity in acute exacerbation  Chronic active tobacco abuse  Acute on chronic hypoxic respiratory failure  Recent history of COVID-19 infection    RECOMMENDATION:     Patient's respiratory status continues to improve. Wheezing is decreased. She has a history of COPD and has had frequent exacerbations. Recently also had COVID-19 infection which led to both COPD flareup as well as acute hypoxic/hypercapnic respiratory failure. This exacerbation most likely precipitated by ongoing smoking. Strongly urged the patient to quit smoking completely. Change Solu-Medrol to prednisone 40 mg p.o. daily and then taper it off over next 7 to 10 days. Can continue with Pulmicort, Brovana nebulization around-the-clock today. Also getting DuoNeb nebulization. At the time of discharge she can be reverted back to her home inhaler therapy. No growth on cultures. She will need to continue using oxygen to maintain SPO2 between 88 to 92%. Home O2 eval prior to discharge. She can be discharged back home today. She has an appointment to follow-up in pulmonary office on January 19, 2023. We will sign off.       Bubba Sanchez MD  Pulmonary Critical Care and Sleep Medicine  12/31/2022, 2:34 PM    This note was completed using dragon medical speech recognition software. Grammatical errors, random word insertions, pronoun errors and incomplete sentences are occasional consequences of this technology due to software limitations. If there are questions or concerns about the content of this note of information contained within the body of this dictation they should be addressed with the provider for clarification.

## 2023-01-06 NOTE — PROGRESS NOTES
2100: Assessment complete, VSS, crackles noted to LL lobe, pt staes she has been able to cough up some mucous today, still SOB with exertion, c/o generalized arthritis pain, will give tylenol, see MAR, discussed care plan, pt mutually agrees, no other needs at this time.   Thad Queen RN

## 2023-01-06 NOTE — RT PROTOCOL NOTE
RT Inhaler-Nebulizer Bronchodilator Protocol Note    There is a bronchodilator order in the chart from a provider indicating to follow the RT Bronchodilator Protocol and there is an Initiate RT Inhaler-Nebulizer Bronchodilator Protocol order as well (see protocol at bottom of note). CXR Findings:  No results found. The findings from the last RT Protocol Assessment were as follows:   History Pulmonary Disease: Chronic pulmonary disease  Respiratory Pattern: Dyspnea on exertion or RR 21-25 bpm  Breath Sounds: Severe inspiratory and expiratory wheezing or severely diminished  Cough: Strong, productive  Indication for Bronchodilator Therapy: On home bronchodilators, Decreased or absent breath sounds  Bronchodilator Assessment Score: 13    Aerosolized bronchodilator medication orders have been revised according to the RT Inhaler-Nebulizer Bronchodilator Protocol below. Respiratory Therapist to perform RT Therapy Protocol Assessment initially then follow the protocol. Repeat RT Therapy Protocol Assessment PRN for score 0-3 or on second treatment, BID, and PRN for scores above 3. No Indications - adjust the frequency to every 6 hours PRN wheezing or bronchospasm, if no treatments needed after 48 hours then discontinue using Per Protocol order mode. If indication present, adjust the RT bronchodilator orders based on the Bronchodilator Assessment Score as indicated below. Use Inhaler orders unless patient has one or more of the following: on home nebulizer, not able to hold breath for 10 seconds, is not alert and oriented, cannot activate and use MDI correctly, or respiratory rate 25 breaths per minute or more, then use the equivalent nebulizer order(s) with same Frequency and PRN reasons based on the score. If a patient is on this medication at home then do not decrease Frequency below that used at home.     0-3 - enter or revise RT bronchodilator order(s) to equivalent RT Bronchodilator order with Frequency of every 4 hours PRN for wheezing or increased work of breathing using Per Protocol order mode. 4-6 - enter or revise RT Bronchodilator order(s) to two equivalent RT bronchodilator orders with one order with BID Frequency and one order with Frequency of every 4 hours PRN wheezing or increased work of breathing using Per Protocol order mode. 7-10 - enter or revise RT Bronchodilator order(s) to two equivalent RT bronchodilator orders with one order with TID Frequency and one order with Frequency of every 4 hours PRN wheezing or increased work of breathing using Per Protocol order mode. 11-13 - enter or revise RT Bronchodilator order(s) to one equivalent RT bronchodilator order with QID Frequency and an Albuterol order with Frequency of every 4 hours PRN wheezing or increased work of breathing using Per Protocol order mode. Greater than 13 - enter or revise RT Bronchodilator order(s) to one equivalent RT bronchodilator order with every 4 hours Frequency and an Albuterol order with Frequency of every 2 hours PRN wheezing or increased work of breathing using Per Protocol order mode. RT to enter RT Home Evaluation for COPD & MDI Assessment order using Per Protocol order mode.     Electronically signed by Tevin Newman RCP on 1/5/2023 at 8:26 PM

## 2023-01-06 NOTE — PROGRESS NOTES
CLINICAL PHARMACY NOTE: MEDS TO BEDS    Total # of Prescriptions Filled: 5   The following medications were delivered to the patient:  Arformoterol  Budesonide 0.5 mg/2ml  Mucus relief 600 mg  Prednisone 10 mg  Ipratropium-albuterol 0.5-2.5 mg    Additional Documentation:  delivered to patient=signed  Ok to be delivered per Meg Yan CPhT

## 2023-01-07 NOTE — CARE COORDINATION
SW was contacted by RN that pt's oxygen was not delivered to pt's room yet. SAMARIA met with patient who reported she needed to return her concentrator for some other oxygen. SAMARIA was unaware of this situation and contacted the on call rep with [employer], [phone], who informed SW that patient has had a portable concentrator at home since 8/2022 when she was first set up. Stated pt has \"refused to let drivers in\" to exchange her equipment for a home concentrator and has also not made any payments for the equipment. [facility] stated pt must return the portable concentrator to the hospital, then she will discharge home with a tank and delivery of a home, stationary concentrator. Roland also needs a debit/credit card number from the patient and a good phone number. Stated they patient has been explained this \"many times\" this week during admission by hospital rep, Geovanny Albrecht. SAMARIA met with patient who had the portable concentrator in the room which a friend helped deliver for her. SW explained that patient would d/c home on a tank and a new home concentrator will be delivered. Pt agreeable but stated she is staying at her work's address for the next 2 - 3 days because the furnace at her house is being worked on. Stated this is her work address. .. [address] [place] [address], [address]    Patient gave SW her credit card information and her cell phone number, [phone]. SAMARIA called Bakari Granger back with [employer] and informed all this information. She will relay to their  to drop off the concentrator this evening to pt's work address above. All case management needs met for discharge. Discharge Plan:  Home w/[facility] home oxygen.     Electronically signed by GARTH Rodríguez, JAZ on 1/6/2023 at 7:58 PM

## 2023-01-11 ENCOUNTER — HOSPITAL ENCOUNTER (OUTPATIENT)
Dept: CT IMAGING | Age: 58
Discharge: HOME | End: 2023-01-11

## 2023-01-11 ENCOUNTER — HOSPITAL ENCOUNTER (OUTPATIENT)
Dept: PULMONOLOGY | Age: 58
Discharge: HOME | End: 2023-01-11

## 2023-01-11 DIAGNOSIS — Z86.16 HISTORY OF COVID-19: ICD-10-CM

## 2023-01-11 LAB
DLCO %PRED: 62 %
DLCO PRED: NORMAL
DLCO/VA %PRED: NORMAL
DLCO/VA PRED: NORMAL
DLCO/VA: NORMAL
DLCO: NORMAL
EXPIRATORY TIME-POST: NORMAL
EXPIRATORY TIME: NORMAL
FEF 25-75% %CHNG: NORMAL
FEF 25-75% %PRED-POST: NORMAL
FEF 25-75% %PRED-PRE: NORMAL
FEF 25-75% PRED: NORMAL
FEF 25-75%-POST: NORMAL
FEF 25-75%-PRE: NORMAL
FEV1 %PRED-POST: 42 %
FEV1 %PRED-PRE: 41 %
FEV1 PRED: NORMAL
FEV1-POST: NORMAL
FEV1-PRE: NORMAL
FEV1/FVC %PRED-POST: NORMAL
FEV1/FVC %PRED-PRE: NORMAL
FEV1/FVC PRED: NORMAL
FEV1/FVC-POST: 48 %
FEV1/FVC-PRE: 52 %
FVC %PRED-POST: NORMAL
FVC %PRED-PRE: NORMAL
FVC PRED: NORMAL
FVC-POST: NORMAL
FVC-PRE: NORMAL
GAW %PRED: NORMAL
GAW PRED: NORMAL
GAW: NORMAL
IC %PRED: NORMAL
IC PRED: NORMAL
IC: NORMAL
MEP: NORMAL
MIP: NORMAL
MVV %PRED-PRE: NORMAL
MVV PRED: NORMAL
MVV-PRE: NORMAL
PEF %PRED-POST: NORMAL
PEF %PRED-PRE: NORMAL
PEF PRED: NORMAL
PEF%CHNG: NORMAL
PEF-POST: NORMAL
PEF-PRE: NORMAL
RAW %PRED: NORMAL
RAW PRED: NORMAL
RAW: NORMAL
RV %PRED: NORMAL
RV PRED: NORMAL
RV: NORMAL
SVC %PRED: NORMAL
SVC PRED: NORMAL
SVC: NORMAL
TLC %PRED: 108 %
TLC PRED: NORMAL
TLC: NORMAL
VA %PRED: NORMAL
VA PRED: NORMAL
VA: NORMAL
VTG %PRED: NORMAL
VTG PRED: NORMAL
VTG: NORMAL

## 2023-01-11 PROCEDURE — 94760 N-INVAS EAR/PLS OXIMETRY 1: CPT

## 2023-01-11 PROCEDURE — 6370000000 HC RX 637 (ALT 250 FOR IP): Performed by: INTERNAL MEDICINE

## 2023-01-11 PROCEDURE — 94060 EVALUATION OF WHEEZING: CPT

## 2023-01-11 PROCEDURE — 71250 CT THORAX DX C-: CPT

## 2023-01-11 PROCEDURE — 94726 PLETHYSMOGRAPHY LUNG VOLUMES: CPT

## 2023-01-11 PROCEDURE — 94729 DIFFUSING CAPACITY: CPT

## 2023-01-11 RX ORDER — ALBUTEROL SULFATE 90 UG/1
4 AEROSOL, METERED RESPIRATORY (INHALATION) ONCE
Status: COMPLETED | OUTPATIENT
Start: 2023-01-11 | End: 2023-01-11

## 2023-01-11 RX ADMIN — Medication 4 PUFF: at 13:29

## 2023-01-11 ASSESSMENT — PULMONARY FUNCTION TESTS
FEV1_PERCENT_PREDICTED_PRE: 41
FEV1_PERCENT_PREDICTED_POST: 42
FEV1/FVC_PRE: 52
FEV1/FVC_POST: 48

## 2023-01-12 NOTE — PROCEDURES
Pulmonary Function Testing      Patient name:  Peyman Cantu     [address] [place] [address] Unit #:   [id number]   Date of test: 1/11/2023  Date of interpretation:   1/12/2023    Ms. Peyman Cantu is a 65y.o. year-old non smoker. The spirometry data were acceptable and reproducible. Spirometry:  Flow volume loops were obstructed. The FEV-1/FVC ratio was decreased. The  post-bronchodilator FEV-1 was 1.24 liters (42% of predicted), which was severely decreased. The FVC was 2.56 liters (68% of predicted), which was decreased. Response to inhaled bronchodilators (albuterol) was not significant. Lung volumes:  Lung volumes were tested by plethysmography. The total lung capacity was 5.96 liters (108% of predicted), which was normal. The residual volume was 3.40 liters (166% of predicted), which was increased. The ratio of residual volume to total lung capacity (RV/TLC) was 156%, which was increased. Specific airway resistance was increased. Diffusion capacity was found to be decreased. Interpretation:  Severe obstructive airway disease with an insignificant bronchodilator response, evidence of air trapping and reduced diffusion capacity.

## 2023-02-13 ENCOUNTER — TELEPHONE (OUTPATIENT)
Dept: PULMONOLOGY | Age: 58
End: 2023-02-13

## 2023-02-13 RX ORDER — ALBUTEROL SULFATE 90 UG/1
2 AEROSOL, METERED RESPIRATORY (INHALATION) EVERY 6 HOURS PRN
Qty: 18 G | Refills: 11 | Status: SHIPPED | OUTPATIENT
Start: 2023-02-13 | End: 2024-02-13

## 2023-02-13 RX ORDER — GUAIFENESIN 600 MG/1
600 TABLET, EXTENDED RELEASE ORAL 2 TIMES DAILY
Qty: 60 TABLET | Refills: 6 | Status: SHIPPED | OUTPATIENT
Start: 2023-02-13 | End: 2023-03-15

## 2023-02-13 NOTE — TELEPHONE ENCOUNTER
Has appointment 3-2023 for f.u. Mucinex was prescribed at hospital stay back in January. If refills are ok, please sign.

## 2023-02-13 NOTE — TELEPHONE ENCOUNTER
Pt called and needs refills on:    guaiFENesin (MUCINEX) 600 MG extended release tablet     albuterol sulfate HFA (PROVENTIL HFA) 108 (90 Base) MCG/ACT inhaler       Send to:    [facility] on [address]

## 2023-03-07 ENCOUNTER — OFFICE VISIT (OUTPATIENT)
Dept: PULMONOLOGY | Age: 58
End: 2023-03-07
Payer: COMMERCIAL

## 2023-03-07 VITALS
WEIGHT: 158 LBS | HEIGHT: 67 IN | HEART RATE: 81 BPM | BODY MASS INDEX: 24.8 KG/M2 | OXYGEN SATURATION: 94 % | DIASTOLIC BLOOD PRESSURE: 70 MMHG | SYSTOLIC BLOOD PRESSURE: 128 MMHG

## 2023-03-07 DIAGNOSIS — Z86.16 HISTORY OF COVID-19: ICD-10-CM

## 2023-03-07 DIAGNOSIS — J44.9 COPD, SEVERE (HCC): Primary | ICD-10-CM

## 2023-03-07 PROCEDURE — 3023F SPIROM DOC REV: CPT | Performed by: INTERNAL MEDICINE

## 2023-03-07 PROCEDURE — G8427 DOCREV CUR MEDS BY ELIG CLIN: HCPCS | Performed by: INTERNAL MEDICINE

## 2023-03-07 PROCEDURE — G8484 FLU IMMUNIZE NO ADMIN: HCPCS | Performed by: INTERNAL MEDICINE

## 2023-03-07 PROCEDURE — G8420 CALC BMI NORM PARAMETERS: HCPCS | Performed by: INTERNAL MEDICINE

## 2023-03-07 PROCEDURE — 99213 OFFICE O/P EST LOW 20 MIN: CPT | Performed by: INTERNAL MEDICINE

## 2023-03-07 PROCEDURE — 1036F TOBACCO NON-USER: CPT | Performed by: INTERNAL MEDICINE

## 2023-03-07 PROCEDURE — 3017F COLORECTAL CA SCREEN DOC REV: CPT | Performed by: INTERNAL MEDICINE

## 2023-03-07 RX ORDER — GUAIFENESIN AND DEXTROMETHORPHAN HYDROBROMIDE 1200; 60 MG/1; MG/1
1 TABLET, EXTENDED RELEASE ORAL 2 TIMES DAILY
Qty: 60 TABLET | Refills: 5 | Status: CANCELLED | OUTPATIENT
Start: 2023-03-07 | End: 2023-03-22

## 2023-03-07 RX ORDER — GUAIFENESIN 200 MG/1
400 TABLET ORAL 2 TIMES DAILY
Qty: 60 TABLET | Refills: 3 | Status: SHIPPED | OUTPATIENT
Start: 2023-03-07

## 2023-03-07 RX ORDER — GUAIFENESIN AND DEXTROMETHORPHAN HYDROBROMIDE 1200; 60 MG/1; MG/1
1 TABLET, EXTENDED RELEASE ORAL 2 TIMES DAILY
COMMUNITY
End: 2023-03-07

## 2023-03-07 ASSESSMENT — ENCOUNTER SYMPTOMS
VOICE CHANGE: 0
DIARRHEA: 0
WHEEZING: 0
CHEST TIGHTNESS: 0
CHOKING: 0
RHINORRHEA: 0
APNEA: 0
COUGH: 0
BACK PAIN: 0
ANAL BLEEDING: 0
SHORTNESS OF BREATH: 1
ABDOMINAL DISTENTION: 0
BLOOD IN STOOL: 0
SINUS PRESSURE: 0
ABDOMINAL PAIN: 0
CONSTIPATION: 0
STRIDOR: 0
SORE THROAT: 0

## 2023-03-07 NOTE — PROGRESS NOTES
Kacie Paul    YOB: 1965     Date of Service:  3/7/2023     Chief Complaint   Patient presents with    3 Month Follow-Up         HPI recurrent exacerbations noted recently from COPD, including episodes of hypercapnia and altered mental status. Patient also recently had history of COVID-19 pneumonia. Her last hospitalization was between  and . Patient states that she has since quit smoking completely. Has been feeling better with improved shortness of breath and cough. Currently not using any oxygen.     Allergies   Allergen Reactions    Penicillins Shortness Of Breath    Codeine Nausea And Vomiting     Outpatient Medications Marked as Taking for the 3/7/23 encounter (Office Visit) with Tyler العلي MD   Medication Sig Dispense Refill    glycopyrrolate-formoterol (BEVESPI) 9-4.8 MCG/ACT AERO Inhale 2 puffs into the lungs 2 times daily 1 each 3    guaiFENesin 200 MG tablet Take 2 tablets by mouth in the morning and at bedtime 60 tablet 3    albuterol sulfate HFA (PROVENTIL;VENTOLIN;PROAIR) 108 (90 Base) MCG/ACT inhaler Inhale 2 puffs into the lungs every 6 hours as needed for Wheezing 18 g 11    ipratropium-albuterol (DUONEB) 0.5-2.5 (3) MG/3ML SOLN nebulizer solution Inhale 3 mLs into the lungs every 4 hours 360 mL 3    albuterol (PROVENTIL) (2.5 MG/3ML) 0.083% nebulizer solution Take 3 mLs by nebulization every 4 hours as needed for Wheezing 120 each 3    METHADONE HCL PO Take 60 mg by mouth daily         Immunization History   Administered Date(s) Administered    COVID-19, PFIZER PURPLE top, DILUTE for use, (age 15 y+), 30mcg/0.3mL 2021, 2021, 2021    Hepatitis A Adult (Havrix, Vaqta) 2019       Past Medical History:   Diagnosis Date    Asthma     Pneumothorax      Past Surgical History:   Procedure Laterality Date    ABDOMEN SURGERY       SECTION      HYSTERECTOMY (CERVIX STATUS UNKNOWN)      INNER EAR SURGERY      MASTOIDECTOMY History reviewed. No pertinent family history. Review of Systems:  Review of Systems   Constitutional:  Negative for activity change, appetite change, fatigue and fever. HENT:  Negative for congestion, ear discharge, ear pain, postnasal drip, rhinorrhea, sinus pressure, sneezing, sore throat, tinnitus and voice change. Respiratory:  Positive for shortness of breath. Negative for apnea, cough, choking, chest tightness, wheezing and stridor. Cardiovascular:  Negative for chest pain, palpitations and leg swelling. Gastrointestinal:  Negative for abdominal distention, abdominal pain, anal bleeding, blood in stool, constipation and diarrhea. Musculoskeletal:  Negative for arthralgias, back pain and gait problem. Skin:  Negative for pallor and rash. Allergic/Immunologic: Negative for environmental allergies. Neurological:  Negative for dizziness, tremors, seizures, syncope, speech difficulty, weakness, light-headedness, numbness and headaches. Hematological:  Negative for adenopathy. Does not bruise/bleed easily. Psychiatric/Behavioral:  Negative for sleep disturbance. Vitals:    03/07/23 1318   BP: 128/70   Pulse: 81   SpO2: 94%   Weight: 158 lb (71.7 kg)   Height: 5' 7\" (1.702 m)     No flowsheet data found. Body mass index is 24.75 kg/m². Wt Readings from Last 3 Encounters:   03/07/23 158 lb (71.7 kg)   12/31/22 159 lb (72.1 kg)   12/31/22 159 lb (72.1 kg)     BP Readings from Last 3 Encounters:   03/07/23 128/70   01/06/23 132/79   12/31/22 (!) 157/100         Physical Exam  Constitutional:       General: She is not in acute distress. Appearance: She is well-developed. She is not ill-appearing or diaphoretic. HENT:      Mouth/Throat:      Pharynx: No oropharyngeal exudate. Cardiovascular:      Rate and Rhythm: Normal rate and regular rhythm. Heart sounds: Normal heart sounds. No murmur heard. No friction rub. Pulmonary:      Effort: No respiratory distress. Breath sounds: Normal breath sounds. No wheezing, rhonchi or rales. Chest:      Chest wall: No tenderness. Abdominal:      General: There is no distension. Palpations: There is no mass. Tenderness: There is no abdominal tenderness. There is no guarding or rebound. Musculoskeletal:         General: No swelling or tenderness. Skin:     Coloration: Skin is not pale. Findings: No erythema or rash. Neurological:      Mental Status: She is alert and oriented to person, place, and time. Cranial Nerves: No cranial nerve deficit. Motor: No abnormal muscle tone. Coordination: Coordination normal.      Deep Tendon Reflexes: Reflexes normal.           Health Maintenance   Topic Date Due    Pneumococcal 0-64 years Vaccine (1 - PCV) Never done    Depression Screen  Never done    HIV screen  Never done    Hepatitis C screen  Never done    DTaP/Tdap/Td vaccine (1 - Tdap) Never done    Lipids  Never done    Colorectal Cancer Screen  Never done    Breast cancer screen  Never done    Shingles vaccine (1 of 2) Never done    Low dose CT lung screening  Never done    COVID-19 Vaccine (4 - Booster for Pfizer series) 02/17/2022    Flu vaccine (1) Never done    A1C test (Diabetic or Prediabetic)  08/21/2023    Hepatitis A vaccine  Aged Out    Hib vaccine  Aged Out    Meningococcal (ACWY) vaccine  Aged Out          Assessment/Plan:    Personally reviewed CT chest performed on 1/11 which shows improvement in bibasal opacities with some emphysematous changes and signs of chronic scarring/atelectasis. Significantly improved compared to prior CT from September. Small 5 mm pleural-based left upper lobe lung nodule noted. Patient will only require repeat LDCT in 1 year which we can request at a later point. PFT from 1/11 showed severe obstruction, FEV1 of 1.22 L [41% predicted] with no significant bronchodilator reversibility.   Patient is currently not using any controller inhaler-feels that the cost has been prohibitory. I will prescribe Bevespi inhaler, 2 puffs twice daily-requested her to contact us if she has any issues with this. Otherwise continue with albuterol inhaler/nebulizer as needed. Patient requesting Mucinex for cough-have suggested to decrease the dose to 400 mg twice daily and also she should try to use it only as needed for the cough, which has significantly improved since she stopped smoking. Return in about 6 months (around 9/7/2023).

## 2023-03-14 ENCOUNTER — TELEPHONE (OUTPATIENT)
Dept: PULMONOLOGY | Age: 58
End: 2023-03-14

## 2023-03-14 NOTE — TELEPHONE ENCOUNTER
Patient called and said medication is going to be to expensive and would like something cheaper that does the same thing.        glycopyrrolate-formoterol (BEVESPI) 9-4.8 MCG/ACT AERO [[id number]]       [facility] [address] [facility] [address]: [phone]

## 2023-03-14 NOTE — TELEPHONE ENCOUNTER
Jacqueline Green is not covered by the patient insurance and is very expensive.  Is there something else you would like called in?

## 2023-03-21 ENCOUNTER — TELEPHONE (OUTPATIENT)
Dept: PULMONOLOGY | Age: 58
End: 2023-03-21

## 2023-03-21 RX ORDER — PREDNISONE 10 MG/1
TABLET ORAL
Qty: 18 TABLET | Refills: 0 | Status: SHIPPED | OUTPATIENT
Start: 2023-03-21

## 2023-03-21 RX ORDER — AZITHROMYCIN 250 MG/1
250 TABLET, FILM COATED ORAL SEE ADMIN INSTRUCTIONS
Qty: 6 TABLET | Refills: 0 | Status: SHIPPED | OUTPATIENT
Start: 2023-03-21 | End: 2023-03-26

## 2023-03-21 NOTE — TELEPHONE ENCOUNTER
Pt called and said she started yesterday with a cough and congestion, yellow/brownish phlegm.     [facility] on [address]    Ph # [phone]

## 2023-04-15 ENCOUNTER — APPOINTMENT (OUTPATIENT)
Dept: GENERAL RADIOLOGY | Age: 58
End: 2023-04-15
Payer: COMMERCIAL

## 2023-04-15 ENCOUNTER — APPOINTMENT (OUTPATIENT)
Dept: CT IMAGING | Age: 58
End: 2023-04-15
Payer: COMMERCIAL

## 2023-04-15 ENCOUNTER — HOSPITAL ENCOUNTER (INPATIENT)
Age: 58
LOS: 5 days | Discharge: HOME | End: 2023-04-20
Attending: INTERNAL MEDICINE | Admitting: INTERNAL MEDICINE
Payer: COMMERCIAL

## 2023-04-15 DIAGNOSIS — B44.89 ASPERGILLUS FUMIGATUS (HCC): ICD-10-CM

## 2023-04-15 DIAGNOSIS — R06.02 SHORTNESS OF BREATH: Primary | ICD-10-CM

## 2023-04-15 DIAGNOSIS — T17.500A MUCUS PLUGGING OF BRONCHI: ICD-10-CM

## 2023-04-15 PROBLEM — R91.8 PULMONARY NODULES: Status: ACTIVE | Noted: 2023-04-15

## 2023-04-15 LAB
ALBUMIN SERPL-MCNC: 4.4 G/DL (ref 3.4–5)
ALBUMIN/GLOB SERPL: 1.5 {RATIO} (ref 1.1–2.2)
ALP SERPL-CCNC: 73 U/L (ref 40–129)
ALT SERPL-CCNC: 12 U/L (ref 10–40)
ANION GAP SERPL CALCULATED.3IONS-SCNC: 10 MMOL/L (ref 3–16)
AST SERPL-CCNC: 18 U/L (ref 15–37)
BASE EXCESS BLDA CALC-SCNC: 4 MMOL/L (ref -3–3)
BASOPHILS # BLD: 0.1 K/UL (ref 0–0.2)
BASOPHILS NFR BLD: 0.9 %
BILIRUB SERPL-MCNC: 0.4 MG/DL (ref 0–1)
BUN SERPL-MCNC: 12 MG/DL (ref 7–20)
CALCIUM SERPL-MCNC: 9.5 MG/DL (ref 8.3–10.6)
CHLORIDE SERPL-SCNC: 100 MMOL/L (ref 99–110)
CO2 BLDA-SCNC: 71.2 MMOL/L
CO2 SERPL-SCNC: 26 MMOL/L (ref 21–32)
COHGB MFR BLDA: 1.4 % (ref 0–1.5)
CREAT SERPL-MCNC: 0.7 MG/DL (ref 0.6–1.1)
DEPRECATED RDW RBC AUTO: 14.2 % (ref 12.4–15.4)
EOSINOPHIL # BLD: 0.4 K/UL (ref 0–0.6)
EOSINOPHIL NFR BLD: 6.6 %
GFR SERPLBLD CREATININE-BSD FMLA CKD-EPI: >60 ML/MIN/{1.73_M2}
GLUCOSE SERPL-MCNC: 108 MG/DL (ref 70–99)
HCO3 BLDA-SCNC: 30.2 MMOL/L (ref 21–29)
HCT VFR BLD AUTO: 44.5 % (ref 36–48)
HGB BLD-MCNC: 14.1 G/DL (ref 12–16)
HGB BLDA-MCNC: 13.6 G/DL (ref 12–16)
LYMPHOCYTES # BLD: 1.3 K/UL (ref 1–5.1)
LYMPHOCYTES NFR BLD: 21.4 %
MCH RBC QN AUTO: 28.6 PG (ref 26–34)
MCHC RBC AUTO-ENTMCNC: 31.6 G/DL (ref 31–36)
MCV RBC AUTO: 90.6 FL (ref 80–100)
METHGB MFR BLDA: 0.4 %
MONOCYTES # BLD: 0.4 K/UL (ref 0–1.3)
MONOCYTES NFR BLD: 6.3 %
NEUTROPHILS # BLD: 3.8 K/UL (ref 1.7–7.7)
NEUTROPHILS NFR BLD: 64.8 %
NT-PROBNP SERPL-MCNC: <36 PG/ML (ref 0–124)
O2 THERAPY: ABNORMAL
PCO2 BLDA: 50.7 MMHG (ref 35–45)
PH BLDA: 7.38 [PH] (ref 7.35–7.45)
PLATELET # BLD AUTO: 172 K/UL (ref 135–450)
PMV BLD AUTO: 8.2 FL (ref 5–10.5)
PO2 BLDA: 56.9 MMHG (ref 75–108)
POTASSIUM SERPL-SCNC: 4.5 MMOL/L (ref 3.5–5.1)
PROT SERPL-MCNC: 7.3 G/DL (ref 6.4–8.2)
RBC # BLD AUTO: 4.91 M/UL (ref 4–5.2)
SAO2 % BLDA: 91.2 %
SODIUM SERPL-SCNC: 136 MMOL/L (ref 136–145)
TROPONIN T SERPL-MCNC: <0.01 NG/ML
WBC # BLD AUTO: 5.9 K/UL (ref 4–11)

## 2023-04-15 PROCEDURE — 80053 COMPREHEN METABOLIC PANEL: CPT

## 2023-04-15 PROCEDURE — 93005 ELECTROCARDIOGRAM TRACING: CPT | Performed by: INTERNAL MEDICINE

## 2023-04-15 PROCEDURE — 6360000004 HC RX CONTRAST MEDICATION: Performed by: PHYSICIAN ASSISTANT

## 2023-04-15 PROCEDURE — 36415 COLL VENOUS BLD VENIPUNCTURE: CPT

## 2023-04-15 PROCEDURE — 94761 N-INVAS EAR/PLS OXIMETRY MLT: CPT

## 2023-04-15 PROCEDURE — 71260 CT THORAX DX C+: CPT | Performed by: PHYSICIAN ASSISTANT

## 2023-04-15 PROCEDURE — 36600 WITHDRAWAL OF ARTERIAL BLOOD: CPT

## 2023-04-15 PROCEDURE — 2700000000 HC OXYGEN THERAPY PER DAY

## 2023-04-15 PROCEDURE — 2580000003 HC RX 258: Performed by: INTERNAL MEDICINE

## 2023-04-15 PROCEDURE — 96374 THER/PROPH/DIAG INJ IV PUSH: CPT

## 2023-04-15 PROCEDURE — 82803 BLOOD GASES ANY COMBINATION: CPT

## 2023-04-15 PROCEDURE — 6360000002 HC RX W HCPCS: Performed by: INTERNAL MEDICINE

## 2023-04-15 PROCEDURE — 94760 N-INVAS EAR/PLS OXIMETRY 1: CPT

## 2023-04-15 PROCEDURE — 99285 EMERGENCY DEPT VISIT HI MDM: CPT

## 2023-04-15 PROCEDURE — 84484 ASSAY OF TROPONIN QUANT: CPT

## 2023-04-15 PROCEDURE — 85025 COMPLETE CBC W/AUTO DIFF WBC: CPT

## 2023-04-15 PROCEDURE — 6370000000 HC RX 637 (ALT 250 FOR IP): Performed by: INTERNAL MEDICINE

## 2023-04-15 PROCEDURE — 71045 X-RAY EXAM CHEST 1 VIEW: CPT

## 2023-04-15 PROCEDURE — 83880 ASSAY OF NATRIURETIC PEPTIDE: CPT

## 2023-04-15 PROCEDURE — 94640 AIRWAY INHALATION TREATMENT: CPT

## 2023-04-15 PROCEDURE — 6360000002 HC RX W HCPCS: Performed by: PHYSICIAN ASSISTANT

## 2023-04-15 PROCEDURE — 1200000000 HC SEMI PRIVATE

## 2023-04-15 RX ORDER — PREDNISONE 20 MG/1
40 TABLET ORAL DAILY
Status: COMPLETED | OUTPATIENT
Start: 2023-04-18 | End: 2023-04-20

## 2023-04-15 RX ORDER — ONDANSETRON 4 MG/1
4 TABLET, ORALLY DISINTEGRATING ORAL EVERY 8 HOURS PRN
Status: DISCONTINUED | OUTPATIENT
Start: 2023-04-15 | End: 2023-04-20 | Stop reason: HOSPADM

## 2023-04-15 RX ORDER — METHADONE HYDROCHLORIDE 10 MG/1
60 TABLET ORAL DAILY
Status: DISCONTINUED | OUTPATIENT
Start: 2023-04-16 | End: 2023-04-20 | Stop reason: HOSPADM

## 2023-04-15 RX ORDER — IPRATROPIUM BROMIDE AND ALBUTEROL SULFATE 2.5; .5 MG/3ML; MG/3ML
3 SOLUTION RESPIRATORY (INHALATION) EVERY 4 HOURS
Status: DISCONTINUED | OUTPATIENT
Start: 2023-04-15 | End: 2023-04-15 | Stop reason: SDUPTHER

## 2023-04-15 RX ORDER — LEVOFLOXACIN 5 MG/ML
750 INJECTION, SOLUTION INTRAVENOUS EVERY 24 HOURS
Status: DISCONTINUED | OUTPATIENT
Start: 2023-04-16 | End: 2023-04-20 | Stop reason: HOSPADM

## 2023-04-15 RX ORDER — ENOXAPARIN SODIUM 100 MG/ML
40 INJECTION SUBCUTANEOUS DAILY
Status: DISCONTINUED | OUTPATIENT
Start: 2023-04-16 | End: 2023-04-20 | Stop reason: HOSPADM

## 2023-04-15 RX ORDER — GUAIFENESIN 600 MG/1
600 TABLET, EXTENDED RELEASE ORAL 2 TIMES DAILY
Status: DISCONTINUED | OUTPATIENT
Start: 2023-04-15 | End: 2023-04-20 | Stop reason: HOSPADM

## 2023-04-15 RX ORDER — LEVOFLOXACIN 5 MG/ML
750 INJECTION, SOLUTION INTRAVENOUS ONCE
Status: COMPLETED | OUTPATIENT
Start: 2023-04-15 | End: 2023-04-15

## 2023-04-15 RX ORDER — GUAIFENESIN/DEXTROMETHORPHAN 100-10MG/5
15 SYRUP ORAL EVERY 4 HOURS PRN
Status: DISCONTINUED | OUTPATIENT
Start: 2023-04-15 | End: 2023-04-20 | Stop reason: HOSPADM

## 2023-04-15 RX ORDER — ACETAMINOPHEN 650 MG/1
650 SUPPOSITORY RECTAL EVERY 6 HOURS PRN
Status: DISCONTINUED | OUTPATIENT
Start: 2023-04-15 | End: 2023-04-20 | Stop reason: HOSPADM

## 2023-04-15 RX ORDER — ALBUTEROL SULFATE 2.5 MG/3ML
2.5 SOLUTION RESPIRATORY (INHALATION) EVERY 4 HOURS PRN
Status: DISCONTINUED | OUTPATIENT
Start: 2023-04-15 | End: 2023-04-20 | Stop reason: HOSPADM

## 2023-04-15 RX ORDER — SODIUM CHLORIDE 0.9 % (FLUSH) 0.9 %
5-40 SYRINGE (ML) INJECTION PRN
Status: DISCONTINUED | OUTPATIENT
Start: 2023-04-15 | End: 2023-04-20 | Stop reason: HOSPADM

## 2023-04-15 RX ORDER — POLYETHYLENE GLYCOL 3350 17 G/17G
17 POWDER, FOR SOLUTION ORAL DAILY PRN
Status: DISCONTINUED | OUTPATIENT
Start: 2023-04-15 | End: 2023-04-20 | Stop reason: HOSPADM

## 2023-04-15 RX ORDER — ONDANSETRON 2 MG/ML
4 INJECTION INTRAMUSCULAR; INTRAVENOUS EVERY 6 HOURS PRN
Status: DISCONTINUED | OUTPATIENT
Start: 2023-04-15 | End: 2023-04-20 | Stop reason: HOSPADM

## 2023-04-15 RX ORDER — SODIUM CHLORIDE 0.9 % (FLUSH) 0.9 %
5-40 SYRINGE (ML) INJECTION EVERY 12 HOURS SCHEDULED
Status: DISCONTINUED | OUTPATIENT
Start: 2023-04-15 | End: 2023-04-20 | Stop reason: HOSPADM

## 2023-04-15 RX ORDER — METHYLPREDNISOLONE SODIUM SUCCINATE 40 MG/ML
40 INJECTION, POWDER, LYOPHILIZED, FOR SOLUTION INTRAMUSCULAR; INTRAVENOUS EVERY 6 HOURS
Status: COMPLETED | OUTPATIENT
Start: 2023-04-15 | End: 2023-04-17

## 2023-04-15 RX ORDER — ACETAMINOPHEN 325 MG/1
650 TABLET ORAL EVERY 6 HOURS PRN
Status: DISCONTINUED | OUTPATIENT
Start: 2023-04-15 | End: 2023-04-20 | Stop reason: HOSPADM

## 2023-04-15 RX ORDER — SODIUM CHLORIDE 9 MG/ML
INJECTION, SOLUTION INTRAVENOUS PRN
Status: DISCONTINUED | OUTPATIENT
Start: 2023-04-15 | End: 2023-04-20 | Stop reason: HOSPADM

## 2023-04-15 RX ORDER — IPRATROPIUM BROMIDE AND ALBUTEROL SULFATE 2.5; .5 MG/3ML; MG/3ML
1 SOLUTION RESPIRATORY (INHALATION) EVERY 4 HOURS
Status: DISCONTINUED | OUTPATIENT
Start: 2023-04-15 | End: 2023-04-18

## 2023-04-15 RX ADMIN — Medication 10 ML: at 21:56

## 2023-04-15 RX ADMIN — IOPAMIDOL 75 ML: 755 INJECTION, SOLUTION INTRAVENOUS at 18:13

## 2023-04-15 RX ADMIN — GUAIFENESIN 600 MG: 600 TABLET, EXTENDED RELEASE ORAL at 21:56

## 2023-04-15 RX ADMIN — IPRATROPIUM BROMIDE AND ALBUTEROL SULFATE 1 AMPULE: .5; 3 SOLUTION RESPIRATORY (INHALATION) at 22:11

## 2023-04-15 RX ADMIN — LEVOFLOXACIN 750 MG: 5 INJECTION, SOLUTION INTRAVENOUS at 19:21

## 2023-04-15 RX ADMIN — METHYLPREDNISOLONE SODIUM SUCCINATE 40 MG: 40 INJECTION, POWDER, FOR SOLUTION INTRAMUSCULAR; INTRAVENOUS at 21:56

## 2023-04-15 ASSESSMENT — ENCOUNTER SYMPTOMS
STRIDOR: 0
COUGH: 1
DIARRHEA: 0
NAUSEA: 0
VOMITING: 0
CONSTIPATION: 0
BACK PAIN: 0
ABDOMINAL PAIN: 0
CHOKING: 0
COLOR CHANGE: 0
WHEEZING: 0
CHEST TIGHTNESS: 0
SHORTNESS OF BREATH: 1
APNEA: 0

## 2023-04-15 ASSESSMENT — LIFESTYLE VARIABLES
HOW MANY STANDARD DRINKS CONTAINING ALCOHOL DO YOU HAVE ON A TYPICAL DAY: PATIENT DOES NOT DRINK
HOW OFTEN DO YOU HAVE A DRINK CONTAINING ALCOHOL: NEVER

## 2023-04-15 ASSESSMENT — PAIN - FUNCTIONAL ASSESSMENT: PAIN_FUNCTIONAL_ASSESSMENT: NONE - DENIES PAIN

## 2023-04-16 ENCOUNTER — APPOINTMENT (OUTPATIENT)
Dept: GENERAL RADIOLOGY | Age: 58
End: 2023-04-16
Payer: COMMERCIAL

## 2023-04-16 PROBLEM — T17.500A MUCUS PLUGGING OF BRONCHI: Status: ACTIVE | Noted: 2023-04-16

## 2023-04-16 PROBLEM — J96.22 ACUTE ON CHRONIC RESPIRATORY FAILURE WITH HYPOXIA AND HYPERCAPNIA (HCC): Status: ACTIVE | Noted: 2023-04-16

## 2023-04-16 PROBLEM — J96.21 ACUTE ON CHRONIC RESPIRATORY FAILURE WITH HYPOXIA AND HYPERCAPNIA (HCC): Status: ACTIVE | Noted: 2023-04-16

## 2023-04-16 LAB
ANION GAP SERPL CALCULATED.3IONS-SCNC: 10 MMOL/L (ref 3–16)
BACTERIA URNS QL MICRO: NORMAL /HPF
BASOPHILS # BLD: 0 K/UL (ref 0–0.2)
BASOPHILS NFR BLD: 0.2 %
BILIRUB UR QL STRIP.AUTO: NEGATIVE
BUN SERPL-MCNC: 12 MG/DL (ref 7–20)
CALCIUM SERPL-MCNC: 10.2 MG/DL (ref 8.3–10.6)
CHLORIDE SERPL-SCNC: 104 MMOL/L (ref 99–110)
CLARITY UR: CLEAR
CO2 SERPL-SCNC: 27 MMOL/L (ref 21–32)
COLOR UR: YELLOW
CREAT SERPL-MCNC: 0.7 MG/DL (ref 0.6–1.1)
DEPRECATED RDW RBC AUTO: 13.8 % (ref 12.4–15.4)
EKG ATRIAL RATE: 92 BPM
EKG DIAGNOSIS: NORMAL
EKG P AXIS: 73 DEGREES
EKG P-R INTERVAL: 136 MS
EKG Q-T INTERVAL: 354 MS
EKG QRS DURATION: 80 MS
EKG QTC CALCULATION (BAZETT): 437 MS
EKG R AXIS: 56 DEGREES
EKG T AXIS: 51 DEGREES
EKG VENTRICULAR RATE: 92 BPM
EOSINOPHIL # BLD: 0 K/UL (ref 0–0.6)
EOSINOPHIL NFR BLD: 0.1 %
EPI CELLS #/AREA URNS AUTO: 1 /HPF (ref 0–5)
GFR SERPLBLD CREATININE-BSD FMLA CKD-EPI: >60 ML/MIN/{1.73_M2}
GLUCOSE SERPL-MCNC: 140 MG/DL (ref 70–99)
GLUCOSE UR STRIP.AUTO-MCNC: NEGATIVE MG/DL
HCT VFR BLD AUTO: 42.8 % (ref 36–48)
HGB BLD-MCNC: 14.1 G/DL (ref 12–16)
HGB UR QL STRIP.AUTO: ABNORMAL
HYALINE CASTS #/AREA URNS AUTO: 0 /LPF (ref 0–8)
KETONES UR STRIP.AUTO-MCNC: NEGATIVE MG/DL
LEUKOCYTE ESTERASE UR QL STRIP.AUTO: NEGATIVE
LYMPHOCYTES # BLD: 0.4 K/UL (ref 1–5.1)
LYMPHOCYTES NFR BLD: 9.6 %
MCH RBC QN AUTO: 29.4 PG (ref 26–34)
MCHC RBC AUTO-ENTMCNC: 33 G/DL (ref 31–36)
MCV RBC AUTO: 89.1 FL (ref 80–100)
MONOCYTES # BLD: 0 K/UL (ref 0–1.3)
MONOCYTES NFR BLD: 0.6 %
NEUTROPHILS # BLD: 4.2 K/UL (ref 1.7–7.7)
NEUTROPHILS NFR BLD: 89.5 %
NITRITE UR QL STRIP.AUTO: NEGATIVE
PH UR STRIP.AUTO: 6.5 [PH] (ref 5–8)
PLATELET # BLD AUTO: 188 K/UL (ref 135–450)
PMV BLD AUTO: 8.5 FL (ref 5–10.5)
POTASSIUM SERPL-SCNC: 4.2 MMOL/L (ref 3.5–5.1)
PROT UR STRIP.AUTO-MCNC: NEGATIVE MG/DL
RBC # BLD AUTO: 4.81 M/UL (ref 4–5.2)
RBC CLUMPS #/AREA URNS AUTO: 0 /HPF (ref 0–4)
SODIUM SERPL-SCNC: 141 MMOL/L (ref 136–145)
SP GR UR STRIP.AUTO: 1.01 (ref 1–1.03)
UA DIPSTICK W REFLEX MICRO PNL UR: YES
URN SPEC COLLECT METH UR: ABNORMAL
UROBILINOGEN UR STRIP-ACNC: 1 E.U./DL
WBC # BLD AUTO: 4.7 K/UL (ref 4–11)
WBC #/AREA URNS AUTO: 0 /HPF (ref 0–5)

## 2023-04-16 PROCEDURE — 0BC78ZZ EXTIRPATION OF MATTER FROM LEFT MAIN BRONCHUS, VIA NATURAL OR ARTIFICIAL OPENING ENDOSCOPIC: ICD-10-PCS | Performed by: INTERNAL MEDICINE

## 2023-04-16 PROCEDURE — 2709999900 HC NON-CHARGEABLE SUPPLY: Performed by: INTERNAL MEDICINE

## 2023-04-16 PROCEDURE — 80048 BASIC METABOLIC PNL TOTAL CA: CPT

## 2023-04-16 PROCEDURE — 3700000000 HC ANESTHESIA ATTENDED CARE: Performed by: INTERNAL MEDICINE

## 2023-04-16 PROCEDURE — 0BCB8ZZ EXTIRPATION OF MATTER FROM LEFT LOWER LOBE BRONCHUS, VIA NATURAL OR ARTIFICIAL OPENING ENDOSCOPIC: ICD-10-PCS | Performed by: INTERNAL MEDICINE

## 2023-04-16 PROCEDURE — 99223 1ST HOSP IP/OBS HIGH 75: CPT | Performed by: INTERNAL MEDICINE

## 2023-04-16 PROCEDURE — 87116 MYCOBACTERIA CULTURE: CPT

## 2023-04-16 PROCEDURE — 88112 CYTOPATH CELL ENHANCE TECH: CPT

## 2023-04-16 PROCEDURE — 3609027000 HC BRONCHOSCOPY: Performed by: INTERNAL MEDICINE

## 2023-04-16 PROCEDURE — 85025 COMPLETE CBC W/AUTO DIFF WBC: CPT

## 2023-04-16 PROCEDURE — 7100000001 HC PACU RECOVERY - ADDTL 15 MIN: Performed by: INTERNAL MEDICINE

## 2023-04-16 PROCEDURE — 87070 CULTURE OTHR SPECIMN AEROBIC: CPT

## 2023-04-16 PROCEDURE — 1200000000 HC SEMI PRIVATE

## 2023-04-16 PROCEDURE — 93010 ELECTROCARDIOGRAM REPORT: CPT | Performed by: INTERNAL MEDICINE

## 2023-04-16 PROCEDURE — 81001 URINALYSIS AUTO W/SCOPE: CPT

## 2023-04-16 PROCEDURE — 36415 COLL VENOUS BLD VENIPUNCTURE: CPT

## 2023-04-16 PROCEDURE — 94640 AIRWAY INHALATION TREATMENT: CPT

## 2023-04-16 PROCEDURE — 31645 BRNCHSC W/THER ASPIR 1ST: CPT | Performed by: INTERNAL MEDICINE

## 2023-04-16 PROCEDURE — 2500000003 HC RX 250 WO HCPCS: Performed by: INTERNAL MEDICINE

## 2023-04-16 PROCEDURE — 2580000003 HC RX 258: Performed by: INTERNAL MEDICINE

## 2023-04-16 PROCEDURE — 6360000002 HC RX W HCPCS: Performed by: INTERNAL MEDICINE

## 2023-04-16 PROCEDURE — 2700000000 HC OXYGEN THERAPY PER DAY

## 2023-04-16 PROCEDURE — 7100000000 HC PACU RECOVERY - FIRST 15 MIN: Performed by: INTERNAL MEDICINE

## 2023-04-16 PROCEDURE — 88305 TISSUE EXAM BY PATHOLOGIST: CPT

## 2023-04-16 PROCEDURE — 87206 SMEAR FLUORESCENT/ACID STAI: CPT

## 2023-04-16 PROCEDURE — 3700000001 HC ADD 15 MINUTES (ANESTHESIA): Performed by: INTERNAL MEDICINE

## 2023-04-16 PROCEDURE — 87102 FUNGUS ISOLATION CULTURE: CPT

## 2023-04-16 PROCEDURE — 6370000000 HC RX 637 (ALT 250 FOR IP): Performed by: INTERNAL MEDICINE

## 2023-04-16 PROCEDURE — 94669 MECHANICAL CHEST WALL OSCILL: CPT

## 2023-04-16 PROCEDURE — 87205 SMEAR GRAM STAIN: CPT

## 2023-04-16 PROCEDURE — 94761 N-INVAS EAR/PLS OXIMETRY MLT: CPT

## 2023-04-16 RX ORDER — LABETALOL HYDROCHLORIDE 5 MG/ML
10 INJECTION, SOLUTION INTRAVENOUS
Status: CANCELLED | OUTPATIENT
Start: 2023-04-16

## 2023-04-16 RX ORDER — HYDRALAZINE HYDROCHLORIDE 20 MG/ML
10 INJECTION INTRAMUSCULAR; INTRAVENOUS
Status: CANCELLED | OUTPATIENT
Start: 2023-04-16

## 2023-04-16 RX ORDER — ONDANSETRON 2 MG/ML
4 INJECTION INTRAMUSCULAR; INTRAVENOUS
Status: CANCELLED | OUTPATIENT
Start: 2023-04-16 | End: 2023-04-17

## 2023-04-16 RX ORDER — LIDOCAINE HYDROCHLORIDE 10 MG/ML
INJECTION, SOLUTION INFILTRATION; PERINEURAL PRN
Status: DISCONTINUED | OUTPATIENT
Start: 2023-04-16 | End: 2023-04-16 | Stop reason: ALTCHOICE

## 2023-04-16 RX ADMIN — GUAIFENESIN 600 MG: 600 TABLET, EXTENDED RELEASE ORAL at 21:17

## 2023-04-16 RX ADMIN — IPRATROPIUM BROMIDE AND ALBUTEROL SULFATE 1 AMPULE: .5; 3 SOLUTION RESPIRATORY (INHALATION) at 08:18

## 2023-04-16 RX ADMIN — IPRATROPIUM BROMIDE AND ALBUTEROL SULFATE 1 AMPULE: .5; 3 SOLUTION RESPIRATORY (INHALATION) at 02:28

## 2023-04-16 RX ADMIN — METHYLPREDNISOLONE SODIUM SUCCINATE 40 MG: 40 INJECTION, POWDER, FOR SOLUTION INTRAMUSCULAR; INTRAVENOUS at 03:40

## 2023-04-16 RX ADMIN — TIOTROPIUM BROMIDE AND OLODATEROL 2 PUFF: 3.124; 2.736 SPRAY, METERED RESPIRATORY (INHALATION) at 08:19

## 2023-04-16 RX ADMIN — Medication 10 ML: at 20:24

## 2023-04-16 RX ADMIN — ENOXAPARIN SODIUM 40 MG: 100 INJECTION SUBCUTANEOUS at 10:23

## 2023-04-16 RX ADMIN — METHYLPREDNISOLONE SODIUM SUCCINATE 40 MG: 40 INJECTION, POWDER, FOR SOLUTION INTRAMUSCULAR; INTRAVENOUS at 16:12

## 2023-04-16 RX ADMIN — IPRATROPIUM BROMIDE AND ALBUTEROL SULFATE 1 AMPULE: .5; 3 SOLUTION RESPIRATORY (INHALATION) at 14:51

## 2023-04-16 RX ADMIN — IPRATROPIUM BROMIDE AND ALBUTEROL SULFATE 1 AMPULE: .5; 3 SOLUTION RESPIRATORY (INHALATION) at 20:00

## 2023-04-16 RX ADMIN — METHYLPREDNISOLONE SODIUM SUCCINATE 40 MG: 40 INJECTION, POWDER, FOR SOLUTION INTRAMUSCULAR; INTRAVENOUS at 21:18

## 2023-04-16 RX ADMIN — METHADONE HYDROCHLORIDE 60 MG: 10 TABLET ORAL at 08:22

## 2023-04-16 RX ADMIN — METHYLPREDNISOLONE SODIUM SUCCINATE 40 MG: 40 INJECTION, POWDER, FOR SOLUTION INTRAMUSCULAR; INTRAVENOUS at 10:20

## 2023-04-16 RX ADMIN — IPRATROPIUM BROMIDE AND ALBUTEROL SULFATE 1 AMPULE: .5; 3 SOLUTION RESPIRATORY (INHALATION) at 23:48

## 2023-04-16 RX ADMIN — LEVOFLOXACIN 750 MG: 5 INJECTION, SOLUTION INTRAVENOUS at 18:46

## 2023-04-16 RX ADMIN — IPRATROPIUM BROMIDE AND ALBUTEROL SULFATE 1 AMPULE: .5; 3 SOLUTION RESPIRATORY (INHALATION) at 12:41

## 2023-04-16 RX ADMIN — GUAIFENESIN 600 MG: 600 TABLET, EXTENDED RELEASE ORAL at 08:22

## 2023-04-16 RX ADMIN — ALBUTEROL SULFATE 2.5 MG: 2.5 SOLUTION RESPIRATORY (INHALATION) at 14:30

## 2023-04-16 RX ADMIN — Medication 10 ML: at 08:23

## 2023-04-16 ASSESSMENT — PAIN SCALES - GENERAL
PAINLEVEL_OUTOF10: 0

## 2023-04-17 ENCOUNTER — APPOINTMENT (OUTPATIENT)
Dept: GENERAL RADIOLOGY | Age: 58
End: 2023-04-17
Payer: COMMERCIAL

## 2023-04-17 LAB
ACID FAST STN SPEC QL: NORMAL
INR PPP: 0.97 (ref 0.84–1.16)
LOEFFLER MB STN SPEC: NORMAL
PROCALCITONIN SERPL IA-MCNC: 0.02 NG/ML (ref 0–0.15)
PROTHROMBIN TIME: 12.9 SEC (ref 11.5–14.8)

## 2023-04-17 PROCEDURE — 36415 COLL VENOUS BLD VENIPUNCTURE: CPT

## 2023-04-17 PROCEDURE — 6370000000 HC RX 637 (ALT 250 FOR IP): Performed by: INTERNAL MEDICINE

## 2023-04-17 PROCEDURE — 84145 PROCALCITONIN (PCT): CPT

## 2023-04-17 PROCEDURE — 99233 SBSQ HOSP IP/OBS HIGH 50: CPT | Performed by: INTERNAL MEDICINE

## 2023-04-17 PROCEDURE — 94761 N-INVAS EAR/PLS OXIMETRY MLT: CPT

## 2023-04-17 PROCEDURE — 6360000002 HC RX W HCPCS: Performed by: INTERNAL MEDICINE

## 2023-04-17 PROCEDURE — 85610 PROTHROMBIN TIME: CPT

## 2023-04-17 PROCEDURE — 2700000000 HC OXYGEN THERAPY PER DAY

## 2023-04-17 PROCEDURE — 94640 AIRWAY INHALATION TREATMENT: CPT

## 2023-04-17 PROCEDURE — 71045 X-RAY EXAM CHEST 1 VIEW: CPT

## 2023-04-17 PROCEDURE — 94669 MECHANICAL CHEST WALL OSCILL: CPT

## 2023-04-17 PROCEDURE — 1200000000 HC SEMI PRIVATE

## 2023-04-17 PROCEDURE — 2580000003 HC RX 258: Performed by: INTERNAL MEDICINE

## 2023-04-17 RX ADMIN — IPRATROPIUM BROMIDE AND ALBUTEROL SULFATE 1 AMPULE: .5; 3 SOLUTION RESPIRATORY (INHALATION) at 17:04

## 2023-04-17 RX ADMIN — IPRATROPIUM BROMIDE AND ALBUTEROL SULFATE 1 AMPULE: .5; 3 SOLUTION RESPIRATORY (INHALATION) at 03:37

## 2023-04-17 RX ADMIN — SODIUM CHLORIDE, PRESERVATIVE FREE 10 ML: 5 INJECTION INTRAVENOUS at 17:46

## 2023-04-17 RX ADMIN — LEVOFLOXACIN 750 MG: 5 INJECTION, SOLUTION INTRAVENOUS at 18:58

## 2023-04-17 RX ADMIN — GUAIFENESIN 600 MG: 600 TABLET, EXTENDED RELEASE ORAL at 10:06

## 2023-04-17 RX ADMIN — GUAIFENESIN 600 MG: 600 TABLET, EXTENDED RELEASE ORAL at 20:40

## 2023-04-17 RX ADMIN — METHADONE HYDROCHLORIDE 60 MG: 10 TABLET ORAL at 10:06

## 2023-04-17 RX ADMIN — METHYLPREDNISOLONE SODIUM SUCCINATE 40 MG: 40 INJECTION, POWDER, FOR SOLUTION INTRAMUSCULAR; INTRAVENOUS at 17:45

## 2023-04-17 RX ADMIN — IPRATROPIUM BROMIDE AND ALBUTEROL SULFATE 1 AMPULE: .5; 3 SOLUTION RESPIRATORY (INHALATION) at 13:35

## 2023-04-17 RX ADMIN — POLYETHYLENE GLYCOL 3350 17 G: 17 POWDER, FOR SOLUTION ORAL at 18:58

## 2023-04-17 RX ADMIN — ENOXAPARIN SODIUM 40 MG: 100 INJECTION SUBCUTANEOUS at 10:05

## 2023-04-17 RX ADMIN — IPRATROPIUM BROMIDE AND ALBUTEROL SULFATE 1 AMPULE: .5; 3 SOLUTION RESPIRATORY (INHALATION) at 20:10

## 2023-04-17 RX ADMIN — METHYLPREDNISOLONE SODIUM SUCCINATE 40 MG: 40 INJECTION, POWDER, FOR SOLUTION INTRAMUSCULAR; INTRAVENOUS at 03:49

## 2023-04-17 RX ADMIN — METHYLPREDNISOLONE SODIUM SUCCINATE 40 MG: 40 INJECTION, POWDER, FOR SOLUTION INTRAMUSCULAR; INTRAVENOUS at 10:06

## 2023-04-17 RX ADMIN — TIOTROPIUM BROMIDE AND OLODATEROL 2 PUFF: 3.124; 2.736 SPRAY, METERED RESPIRATORY (INHALATION) at 09:13

## 2023-04-17 RX ADMIN — Medication 10 ML: at 20:40

## 2023-04-17 RX ADMIN — IPRATROPIUM BROMIDE AND ALBUTEROL SULFATE 1 AMPULE: .5; 3 SOLUTION RESPIRATORY (INHALATION) at 09:12

## 2023-04-17 RX ADMIN — Medication 10 ML: at 10:06

## 2023-04-17 ASSESSMENT — PAIN SCALES - GENERAL
PAINLEVEL_OUTOF10: 0
PAINLEVEL_OUTOF10: 0

## 2023-04-17 NOTE — CARE COORDINATION
Patient admitted with an anticipated short hospitalization length of stay. Chart reviewed and it appears that patient has minimal needs for discharge at this time. Discussed with  patient and patient's nurse and requested that case management be notified if discharge needs are identified. Case management will continue to follow progress and update discharge plan as needed.    Electronically signed by Nathaniel Levy on 4/17/2023 at 1:08 PM

## 2023-04-18 PROBLEM — R06.02 SHORTNESS OF BREATH: Status: ACTIVE | Noted: 2023-04-18

## 2023-04-18 PROBLEM — Z87.891 EX-SMOKER: Status: ACTIVE | Noted: 2023-04-18

## 2023-04-18 PROBLEM — B44.89 ASPERGILLUS FUMIGATUS (HCC): Status: ACTIVE | Noted: 2023-04-18

## 2023-04-18 PROBLEM — J44.9 CHRONIC OBSTRUCTIVE PULMONARY DISEASE (HCC): Status: ACTIVE | Noted: 2022-12-31

## 2023-04-18 LAB
BACTERIA SPEC RESP CULT: ABNORMAL
BACTERIA SPEC RESP CULT: ABNORMAL
GRAM STN SPEC: ABNORMAL
ORGANISM: ABNORMAL

## 2023-04-18 PROCEDURE — 86612 BLASTOMYCES ANTIBODY: CPT

## 2023-04-18 PROCEDURE — 86702 HIV-2 ANTIBODY: CPT

## 2023-04-18 PROCEDURE — 2580000003 HC RX 258: Performed by: INTERNAL MEDICINE

## 2023-04-18 PROCEDURE — 6360000002 HC RX W HCPCS: Performed by: INTERNAL MEDICINE

## 2023-04-18 PROCEDURE — 94669 MECHANICAL CHEST WALL OSCILL: CPT

## 2023-04-18 PROCEDURE — 99232 SBSQ HOSP IP/OBS MODERATE 35: CPT | Performed by: INTERNAL MEDICINE

## 2023-04-18 PROCEDURE — 86698 HISTOPLASMA ANTIBODY: CPT

## 2023-04-18 PROCEDURE — 86635 COCCIDIOIDES ANTIBODY: CPT

## 2023-04-18 PROCEDURE — 6370000000 HC RX 637 (ALT 250 FOR IP): Performed by: INTERNAL MEDICINE

## 2023-04-18 PROCEDURE — 87390 HIV-1 AG IA: CPT

## 2023-04-18 PROCEDURE — 86701 HIV-1ANTIBODY: CPT

## 2023-04-18 PROCEDURE — 2700000000 HC OXYGEN THERAPY PER DAY

## 2023-04-18 PROCEDURE — 36415 COLL VENOUS BLD VENIPUNCTURE: CPT

## 2023-04-18 PROCEDURE — 99223 1ST HOSP IP/OBS HIGH 75: CPT | Performed by: INTERNAL MEDICINE

## 2023-04-18 PROCEDURE — 87305 ASPERGILLUS AG IA: CPT

## 2023-04-18 PROCEDURE — 94761 N-INVAS EAR/PLS OXIMETRY MLT: CPT

## 2023-04-18 PROCEDURE — 86606 ASPERGILLUS ANTIBODY: CPT

## 2023-04-18 PROCEDURE — 1200000000 HC SEMI PRIVATE

## 2023-04-18 PROCEDURE — 94640 AIRWAY INHALATION TREATMENT: CPT

## 2023-04-18 PROCEDURE — 87449 NOS EACH ORGANISM AG IA: CPT

## 2023-04-18 RX ORDER — IPRATROPIUM BROMIDE AND ALBUTEROL SULFATE 2.5; .5 MG/3ML; MG/3ML
1 SOLUTION RESPIRATORY (INHALATION) 4 TIMES DAILY
Status: DISCONTINUED | OUTPATIENT
Start: 2023-04-18 | End: 2023-04-20 | Stop reason: HOSPADM

## 2023-04-18 RX ADMIN — IPRATROPIUM BROMIDE AND ALBUTEROL SULFATE 1 AMPULE: 2.5; .5 SOLUTION RESPIRATORY (INHALATION) at 08:16

## 2023-04-18 RX ADMIN — GUAIFENESIN 600 MG: 600 TABLET, EXTENDED RELEASE ORAL at 07:53

## 2023-04-18 RX ADMIN — GUAIFENESIN 600 MG: 600 TABLET, EXTENDED RELEASE ORAL at 20:13

## 2023-04-18 RX ADMIN — ACETAMINOPHEN 650 MG: 325 TABLET ORAL at 07:54

## 2023-04-18 RX ADMIN — LEVOFLOXACIN 750 MG: 5 INJECTION, SOLUTION INTRAVENOUS at 18:42

## 2023-04-18 RX ADMIN — IPRATROPIUM BROMIDE AND ALBUTEROL SULFATE 1 AMPULE: 2.5; .5 SOLUTION RESPIRATORY (INHALATION) at 12:24

## 2023-04-18 RX ADMIN — POLYETHYLENE GLYCOL 3350 17 G: 17 POWDER, FOR SOLUTION ORAL at 18:42

## 2023-04-18 RX ADMIN — METHADONE HYDROCHLORIDE 60 MG: 10 TABLET ORAL at 07:53

## 2023-04-18 RX ADMIN — Medication 10 ML: at 07:54

## 2023-04-18 RX ADMIN — ENOXAPARIN SODIUM 40 MG: 100 INJECTION SUBCUTANEOUS at 07:53

## 2023-04-18 RX ADMIN — TIOTROPIUM BROMIDE AND OLODATEROL 2 PUFF: 3.124; 2.736 SPRAY, METERED RESPIRATORY (INHALATION) at 08:16

## 2023-04-18 RX ADMIN — IPRATROPIUM BROMIDE AND ALBUTEROL SULFATE 1 AMPULE: 2.5; .5 SOLUTION RESPIRATORY (INHALATION) at 20:28

## 2023-04-18 RX ADMIN — PREDNISONE 40 MG: 20 TABLET ORAL at 07:53

## 2023-04-18 ASSESSMENT — ENCOUNTER SYMPTOMS
SINUS PRESSURE: 0
BACK PAIN: 0
SORE THROAT: 0
EYE DISCHARGE: 0
ABDOMINAL PAIN: 0
DIARRHEA: 0
RHINORRHEA: 0
EYE REDNESS: 0
SINUS PAIN: 0
SHORTNESS OF BREATH: 0
WHEEZING: 0
COUGH: 1
CONSTIPATION: 0
NAUSEA: 0

## 2023-04-18 ASSESSMENT — PAIN DESCRIPTION - DESCRIPTORS: DESCRIPTORS: SORE

## 2023-04-18 ASSESSMENT — PAIN DESCRIPTION - LOCATION: LOCATION: GENERALIZED

## 2023-04-18 ASSESSMENT — PAIN SCALES - GENERAL
PAINLEVEL_OUTOF10: 0
PAINLEVEL_OUTOF10: 0
PAINLEVEL_OUTOF10: 5

## 2023-04-18 NOTE — RT PROTOCOL NOTE
RT Nebulizer Bronchodilator Protocol Note    There is a bronchodilator order in the chart from a provider indicating to follow the RT Bronchodilator Protocol and there is an Initiate RT Bronchodilator Protocol order as well (see protocol at bottom of note). CXR Findings:  XR CHEST PORTABLE    Result Date: 4/17/2023  COPD changes. Bibasilar atelectasis versus infiltrates, increased from recent comparison. The findings from the last RT Protocol Assessment were as follows:  Smoking: Chronic pulmonary disease  Respiratory Pattern: Regular pattern and RR 12-20 bpm  Breath Sounds: Inspiratory and expiratory or bilateral wheezing and/or rhonchi  Cough: Strong, spontaneous, non-productive  Indication for Bronchodilator Therapy: Wheezing associated with pulm disorder  Bronchodilator Assessment Score: 8    Aerosolized bronchodilator medication orders have been revised according to the RT Nebulizer Bronchodilator Protocol below. Respiratory Therapist to perform RT Therapy Protocol Assessment initially then follow the protocol. Repeat RT Therapy Protocol Assessment PRN for score 0-3 or on second treatment, BID, and PRN for scores above 3. No Indications - adjust the frequency to every 6 hours PRN wheezing or bronchospasm, if no treatments needed after 48 hours then discontinue using Per Protocol order mode. If indication present, adjust the RT bronchodilator orders based on the Bronchodilator Assessment Score as indicated below. If a patient is on this medication at home then do not decrease Frequency below that used at home. 0-3 - enter or revise RT bronchodilator order(s) to equivalent RT Bronchodilator order with Frequency of every 4 hours PRN for wheezing or increased work of breathing using Per Protocol order mode.        4-6 - enter or revise RT Bronchodilator order(s) to two equivalent RT bronchodilator orders with one order with BID Frequency and one order with Frequency of every 4 hours PRN
increased work of breathing using Per Protocol order mode. 7-10 - enter or revise RT Bronchodilator order(s) to two equivalent RT bronchodilator orders with one order with TID Frequency and one order with Frequency of every 4 hours PRN wheezing or increased work of breathing using Per Protocol order mode. 11-13 - enter or revise RT Bronchodilator order(s) to one equivalent RT bronchodilator order with QID Frequency and an Albuterol order with Frequency of every 4 hours PRN wheezing or increased work of breathing using Per Protocol order mode. Greater than 13 - enter or revise RT Bronchodilator order(s) to one equivalent RT bronchodilator order with every 4 hours Frequency and an Albuterol order with Frequency of every 2 hours PRN wheezing or increased work of breathing using Per Protocol order mode. RT to enter RT Home Evaluation for COPD & MDI Assessment order using Per Protocol order mode. Pt.  Would benefit from remaining on q4 hr hhns times 24 hrs then reassess  Electronically signed by Geovany Estrada RCP on 4/17/2023 at 12:50 AM

## 2023-04-18 NOTE — CARE COORDINATION
Discharge Planning Note:    CM met with pt to provide requested PCP list.  In discussion with pt, pt reports that she has home O2 thru [employer]. Pt reports wearing 2 LPM at HS but has been told to wear it continuously for the next few weeks. Pt voiced concern that her insurance is not covering the O2 and asked CM to look into it. CM called Siobhan Lapping at [facility], advised of pt question and Siobhan Lapping will look into it. CM asked if new home O2 eval is needed, at this time, per Siobhan Lapping, no O2 eval is needed and pt is on file for continuous vs HS only. Siobhan Lapping will look further and notify this CM if home O2 eval is needed. Electronically signed by Clemente Mleara RN on 4/18/2023 at 11:43 AM      Update    CM received update from shantel at [facility] requesting home O2 eval.  Pt did not previously have insurance and [employer] will need new eval to send to insurance. Home O2 eval order obtained.      Electronically signed by Clemente Melara RN on 4/18/2023 at 12:21 PM

## 2023-04-18 NOTE — CONSULTS
Second Dose COVID-19, PFIZER PURPLE top, DILUTE for use, (age 15 y+), 30mcg/0.3mL   04/22/2021       Last COVID Lab SARS-CoV-2 RNA, RT PCR (no units)   Date Value   09/07/2022 DETECTED (A)     SARS-CoV-2, NAAT (no units)   Date Value   12/31/2022 Not Detected            Assessment:     The patient is a 63 y.o. old female who  has a past medical history of Asthma and Pneumothorax. with following problems:    Positive bronchial washing culture for Aspergillus fumigatus  Mucous plugging in the left lung base, noted on CT angiogram of the chest done on 4/15/2023  Chronic hypoxemic respiratory failure, on home oxygen  History of COVID-19 infection in September 2022  Chronic COPD  Ex heavy smoker, 32-pack-year smoking history, quit in December 2022        Discussion:      I reviewed the images of CT angiogram of her chest on 4/15/2023. The patient is a COPD changes with emphysematous blebs, mainly involving the right upper lobe area    She also does have some nonspecific lung nodules bilaterally, mainly at the lower bases. Hard to say if the Aspergillus fumigatus that has grown in the bronchial washing culture is truly causing an active pulmonary aspergillosis or is age-adjusted innocent bystander at this time. Plan:     Diagnostic Workup: Will order serum Aspergillus galactomannan and 1 3 beta glucan  Will order fungal serologies by immunodiffusion and complement fixation  Continue to follow fever curve, WBC count and blood cultures  Follow up on liverand renal functions closely    Antimicrobials:    I had a long discussion with the patient today. I recommended starting oral voriconazole. I explained to her that Aspergillus is a mold and a slow-growing organism. Hence, we will have to see how she responds to initiation of voriconazole over the next few months.   Typically pulmonary aspergillosis treated for 6 months to 1 year  The patient wants to think about it and wants to discuss with pulmonology

## 2023-04-19 LAB
HIV 1+2 AB+HIV1 P24 AG SERPL QL IA: NORMAL
HIV 2 AB SERPL QL IA: NORMAL
HIV1 AB SERPL QL IA: NORMAL
HIV1 P24 AG SERPL QL IA: NORMAL

## 2023-04-19 PROCEDURE — 6370000000 HC RX 637 (ALT 250 FOR IP): Performed by: INTERNAL MEDICINE

## 2023-04-19 PROCEDURE — 99232 SBSQ HOSP IP/OBS MODERATE 35: CPT | Performed by: INTERNAL MEDICINE

## 2023-04-19 PROCEDURE — 2580000003 HC RX 258: Performed by: INTERNAL MEDICINE

## 2023-04-19 PROCEDURE — 94640 AIRWAY INHALATION TREATMENT: CPT

## 2023-04-19 PROCEDURE — 94761 N-INVAS EAR/PLS OXIMETRY MLT: CPT

## 2023-04-19 PROCEDURE — 2700000000 HC OXYGEN THERAPY PER DAY

## 2023-04-19 PROCEDURE — 94669 MECHANICAL CHEST WALL OSCILL: CPT

## 2023-04-19 PROCEDURE — 6360000002 HC RX W HCPCS: Performed by: INTERNAL MEDICINE

## 2023-04-19 PROCEDURE — 1200000000 HC SEMI PRIVATE

## 2023-04-19 RX ADMIN — GUAIFENESIN 600 MG: 600 TABLET, EXTENDED RELEASE ORAL at 22:22

## 2023-04-19 RX ADMIN — IPRATROPIUM BROMIDE AND ALBUTEROL SULFATE 1 AMPULE: 2.5; .5 SOLUTION RESPIRATORY (INHALATION) at 12:17

## 2023-04-19 RX ADMIN — GUAIFENESIN 600 MG: 600 TABLET, EXTENDED RELEASE ORAL at 09:28

## 2023-04-19 RX ADMIN — IPRATROPIUM BROMIDE AND ALBUTEROL SULFATE 1 AMPULE: 2.5; .5 SOLUTION RESPIRATORY (INHALATION) at 09:32

## 2023-04-19 RX ADMIN — Medication 10 ML: at 22:23

## 2023-04-19 RX ADMIN — IPRATROPIUM BROMIDE AND ALBUTEROL SULFATE 1 AMPULE: 2.5; .5 SOLUTION RESPIRATORY (INHALATION) at 15:59

## 2023-04-19 RX ADMIN — TIOTROPIUM BROMIDE AND OLODATEROL 2 PUFF: 3.124; 2.736 SPRAY, METERED RESPIRATORY (INHALATION) at 09:32

## 2023-04-19 RX ADMIN — LEVOFLOXACIN 750 MG: 5 INJECTION, SOLUTION INTRAVENOUS at 19:42

## 2023-04-19 RX ADMIN — METHADONE HYDROCHLORIDE 60 MG: 10 TABLET ORAL at 09:28

## 2023-04-19 RX ADMIN — IPRATROPIUM BROMIDE AND ALBUTEROL SULFATE 1 AMPULE: 2.5; .5 SOLUTION RESPIRATORY (INHALATION) at 21:02

## 2023-04-19 RX ADMIN — ENOXAPARIN SODIUM 40 MG: 100 INJECTION SUBCUTANEOUS at 09:28

## 2023-04-19 RX ADMIN — PREDNISONE 40 MG: 20 TABLET ORAL at 09:28

## 2023-04-19 RX ADMIN — Medication 20 ML: at 09:51

## 2023-04-19 RX ADMIN — ACETAMINOPHEN 650 MG: 325 TABLET ORAL at 21:04

## 2023-04-19 ASSESSMENT — ENCOUNTER SYMPTOMS
DIARRHEA: 0
COUGH: 1
WHEEZING: 0
BACK PAIN: 0
RHINORRHEA: 0
SORE THROAT: 0
ABDOMINAL PAIN: 0
EYE DISCHARGE: 0
SINUS PRESSURE: 0
SINUS PAIN: 0
SHORTNESS OF BREATH: 0
EYE REDNESS: 0
CONSTIPATION: 0
NAUSEA: 0

## 2023-04-19 ASSESSMENT — PAIN SCALES - GENERAL
PAINLEVEL_OUTOF10: 4
PAINLEVEL_OUTOF10: 4

## 2023-04-19 ASSESSMENT — PAIN DESCRIPTION - LOCATION: LOCATION: LEG

## 2023-04-19 NOTE — PLAN OF CARE
Problem: Pain  Goal: Verbalizes/displays adequate comfort level or baseline comfort level  Outcome: Progressing    Pain/discomfort being managed with PRN analgesics per MD orders. Pt able to express presence and absence of pain and rate pain appropriately using numerical scale. Non-pharmacologic comfort measures implemented. Rest and comfort promoted.       Problem: Respiratory - Adult  Goal: Achieves optimal ventilation and oxygenation  Outcome: Progressing     Problem: Cardiovascular - Adult  Goal: Maintains optimal cardiac output and hemodynamic stability  Outcome: Progressing

## 2023-04-20 VITALS
BODY MASS INDEX: 22.73 KG/M2 | TEMPERATURE: 97.7 F | HEIGHT: 68 IN | HEART RATE: 94 BPM | SYSTOLIC BLOOD PRESSURE: 135 MMHG | RESPIRATION RATE: 17 BRPM | OXYGEN SATURATION: 93 % | WEIGHT: 150 LBS | DIASTOLIC BLOOD PRESSURE: 66 MMHG

## 2023-04-20 LAB
ANION GAP SERPL CALCULATED.3IONS-SCNC: 8 MMOL/L (ref 3–16)
BUN SERPL-MCNC: 17 MG/DL (ref 7–20)
CALCIUM SERPL-MCNC: 9 MG/DL (ref 8.3–10.6)
CHLORIDE SERPL-SCNC: 104 MMOL/L (ref 99–110)
CO2 SERPL-SCNC: 29 MMOL/L (ref 21–32)
CREAT SERPL-MCNC: 0.6 MG/DL (ref 0.6–1.1)
DEPRECATED RDW RBC AUTO: 13.5 % (ref 12.4–15.4)
GFR SERPLBLD CREATININE-BSD FMLA CKD-EPI: >60 ML/MIN/{1.73_M2}
GLUCOSE SERPL-MCNC: 94 MG/DL (ref 70–99)
HCT VFR BLD AUTO: 39.5 % (ref 36–48)
HGB BLD-MCNC: 13.1 G/DL (ref 12–16)
MCH RBC QN AUTO: 29.3 PG (ref 26–34)
MCHC RBC AUTO-ENTMCNC: 33.1 G/DL (ref 31–36)
MCV RBC AUTO: 88.4 FL (ref 80–100)
PLATELET # BLD AUTO: 204 K/UL (ref 135–450)
PMV BLD AUTO: 8.4 FL (ref 5–10.5)
POTASSIUM SERPL-SCNC: 3.9 MMOL/L (ref 3.5–5.1)
RBC # BLD AUTO: 4.47 M/UL (ref 4–5.2)
SODIUM SERPL-SCNC: 141 MMOL/L (ref 136–145)
WBC # BLD AUTO: 8.2 K/UL (ref 4–11)

## 2023-04-20 PROCEDURE — 94640 AIRWAY INHALATION TREATMENT: CPT

## 2023-04-20 PROCEDURE — 85027 COMPLETE CBC AUTOMATED: CPT

## 2023-04-20 PROCEDURE — 6370000000 HC RX 637 (ALT 250 FOR IP): Performed by: INTERNAL MEDICINE

## 2023-04-20 PROCEDURE — 94680 O2 UPTK RST&XERS DIR SIMPLE: CPT

## 2023-04-20 PROCEDURE — 2580000003 HC RX 258: Performed by: INTERNAL MEDICINE

## 2023-04-20 PROCEDURE — 80048 BASIC METABOLIC PNL TOTAL CA: CPT

## 2023-04-20 PROCEDURE — 6360000002 HC RX W HCPCS: Performed by: INTERNAL MEDICINE

## 2023-04-20 PROCEDURE — 94761 N-INVAS EAR/PLS OXIMETRY MLT: CPT

## 2023-04-20 PROCEDURE — 2700000000 HC OXYGEN THERAPY PER DAY

## 2023-04-20 PROCEDURE — 94669 MECHANICAL CHEST WALL OSCILL: CPT

## 2023-04-20 PROCEDURE — 99233 SBSQ HOSP IP/OBS HIGH 50: CPT | Performed by: INTERNAL MEDICINE

## 2023-04-20 RX ORDER — GUAIFENESIN 200 MG/1
600 TABLET ORAL 2 TIMES DAILY
COMMUNITY
Start: 2023-04-20

## 2023-04-20 RX ORDER — LEVOFLOXACIN 750 MG/1
750 TABLET ORAL DAILY
Qty: 2 TABLET | Refills: 0 | Status: SHIPPED | OUTPATIENT
Start: 2023-04-20 | End: 2023-04-22

## 2023-04-20 RX ORDER — PREDNISONE 10 MG/1
TABLET ORAL
Qty: 16 TABLET | Refills: 0 | Status: SHIPPED | OUTPATIENT
Start: 2023-04-21 | End: 2023-04-28

## 2023-04-20 RX ORDER — POSACONAZOLE 100 MG/1
300 TABLET, DELAYED RELEASE ORAL
Qty: 90 TABLET | Refills: 2 | Status: SHIPPED | OUTPATIENT
Start: 2023-04-20 | End: 2023-07-19

## 2023-04-20 RX ADMIN — ACETAMINOPHEN 650 MG: 325 TABLET ORAL at 09:56

## 2023-04-20 RX ADMIN — TIOTROPIUM BROMIDE AND OLODATEROL 2 PUFF: 3.124; 2.736 SPRAY, METERED RESPIRATORY (INHALATION) at 08:38

## 2023-04-20 RX ADMIN — ENOXAPARIN SODIUM 40 MG: 100 INJECTION SUBCUTANEOUS at 09:53

## 2023-04-20 RX ADMIN — METHADONE HYDROCHLORIDE 60 MG: 10 TABLET ORAL at 09:54

## 2023-04-20 RX ADMIN — IPRATROPIUM BROMIDE AND ALBUTEROL SULFATE 1 AMPULE: 2.5; .5 SOLUTION RESPIRATORY (INHALATION) at 12:29

## 2023-04-20 RX ADMIN — GUAIFENESIN 600 MG: 600 TABLET, EXTENDED RELEASE ORAL at 09:54

## 2023-04-20 RX ADMIN — Medication 10 ML: at 09:55

## 2023-04-20 RX ADMIN — IPRATROPIUM BROMIDE AND ALBUTEROL SULFATE 1 AMPULE: 2.5; .5 SOLUTION RESPIRATORY (INHALATION) at 08:38

## 2023-04-20 RX ADMIN — PREDNISONE 40 MG: 20 TABLET ORAL at 09:55

## 2023-04-20 RX ADMIN — IPRATROPIUM BROMIDE AND ALBUTEROL SULFATE 1 AMPULE: 2.5; .5 SOLUTION RESPIRATORY (INHALATION) at 15:50

## 2023-04-20 ASSESSMENT — ENCOUNTER SYMPTOMS
EYE DISCHARGE: 0
SHORTNESS OF BREATH: 0
COUGH: 0
NAUSEA: 0
EYE REDNESS: 0
BACK PAIN: 0
RHINORRHEA: 0
WHEEZING: 0
DIARRHEA: 0
SINUS PRESSURE: 0
SINUS PAIN: 0
ABDOMINAL PAIN: 0
SORE THROAT: 0
CONSTIPATION: 0

## 2023-04-20 ASSESSMENT — PAIN DESCRIPTION - ORIENTATION
ORIENTATION: ANTERIOR

## 2023-04-20 ASSESSMENT — PAIN DESCRIPTION - DESCRIPTORS
DESCRIPTORS: DULL

## 2023-04-20 ASSESSMENT — PAIN DESCRIPTION - LOCATION
LOCATION: HEAD

## 2023-04-20 ASSESSMENT — PAIN SCALES - GENERAL
PAINLEVEL_OUTOF10: 5
PAINLEVEL_OUTOF10: 0
PAINLEVEL_OUTOF10: 5
PAINLEVEL_OUTOF10: 0
PAINLEVEL_OUTOF10: 5
PAINLEVEL_OUTOF10: 5
PAINLEVEL_OUTOF10: 0
PAINLEVEL_OUTOF10: 4

## 2023-04-20 NOTE — PROGRESS NOTES
04/16/23 8656   Treatment   Treatment Type Vest
04/17/23 0049   RT Protocol   History Pulmonary Disease 2   Respiratory pattern 4   Breath sounds 6   Cough 0   Indications for Bronchodilator Therapy Decreased or absent breath sounds; On home bronchodilators   Bronchodilator Assessment Score 12
04/20/23 1541   Resting (Room Air)   SpO2 88      Resting (On O2)   O2 Device Nasal cannula   During Walk (On O2)   SpO2 92      O2 Device Nasal cannula   O2 Flow Rate (l/min) 2 l/min   Need Additional O2 Flow Rate Rows Yes   O2 Flow Rate (l/min) 1 l/min   O2 Saturation 90   After Walk   Does the Patient Qualify for Home O2 Yes
Assessment complete. VSS. Patient resting in bed. Respirations even and easy. Call light in reach. Fall precautions in place. No needs expressed at this time. Will continue to monitor.
Chest vest held at this time as patient just ate.
Discharge instructions reviewed with pt, KARTHIK perez. Pt will call Dr Epifanio Sahni office in AM to see what he plans to do with discharge antifungal since pt unable to afford Noxafil. Pt assisted by nursing staff via wheelchair to private vehicle. Pt with personal belongings and portable O2 tank for discharge.
Infectious Diseases   Progress Note      Admission Date: 4/15/2023  Hospital Day: Hospital Day: 6   Attending: Kassandra Pinto MD  Date of service: 4/20/2023     Chief complaint/ Reason for consult:     Positive bronchial washing culture for Aspergillus fumigatus  Mucous plugging in the left lung base, noted on CT angiogram of the chest done on 4/15/2023  Chronic hypoxemic respiratory failure, on home oxygen  History of COVID-19 infection in September 2022    Microbiology:        I have reviewed allavailable micro lab data and cultures    Bronchial washing culture  - collected on 4/16/2023: Aspergillus fumigatus      Antibiotics and immunizations:       Current antibiotics: All antibiotics and their doses were reviewed by me    Recent Abx Admin                     levoFLOXacin (LEVAQUIN) 750 MG/150ML infusion 750 mg (mg) 750 mg New Bag 04/19/23 1942                      Immunization History: All immunization history was reviewed by me today. Immunization History   Administered Date(s) Administered    COVID-19, PFIZER PURPLE top, DILUTE for use, (age 15 y+), 30mcg/0.3mL 04/01/2021, 04/22/2021, 12/23/2021    Hep A, HAVRIX, VAQTA, (age 19y+), IM, 1mL 09/25/2019       Known drug allergies: All allergies were reviewed and updated    Allergies   Allergen Reactions    Penicillins Shortness Of Breath    Codeine Nausea And Vomiting       Social history:     Social History:  All social andepidemiologic history was reviewed and updated by me today as needed. Tobacco use:   reports that she quit smoking about 3 months ago. Her smoking use included cigarettes. She has a 32.00 pack-year smoking history. She has never used smokeless tobacco.  Alcohol use:   reports no history of alcohol use. Currently lives in: [facility]   reports no history of drug use.      COVID VACCINATION AND LAB RESULT RECORDS:     Internal Administration   First Dose COVID-19, PFIZER PURPLE top, DILUTE for use, (age 15 y+),
[facility] HOSPITALISTS PROGRESS NOTE    4/19/2023 3:19 PM        Name: Kristina Thapa . Admitted: 4/15/2023  Primary Care Provider: Bette Mccartney MD (Tel: [phone])      Chief Complaint   Patient presents with    Shortness of Breath     Pt states a few hours ago she started having a coughing fit and feels like something is in her throat that she can't get up. Pt feels like she can't get enough oxygen in her lungs. Pt has hx of COPD. Pt wears oxygen PRN and feels like she needs it, currently on 2. Pt took guaifenesin and 2 breathing treatments today. Pt O2 is at 94 on 2L NC. Pt also has hx of 3 Pnuemothorax     Brief History: 62 yo female hx COPD, on intermittent O2 use at home. Presented to ER with worsening shortness of breath, cough. Admitted with COPD exacerbation. Subjective:  Sitting up in bed. Admits she does not feel as well today as she did yesterday. Reports some worsening shortness of breath and chest congestion. Has been using Acapella and chest vest, says she is bringing up scant green phlegm.     Reviewed interval ancillary notes    Current Medications  ipratropium-albuterol (DUONEB) nebulizer solution 1 ampule, 4x daily  albuterol (PROVENTIL) nebulizer solution 2.5 mg, Q4H PRN  guaiFENesin (MUCINEX) extended release tablet 600 mg, BID  methadone (DOLOPHINE) tablet 60 mg, Daily  tiotropium-olodaterol (STIOLTO) 2.5-2.5 MCG/ACT inhaler 2 puff, Daily  sodium chloride flush 0.9 % injection 5-40 mL, 2 times per day  sodium chloride flush 0.9 % injection 5-40 mL, PRN  0.9 % sodium chloride infusion, PRN  ondansetron (ZOFRAN-ODT) disintegrating tablet 4 mg, Q8H PRN   Or  ondansetron (ZOFRAN) injection 4 mg, Q6H PRN  polyethylene glycol (GLYCOLAX) packet 17 g, Daily PRN  enoxaparin (LOVENOX) injection 40 mg, Daily  acetaminophen (TYLENOL) tablet 650 mg, Q6H PRN   Or  acetaminophen (TYLENOL) suppository 650 mg,
[facility] Pulmonary/CCM Progress note      Admit Date: 4/15/2023    Chief Complaint: Shortness of breath, cough and wheezing    Subjective: Interval History: Mucous plugging of airway, improved shortness of breath and cough. States that the chest vest therapy is working. Currently on 2 L O2.     Scheduled Meds:   ipratropium-albuterol  1 ampule Inhalation 4x daily    guaiFENesin  600 mg Oral BID    methadone  60 mg Oral Daily    tiotropium-olodaterol  2 puff Inhalation Daily    sodium chloride flush  5-40 mL IntraVENous 2 times per day    enoxaparin  40 mg SubCUTAneous Daily    levofloxacin  750 mg IntraVENous Q24H     Continuous Infusions:   sodium chloride       PRN Meds:albuterol, sodium chloride flush, sodium chloride, ondansetron **OR** ondansetron, polyethylene glycol, acetaminophen **OR** acetaminophen, guaiFENesin-dextromethorphan    Review of Systems  Constitutional: negative for fatigue, fevers, malaise and weight loss  Ears, nose, mouth, throat: negative for ear drainage, epistaxis, hoarseness, nasal congestion, sore throat and voice change  Respiratory: negative except for cough and shortness of breath  Cardiovascular: negative for chest pain, chest pressure/discomfort, irregular heart beat, lower extremity edema and palpitations  Gastrointestinal: negative for abdominal pain, constipation, diarrhea, jaundice, melena, odynophagia, reflux symptoms and vomiting  Hematologic/lymphatic: negative for bleeding, easy bruising, lymphadenopathy and petechiae  Musculoskeletal:negative for arthralgias, bone pain, muscle weakness, neck pain and stiff joints  Neurological: negative for dizziness, gait problems, headaches, seizures, speech problems, tremors and weakness  Behavioral/Psych: negative for anxiety, behavior problems, depression, fatigue and sleep disturbance  Endocrine: negative for diabetic symptoms including none, neuropathy, polyphagia, polyuria, polydipsia, vomiting and diarrhea and temperature
Per Dr Belcher Apt not urgent can address as OP. [facility] and notified Casper Alatorre [phone] Dr Tami Valdivia will follow up with OP.
[facility] Pulmonary/CCM Progress note      Admit Date: 4/15/2023    Chief Complaint: Shortness of breath, cough and wheezing    Subjective: Interval History: Mucous plugging of left mainstem bronchus, status post bronchoscopy. Still on 2 L O2. Small amount of mucus expectorated. Using chest vest therapy.     Scheduled Meds:   ipratropium-albuterol  1 ampule Inhalation 4x daily    guaiFENesin  600 mg Oral BID    methadone  60 mg Oral Daily    tiotropium-olodaterol  2 puff Inhalation Daily    sodium chloride flush  5-40 mL IntraVENous 2 times per day    enoxaparin  40 mg SubCUTAneous Daily    levofloxacin  750 mg IntraVENous Q24H    predniSONE  40 mg Oral Daily     Continuous Infusions:   sodium chloride       PRN Meds:albuterol, sodium chloride flush, sodium chloride, ondansetron **OR** ondansetron, polyethylene glycol, acetaminophen **OR** acetaminophen, guaiFENesin-dextromethorphan    Review of Systems  Constitutional: negative for fatigue, fevers, malaise and weight loss  Ears, nose, mouth, throat: negative for ear drainage, epistaxis, hoarseness, nasal congestion, sore throat and voice change  Respiratory: negative except for cough and shortness of breath  Cardiovascular: negative for chest pain, chest pressure/discomfort, irregular heart beat, lower extremity edema and palpitations  Gastrointestinal: negative for abdominal pain, constipation, diarrhea, jaundice, melena, odynophagia, reflux symptoms and vomiting  Hematologic/lymphatic: negative for bleeding, easy bruising, lymphadenopathy and petechiae  Musculoskeletal:negative for arthralgias, bone pain, muscle weakness, neck pain and stiff joints  Neurological: negative for dizziness, gait problems, headaches, seizures, speech problems, tremors and weakness  Behavioral/Psych: negative for anxiety, behavior problems, depression, fatigue and sleep disturbance  Endocrine: negative for diabetic symptoms including none, neuropathy, polyphagia, polyuria, polydipsia,
Received call from Pato S Silvia Marquez that pt discharge medication Noxafil (antifungal) will cost pt $3,800.00 after insurance out of pocket and pt states that she cannot afford this. Dr Julissa Caldera.  Will call Trell back at [phone] [facility] Pharmacist.
Routine VS obtained. Patient c/o pain, 5/10. Scheduled methadone administered as well as PRN tylenol. Other scheduled medications administered. Pt is currently resting in bed, sitting up at this time. Call light within reach. No further needs expressed.
Shift assessment completed. Routine vitals obtained. Patient c/o pain 5/10 that is generalized and describes it as \"sore. \" Scheduled methadone administered as well as other scheduled medications given. Patient is awake, alert and oriented. Patient is currently resting in bed, semi-fowlers. Call light within reach. No further needs expressed.
polyphagia, polyuria, polydipsia, vomiting and diarrhea and temperature intolerance  Allergic/Immunologic: negative for anaphylaxis, angioedema, hay fever and urticaria    Objective:     Patient Vitals for the past 8 hrs:   BP Temp Temp src Pulse Resp SpO2   04/19/23 0943 -- -- -- 98 18 92 %   04/19/23 0941 -- -- -- 98 18 92 %   04/19/23 0939 -- -- -- 98 18 92 %   04/19/23 0933 -- -- -- 98 18 92 %   04/19/23 0815 (!) 134/56 97.9 °F (36.6 °C) Oral 91 20 90 %   04/19/23 0611 116/67 98.3 °F (36.8 °C) Oral 71 18 92 %       I/O last 3 completed shifts:   In: 901.6 [P.O.:480; IV Piggyback:421.6]  Out: -   I/O this shift:  In: 240 [P.O.:240]  Out: -     General Appearance: alert and oriented to person, place and time, well developed and well- nourished, in no acute distress  Skin: warm and dry, no rash or erythema  Head: normocephalic and atraumatic  Eyes: pupils equal, round, and reactive to light, extraocular eye movements intact, conjunctivae normal  ENT: external ear and ear canal normal bilaterally, nose without deformity, nasal mucosa and turbinates normal  Neck: supple and non-tender without mass, no cervical lymphadenopathy  Pulmonary/Chest: clear to auscultation bilaterally- no wheezes, rales or rhonchi, normal air movement, no respiratory distress  Cardiovascular: normal rate, regular rhythm,  no murmurs, rubs, distal pulses intact, no carotid bruits  Abdomen: soft, non-tender, non-distended, normal bowel sounds, no masses or organomegaly  Lymph Nodes: Cervical, supraclavicular normal  Extremities: no cyanosis, clubbing or edema  Musculoskeletal: normal range of motion, no joint swelling, deformity or tenderness  Neurologic: alert, no focal neurologic deficits    Data Review:  CBC:   Lab Results   Component Value Date/Time    WBC 4.7 04/16/2023 06:33 AM    RBC 4.81 04/16/2023 06:33 AM     BMP:   Lab Results   Component Value Date/Time    GLUCOSE 140 04/16/2023 06:33 AM    CO2 27 04/16/2023 06:33 AM    BUN 12
distally. Recommend pulmonology correlation and   suggest short-term follow-up to assure resolution. Mild bibasilar atelectasis vs early infiltrates posterolaterally which is   more prominent. Mild chronic obstructive lung changes with bullous emphysematous changes of   lungs and the upper lobes and mild linear scarring along the lung bases   anteriorly which is less prominent. Small pulmonary nodules left upper lobe and right lower lobe which measure up   to 8 mm and are slightly more prominent. Recommend short-term follow-up or   PET scan correlation.        MELISSA Vasquez CNP   4/17/2023 11:58 AM
significantly improved shortness of breath  - Bronchial cultures growing Aspergillus fumigatus. ID has been consulted    Pulmonary nodules  - CT scan with multiple small nodules noted MOHAN and RLL which measure up to 8 mm  - Recommend short term follow up or PET scan correlation    Acute on chronic hypoxic respiratory failure  - Presents with increased work of breathing, pO2 56.9 on ABGs on presentation  - Has O2 at home but has only been using nocturnally  - O2 sats currently low to mid 90s, stable with ambulation    Disposition: Anticipate home likely tomorrow. Await ID consult and recs. Diet: ADULT DIET;  Regular  Code:Full Code  DVT PPX: enoxaparin      MELISSA Winston - CNP   4/18/2023 12:38 PM
regular rhythm. Heart sounds: Normal heart sounds. No murmur heard. No friction rub. Pulmonary:      Effort: No respiratory distress. Breath sounds: No stridor. No wheezing or rales. Abdominal:      General: Bowel sounds are normal.      Palpations: Abdomen is soft. Tenderness: There is no abdominal tenderness. There is no guarding or rebound. Musculoskeletal:         General: No swelling, tenderness or deformity. Normal range of motion. Cervical back: Normal range of motion and neck supple. Right lower leg: No edema. Left lower leg: No edema. Lymphadenopathy:      Cervical: No cervical adenopathy. Skin:     General: Skin is warm and dry. Coloration: Skin is not jaundiced. Findings: No bruising, erythema or rash. Neurological:      General: No focal deficit present. Mental Status: She is alert and oriented to person, place, and time. Mental status is at baseline. Motor: No abnormal muscle tone. Psychiatric:         Mood and Affect: Mood normal.         Behavior: Behavior normal.         Lines and drains: All vascular access sites are healthy with no local erythema, discharge or tenderness. Intake and output:    I/O last 3 completed shifts: In: 901.6 [P.O.:480; IV Piggyback:421.6]  Out: -     Lab Data:   All available labs and old records have been reviewed by me. CBC:No results for input(s): WBC, RBC, HGB, HCT, PLT, MCV, MCH, MCHC, RDW, NRBC, SEGSPCT, BANDSPCT in the last 72 hours. BMP:  No results for input(s): NA, K, CL, CO2, BUN, CREATININE, CALCIUM, GLUCOSE in the last 72 hours.      Hepatic Function Panel:   Lab Results   Component Value Date/Time    ALKPHOS 73 04/15/2023 05:21 PM    ALT 12 04/15/2023 05:21 PM    AST 18 04/15/2023 05:21 PM    PROT 7.3 04/15/2023 05:21 PM    BILITOT 0.4 04/15/2023 05:21 PM    LABALBU 4.4 04/15/2023 05:21 PM       CPK:   Lab Results   Component Value Date    CKTOTAL 217 (H) 09/06/2022     ESR:   Lab
requirements. Currently only on 2 L O2. Will continue with nebulizer therapy/Acapella valve and chest vest.  Patient states that she has not had much mucus expectoration. Severe COPD with acute exacerbation, related to mucous plugging of airway. Continue with bronchodilators, Solu-Medrol will be switched over to prednisone from tomorrow. Potential discharge tomorrow. Might require evaluation for home oxygen.     Dee Luz MD

## 2023-04-20 NOTE — DISCHARGE INSTRUCTIONS
Call  on [facility] [phone] to see if financial assistance is needed after speaking with Dr Karla Helm

## 2023-04-20 NOTE — DISCHARGE SUMMARY
Himanshu Bai RCP  Respiratory Therapist  Specialty:  Respiratory Therapy  Progress Notes      Signed  Date of Service:  4/20/2023  3:46 PM     Signed                           04/20/23 1541   Resting (Room Air)   SpO2 88      Resting (On O2)   O2 Device Nasal cannula   During Walk (On O2)   SpO2 92      O2 Device Nasal cannula   O2 Flow Rate (l/min) 2 l/min   Need Additional O2 Flow Rate Rows Yes   O2 Flow Rate (l/min) 1 l/min   O2 Saturation 90   After Walk   Does the Patient Qualify for Home O2 Yes
glycopyrrolate-formoterol 9-4.8 MCG/ACT Aero  Commonly known as: BEVESPI     pantoprazole 40 MG tablet  Commonly known as: PROTONIX               Where to Get Your Medications        These medications were sent to [facility] [address] [address]      Phone: [phone]   levoFLOXacin 750 MG tablet  posaconazole 100 MG Tbec tablet  predniSONE 10 MG tablet  tiotropium-olodaterol 2.5-2.5 MCG/ACT Aers     DC meds above patient reported as no longer taking    Discharge recommendations given to patient. Follow Up. PCP in 1 week, pulmonary in 2 weeks, ID in 3 months  Disposition. home  Activity. activity as tolerated  Diet: ADULT DIET; Regular      Spent 40 minutes in discharge process.     Signed:  MELISSA Fraire CNP     4/20/2023 4:35 PM

## 2023-04-20 NOTE — PLAN OF CARE
Problem: Respiratory - Adult  Goal: Achieves optimal ventilation and oxygenation  Outcome: Adequate for Discharge     Problem: Cardiovascular - Adult  Goal: Maintains optimal cardiac output and hemodynamic stability  Outcome: Adequate for Discharge  Goal: Absence of cardiac dysrhythmias or at baseline  Outcome: Adequate for Discharge     Problem: Pain  Goal: Verbalizes/displays adequate comfort level or baseline comfort level  Outcome: Adequate for Discharge  Flowsheets (Taken 4/20/2023 5574)  Verbalizes/displays adequate comfort level or baseline comfort level:   Encourage patient to monitor pain and request assistance   Assess pain using appropriate pain scale   Administer analgesics based on type and severity of pain and evaluate response

## 2023-04-21 LAB
(1,3)-BETA-D-GLUCAN (FUNGITELL) INTERPRETATION: NEGATIVE
1,3 BETA GLUCAN SER-MCNC: <31 PG/ML
GALACTOMANNAN AG SERPL IA-ACNC: 0.08
GALACTOMANNAN AG SERPL QL IA: NEGATIVE

## 2023-04-23 LAB
ASPERGILLUS AB TITR SER CF: NORMAL {TITER}
B DERMAT AB SER-ACNC: 0.2 IV
COCCIDIOIDES AB TITR SER CF: NORMAL {TITER}
H CAPSUL MYC AB TITR SER CF: NORMAL {TITER}
H CAPSUL YST AB TITR SER CF: NORMAL {TITER}

## 2023-04-24 LAB
ASPERGILLUS AB SER QL ID: NOT DETECTED
B DERMAT AB SER QL ID: NOT DETECTED
COCCIDIOIDES AB SPEC QL ID: NOT DETECTED
FUNGUS SPEC CULT: NORMAL
H CAPSUL AB TITR SER ID: NOT DETECTED {TITER}
LOEFFLER MB STN SPEC: NORMAL

## 2023-04-25 LAB
ACID FAST STN SPEC QL: NORMAL
MYCOBACTERIUM SPEC CULT: NORMAL

## 2023-05-01 LAB
FUNGUS SPEC CULT: NORMAL
LOEFFLER MB STN SPEC: NORMAL

## 2023-05-02 LAB
ACID FAST STN SPEC QL: NORMAL
MYCOBACTERIUM SPEC CULT: NORMAL

## 2023-05-08 LAB
FUNGUS SPEC CULT: NORMAL
LOEFFLER MB STN SPEC: NORMAL

## 2023-05-09 ENCOUNTER — OFFICE VISIT (OUTPATIENT)
Dept: PULMONOLOGY | Age: 58
End: 2023-05-09
Payer: COMMERCIAL

## 2023-05-09 VITALS
HEART RATE: 102 BPM | OXYGEN SATURATION: 93 % | SYSTOLIC BLOOD PRESSURE: 130 MMHG | DIASTOLIC BLOOD PRESSURE: 70 MMHG | BODY MASS INDEX: 23.79 KG/M2 | WEIGHT: 157 LBS | HEIGHT: 68 IN

## 2023-05-09 DIAGNOSIS — B44.89 ASPERGILLUS FUMIGATUS (HCC): ICD-10-CM

## 2023-05-09 DIAGNOSIS — T17.500A MUCUS PLUGGING OF BRONCHI: Primary | ICD-10-CM

## 2023-05-09 DIAGNOSIS — J44.1 COPD EXACERBATION (HCC): ICD-10-CM

## 2023-05-09 LAB
ACID FAST STN SPEC QL: NORMAL
MYCOBACTERIUM SPEC CULT: NORMAL

## 2023-05-09 PROCEDURE — 1036F TOBACCO NON-USER: CPT | Performed by: INTERNAL MEDICINE

## 2023-05-09 PROCEDURE — G8427 DOCREV CUR MEDS BY ELIG CLIN: HCPCS | Performed by: INTERNAL MEDICINE

## 2023-05-09 PROCEDURE — G8420 CALC BMI NORM PARAMETERS: HCPCS | Performed by: INTERNAL MEDICINE

## 2023-05-09 PROCEDURE — 3023F SPIROM DOC REV: CPT | Performed by: INTERNAL MEDICINE

## 2023-05-09 PROCEDURE — 1111F DSCHRG MED/CURRENT MED MERGE: CPT | Performed by: INTERNAL MEDICINE

## 2023-05-09 PROCEDURE — 3017F COLORECTAL CA SCREEN DOC REV: CPT | Performed by: INTERNAL MEDICINE

## 2023-05-09 PROCEDURE — 99214 OFFICE O/P EST MOD 30 MIN: CPT | Performed by: INTERNAL MEDICINE

## 2023-05-09 RX ORDER — TIOTROPIUM BROMIDE 18 UG/1
18 CAPSULE ORAL; RESPIRATORY (INHALATION) DAILY
Qty: 90 CAPSULE | Refills: 1 | Status: SHIPPED | OUTPATIENT
Start: 2023-05-09

## 2023-05-09 ASSESSMENT — ENCOUNTER SYMPTOMS
CONSTIPATION: 0
VOICE CHANGE: 0
DIARRHEA: 0
ABDOMINAL DISTENTION: 0
SORE THROAT: 0
WHEEZING: 0
ABDOMINAL PAIN: 0
APNEA: 0
CHOKING: 0
ANAL BLEEDING: 0
COUGH: 1
BLOOD IN STOOL: 0
RHINORRHEA: 0
SHORTNESS OF BREATH: 1
SINUS PRESSURE: 0
CHEST TIGHTNESS: 0
STRIDOR: 0

## 2023-05-09 NOTE — PROGRESS NOTES
Cassy Bender    YOB: 1965     Date of Service:  5/9/2023     Chief Complaint   Patient presents with    Follow-Up from Hospital    Chest Congestion     Mostly in the morning         HPI patient was recently hospitalized between 4/15 and 4/20 for mucous plugging of airway and COPD exacerbation. Patient has had about 3 exacerbations of COPD since August 2022. Required bronchoscopy performed on 4/16, with large mucous plug noted in the left mainstem bronchus extending into the left lower lobe. Patient is currently using albuterol inhaler and DuoNeb breathing treatments 3-4 times a day, also using Acapella valve which she obtained during the hospitalization. Overall her respiratory status has improved, stable-not needing oxygen. Dyspnea with cough-mucoid phlegm helped by Acapella valve. Denies any wheezing or chest tightness. Allergies   Allergen Reactions    Penicillins Shortness Of Breath    Codeine Nausea And Vomiting     Outpatient Medications Marked as Taking for the 5/9/23 encounter (Office Visit) with David Barreto MD   Medication Sig Dispense Refill    tiotropium (SPIRIVA HANDIHALER) 18 MCG inhalation capsule Inhale 1 capsule into the lungs daily 90 capsule 1    mometasone-formoterol (DULERA) 200-5 MCG/ACT inhaler Inhale 2 puffs into the lungs in the morning and 2 puffs in the evening.  1 each 3    guaiFENesin 200 MG tablet Take 3 tablets by mouth in the morning and at bedtime      posaconazole (NOXAFIL) 100 MG TBEC tablet Take 3 tablets by mouth daily (with breakfast) 90 tablet 2    albuterol sulfate HFA (PROVENTIL;VENTOLIN;PROAIR) 108 (90 Base) MCG/ACT inhaler Inhale 2 puffs into the lungs every 6 hours as needed for Wheezing 18 g 11    ipratropium-albuterol (DUONEB) 0.5-2.5 (3) MG/3ML SOLN nebulizer solution Inhale 3 mLs into the lungs every 4 hours 360 mL 3    albuterol (PROVENTIL) (2.5 MG/3ML) 0.083% nebulizer solution Take 3 mLs by nebulization every 4 hours as

## 2023-05-10 ENCOUNTER — TELEPHONE (OUTPATIENT)
Dept: PULMONOLOGY | Age: 58
End: 2023-05-10

## 2023-05-10 RX ORDER — FLUTICASONE PROPIONATE AND SALMETEROL 250; 50 UG/1; UG/1
1 POWDER RESPIRATORY (INHALATION) EVERY 12 HOURS
COMMUNITY

## 2023-05-10 NOTE — TELEPHONE ENCOUNTER
Patients insurance company called from [facility] and informed that prior Auth on patients dulera was denied.        PH: [phone]  [id number]

## 2023-05-12 RX ORDER — FLUTICASONE PROPIONATE AND SALMETEROL 250; 50 UG/1; UG/1
1 POWDER RESPIRATORY (INHALATION) EVERY 12 HOURS
Qty: 60 EACH | Refills: 5 | Status: SHIPPED | OUTPATIENT
Start: 2023-05-12

## 2023-05-15 LAB
FUNGUS SPEC CULT: NORMAL
LOEFFLER MB STN SPEC: NORMAL

## 2023-05-16 LAB
ACID FAST STN SPEC QL: NORMAL
MYCOBACTERIUM SPEC CULT: NORMAL

## 2023-05-23 ENCOUNTER — OFFICE VISIT (OUTPATIENT)
Dept: INFECTIOUS DISEASES | Age: 58
End: 2023-05-23
Payer: COMMERCIAL

## 2023-05-23 VITALS
DIASTOLIC BLOOD PRESSURE: 75 MMHG | HEART RATE: 90 BPM | HEIGHT: 67 IN | OXYGEN SATURATION: 92 % | SYSTOLIC BLOOD PRESSURE: 136 MMHG | TEMPERATURE: 97.2 F | BODY MASS INDEX: 24.55 KG/M2 | WEIGHT: 156.4 LBS

## 2023-05-23 DIAGNOSIS — T17.500A MUCUS PLUGGING OF BRONCHI: ICD-10-CM

## 2023-05-23 DIAGNOSIS — R06.02 SHORTNESS OF BREATH: ICD-10-CM

## 2023-05-23 DIAGNOSIS — B44.89 ASPERGILLUS FUMIGATUS (HCC): Primary | ICD-10-CM

## 2023-05-23 DIAGNOSIS — J44.9 CHRONIC OBSTRUCTIVE PULMONARY DISEASE, UNSPECIFIED COPD TYPE (HCC): ICD-10-CM

## 2023-05-23 DIAGNOSIS — Z86.16 HISTORY OF 2019 NOVEL CORONAVIRUS DISEASE (COVID-19): ICD-10-CM

## 2023-05-23 DIAGNOSIS — R91.8 PULMONARY NODULES: ICD-10-CM

## 2023-05-23 DIAGNOSIS — B44.1 PULMONARY ASPERGILLOSIS (HCC): ICD-10-CM

## 2023-05-23 DIAGNOSIS — Z51.81 THERAPEUTIC DRUG MONITORING: ICD-10-CM

## 2023-05-23 DIAGNOSIS — Z87.891 EX-SMOKER: ICD-10-CM

## 2023-05-23 LAB
ACID FAST STN SPEC QL: NORMAL
MYCOBACTERIUM SPEC CULT: NORMAL

## 2023-05-23 PROCEDURE — 3023F SPIROM DOC REV: CPT | Performed by: INTERNAL MEDICINE

## 2023-05-23 PROCEDURE — G8420 CALC BMI NORM PARAMETERS: HCPCS | Performed by: INTERNAL MEDICINE

## 2023-05-23 PROCEDURE — 99214 OFFICE O/P EST MOD 30 MIN: CPT | Performed by: INTERNAL MEDICINE

## 2023-05-23 PROCEDURE — 1036F TOBACCO NON-USER: CPT | Performed by: INTERNAL MEDICINE

## 2023-05-23 PROCEDURE — G8427 DOCREV CUR MEDS BY ELIG CLIN: HCPCS | Performed by: INTERNAL MEDICINE

## 2023-05-23 PROCEDURE — 3017F COLORECTAL CA SCREEN DOC REV: CPT | Performed by: INTERNAL MEDICINE

## 2023-05-23 ASSESSMENT — ENCOUNTER SYMPTOMS
SHORTNESS OF BREATH: 0
SINUS PAIN: 0
SINUS PRESSURE: 0
DIARRHEA: 0
EYE DISCHARGE: 0
RHINORRHEA: 0
BACK PAIN: 0
COUGH: 0
NAUSEA: 0
SORE THROAT: 0
CONSTIPATION: 0
WHEEZING: 0
EYE REDNESS: 0
ABDOMINAL PAIN: 0

## 2023-05-23 NOTE — PROGRESS NOTES
or through video visit as needed    Level of complexity of visit and medical decision making: High           An electronic signature was used to authenticate this note. TIME SPENT TODAY:    - Spent over 32 minutes on visit (including interval history, physical exam, review of data including labs,cultures, imaging, development and implementation of treatment plan and coordination of care). - Over 50% of face-to-face time spent with pt counseling andeducation. Please note that this chart was generated using Dragon dictation software. Although every effort was made to ensure the accuracy of this automated transcription, some errors in transcription may have occurred inadvertently. If you may need any clarification, please do not hesitate to contact me through EPIC or at the phone number provided below with my electronic signature. Any pictures or media included in this note were obtained after taking informed verbal consent from the patient and with their approval to include those in the patient's medical record. Thankyou for involving me inthe care of your patient. If you have any additional questions, please do not hesitate to contact me. Natanael Conroy MD, MPH, FACP, [facility]  5/23/23 , 10:59 AM EDT   [facility] Infectious Disease   [address] [place] [address], [address] ([address] [place] [address])  [address]  Office: [phone]  Fax: [phone]  In-person Clinic days:  Tuesday & Thursday a.m. Virtual clinic days: Monday, Wednesday & Friday a.m.

## 2023-05-25 ENCOUNTER — TELEPHONE (OUTPATIENT)
Dept: INFECTIOUS DISEASES | Age: 58
End: 2023-05-25

## 2023-05-25 NOTE — TELEPHONE ENCOUNTER
I checked epic records. The posaconazole prescription was sent to patient's pharmacy on 4/20/2023.   Please check with the pharmacy if they have not received the order

## 2023-05-25 NOTE — TELEPHONE ENCOUNTER
Received VM from patient stating she needs posaconazole rx sent to [place] as listed in [facility]

## 2023-05-26 ENCOUNTER — TELEPHONE (OUTPATIENT)
Dept: INFECTIOUS DISEASES | Age: 58
End: 2023-05-26

## 2023-05-26 NOTE — TELEPHONE ENCOUNTER
Spoke with Juliana Tyler at [facility] who states previous posaconazole was closed, he will reinstate prescription now.  Spoke with patient who v/u of above

## 2023-05-30 LAB
ACID FAST STN SPEC QL: NORMAL
MYCOBACTERIUM SPEC CULT: NORMAL

## 2023-06-19 RX ORDER — GUAIFENESIN 600 MG/1
TABLET, EXTENDED RELEASE ORAL
Qty: 60 TABLET | Refills: 6 | Status: SHIPPED | OUTPATIENT
Start: 2023-06-19

## 2023-06-26 ENCOUNTER — TELEPHONE (OUTPATIENT)
Dept: INFECTIOUS DISEASES | Age: 58
End: 2023-06-26

## 2023-06-26 DIAGNOSIS — Z51.81 THERAPEUTIC DRUG MONITORING: Primary | ICD-10-CM

## 2023-06-28 RX ORDER — VORICONAZOLE 200 MG/1
200 TABLET, FILM COATED ORAL 2 TIMES DAILY
Qty: 60 TABLET | Refills: 2 | Status: SHIPPED | OUTPATIENT
Start: 2023-06-28 | End: 2023-09-26

## 2023-06-29 ENCOUNTER — TELEPHONE (OUTPATIENT)
Dept: INFECTIOUS DISEASES | Age: 58
End: 2023-06-29

## 2023-06-29 NOTE — TELEPHONE ENCOUNTER
Received Prior Authorization request  PA needed for Voriconazole        PA submitted to [facility] through Fax.   Scanned into media

## 2023-07-03 ENCOUNTER — HOSPITAL ENCOUNTER (INPATIENT)
Age: 58
LOS: 3 days | Discharge: HOME | DRG: 193 | End: 2023-07-06
Attending: EMERGENCY MEDICINE | Admitting: INTERNAL MEDICINE
Payer: COMMERCIAL

## 2023-07-03 ENCOUNTER — APPOINTMENT (OUTPATIENT)
Dept: GENERAL RADIOLOGY | Age: 58
DRG: 193 | End: 2023-07-03
Payer: COMMERCIAL

## 2023-07-03 ENCOUNTER — TELEPHONE (OUTPATIENT)
Dept: PULMONOLOGY | Age: 58
End: 2023-07-03

## 2023-07-03 DIAGNOSIS — R06.02 SHORTNESS OF BREATH: Primary | ICD-10-CM

## 2023-07-03 DIAGNOSIS — Z99.81 OXYGEN DEPENDENT: ICD-10-CM

## 2023-07-03 LAB
ALBUMIN SERPL-MCNC: 4.3 G/DL (ref 3.4–5)
ALBUMIN/GLOB SERPL: 1.7 {RATIO} (ref 1.1–2.2)
ALP SERPL-CCNC: 83 U/L (ref 40–129)
ALT SERPL-CCNC: 11 U/L (ref 10–40)
ANION GAP SERPL CALCULATED.3IONS-SCNC: 9 MMOL/L (ref 3–16)
AST SERPL-CCNC: 13 U/L (ref 15–37)
BASOPHILS # BLD: 0.1 K/UL (ref 0–0.2)
BASOPHILS NFR BLD: 1.5 %
BILIRUB SERPL-MCNC: 0.4 MG/DL (ref 0–1)
BUN SERPL-MCNC: 8 MG/DL (ref 7–20)
CALCIUM SERPL-MCNC: 9.5 MG/DL (ref 8.3–10.6)
CHLORIDE SERPL-SCNC: 104 MMOL/L (ref 99–110)
CO2 SERPL-SCNC: 30 MMOL/L (ref 21–32)
CREAT SERPL-MCNC: 0.7 MG/DL (ref 0.6–1.1)
DEPRECATED RDW RBC AUTO: 13.5 % (ref 12.4–15.4)
EKG ATRIAL RATE: 82 BPM
EKG DIAGNOSIS: NORMAL
EKG P AXIS: 47 DEGREES
EKG P-R INTERVAL: 132 MS
EKG Q-T INTERVAL: 404 MS
EKG QRS DURATION: 82 MS
EKG QTC CALCULATION (BAZETT): 472 MS
EKG R AXIS: 63 DEGREES
EKG T AXIS: 64 DEGREES
EKG VENTRICULAR RATE: 82 BPM
EOSINOPHIL # BLD: 0.2 K/UL (ref 0–0.6)
EOSINOPHIL NFR BLD: 4.9 %
GFR SERPLBLD CREATININE-BSD FMLA CKD-EPI: >60 ML/MIN/{1.73_M2}
GLUCOSE SERPL-MCNC: 127 MG/DL (ref 70–99)
HCT VFR BLD AUTO: 43.2 % (ref 36–48)
HGB BLD-MCNC: 14.1 G/DL (ref 12–16)
LYMPHOCYTES # BLD: 1.7 K/UL (ref 1–5.1)
LYMPHOCYTES NFR BLD: 33.4 %
MCH RBC QN AUTO: 28.8 PG (ref 26–34)
MCHC RBC AUTO-ENTMCNC: 32.7 G/DL (ref 31–36)
MCV RBC AUTO: 88 FL (ref 80–100)
MONOCYTES # BLD: 0.3 K/UL (ref 0–1.3)
MONOCYTES NFR BLD: 6.2 %
NEUTROPHILS # BLD: 2.8 K/UL (ref 1.7–7.7)
NEUTROPHILS NFR BLD: 54 %
NT-PROBNP SERPL-MCNC: 38 PG/ML (ref 0–124)
PLATELET # BLD AUTO: 196 K/UL (ref 135–450)
PMV BLD AUTO: 8.7 FL (ref 5–10.5)
POTASSIUM SERPL-SCNC: 3.7 MMOL/L (ref 3.5–5.1)
PROT SERPL-MCNC: 6.8 G/DL (ref 6.4–8.2)
RBC # BLD AUTO: 4.9 M/UL (ref 4–5.2)
SODIUM SERPL-SCNC: 143 MMOL/L (ref 136–145)
TROPONIN, HIGH SENSITIVITY: 11 NG/L (ref 0–14)
TROPONIN, HIGH SENSITIVITY: 12 NG/L (ref 0–14)
WBC # BLD AUTO: 5.1 K/UL (ref 4–11)

## 2023-07-03 PROCEDURE — 94669 MECHANICAL CHEST WALL OSCILL: CPT

## 2023-07-03 PROCEDURE — 6370000000 HC RX 637 (ALT 250 FOR IP): Performed by: INTERNAL MEDICINE

## 2023-07-03 PROCEDURE — 2060000000 HC ICU INTERMEDIATE R&B

## 2023-07-03 PROCEDURE — 80053 COMPREHEN METABOLIC PANEL: CPT

## 2023-07-03 PROCEDURE — 6360000002 HC RX W HCPCS: Performed by: INTERNAL MEDICINE

## 2023-07-03 PROCEDURE — 96374 THER/PROPH/DIAG INJ IV PUSH: CPT

## 2023-07-03 PROCEDURE — 71046 X-RAY EXAM CHEST 2 VIEWS: CPT

## 2023-07-03 PROCEDURE — 2580000003 HC RX 258: Performed by: INTERNAL MEDICINE

## 2023-07-03 PROCEDURE — 93010 ELECTROCARDIOGRAM REPORT: CPT | Performed by: INTERNAL MEDICINE

## 2023-07-03 PROCEDURE — 93005 ELECTROCARDIOGRAM TRACING: CPT | Performed by: EMERGENCY MEDICINE

## 2023-07-03 PROCEDURE — 2700000000 HC OXYGEN THERAPY PER DAY

## 2023-07-03 PROCEDURE — 85025 COMPLETE CBC W/AUTO DIFF WBC: CPT

## 2023-07-03 PROCEDURE — 84484 ASSAY OF TROPONIN QUANT: CPT

## 2023-07-03 PROCEDURE — 99222 1ST HOSP IP/OBS MODERATE 55: CPT | Performed by: INTERNAL MEDICINE

## 2023-07-03 PROCEDURE — 94640 AIRWAY INHALATION TREATMENT: CPT

## 2023-07-03 PROCEDURE — 36415 COLL VENOUS BLD VENIPUNCTURE: CPT

## 2023-07-03 PROCEDURE — 99285 EMERGENCY DEPT VISIT HI MDM: CPT

## 2023-07-03 PROCEDURE — 94761 N-INVAS EAR/PLS OXIMETRY MLT: CPT

## 2023-07-03 PROCEDURE — 6360000002 HC RX W HCPCS: Performed by: PHYSICIAN ASSISTANT

## 2023-07-03 PROCEDURE — 83880 ASSAY OF NATRIURETIC PEPTIDE: CPT

## 2023-07-03 PROCEDURE — 6370000000 HC RX 637 (ALT 250 FOR IP): Performed by: PHYSICIAN ASSISTANT

## 2023-07-03 RX ORDER — ONDANSETRON 2 MG/ML
4 INJECTION INTRAMUSCULAR; INTRAVENOUS EVERY 6 HOURS PRN
Status: DISCONTINUED | OUTPATIENT
Start: 2023-07-03 | End: 2023-07-06 | Stop reason: HOSPADM

## 2023-07-03 RX ORDER — IPRATROPIUM BROMIDE AND ALBUTEROL SULFATE 2.5; .5 MG/3ML; MG/3ML
1 SOLUTION RESPIRATORY (INHALATION) ONCE
Status: COMPLETED | OUTPATIENT
Start: 2023-07-03 | End: 2023-07-03

## 2023-07-03 RX ORDER — ACETAMINOPHEN 325 MG/1
650 TABLET ORAL EVERY 6 HOURS PRN
Status: DISCONTINUED | OUTPATIENT
Start: 2023-07-03 | End: 2023-07-06 | Stop reason: HOSPADM

## 2023-07-03 RX ORDER — METHADONE HYDROCHLORIDE 10 MG/1
60 TABLET ORAL DAILY
Status: DISCONTINUED | OUTPATIENT
Start: 2023-07-04 | End: 2023-07-06 | Stop reason: HOSPADM

## 2023-07-03 RX ORDER — ENOXAPARIN SODIUM 100 MG/ML
40 INJECTION SUBCUTANEOUS DAILY
Status: DISCONTINUED | OUTPATIENT
Start: 2023-07-03 | End: 2023-07-06 | Stop reason: HOSPADM

## 2023-07-03 RX ORDER — GUAIFENESIN 600 MG/1
600 TABLET, EXTENDED RELEASE ORAL 2 TIMES DAILY
Status: DISCONTINUED | OUTPATIENT
Start: 2023-07-03 | End: 2023-07-06 | Stop reason: HOSPADM

## 2023-07-03 RX ORDER — COVID-19 ANTIGEN TEST
1 KIT MISCELLANEOUS 2 TIMES DAILY PRN
COMMUNITY

## 2023-07-03 RX ORDER — METHYLPREDNISOLONE SODIUM SUCCINATE 40 MG/ML
40 INJECTION, POWDER, LYOPHILIZED, FOR SOLUTION INTRAMUSCULAR; INTRAVENOUS EVERY 6 HOURS
Status: DISCONTINUED | OUTPATIENT
Start: 2023-07-03 | End: 2023-07-06

## 2023-07-03 RX ORDER — ONDANSETRON 4 MG/1
4 TABLET, ORALLY DISINTEGRATING ORAL EVERY 8 HOURS PRN
Status: DISCONTINUED | OUTPATIENT
Start: 2023-07-03 | End: 2023-07-06 | Stop reason: HOSPADM

## 2023-07-03 RX ORDER — SODIUM CHLORIDE 9 MG/ML
INJECTION, SOLUTION INTRAVENOUS CONTINUOUS
Status: DISCONTINUED | OUTPATIENT
Start: 2023-07-03 | End: 2023-07-04 | Stop reason: ALTCHOICE

## 2023-07-03 RX ORDER — IPRATROPIUM BROMIDE AND ALBUTEROL SULFATE 2.5; .5 MG/3ML; MG/3ML
1 SOLUTION RESPIRATORY (INHALATION) EVERY 4 HOURS
Status: DISCONTINUED | OUTPATIENT
Start: 2023-07-03 | End: 2023-07-03

## 2023-07-03 RX ORDER — ACETAMINOPHEN 650 MG/1
650 SUPPOSITORY RECTAL EVERY 6 HOURS PRN
Status: DISCONTINUED | OUTPATIENT
Start: 2023-07-03 | End: 2023-07-06 | Stop reason: HOSPADM

## 2023-07-03 RX ORDER — SODIUM CHLORIDE 9 MG/ML
INJECTION, SOLUTION INTRAVENOUS PRN
Status: DISCONTINUED | OUTPATIENT
Start: 2023-07-03 | End: 2023-07-06 | Stop reason: HOSPADM

## 2023-07-03 RX ORDER — LEVOFLOXACIN 5 MG/ML
750 INJECTION, SOLUTION INTRAVENOUS EVERY 24 HOURS
Status: DISCONTINUED | OUTPATIENT
Start: 2023-07-03 | End: 2023-07-06 | Stop reason: HOSPADM

## 2023-07-03 RX ORDER — IPRATROPIUM BROMIDE AND ALBUTEROL SULFATE 2.5; .5 MG/3ML; MG/3ML
1 SOLUTION RESPIRATORY (INHALATION) 3 TIMES DAILY
Status: DISCONTINUED | OUTPATIENT
Start: 2023-07-03 | End: 2023-07-03

## 2023-07-03 RX ORDER — SODIUM CHLORIDE 0.9 % (FLUSH) 0.9 %
5-40 SYRINGE (ML) INJECTION EVERY 12 HOURS SCHEDULED
Status: DISCONTINUED | OUTPATIENT
Start: 2023-07-03 | End: 2023-07-06 | Stop reason: HOSPADM

## 2023-07-03 RX ORDER — KETOROLAC TROMETHAMINE 30 MG/ML
30 INJECTION, SOLUTION INTRAMUSCULAR; INTRAVENOUS EVERY 6 HOURS PRN
Status: DISCONTINUED | OUTPATIENT
Start: 2023-07-03 | End: 2023-07-06 | Stop reason: HOSPADM

## 2023-07-03 RX ORDER — ALBUTEROL SULFATE 2.5 MG/3ML
2.5 SOLUTION RESPIRATORY (INHALATION) EVERY 4 HOURS PRN
Status: DISCONTINUED | OUTPATIENT
Start: 2023-07-03 | End: 2023-07-06 | Stop reason: HOSPADM

## 2023-07-03 RX ORDER — ALBUTEROL SULFATE 2.5 MG/3ML
5 SOLUTION RESPIRATORY (INHALATION) ONCE
Status: COMPLETED | OUTPATIENT
Start: 2023-07-03 | End: 2023-07-03

## 2023-07-03 RX ORDER — POLYETHYLENE GLYCOL 3350 17 G/17G
17 POWDER, FOR SOLUTION ORAL DAILY PRN
Status: DISCONTINUED | OUTPATIENT
Start: 2023-07-03 | End: 2023-07-06 | Stop reason: HOSPADM

## 2023-07-03 RX ORDER — SODIUM CHLORIDE 0.9 % (FLUSH) 0.9 %
5-40 SYRINGE (ML) INJECTION PRN
Status: DISCONTINUED | OUTPATIENT
Start: 2023-07-03 | End: 2023-07-06 | Stop reason: HOSPADM

## 2023-07-03 RX ORDER — IPRATROPIUM BROMIDE AND ALBUTEROL SULFATE 2.5; .5 MG/3ML; MG/3ML
1 SOLUTION RESPIRATORY (INHALATION) EVERY 4 HOURS
Status: DISCONTINUED | OUTPATIENT
Start: 2023-07-03 | End: 2023-07-06 | Stop reason: HOSPADM

## 2023-07-03 RX ORDER — VORICONAZOLE 200 MG/1
200 TABLET, FILM COATED ORAL 2 TIMES DAILY
Status: DISCONTINUED | OUTPATIENT
Start: 2023-07-03 | End: 2023-07-06 | Stop reason: HOSPADM

## 2023-07-03 RX ADMIN — METHYLPREDNISOLONE SODIUM SUCCINATE 40 MG: 40 INJECTION INTRAMUSCULAR; INTRAVENOUS at 18:19

## 2023-07-03 RX ADMIN — IPRATROPIUM BROMIDE AND ALBUTEROL SULFATE 1 DOSE: .5; 3 SOLUTION RESPIRATORY (INHALATION) at 10:11

## 2023-07-03 RX ADMIN — IPRATROPIUM BROMIDE AND ALBUTEROL SULFATE 1 DOSE: 2.5; .5 SOLUTION RESPIRATORY (INHALATION) at 18:13

## 2023-07-03 RX ADMIN — ALBUTEROL SULFATE 5 MG: 2.5 SOLUTION RESPIRATORY (INHALATION) at 10:11

## 2023-07-03 RX ADMIN — SODIUM CHLORIDE, PRESERVATIVE FREE 10 ML: 5 INJECTION INTRAVENOUS at 20:45

## 2023-07-03 RX ADMIN — METHYLPREDNISOLONE SODIUM SUCCINATE 125 MG: 125 INJECTION, POWDER, FOR SOLUTION INTRAMUSCULAR; INTRAVENOUS at 10:04

## 2023-07-03 RX ADMIN — VORICONAZOLE 200 MG: 200 TABLET ORAL at 20:45

## 2023-07-03 RX ADMIN — ENOXAPARIN SODIUM 40 MG: 100 INJECTION SUBCUTANEOUS at 18:19

## 2023-07-03 RX ADMIN — LEVOFLOXACIN 750 MG: 5 INJECTION, SOLUTION INTRAVENOUS at 18:26

## 2023-07-03 RX ADMIN — Medication 2 PUFF: at 20:44

## 2023-07-03 RX ADMIN — IPRATROPIUM BROMIDE AND ALBUTEROL SULFATE 1 DOSE: 2.5; .5 SOLUTION RESPIRATORY (INHALATION) at 20:43

## 2023-07-03 RX ADMIN — METHYLPREDNISOLONE SODIUM SUCCINATE 40 MG: 40 INJECTION INTRAMUSCULAR; INTRAVENOUS at 20:44

## 2023-07-03 RX ADMIN — SODIUM CHLORIDE: 9 INJECTION, SOLUTION INTRAVENOUS at 18:24

## 2023-07-03 RX ADMIN — GUAIFENESIN 600 MG: 600 TABLET, EXTENDED RELEASE ORAL at 20:45

## 2023-07-03 SDOH — ECONOMIC STABILITY: TRANSPORTATION INSECURITY
IN THE PAST 12 MONTHS, HAS THE LACK OF TRANSPORTATION KEPT YOU FROM MEDICAL APPOINTMENTS OR FROM GETTING MEDICATIONS?: NO

## 2023-07-03 SDOH — ECONOMIC STABILITY: FOOD INSECURITY: WITHIN THE PAST 12 MONTHS, THE FOOD YOU BOUGHT JUST DIDN'T LAST AND YOU DIDN'T HAVE MONEY TO GET MORE.: NEVER TRUE

## 2023-07-03 SDOH — ECONOMIC STABILITY: HOUSING INSECURITY
IN THE LAST 12 MONTHS, WAS THERE A TIME WHEN YOU DID NOT HAVE A STEADY PLACE TO SLEEP OR SLEPT IN A SHELTER (INCLUDING NOW)?: NO

## 2023-07-03 SDOH — ECONOMIC STABILITY: TRANSPORTATION INSECURITY
IN THE PAST 12 MONTHS, HAS LACK OF TRANSPORTATION KEPT YOU FROM MEETINGS, WORK, OR FROM GETTING THINGS NEEDED FOR DAILY LIVING?: NO

## 2023-07-03 SDOH — ECONOMIC STABILITY: FOOD INSECURITY: WITHIN THE PAST 12 MONTHS, YOU WORRIED THAT YOUR FOOD WOULD RUN OUT BEFORE YOU GOT MONEY TO BUY MORE.: NEVER TRUE

## 2023-07-03 SDOH — HEALTH STABILITY: PHYSICAL HEALTH: ON AVERAGE, HOW MANY DAYS PER WEEK DO YOU ENGAGE IN MODERATE TO STRENUOUS EXERCISE (LIKE A BRISK WALK)?: 0 DAYS

## 2023-07-03 SDOH — ECONOMIC STABILITY: HOUSING INSECURITY: IN THE LAST 12 MONTHS, HOW MANY PLACES HAVE YOU LIVED?: 1

## 2023-07-03 SDOH — ECONOMIC STABILITY: INCOME INSECURITY: IN THE LAST 12 MONTHS, WAS THERE A TIME WHEN YOU WERE NOT ABLE TO PAY THE MORTGAGE OR RENT ON TIME?: NO

## 2023-07-03 SDOH — HEALTH STABILITY: PHYSICAL HEALTH: ON AVERAGE, HOW MANY MINUTES DO YOU ENGAGE IN EXERCISE AT THIS LEVEL?: 0 MIN

## 2023-07-03 ASSESSMENT — ENCOUNTER SYMPTOMS
COUGH: 1
CHEST TIGHTNESS: 0
NAUSEA: 0
VOMITING: 0
WHEEZING: 1
DIARRHEA: 0
SHORTNESS OF BREATH: 1
ABDOMINAL PAIN: 0

## 2023-07-03 ASSESSMENT — PAIN - FUNCTIONAL ASSESSMENT: PAIN_FUNCTIONAL_ASSESSMENT: NONE - DENIES PAIN

## 2023-07-03 ASSESSMENT — SOCIAL DETERMINANTS OF HEALTH (SDOH)
WITHIN THE LAST YEAR, HAVE YOU BEEN HUMILIATED OR EMOTIONALLY ABUSED IN OTHER WAYS BY YOUR PARTNER OR EX-PARTNER?: NO
WITHIN THE LAST YEAR, HAVE YOU BEEN AFRAID OF YOUR PARTNER OR EX-PARTNER?: NO
WITHIN THE LAST YEAR, HAVE YOU BEEN KICKED, HIT, SLAPPED, OR OTHERWISE PHYSICALLY HURT BY YOUR PARTNER OR EX-PARTNER?: NO
HOW OFTEN DO YOU ATTENT MEETINGS OF THE CLUB OR ORGANIZATION YOU BELONG TO?: NEVER
DO YOU BELONG TO ANY CLUBS OR ORGANIZATIONS SUCH AS CHURCH GROUPS UNIONS, FRATERNAL OR ATHLETIC GROUPS, OR SCHOOL GROUPS?: NO
IN A TYPICAL WEEK, HOW MANY TIMES DO YOU TALK ON THE PHONE WITH FAMILY, FRIENDS, OR NEIGHBORS?: MORE THAN THREE TIMES A WEEK
HOW OFTEN DO YOU GET TOGETHER WITH FRIENDS OR RELATIVES?: MORE THAN THREE TIMES A WEEK
WITHIN THE LAST YEAR, HAVE TO BEEN RAPED OR FORCED TO HAVE ANY KIND OF SEXUAL ACTIVITY BY YOUR PARTNER OR EX-PARTNER?: NO
HOW OFTEN DO YOU ATTEND CHURCH OR RELIGIOUS SERVICES?: NEVER
HOW HARD IS IT FOR YOU TO PAY FOR THE VERY BASICS LIKE FOOD, HOUSING, MEDICAL CARE, AND HEATING?: NOT VERY HARD

## 2023-07-03 ASSESSMENT — PATIENT HEALTH QUESTIONNAIRE - PHQ9
2. FEELING DOWN, DEPRESSED OR HOPELESS: NOT AT ALL
1. LITTLE INTEREST OR PLEASURE IN DOING THINGS: NOT AT ALL
SUM OF ALL RESPONSES TO PHQ9 QUESTIONS 1 & 2: 0

## 2023-07-03 ASSESSMENT — LIFESTYLE VARIABLES: HOW OFTEN DO YOU HAVE A DRINK CONTAINING ALCOHOL: NEVER

## 2023-07-03 NOTE — ACP (ADVANCE CARE PLANNING)
Advanced Care Planning Note. Purpose of Encounter: Advanced care planning in light of acute on chronic  respiratory failure with hypoxia and hypercapnia  Parties In Attendance: Patient  Decisional Capacity: Yes  Subjective: Patient is SOB  Objective: Cr 0.7  Goals of Care Determination: Patient wants full support (CPR, vent, surgery, HD, trach, PEG)  Plan:  IV steroids, IV Abx, Pulm consult  Code Status: Full code   Time spent on Advanced care Plannin minutes  Advanced Care Planning Documents: Completed advanced directives on chart, daughter is the POA.     Jace Seth MD  7/3/2023 1:56 PM Admitted for R mastectomy and sentinel lymph node dissection. She recovered from the procedure, pain was controlled without on POD #1 and she was discharged in stable condition, ambulating and tolerating PO.

## 2023-07-03 NOTE — CONSULTS
[facility] Pulmonary and Critical Care   Consult Note      Reason for Consult: Shortness of breath with productive cough/history of mucous plugging  Requesting Physician: Radha Fine    Subjective:   CHIEF COMPLAINT: Shortness of breath and productive cough     HPI: Patient states that she has had ongoing cough which is productive of yellow-green phlegm for the last week or two. This is associated with worsening shortness of breath. Denies any fevers or chest pain. Was recently hospitalized in April for mucous plugging of airway and COPD exacerbation-noted to have Aspergillus, but could not tolerate posaconazole. Pulmonary consultation has been requested for ? Need for repeat bronchoscopy. History of severe COPD, PFT from January 2023 showed severe obstruction-FEV1 of 1.22 L [41% predicted]. Prior history of mold exposure at home. Former smoker.        The patient is a 63 y.o. female with significant past medical history of:      Diagnosis Date    Asthma     Pneumothorax         Past Surgical History:        Procedure Laterality Date    ABDOMEN SURGERY      BRONCHOSCOPY N/A 4/16/2023    BRONCHOSCOPY performed by Deo Ellis MD at [address] [place] [address],[address] (CERVIX STATUS UNKNOWN)      INNER EAR SURGERY      MASTOIDECTOMY       Current Medications:    Current Facility-Administered Medications: mometasone-formoterol (DULERA) 200-5 MCG/ACT inhaler 2 puff, 2 puff, Inhalation, BID  guaiFENesin (MUCINEX) extended release tablet 600 mg, 600 mg, Oral, BID  [START ON 7/4/2023] methadone (DOLOPHINE) tablet 60 mg, 60 mg, Oral, Daily  voriconazole (VFEND) tablet 200 mg, 200 mg, Oral, BID  sodium chloride flush 0.9 % injection 5-40 mL, 5-40 mL, IntraVENous, 2 times per day  sodium chloride flush 0.9 % injection 5-40 mL, 5-40 mL, IntraVENous, PRN  0.9 % sodium chloride infusion, , IntraVENous, PRN  enoxaparin (LOVENOX) injection 40 mg, 40 mg, SubCUTAneous, Daily  ondansetron

## 2023-07-03 NOTE — TELEPHONE ENCOUNTER
Called patient to let her know that Dr Cora Del Valle is doing consults this week and he would be the doctor seeing her in the hospital Detail Level: Generalized Detail Level: Zone

## 2023-07-03 NOTE — TELEPHONE ENCOUNTER
Patient called from ED and said she is going to be admitted and was mention needing another bronch and is wanting to know if  will do it.      [id number] [address]: [phone] Unable to get hold of Rheumatology. There is no Rheumatologist on call. Left a message for Dr. Nash where pt. was referred in the past, about patient being hospitalized. We will ry to reach him tomorrow for an treatment advice. TSH and Mg ordered per GI recommendations. Liquid diet started per speech evaluation. ABHINAV RODRIGUEZ.

## 2023-07-03 NOTE — H&P
HOSPITALISTS HISTORY AND PHYSICAL    7/3/2023 1:50 PM    Patient Information:  Julian Trujillo is a 63 y.o. female [id number]  PCP:  None None (Tel: None )    Chief complaint:    Chief Complaint   Patient presents with    Shortness of Breath     Arrived per friend d/t SOB that started this morning; took PRN breathing tx at home without relief; hx of mucus plug and COPD on O2 @ 2 liters via NC at noc        History of Present Illness:  Julian Trujillo is a 63 y.o. female with history of COPD, aspergillosis on VFend, chronic respiratory failure with hypoxia and hypercapnia on 2 L O2 at night who came to ER with SOB. States symptoms worsening since this morning. Required bronch by Dr Terrie Rodriguez in April for mucus clearance. Called Pulm office and told to come to ER. Has some R sided back pain. Not expectorating. No fevers, chills or NS. No CP, abdominal pain, dysphagia. Otherwise complete ROS is negative unless listed above. REVIEW OF SYSTEMS:   Pertinent positives as noted in HPI. All other systems were reviewed and are negative. Past Medical History:   has a past medical history of Asthma and Pneumothorax. Past Surgical History:   has a past surgical history that includes Hysterectomy;  section; Inner ear surgery; mastoidectomy; Abdomen surgery; and bronchoscopy (N/A, 2023). Medications:  No current facility-administered medications on file prior to encounter.      Current Outpatient Medications on File Prior to Encounter   Medication Sig Dispense Refill    voriconazole (VFEND) 200 MG tablet Take 1 tablet by mouth 2 times daily 60 tablet 2    guaiFENesin (MUCINEX) 600 MG extended release tablet TAKE 1 TABLET BY MOUTH TWICE DAILY 60 tablet 6    fluticasone-salmeterol (WIXELA INHUB) 250-50 MCG/ACT AEPB diskus inhaler Inhale 1 puff into the lungs in the morning and 1 puff in

## 2023-07-04 LAB
ANION GAP SERPL CALCULATED.3IONS-SCNC: 12 MMOL/L (ref 3–16)
BASOPHILS # BLD: 0 K/UL (ref 0–0.2)
BASOPHILS NFR BLD: 0.5 %
BUN SERPL-MCNC: 11 MG/DL (ref 7–20)
CALCIUM SERPL-MCNC: 9.5 MG/DL (ref 8.3–10.6)
CHLORIDE SERPL-SCNC: 104 MMOL/L (ref 99–110)
CO2 SERPL-SCNC: 22 MMOL/L (ref 21–32)
CREAT SERPL-MCNC: 0.6 MG/DL (ref 0.6–1.1)
DEPRECATED RDW RBC AUTO: 13.3 % (ref 12.4–15.4)
EOSINOPHIL # BLD: 0 K/UL (ref 0–0.6)
EOSINOPHIL NFR BLD: 0 %
GFR SERPLBLD CREATININE-BSD FMLA CKD-EPI: >60 ML/MIN/{1.73_M2}
GLUCOSE SERPL-MCNC: 166 MG/DL (ref 70–99)
HCT VFR BLD AUTO: 40.9 % (ref 36–48)
HGB BLD-MCNC: 13.2 G/DL (ref 12–16)
LYMPHOCYTES # BLD: 0.5 K/UL (ref 1–5.1)
LYMPHOCYTES NFR BLD: 4.7 %
MCH RBC QN AUTO: 28.9 PG (ref 26–34)
MCHC RBC AUTO-ENTMCNC: 32.3 G/DL (ref 31–36)
MCV RBC AUTO: 89.3 FL (ref 80–100)
MONOCYTES # BLD: 0.1 K/UL (ref 0–1.3)
MONOCYTES NFR BLD: 1.2 %
NEUTROPHILS # BLD: 9.7 K/UL (ref 1.7–7.7)
NEUTROPHILS NFR BLD: 93.6 %
PLATELET # BLD AUTO: 180 K/UL (ref 135–450)
PLATELET BLD QL SMEAR: ADEQUATE
PMV BLD AUTO: 8.9 FL (ref 5–10.5)
POTASSIUM SERPL-SCNC: 4.1 MMOL/L (ref 3.5–5.1)
RBC # BLD AUTO: 4.58 M/UL (ref 4–5.2)
RBC MORPH BLD: NORMAL
SLIDE REVIEW: ABNORMAL
SODIUM SERPL-SCNC: 138 MMOL/L (ref 136–145)
WBC # BLD AUTO: 10.3 K/UL (ref 4–11)

## 2023-07-04 PROCEDURE — 97535 SELF CARE MNGMENT TRAINING: CPT

## 2023-07-04 PROCEDURE — 80048 BASIC METABOLIC PNL TOTAL CA: CPT

## 2023-07-04 PROCEDURE — 36415 COLL VENOUS BLD VENIPUNCTURE: CPT

## 2023-07-04 PROCEDURE — 97530 THERAPEUTIC ACTIVITIES: CPT

## 2023-07-04 PROCEDURE — 6370000000 HC RX 637 (ALT 250 FOR IP): Performed by: INTERNAL MEDICINE

## 2023-07-04 PROCEDURE — 2580000003 HC RX 258: Performed by: INTERNAL MEDICINE

## 2023-07-04 PROCEDURE — 87070 CULTURE OTHR SPECIMN AEROBIC: CPT

## 2023-07-04 PROCEDURE — 94761 N-INVAS EAR/PLS OXIMETRY MLT: CPT

## 2023-07-04 PROCEDURE — 99232 SBSQ HOSP IP/OBS MODERATE 35: CPT | Performed by: INTERNAL MEDICINE

## 2023-07-04 PROCEDURE — 87205 SMEAR GRAM STAIN: CPT

## 2023-07-04 PROCEDURE — 97116 GAIT TRAINING THERAPY: CPT

## 2023-07-04 PROCEDURE — 85025 COMPLETE CBC W/AUTO DIFF WBC: CPT

## 2023-07-04 PROCEDURE — 2700000000 HC OXYGEN THERAPY PER DAY

## 2023-07-04 PROCEDURE — 97165 OT EVAL LOW COMPLEX 30 MIN: CPT

## 2023-07-04 PROCEDURE — 2060000000 HC ICU INTERMEDIATE R&B

## 2023-07-04 PROCEDURE — 94669 MECHANICAL CHEST WALL OSCILL: CPT

## 2023-07-04 PROCEDURE — 97161 PT EVAL LOW COMPLEX 20 MIN: CPT

## 2023-07-04 PROCEDURE — 6360000002 HC RX W HCPCS: Performed by: INTERNAL MEDICINE

## 2023-07-04 PROCEDURE — 94640 AIRWAY INHALATION TREATMENT: CPT

## 2023-07-04 RX ADMIN — LEVOFLOXACIN 750 MG: 5 INJECTION, SOLUTION INTRAVENOUS at 16:13

## 2023-07-04 RX ADMIN — Medication 2 PUFF: at 19:52

## 2023-07-04 RX ADMIN — METHYLPREDNISOLONE SODIUM SUCCINATE 40 MG: 40 INJECTION INTRAMUSCULAR; INTRAVENOUS at 08:14

## 2023-07-04 RX ADMIN — VORICONAZOLE 200 MG: 200 TABLET ORAL at 10:53

## 2023-07-04 RX ADMIN — VORICONAZOLE 200 MG: 200 TABLET ORAL at 21:27

## 2023-07-04 RX ADMIN — METHYLPREDNISOLONE SODIUM SUCCINATE 40 MG: 40 INJECTION INTRAMUSCULAR; INTRAVENOUS at 04:29

## 2023-07-04 RX ADMIN — ENOXAPARIN SODIUM 40 MG: 100 INJECTION SUBCUTANEOUS at 08:13

## 2023-07-04 RX ADMIN — METHYLPREDNISOLONE SODIUM SUCCINATE 40 MG: 40 INJECTION INTRAMUSCULAR; INTRAVENOUS at 16:13

## 2023-07-04 RX ADMIN — ACETAMINOPHEN 650 MG: 325 TABLET ORAL at 14:22

## 2023-07-04 RX ADMIN — GUAIFENESIN 600 MG: 600 TABLET, EXTENDED RELEASE ORAL at 21:22

## 2023-07-04 RX ADMIN — POLYETHYLENE GLYCOL 3350 17 G: 17 POWDER, FOR SOLUTION ORAL at 14:22

## 2023-07-04 RX ADMIN — ACETAMINOPHEN 650 MG: 325 TABLET ORAL at 08:16

## 2023-07-04 RX ADMIN — GUAIFENESIN 600 MG: 600 TABLET, EXTENDED RELEASE ORAL at 08:14

## 2023-07-04 RX ADMIN — SODIUM CHLORIDE, PRESERVATIVE FREE 10 ML: 5 INJECTION INTRAVENOUS at 21:27

## 2023-07-04 RX ADMIN — IPRATROPIUM BROMIDE AND ALBUTEROL SULFATE 1 DOSE: 2.5; .5 SOLUTION RESPIRATORY (INHALATION) at 11:54

## 2023-07-04 RX ADMIN — IPRATROPIUM BROMIDE AND ALBUTEROL SULFATE 1 DOSE: 2.5; .5 SOLUTION RESPIRATORY (INHALATION) at 04:27

## 2023-07-04 RX ADMIN — IPRATROPIUM BROMIDE AND ALBUTEROL SULFATE 1 DOSE: 2.5; .5 SOLUTION RESPIRATORY (INHALATION) at 19:52

## 2023-07-04 RX ADMIN — Medication 2 PUFF: at 08:00

## 2023-07-04 RX ADMIN — IPRATROPIUM BROMIDE AND ALBUTEROL SULFATE 1 DOSE: 2.5; .5 SOLUTION RESPIRATORY (INHALATION) at 16:10

## 2023-07-04 RX ADMIN — METHYLPREDNISOLONE SODIUM SUCCINATE 40 MG: 40 INJECTION INTRAMUSCULAR; INTRAVENOUS at 21:22

## 2023-07-04 RX ADMIN — IPRATROPIUM BROMIDE AND ALBUTEROL SULFATE 1 DOSE: 2.5; .5 SOLUTION RESPIRATORY (INHALATION) at 00:04

## 2023-07-04 RX ADMIN — IPRATROPIUM BROMIDE AND ALBUTEROL SULFATE 1 DOSE: 2.5; .5 SOLUTION RESPIRATORY (INHALATION) at 07:59

## 2023-07-04 RX ADMIN — METHADONE HYDROCHLORIDE 60 MG: 10 TABLET ORAL at 08:14

## 2023-07-04 ASSESSMENT — PAIN DESCRIPTION - ORIENTATION
ORIENTATION: OTHER (COMMENT)
ORIENTATION: ANTERIOR

## 2023-07-04 ASSESSMENT — PAIN DESCRIPTION - DESCRIPTORS
DESCRIPTORS: ACHING;DULL
DESCRIPTORS: ACHING;DULL

## 2023-07-04 ASSESSMENT — PAIN DESCRIPTION - ONSET: ONSET: ON-GOING

## 2023-07-04 ASSESSMENT — PAIN DESCRIPTION - FREQUENCY: FREQUENCY: INTERMITTENT

## 2023-07-04 ASSESSMENT — PAIN DESCRIPTION - PAIN TYPE: TYPE: ACUTE PAIN

## 2023-07-04 ASSESSMENT — PAIN DESCRIPTION - LOCATION
LOCATION: HEAD
LOCATION: HEAD

## 2023-07-04 ASSESSMENT — PAIN - FUNCTIONAL ASSESSMENT
PAIN_FUNCTIONAL_ASSESSMENT: ACTIVITIES ARE NOT PREVENTED
PAIN_FUNCTIONAL_ASSESSMENT: ACTIVITIES ARE NOT PREVENTED

## 2023-07-04 ASSESSMENT — PAIN SCALES - GENERAL
PAINLEVEL_OUTOF10: 3
PAINLEVEL_OUTOF10: 3

## 2023-07-04 NOTE — PLAN OF CARE
Problem: Discharge Planning  Goal: Discharge to home or other facility with appropriate resources  Outcome: Progressing  Flowsheets (Taken 7/3/2023 1746 by Rodney Guerrero RN)  Discharge to home or other facility with appropriate resources:   Identify barriers to discharge with patient and caregiver   Arrange for needed discharge resources and transportation as appropriate   Identify discharge learning needs (meds, wound care, etc)   Refer to discharge planning if patient needs post-hospital services based on physician order or complex needs related to functional status, cognitive ability or social support system     Problem: Safety - Adult  Goal: Free from fall injury  Outcome: Progressing     Problem: ABCDS Injury Assessment  Goal: Absence of physical injury  Outcome: Progressing

## 2023-07-04 NOTE — PLAN OF CARE
Problem: Discharge Planning  Goal: Discharge to home or other facility with appropriate resources  7/4/2023 0818 by Mejia Artis RN  Outcome: Progressing     Problem: Safety - Adult  Goal: Free from fall injury  7/4/2023 0818 by Mejia Artis RN  Outcome: Progressing     Problem: ABCDS Injury Assessment  Goal: Absence of physical injury  7/4/2023 0818 by Mejia Artis RN  Outcome: Progressing     Problem: Pain  Goal: Verbalizes/displays adequate comfort level or baseline comfort level  Outcome: Progressing     Problem: Respiratory - Adult  Goal: Achieves optimal ventilation and oxygenation  Outcome: Progressing

## 2023-07-05 ENCOUNTER — ANESTHESIA (OUTPATIENT)
Dept: ENDOSCOPY | Age: 58
DRG: 190 | End: 2023-07-05
Payer: COMMERCIAL

## 2023-07-05 ENCOUNTER — ANESTHESIA EVENT (OUTPATIENT)
Dept: ENDOSCOPY | Age: 58
DRG: 190 | End: 2023-07-05
Payer: COMMERCIAL

## 2023-07-05 PROBLEM — J96.21 ACUTE ON CHRONIC RESPIRATORY FAILURE WITH HYPOXIA (HCC): Status: ACTIVE | Noted: 2022-08-21

## 2023-07-05 LAB
ANION GAP SERPL CALCULATED.3IONS-SCNC: 10 MMOL/L (ref 3–16)
APPEARANCE BRONCH: CLEAR
BASOPHILS # BLD: 0 K/UL (ref 0–0.2)
BASOPHILS NFR BLD: 0.3 %
BUN SERPL-MCNC: 15 MG/DL (ref 7–20)
CALCIUM SERPL-MCNC: 9.5 MG/DL (ref 8.3–10.6)
CHLORIDE SERPL-SCNC: 104 MMOL/L (ref 99–110)
CLOT SPEC QL: NORMAL
CO2 SERPL-SCNC: 25 MMOL/L (ref 21–32)
COLOR BRONCH: COLORLESS
CREAT SERPL-MCNC: 0.6 MG/DL (ref 0.6–1.1)
DEPRECATED RDW RBC AUTO: 13.7 % (ref 12.4–15.4)
EOSINOPHIL # BLD: 0 K/UL (ref 0–0.6)
EOSINOPHIL NFR BLD: 0 %
FLUID DIFF COMMENT: NORMAL
GFR SERPLBLD CREATININE-BSD FMLA CKD-EPI: >60 ML/MIN/{1.73_M2}
GLUCOSE SERPL-MCNC: 151 MG/DL (ref 70–99)
HCT VFR BLD AUTO: 38.1 % (ref 36–48)
HGB BLD-MCNC: 12.3 G/DL (ref 12–16)
LYMPHOCYTES # BLD: 0.6 K/UL (ref 1–5.1)
LYMPHOCYTES NFR BLD: 3.6 %
MCH RBC QN AUTO: 28.8 PG (ref 26–34)
MCHC RBC AUTO-ENTMCNC: 32.2 G/DL (ref 31–36)
MCV RBC AUTO: 89.2 FL (ref 80–100)
MONOCYTES # BLD: 0.3 K/UL (ref 0–1.3)
MONOCYTES NFR BLD: 1.9 %
NEUTROPHILS # BLD: 14.4 K/UL (ref 1.7–7.7)
NEUTROPHILS NFR BLD: 94.2 %
NUC CELL # BRONCH MANUAL: 7 /CUMM
PATH REV: NO
PLATELET # BLD AUTO: 165 K/UL (ref 135–450)
PMV BLD AUTO: 9 FL (ref 5–10.5)
POTASSIUM SERPL-SCNC: 4.7 MMOL/L (ref 3.5–5.1)
RBC # BLD AUTO: 4.27 M/UL (ref 4–5.2)
RBC # FLD MANUAL: 3 /CUMM
SODIUM SERPL-SCNC: 139 MMOL/L (ref 136–145)
WBC # BLD AUTO: 15.3 K/UL (ref 4–11)

## 2023-07-05 PROCEDURE — 2500000003 HC RX 250 WO HCPCS: Performed by: NURSE ANESTHETIST, CERTIFIED REGISTERED

## 2023-07-05 PROCEDURE — 3609010900 HC BRONCHOSCOPY THERAPUTIC ASPIRATION INITIAL: Performed by: INTERNAL MEDICINE

## 2023-07-05 PROCEDURE — 2580000003 HC RX 258: Performed by: NURSE ANESTHETIST, CERTIFIED REGISTERED

## 2023-07-05 PROCEDURE — 31645 BRNCHSC W/THER ASPIR 1ST: CPT | Performed by: INTERNAL MEDICINE

## 2023-07-05 PROCEDURE — 0BCB8ZZ EXTIRPATION OF MATTER FROM LEFT LOWER LOBE BRONCHUS, VIA NATURAL OR ARTIFICIAL OPENING ENDOSCOPIC: ICD-10-PCS | Performed by: INTERNAL MEDICINE

## 2023-07-05 PROCEDURE — 6370000000 HC RX 637 (ALT 250 FOR IP): Performed by: INTERNAL MEDICINE

## 2023-07-05 PROCEDURE — 89050 BODY FLUID CELL COUNT: CPT

## 2023-07-05 PROCEDURE — 6360000002 HC RX W HCPCS: Performed by: NURSE ANESTHETIST, CERTIFIED REGISTERED

## 2023-07-05 PROCEDURE — 80048 BASIC METABOLIC PNL TOTAL CA: CPT

## 2023-07-05 PROCEDURE — 2060000000 HC ICU INTERMEDIATE R&B

## 2023-07-05 PROCEDURE — 2580000003 HC RX 258: Performed by: INTERNAL MEDICINE

## 2023-07-05 PROCEDURE — 94669 MECHANICAL CHEST WALL OSCILL: CPT

## 2023-07-05 PROCEDURE — 3700000000 HC ANESTHESIA ATTENDED CARE: Performed by: INTERNAL MEDICINE

## 2023-07-05 PROCEDURE — 3700000001 HC ADD 15 MINUTES (ANESTHESIA): Performed by: INTERNAL MEDICINE

## 2023-07-05 PROCEDURE — 2709999900 HC NON-CHARGEABLE SUPPLY: Performed by: INTERNAL MEDICINE

## 2023-07-05 PROCEDURE — 94640 AIRWAY INHALATION TREATMENT: CPT

## 2023-07-05 PROCEDURE — 36415 COLL VENOUS BLD VENIPUNCTURE: CPT

## 2023-07-05 PROCEDURE — 2580000003 HC RX 258

## 2023-07-05 PROCEDURE — 0BC48ZZ EXTIRPATION OF MATTER FROM RIGHT UPPER LOBE BRONCHUS, VIA NATURAL OR ARTIFICIAL OPENING ENDOSCOPIC: ICD-10-PCS | Performed by: INTERNAL MEDICINE

## 2023-07-05 PROCEDURE — 87205 SMEAR GRAM STAIN: CPT

## 2023-07-05 PROCEDURE — 87206 SMEAR FLUORESCENT/ACID STAI: CPT

## 2023-07-05 PROCEDURE — 3609010800 HC BRONCHOSCOPY ALVEOLAR LAVAGE: Performed by: INTERNAL MEDICINE

## 2023-07-05 PROCEDURE — 99232 SBSQ HOSP IP/OBS MODERATE 35: CPT | Performed by: INTERNAL MEDICINE

## 2023-07-05 PROCEDURE — 7100000000 HC PACU RECOVERY - FIRST 15 MIN: Performed by: INTERNAL MEDICINE

## 2023-07-05 PROCEDURE — 0B9J8ZX DRAINAGE OF LEFT LOWER LUNG LOBE, VIA NATURAL OR ARTIFICIAL OPENING ENDOSCOPIC, DIAGNOSTIC: ICD-10-PCS | Performed by: INTERNAL MEDICINE

## 2023-07-05 PROCEDURE — 87106 FUNGI IDENTIFICATION YEAST: CPT

## 2023-07-05 PROCEDURE — 31624 DX BRONCHOSCOPE/LAVAGE: CPT | Performed by: INTERNAL MEDICINE

## 2023-07-05 PROCEDURE — 6360000002 HC RX W HCPCS: Performed by: INTERNAL MEDICINE

## 2023-07-05 PROCEDURE — 87102 FUNGUS ISOLATION CULTURE: CPT

## 2023-07-05 PROCEDURE — 87070 CULTURE OTHR SPECIMN AEROBIC: CPT

## 2023-07-05 PROCEDURE — 88112 CYTOPATH CELL ENHANCE TECH: CPT

## 2023-07-05 PROCEDURE — 88305 TISSUE EXAM BY PATHOLOGIST: CPT

## 2023-07-05 PROCEDURE — 87116 MYCOBACTERIA CULTURE: CPT

## 2023-07-05 PROCEDURE — 87305 ASPERGILLUS AG IA: CPT

## 2023-07-05 PROCEDURE — 94761 N-INVAS EAR/PLS OXIMETRY MLT: CPT

## 2023-07-05 PROCEDURE — 85025 COMPLETE CBC W/AUTO DIFF WBC: CPT

## 2023-07-05 PROCEDURE — 2700000000 HC OXYGEN THERAPY PER DAY

## 2023-07-05 PROCEDURE — 7100000001 HC PACU RECOVERY - ADDTL 15 MIN: Performed by: INTERNAL MEDICINE

## 2023-07-05 RX ORDER — FLUOCINONIDE 0.5 MG/G
OINTMENT TOPICAL 2 TIMES DAILY
Status: DISCONTINUED | OUTPATIENT
Start: 2023-07-05 | End: 2023-07-06 | Stop reason: HOSPADM

## 2023-07-05 RX ORDER — LIDOCAINE HYDROCHLORIDE 20 MG/ML
INJECTION, SOLUTION INFILTRATION; PERINEURAL PRN
Status: DISCONTINUED | OUTPATIENT
Start: 2023-07-05 | End: 2023-07-05 | Stop reason: SDUPTHER

## 2023-07-05 RX ORDER — LIDOCAINE HYDROCHLORIDE 40 MG/ML
SOLUTION TOPICAL PRN
Status: DISCONTINUED | OUTPATIENT
Start: 2023-07-05 | End: 2023-07-05 | Stop reason: ALTCHOICE

## 2023-07-05 RX ORDER — DEXAMETHASONE SODIUM PHOSPHATE 4 MG/ML
INJECTION, SOLUTION INTRA-ARTICULAR; INTRALESIONAL; INTRAMUSCULAR; INTRAVENOUS; SOFT TISSUE PRN
Status: DISCONTINUED | OUTPATIENT
Start: 2023-07-05 | End: 2023-07-05 | Stop reason: SDUPTHER

## 2023-07-05 RX ORDER — SODIUM CHLORIDE 9 MG/ML
INJECTION, SOLUTION INTRAVENOUS CONTINUOUS PRN
Status: DISCONTINUED | OUTPATIENT
Start: 2023-07-05 | End: 2023-07-05 | Stop reason: SDUPTHER

## 2023-07-05 RX ORDER — WATER 1000 ML/1000ML
INJECTION, SOLUTION INTRAVENOUS
Status: COMPLETED
Start: 2023-07-05 | End: 2023-07-05

## 2023-07-05 RX ORDER — KETAMINE HYDROCHLORIDE 10 MG/ML
INJECTION INTRAMUSCULAR; INTRAVENOUS PRN
Status: DISCONTINUED | OUTPATIENT
Start: 2023-07-05 | End: 2023-07-05 | Stop reason: SDUPTHER

## 2023-07-05 RX ORDER — PROPOFOL 10 MG/ML
INJECTION, EMULSION INTRAVENOUS PRN
Status: DISCONTINUED | OUTPATIENT
Start: 2023-07-05 | End: 2023-07-05 | Stop reason: SDUPTHER

## 2023-07-05 RX ORDER — ONDANSETRON 2 MG/ML
INJECTION INTRAMUSCULAR; INTRAVENOUS PRN
Status: DISCONTINUED | OUTPATIENT
Start: 2023-07-05 | End: 2023-07-05 | Stop reason: SDUPTHER

## 2023-07-05 RX ORDER — GLYCOPYRROLATE 0.2 MG/ML
INJECTION INTRAMUSCULAR; INTRAVENOUS PRN
Status: DISCONTINUED | OUTPATIENT
Start: 2023-07-05 | End: 2023-07-05 | Stop reason: SDUPTHER

## 2023-07-05 RX ADMIN — METHYLPREDNISOLONE SODIUM SUCCINATE 40 MG: 40 INJECTION INTRAMUSCULAR; INTRAVENOUS at 04:46

## 2023-07-05 RX ADMIN — PROPOFOL 200 MG: 10 INJECTION, EMULSION INTRAVENOUS at 11:44

## 2023-07-05 RX ADMIN — SODIUM CHLORIDE: 9 INJECTION, SOLUTION INTRAVENOUS at 11:15

## 2023-07-05 RX ADMIN — IPRATROPIUM BROMIDE AND ALBUTEROL SULFATE 1 DOSE: 2.5; .5 SOLUTION RESPIRATORY (INHALATION) at 20:07

## 2023-07-05 RX ADMIN — ONDANSETRON 4 MG: 2 INJECTION INTRAMUSCULAR; INTRAVENOUS at 11:51

## 2023-07-05 RX ADMIN — Medication 2 PUFF: at 20:07

## 2023-07-05 RX ADMIN — KETAMINE HYDROCHLORIDE 25 MG: 10 INJECTION, SOLUTION INTRAMUSCULAR; INTRAVENOUS at 11:53

## 2023-07-05 RX ADMIN — POLYETHYLENE GLYCOL 3350 17 G: 17 POWDER, FOR SOLUTION ORAL at 15:46

## 2023-07-05 RX ADMIN — METHYLPREDNISOLONE SODIUM SUCCINATE 40 MG: 40 INJECTION INTRAMUSCULAR; INTRAVENOUS at 21:46

## 2023-07-05 RX ADMIN — GUAIFENESIN 600 MG: 600 TABLET, EXTENDED RELEASE ORAL at 21:46

## 2023-07-05 RX ADMIN — SODIUM CHLORIDE, PRESERVATIVE FREE 10 ML: 5 INJECTION INTRAVENOUS at 08:23

## 2023-07-05 RX ADMIN — GUAIFENESIN 600 MG: 600 TABLET, EXTENDED RELEASE ORAL at 08:23

## 2023-07-05 RX ADMIN — PHENYLEPHRINE HYDROCHLORIDE 100 MCG: 10 INJECTION INTRAVENOUS at 11:53

## 2023-07-05 RX ADMIN — LIDOCAINE HYDROCHLORIDE 60 MG: 20 INJECTION, SOLUTION INFILTRATION; PERINEURAL at 11:44

## 2023-07-05 RX ADMIN — LEVOFLOXACIN 750 MG: 5 INJECTION, SOLUTION INTRAVENOUS at 15:46

## 2023-07-05 RX ADMIN — GLYCOPYRROLATE 0.2 MG: 0.2 INJECTION, SOLUTION INTRAMUSCULAR; INTRAVENOUS at 11:39

## 2023-07-05 RX ADMIN — VORICONAZOLE 200 MG: 200 TABLET ORAL at 08:23

## 2023-07-05 RX ADMIN — IPRATROPIUM BROMIDE AND ALBUTEROL SULFATE 1 DOSE: 2.5; .5 SOLUTION RESPIRATORY (INHALATION) at 16:41

## 2023-07-05 RX ADMIN — WATER 10 ML: 1 INJECTION INTRAMUSCULAR; INTRAVENOUS; SUBCUTANEOUS at 21:46

## 2023-07-05 RX ADMIN — IPRATROPIUM BROMIDE AND ALBUTEROL SULFATE 1 DOSE: 2.5; .5 SOLUTION RESPIRATORY (INHALATION) at 01:09

## 2023-07-05 RX ADMIN — METHADONE HYDROCHLORIDE 60 MG: 10 TABLET ORAL at 08:23

## 2023-07-05 RX ADMIN — IPRATROPIUM BROMIDE AND ALBUTEROL SULFATE 1 DOSE: 2.5; .5 SOLUTION RESPIRATORY (INHALATION) at 04:56

## 2023-07-05 RX ADMIN — METHYLPREDNISOLONE SODIUM SUCCINATE 40 MG: 40 INJECTION INTRAMUSCULAR; INTRAVENOUS at 13:05

## 2023-07-05 RX ADMIN — IPRATROPIUM BROMIDE AND ALBUTEROL SULFATE 1 DOSE: 2.5; .5 SOLUTION RESPIRATORY (INHALATION) at 08:47

## 2023-07-05 RX ADMIN — Medication 2 PUFF: at 08:47

## 2023-07-05 RX ADMIN — PHENYLEPHRINE HYDROCHLORIDE 100 MCG: 10 INJECTION INTRAVENOUS at 11:48

## 2023-07-05 RX ADMIN — SODIUM CHLORIDE, PRESERVATIVE FREE 10 ML: 5 INJECTION INTRAVENOUS at 21:46

## 2023-07-05 RX ADMIN — DEXAMETHASONE SODIUM PHOSPHATE 8 MG: 4 INJECTION, SOLUTION INTRAMUSCULAR; INTRAVENOUS at 11:50

## 2023-07-05 RX ADMIN — METHYLPREDNISOLONE SODIUM SUCCINATE 40 MG: 40 INJECTION INTRAMUSCULAR; INTRAVENOUS at 18:30

## 2023-07-05 RX ADMIN — KETAMINE HYDROCHLORIDE 25 MG: 10 INJECTION, SOLUTION INTRAMUSCULAR; INTRAVENOUS at 11:48

## 2023-07-05 ASSESSMENT — PAIN SCALES - GENERAL
PAINLEVEL_OUTOF10: 0
PAINLEVEL_OUTOF10: 0

## 2023-07-05 ASSESSMENT — ENCOUNTER SYMPTOMS: SHORTNESS OF BREATH: 1

## 2023-07-05 ASSESSMENT — LIFESTYLE VARIABLES: SMOKING_STATUS: 0

## 2023-07-05 ASSESSMENT — COPD QUESTIONNAIRES: CAT_SEVERITY: SEVERE

## 2023-07-05 NOTE — ANESTHESIA POSTPROCEDURE EVALUATION
Department of Anesthesiology  Postprocedure Note    Patient: Terra Thakur  MRN: [id number]  YOB: 1965  Date of evaluation: 7/5/2023      Procedure Summary     Date: 07/05/23 Room / Location: [address] [place] [address]    Anesthesia Start: 1137 Anesthesia Stop: 1208    Procedures:       BRONCHOSCOPY ALVEOLAR LAVAGE      BRONCHOSCOPY THERAPUTIC ASPIRATION INITIAL Diagnosis:       Mucus plugging of bronchi      (Mucus plugging of bronchi [T17.500A])    Surgeons: Arabella Murillo MD Responsible Provider: Selam Chavez MD    Anesthesia Type: MAC ASA Status: 3          Anesthesia Type: No value filed.     Marquis Phase I: Marquis Score: 10    Marquis Phase II:        Anesthesia Post Evaluation    Patient location during evaluation: PACU  Patient participation: complete - patient participated  Level of consciousness: awake and alert  Airway patency: patent  Nausea & Vomiting: no nausea and no vomiting  Complications: no  Cardiovascular status: hemodynamically stable  Respiratory status: acceptable  Hydration status: stable  Multimodal analgesia pain management approach

## 2023-07-05 NOTE — TELEPHONE ENCOUNTER
CoverMyMeds   REQUESTS    Edwin Coronado  CASE TERRY [id number]   1965    Voriconazole   Pharmacy Coverage Details  Plan Name  [employer]  Insurance Type  Commercial  PA Requirement  Not Required  Effective - Term Dates  Not Available    PA Approval on file. 23 @ 259: I called [address] [place] [address] ([phone]) and spoke with pharmacist Hailee Conroy who states that script went through but states that patient is in the \"donut hole\" cost will be $661.32 and she will have to pay full amount.

## 2023-07-05 NOTE — PLAN OF CARE
Problem: Discharge Planning  Goal: Discharge to home or other facility with appropriate resources  Outcome: Progressing  Flowsheets (Taken 7/4/2023 2129 by Clara Bettencourt RN)  Discharge to home or other facility with appropriate resources:   Identify barriers to discharge with patient and caregiver   Arrange for needed discharge resources and transportation as appropriate   Identify discharge learning needs (meds, wound care, etc)   Refer to discharge planning if patient needs post-hospital services based on physician order or complex needs related to functional status, cognitive ability or social support system     Problem: Safety - Adult  Goal: Free from fall injury  Outcome: Progressing     Problem: ABCDS Injury Assessment  Goal: Absence of physical injury  Outcome: Progressing     Problem: Pain  Goal: Verbalizes/displays adequate comfort level or baseline comfort level  Outcome: Progressing     Problem: Respiratory - Adult  Goal: Achieves optimal ventilation and oxygenation  Outcome: Progressing

## 2023-07-05 NOTE — ANESTHESIA PRE PROCEDURE
PREGSERUM, HCG, HCGQUANT     ABGs:   Lab Results   Component Value Date/Time    PHART 7.384 04/15/2023 10:20 PM    PO2ART 56.9 04/15/2023 10:20 PM    YMG1YTD 50.7 04/15/2023 10:20 PM    VSZ0QNS 30.2 04/15/2023 10:20 PM    BEART 4.0 04/15/2023 10:20 PM    V6XLBAVR 91.2 04/15/2023 10:20 PM        Type & Screen (If Applicable):  No results found for: LABABO, LABRH    Drug/Infectious Status (If Applicable):  No results found for: HIV, HEPCAB    COVID-19 Screening (If Applicable):   Lab Results   Component Value Date/Time    COVID19 Not Detected 12/31/2022 04:16 PM    COVID19 DETECTED 09/07/2022 03:00 AM           Anesthesia Evaluation  Patient summary reviewed and Nursing notes reviewed no history of anesthetic complications:   Airway: Mallampati: II  TM distance: >3 FB   Neck ROM: full  Mouth opening: > = 3 FB   Dental:    (+) upper dentures and lower dentures      Pulmonary:normal exam  breath sounds clear to auscultation  (+) COPD (severe, O2 at home, acute exacerbation and mucus plugging): severe,  shortness of breath:  asthma:     (-) sleep apnea and not a current smoker (former)                           Cardiovascular:Negative CV ROS  Exercise tolerance: poor (<4 METS),       (-) hypertension, past MI, CAD, CABG/stent, dysrhythmias,  angina and  CHF    ECG reviewed  Rhythm: regular  Rate: normal                    Neuro/Psych:   (+) neuromuscular disease (chronic pain syndrome, on methadone):,    (-) seizures, TIA and CVA           GI/Hepatic/Renal: Neg GI/Hepatic/Renal ROS       (-) GERD, liver disease and no renal disease       Endo/Other: Negative Endo/Other ROS       (-) diabetes mellitus, hypothyroidism, hyperthyroidism               Abdominal:             Vascular: Other Findings:             Anesthesia Plan      MAC     ASA 3       Induction: intravenous. MIPS: Prophylactic antiemetics administered. Anesthetic plan and risks discussed with patient.       Plan discussed with

## 2023-07-06 VITALS
WEIGHT: 155.6 LBS | RESPIRATION RATE: 18 BRPM | BODY MASS INDEX: 24.42 KG/M2 | HEIGHT: 67 IN | TEMPERATURE: 98.5 F | HEART RATE: 85 BPM | SYSTOLIC BLOOD PRESSURE: 148 MMHG | OXYGEN SATURATION: 90 % | DIASTOLIC BLOOD PRESSURE: 82 MMHG

## 2023-07-06 LAB
ACID FAST STN SPEC QL: NORMAL
ACID FAST STN SPEC QL: NORMAL
BACTERIA SPEC RESP CULT: NORMAL
GRAM STN SPEC: NORMAL
LOEFFLER MB STN SPEC: NORMAL

## 2023-07-06 PROCEDURE — 6360000002 HC RX W HCPCS: Performed by: INTERNAL MEDICINE

## 2023-07-06 PROCEDURE — 2580000003 HC RX 258: Performed by: INTERNAL MEDICINE

## 2023-07-06 PROCEDURE — 99233 SBSQ HOSP IP/OBS HIGH 50: CPT | Performed by: INTERNAL MEDICINE

## 2023-07-06 PROCEDURE — 94669 MECHANICAL CHEST WALL OSCILL: CPT

## 2023-07-06 PROCEDURE — 94640 AIRWAY INHALATION TREATMENT: CPT

## 2023-07-06 PROCEDURE — 6370000000 HC RX 637 (ALT 250 FOR IP): Performed by: INTERNAL MEDICINE

## 2023-07-06 PROCEDURE — 94761 N-INVAS EAR/PLS OXIMETRY MLT: CPT

## 2023-07-06 PROCEDURE — 2700000000 HC OXYGEN THERAPY PER DAY

## 2023-07-06 RX ORDER — PREDNISONE 10 MG/1
10 TABLET ORAL DAILY
Qty: 2 TABLET | Refills: 0 | Status: SHIPPED | OUTPATIENT
Start: 2023-07-06 | End: 2023-07-08

## 2023-07-06 RX ORDER — PREDNISONE 5 MG/1
5 TABLET ORAL DAILY
Qty: 2 TABLET | Refills: 0 | Status: SHIPPED | OUTPATIENT
Start: 2023-07-06 | End: 2023-07-08

## 2023-07-06 RX ORDER — PREDNISONE 10 MG/1
20 TABLET ORAL DAILY
Qty: 6 TABLET | Refills: 0 | Status: SHIPPED | OUTPATIENT
Start: 2023-07-06 | End: 2023-07-09

## 2023-07-06 RX ORDER — PREDNISONE 20 MG/1
40 TABLET ORAL DAILY
Qty: 6 TABLET | Refills: 0 | Status: SHIPPED | OUTPATIENT
Start: 2023-07-06 | End: 2023-07-09

## 2023-07-06 RX ADMIN — Medication 2 PUFF: at 08:06

## 2023-07-06 RX ADMIN — ENOXAPARIN SODIUM 40 MG: 100 INJECTION SUBCUTANEOUS at 08:11

## 2023-07-06 RX ADMIN — SODIUM CHLORIDE, PRESERVATIVE FREE 10 ML: 5 INJECTION INTRAVENOUS at 08:11

## 2023-07-06 RX ADMIN — IPRATROPIUM BROMIDE AND ALBUTEROL SULFATE 1 DOSE: 2.5; .5 SOLUTION RESPIRATORY (INHALATION) at 08:06

## 2023-07-06 RX ADMIN — METHADONE HYDROCHLORIDE 60 MG: 10 TABLET ORAL at 08:10

## 2023-07-06 RX ADMIN — METHYLPREDNISOLONE SODIUM SUCCINATE 40 MG: 40 INJECTION INTRAMUSCULAR; INTRAVENOUS at 04:17

## 2023-07-06 RX ADMIN — GUAIFENESIN 600 MG: 600 TABLET, EXTENDED RELEASE ORAL at 08:10

## 2023-07-06 RX ADMIN — IPRATROPIUM BROMIDE AND ALBUTEROL SULFATE 1 DOSE: 2.5; .5 SOLUTION RESPIRATORY (INHALATION) at 00:05

## 2023-07-06 RX ADMIN — IPRATROPIUM BROMIDE AND ALBUTEROL SULFATE 1 DOSE: 2.5; .5 SOLUTION RESPIRATORY (INHALATION) at 03:58

## 2023-07-06 ASSESSMENT — PAIN SCALES - GENERAL
PAINLEVEL_OUTOF10: 0

## 2023-07-06 NOTE — PLAN OF CARE
Problem: Discharge Planning  Goal: Discharge to home or other facility with appropriate resources  Outcome: Adequate for Discharge     Problem: Safety - Adult  Goal: Free from fall injury  Outcome: Adequate for Discharge     Problem: ABCDS Injury Assessment  Goal: Absence of physical injury  Outcome: Adequate for Discharge     Problem: Pain  Goal: Verbalizes/displays adequate comfort level or baseline comfort level  Outcome: Adequate for Discharge     Problem: Respiratory - Adult  Goal: Achieves optimal ventilation and oxygenation  Outcome: Adequate for Discharge

## 2023-07-06 NOTE — TELEPHONE ENCOUNTER
Noted.  I do not think I can help her any further for the donut hole coverage, if the medication is otherwise approved.

## 2023-07-06 NOTE — TELEPHONE ENCOUNTER
Spoke with patient who has been in the hospital and received voriconazole during her stay, she states it made her dizzy, had difficulty swallowing, and felt delusional. It was then stopped and she was told that she could wait on her culture results from her bronchoscopy to decide if she even needs the medication.  She would like to know if she needs a f/u appt with Dr. Ty Thomas?

## 2023-07-06 NOTE — CARE COORDINATION
Case Management Assessment  Initial Evaluation    Date/Time of Evaluation: 7/6/2023 11:17 AM  Assessment Completed by: Juan Antonio Dickson RN    If patient is discharged prior to next notation, then this note serves as note for discharge by case management. Patient Name: Ronn Rowley                   YOB: 1965  Diagnosis: Shortness of breath [R06.02]  Oxygen dependent [Z99.81]  Acute on chronic respiratory failure with hypoxia and hypercapnia (HCC) [J96.21, J96.22]                   Date / Time: 7/3/2023  8:52 AM    Patient Admission Status: Inpatient   Readmission Risk (Low < 19, Mod (19-27), High > 27): Readmission Risk Score: 20.1    Current PCP: None None  PCP verified by CM? No (No PCP. Given handout with [facility] PCP's. Advised to magnus number listed for help. Agreed and will call.)    Chart Reviewed: Yes      History Provided by: Patient  Patient Orientation: Alert and Oriented    Patient Cognition: Alert    Hospitalization in the last 30 days (Readmission):  No    If yes, Readmission Assessment in CM Navigator will be completed. Advance Directives:      Code Status: Full Code   Patient's Primary Decision Maker is: Legal Next of Kin    Primary Decision MakerMills Winston  Child - [phone]    Secondary Decision Maker: Kylah Gallegos Child - [phone]    Secondary Decision Maker: Doron Boyd Child - [phone]    Discharge Planning:    Patient lives with: Alone Type of Home: House  Primary Care Giver: Self  Patient Support Systems include: Children, Family Members   Current Financial resources: Other (Comment) ([employer])  Current community resources: None  Current services prior to admission: Oxygen Therapy ([employer] for home O2. States will continue this contract)            Current DME:  home oxygen            Type of Home Care services:  None    ADLS  Prior functional level: Independent in ADLs/IADLs  Current functional level:  Independent in

## 2023-07-06 NOTE — DISCHARGE SUMMARY
V2.0  Discharge Summary    Name:  Papo Merritt /Age/Sex: 1965 (63 y.o. female)   Admit Date: 7/3/2023  Discharge Date: 23    MRN & CSN:  [id number] & [id number] Encounter Date and Time 23 10:34 AM EDT    Attending:  Dom Chapin MD Discharging Provider: Dom Chapin MD       Hospital Course:     Brief HPI: Papo Merritt is a 63 y.o. female who presented with shortness of breath. Admitted for COPD exacerbation    Brief Problem Based Course:   Acute on chronic respiratory failure with hypoxia and hypercapnia. Secondary to COPD mucous plugging. Bronchoscopy completed today. Pulmonary input noted mucous plug clearance. Acute COPD excerbation, improved with  iv steroids. Transition to p.o. steroid on discharge. Continue DuoNebs bronchodilators. Aspergillosis - hold Vfend per Pulmonary due to side effects. Mucus Plugging. - pulm consult s/p bronchoscopy. Continue bronchodilators       The patient expressed appropriate understanding of, and agreement with the discharge recommendations, medications, and plan.      Consults this admission:  IP CONSULT TO PULMONOLOGY    Discharge Diagnosis:   Acute on chronic respiratory failure with hypoxia and hypercapnia ([address])        Discharge Instruction:   Follow up appointments:   Primary care physician: None None within 2 weeks  Diet:    Activity:   Disposition: Discharged to:   []Home, []C, []SNF, []Acute Rehab, []Hospice   Condition on discharge: Stable  Labs and Tests to be Followed up as an outpatient by PCP or Specialist:     Discharge Medications:        Medication List        START taking these medications      * predniSONE 20 MG tablet  Commonly known as: DELTASONE  Take 2 tablets by mouth daily for 3 days     * predniSONE 10 MG tablet  Commonly known as: DELTASONE  Take 2 tablets by mouth daily for 3 days     * predniSONE 10 MG tablet  Commonly known as: DELTASONE  Take 1 tablet by mouth daily for 2 days     *

## 2023-07-07 LAB
ASPERGILLUS GALACTO AG: NEGATIVE
BACTERIA SPEC RESP CULT: NORMAL
BACTERIA SPEC RESP CULT: NORMAL
GALACTOMANNAN AG SERPL IA-ACNC: 0.05
GRAM STN SPEC: NORMAL
GRAM STN SPEC: NORMAL
LOEFFLER MB STN SPEC: NORMAL

## 2023-07-07 RX ORDER — ALBUTEROL SULFATE 90 UG/1
2 AEROSOL, METERED RESPIRATORY (INHALATION) EVERY 6 HOURS PRN
Qty: 18 G | Refills: 11 | Status: SHIPPED | OUTPATIENT
Start: 2023-07-07 | End: 2024-07-06

## 2023-07-07 RX ORDER — TIOTROPIUM BROMIDE 18 UG/1
18 CAPSULE ORAL; RESPIRATORY (INHALATION) DAILY
Qty: 90 CAPSULE | Refills: 1 | Status: SHIPPED | OUTPATIENT
Start: 2023-07-07

## 2023-07-07 RX ORDER — IPRATROPIUM BROMIDE AND ALBUTEROL SULFATE 2.5; .5 MG/3ML; MG/3ML
3 SOLUTION RESPIRATORY (INHALATION) EVERY 4 HOURS
Qty: 360 ML | Refills: 3 | Status: SHIPPED | OUTPATIENT
Start: 2023-07-07

## 2023-07-07 NOTE — TELEPHONE ENCOUNTER
I noted that patient had a bronchoscopy done on 7/5/2023. Okay to wait for the results. Patient can continue to follow-up with pulmonology for now, and if the BAL results do come back positive for Aspergillus, she will have to choose between voriconazole, posaconazole or Cresemba and unfortunately arrange today for any co-pay, unless she can be in any patient assistance programs. The patient is unfortunately a very anxious individual and last time asked to discuss every possible side effect of every antifungal medication, which is unfortunately not possible for me in the limited clinic appointment times. That would be more appropriate for a discussion with the pharmacist or she may consider going to ID clinic at [facility], where they have a pharmacist typically in the clinic.

## 2023-07-10 LAB
FUNGUS SPEC CULT: ABNORMAL
LOEFFLER MB STN SPEC: ABNORMAL
ORGANISM: ABNORMAL

## 2023-07-17 LAB
FUNGUS SPEC CULT: NORMAL
LOEFFLER MB STN SPEC: NORMAL

## 2023-07-18 ENCOUNTER — OFFICE VISIT (OUTPATIENT)
Dept: PULMONOLOGY | Age: 58
End: 2023-07-18
Payer: COMMERCIAL

## 2023-07-18 ENCOUNTER — TELEPHONE (OUTPATIENT)
Dept: PULMONOLOGY | Age: 58
End: 2023-07-18

## 2023-07-18 VITALS
HEART RATE: 88 BPM | BODY MASS INDEX: 24.33 KG/M2 | WEIGHT: 155 LBS | OXYGEN SATURATION: 92 % | SYSTOLIC BLOOD PRESSURE: 128 MMHG | HEIGHT: 67 IN | DIASTOLIC BLOOD PRESSURE: 60 MMHG

## 2023-07-18 DIAGNOSIS — T17.500A MUCUS PLUGGING OF BRONCHI: ICD-10-CM

## 2023-07-18 DIAGNOSIS — J44.9 COPD, SEVERE (HCC): Primary | ICD-10-CM

## 2023-07-18 LAB
ACID FAST STN SPEC QL: NORMAL
ACID FAST STN SPEC QL: NORMAL
MYCOBACTERIUM SPEC CULT: NORMAL
MYCOBACTERIUM SPEC CULT: NORMAL

## 2023-07-18 PROCEDURE — 99214 OFFICE O/P EST MOD 30 MIN: CPT | Performed by: INTERNAL MEDICINE

## 2023-07-18 PROCEDURE — 3023F SPIROM DOC REV: CPT | Performed by: INTERNAL MEDICINE

## 2023-07-18 PROCEDURE — 3017F COLORECTAL CA SCREEN DOC REV: CPT | Performed by: INTERNAL MEDICINE

## 2023-07-18 PROCEDURE — G8420 CALC BMI NORM PARAMETERS: HCPCS | Performed by: INTERNAL MEDICINE

## 2023-07-18 PROCEDURE — 1036F TOBACCO NON-USER: CPT | Performed by: INTERNAL MEDICINE

## 2023-07-18 PROCEDURE — 1111F DSCHRG MED/CURRENT MED MERGE: CPT | Performed by: INTERNAL MEDICINE

## 2023-07-18 PROCEDURE — G8427 DOCREV CUR MEDS BY ELIG CLIN: HCPCS | Performed by: INTERNAL MEDICINE

## 2023-07-18 ASSESSMENT — ENCOUNTER SYMPTOMS
CHOKING: 0
SORE THROAT: 0
BLOOD IN STOOL: 0
ANAL BLEEDING: 0
STRIDOR: 0
WHEEZING: 0
ABDOMINAL DISTENTION: 0
VOICE CHANGE: 0
CHEST TIGHTNESS: 0
COUGH: 1
DIARRHEA: 0
RHINORRHEA: 0
CONSTIPATION: 0
ABDOMINAL PAIN: 0
SINUS PRESSURE: 0
BACK PAIN: 0
APNEA: 0
SHORTNESS OF BREATH: 1

## 2023-07-18 NOTE — PROGRESS NOTES
Bud Francisco    YOB: 1965     Date of Service:  7/18/2023     Chief Complaint   Patient presents with    Follow-up     After bronch done 07/05/2023         HPI patient was once again recently hospitalized for COPD exacerbation and mucous plugging of airway between 7/3 and 7/6. Underwent repeat bronchoscopy on 7/5 with small amount of mucous plugging noted over the left lower lobe. Patient states that overall she feels much better, small amount of mucous plugs particularly in a.m. Denies wheezing. Continues to use 2 L at night. Allergies   Allergen Reactions    Penicillins Shortness Of Breath    Codeine Nausea And Vomiting    Posaconazole Nausea And Vomiting     diarrhea     Outpatient Medications Marked as Taking for the 7/18/23 encounter (Office Visit) with Estela Rogers MD   Medication Sig Dispense Refill    ipratropium 0.5 mg-albuterol 2.5 mg (DUONEB) 0.5-2.5 (3) MG/3ML SOLN nebulizer solution Inhale 3 mLs into the lungs every 4 hours 360 mL 3    tiotropium (SPIRIVA HANDIHALER) 18 MCG inhalation capsule Inhale 1 capsule into the lungs daily 90 capsule 1    mometasone-formoterol (DULERA) 200-5 MCG/ACT inhaler Inhale 2 puffs into the lungs in the morning and 2 puffs in the evening.  13 g 5    albuterol sulfate HFA (PROVENTIL;VENTOLIN;PROAIR) 108 (90 Base) MCG/ACT inhaler Inhale 2 puffs into the lungs every 6 hours as needed for Wheezing 18 g 11    Naproxen Sodium 220 MG CAPS Take 1 tablet by mouth 2 times daily as needed for Pain (Arthritis.)      voriconazole (VFEND) 200 MG tablet Take 1 tablet by mouth 2 times daily 60 tablet 2    guaiFENesin (MUCINEX) 600 MG extended release tablet TAKE 1 TABLET BY MOUTH TWICE DAILY 60 tablet 6    albuterol (PROVENTIL) (2.5 MG/3ML) 0.083% nebulizer solution Take 3 mLs by nebulization every 4 hours as needed for Wheezing 120 each 3    METHADONE HCL PO Take 60 mg by mouth daily         Immunization History   Administered Date(s) Administered

## 2023-07-18 NOTE — TELEPHONE ENCOUNTER
Patient called wanting to speak to Rollen Barefoot about a handicap placard. Please call patient at [phone].

## 2023-07-24 LAB
FUNGUS SPEC CULT: NORMAL
LOEFFLER MB STN SPEC: NORMAL

## 2023-07-31 ENCOUNTER — TELEPHONE (OUTPATIENT)
Dept: CARDIAC REHAB | Age: 58
End: 2023-07-31

## 2023-07-31 LAB
FUNGUS SPEC CULT: NORMAL
LOEFFLER MB STN SPEC: NORMAL

## 2023-08-07 LAB
FUNGUS SPEC CULT: ABNORMAL
FUNGUS SPEC CULT: NORMAL
LOEFFLER MB STN SPEC: ABNORMAL
LOEFFLER MB STN SPEC: NORMAL
ORGANISM: ABNORMAL

## 2023-08-11 ENCOUNTER — TELEPHONE (OUTPATIENT)
Dept: PULMONOLOGY | Age: 58
End: 2023-08-11

## 2023-08-11 NOTE — TELEPHONE ENCOUNTER
Spoke with patient. She states she has chest congestion and productive cough of yellowish-green sputum. Denies fever, chills, body aches. Symptoms started yesterday. She is using her inhalers as directed and doing nebulizer treatments every 4 hours.   She is requesting Prednisone

## 2023-08-11 NOTE — TELEPHONE ENCOUNTER
Pt left voice mail saying she is congested and would like to get a steroid.     [facility] # [phone]

## 2023-10-13 ENCOUNTER — TELEPHONE (OUTPATIENT)
Dept: PULMONOLOGY | Age: 58
End: 2023-10-13

## 2023-10-13 NOTE — TELEPHONE ENCOUNTER
Called to let the patient know her bronch was on 07/05/23 and her appointment to go over the results were on 07/18/2023.

## 2023-10-13 NOTE — TELEPHONE ENCOUNTER
Patient called and said she did not hear anything back from her Bronchoscopy she had done back in July and wanted to know what the results were.     [id number] [address]: [phone]

## 2023-10-24 ENCOUNTER — OFFICE VISIT (OUTPATIENT)
Dept: PULMONOLOGY | Age: 58
End: 2023-10-24
Payer: COMMERCIAL

## 2023-10-24 VITALS
WEIGHT: 153 LBS | HEIGHT: 67 IN | HEART RATE: 92 BPM | BODY MASS INDEX: 24.01 KG/M2 | DIASTOLIC BLOOD PRESSURE: 70 MMHG | OXYGEN SATURATION: 92 % | SYSTOLIC BLOOD PRESSURE: 130 MMHG

## 2023-10-24 DIAGNOSIS — J44.9 COPD, SEVERE (HCC): Primary | ICD-10-CM

## 2023-10-24 DIAGNOSIS — T17.500A MUCUS PLUGGING OF BRONCHI: ICD-10-CM

## 2023-10-24 PROCEDURE — 3023F SPIROM DOC REV: CPT | Performed by: INTERNAL MEDICINE

## 2023-10-24 PROCEDURE — 1036F TOBACCO NON-USER: CPT | Performed by: INTERNAL MEDICINE

## 2023-10-24 PROCEDURE — G8484 FLU IMMUNIZE NO ADMIN: HCPCS | Performed by: INTERNAL MEDICINE

## 2023-10-24 PROCEDURE — G8427 DOCREV CUR MEDS BY ELIG CLIN: HCPCS | Performed by: INTERNAL MEDICINE

## 2023-10-24 PROCEDURE — 99214 OFFICE O/P EST MOD 30 MIN: CPT | Performed by: INTERNAL MEDICINE

## 2023-10-24 PROCEDURE — G8420 CALC BMI NORM PARAMETERS: HCPCS | Performed by: INTERNAL MEDICINE

## 2023-10-24 PROCEDURE — 3017F COLORECTAL CA SCREEN DOC REV: CPT | Performed by: INTERNAL MEDICINE

## 2023-10-24 RX ORDER — ROFLUMILAST 500 UG/1
500 TABLET ORAL DAILY
Qty: 30 TABLET | Refills: 3
Start: 2023-10-24

## 2023-10-24 ASSESSMENT — ENCOUNTER SYMPTOMS
DIARRHEA: 0
BLOOD IN STOOL: 0
SHORTNESS OF BREATH: 1
VOICE CHANGE: 0
CONSTIPATION: 0
STRIDOR: 0
CHOKING: 0
WHEEZING: 0
BACK PAIN: 0
APNEA: 0
RHINORRHEA: 0
SORE THROAT: 0
ABDOMINAL PAIN: 0
VOMITING: 0
COUGH: 1
CHEST TIGHTNESS: 0
COLOR CHANGE: 0
ABDOMINAL DISTENTION: 0
SINUS PRESSURE: 0

## 2023-10-24 NOTE — PROGRESS NOTES
Celia Suarez    YOB: 1965     Date of Service:  10/24/2023     Chief Complaint   Patient presents with    3 Month Follow-Up    COPD    Cough     Productive pale yellow  mucus mostly in the morning        HPI patient is here for follow-up for COPD and recurrent mucous plugging of airway. Patient has had recurrent hospitalizations for mucous plugging of airway in April and July 2023. She has now been using Acapella valve on a regular basis and has noticed an improvement in her shortness of breath and decreased mucus accumulation. She does cough up clear phlegm after use of the Acapella valve. No hospitalizations since July 2023. Allergies   Allergen Reactions    Penicillins Shortness Of Breath    Codeine Nausea And Vomiting    Posaconazole Nausea And Vomiting     diarrhea     Outpatient Medications Marked as Taking for the 10/24/23 encounter (Office Visit) with Angelina Amos MD   Medication Sig Dispense Refill    Roflumilast (DALIRESP) 500 MCG tablet Take 1 tablet by mouth daily 30 tablet 3    tiotropium (SPIRIVA RESPIMAT) 2.5 MCG/ACT AERS inhaler Inhale 2 puffs into the lungs daily 2 each 3    ipratropium 0.5 mg-albuterol 2.5 mg (DUONEB) 0.5-2.5 (3) MG/3ML SOLN nebulizer solution Inhale 3 mLs into the lungs every 4 hours 360 mL 3    mometasone-formoterol (DULERA) 200-5 MCG/ACT inhaler Inhale 2 puffs into the lungs in the morning and 2 puffs in the evening.  13 g 5    albuterol sulfate HFA (PROVENTIL;VENTOLIN;PROAIR) 108 (90 Base) MCG/ACT inhaler Inhale 2 puffs into the lungs every 6 hours as needed for Wheezing 18 g 11    Naproxen Sodium 220 MG CAPS Take 1 tablet by mouth 2 times daily as needed for Pain (Arthritis.)      guaiFENesin (MUCINEX) 600 MG extended release tablet TAKE 1 TABLET BY MOUTH TWICE DAILY 60 tablet 6    albuterol (PROVENTIL) (2.5 MG/3ML) 0.083% nebulizer solution Take 3 mLs by nebulization every 4 hours as needed for Wheezing 120 each 3    METHADONE HCL PO Take

## 2023-10-30 ENCOUNTER — TELEPHONE (OUTPATIENT)
Dept: PULMONOLOGY | Age: 58
End: 2023-10-30

## 2023-10-30 RX ORDER — LEVOFLOXACIN 750 MG/1
750 TABLET ORAL DAILY
COMMUNITY

## 2023-10-30 RX ORDER — PREDNISONE 10 MG/1
10 TABLET ORAL DAILY
COMMUNITY

## 2023-10-30 NOTE — TELEPHONE ENCOUNTER
Patient has chest congestion,productive cough with green phlegm, and a burning feeling in her back. no fever or chills she is taking her inhalers as prescribed she is wanting to know if something can be called in?     Allergies : URIEL

## 2023-10-30 NOTE — TELEPHONE ENCOUNTER
Patient left  and said she woke up through out the night and was coughing along with green thick phlegm.  Would like an antibiotic called in.     [place]: [phone]

## 2023-11-27 ENCOUNTER — TELEPHONE (OUTPATIENT)
Dept: PULMONOLOGY | Age: 58
End: 2023-11-27

## 2023-11-27 RX ORDER — PREDNISONE 10 MG/1
10 TABLET ORAL DAILY
COMMUNITY

## 2023-11-27 NOTE — TELEPHONE ENCOUNTER
Patient called and said she is coughing up light yellow phlegm, no wheezing,congestion,sob, Started to feel bad Friday evening. Patient would like a steroid called in.       [facility] [address] [address] ([facility]), [address] [place] [address]     [id number] [address]: [phone]

## 2023-12-15 ENCOUNTER — APPOINTMENT (OUTPATIENT)
Dept: GENERAL RADIOLOGY | Age: 58
End: 2023-12-15
Payer: COMMERCIAL

## 2023-12-15 ENCOUNTER — HOSPITAL ENCOUNTER (EMERGENCY)
Age: 58
Discharge: HOME | End: 2023-12-15
Attending: EMERGENCY MEDICINE
Payer: COMMERCIAL

## 2023-12-15 ENCOUNTER — TELEPHONE (OUTPATIENT)
Dept: PULMONOLOGY | Age: 58
End: 2023-12-15

## 2023-12-15 VITALS
HEART RATE: 88 BPM | BODY MASS INDEX: 23.54 KG/M2 | WEIGHT: 150 LBS | OXYGEN SATURATION: 94 % | HEIGHT: 67 IN | RESPIRATION RATE: 20 BRPM | DIASTOLIC BLOOD PRESSURE: 73 MMHG | SYSTOLIC BLOOD PRESSURE: 125 MMHG | TEMPERATURE: 98.3 F

## 2023-12-15 DIAGNOSIS — J44.1 COPD EXACERBATION (HCC): Primary | ICD-10-CM

## 2023-12-15 DIAGNOSIS — U07.1 COVID-19: ICD-10-CM

## 2023-12-15 LAB
ALBUMIN SERPL-MCNC: 4.3 G/DL (ref 3.4–5)
ALBUMIN/GLOB SERPL: 1.7 {RATIO} (ref 1.1–2.2)
ALP SERPL-CCNC: 71 U/L (ref 40–129)
ALT SERPL-CCNC: 11 U/L (ref 10–40)
ANION GAP SERPL CALCULATED.3IONS-SCNC: 4 MMOL/L (ref 3–16)
AST SERPL-CCNC: 12 U/L (ref 15–37)
BASE EXCESS BLDV CALC-SCNC: 5.2 MMOL/L (ref -3–3)
BASE EXCESS BLDV CALC-SCNC: 7.7 MMOL/L (ref -3–3)
BASOPHILS # BLD: 0.1 K/UL (ref 0–0.2)
BASOPHILS NFR BLD: 0.7 %
BILIRUB SERPL-MCNC: <0.2 MG/DL (ref 0–1)
BUN SERPL-MCNC: 9 MG/DL (ref 7–20)
CALCIUM SERPL-MCNC: 9.5 MG/DL (ref 8.3–10.6)
CHLORIDE SERPL-SCNC: 100 MMOL/L (ref 99–110)
CO2 BLDV-SCNC: 83 MMOL/L
CO2 BLDV-SCNC: 86 MMOL/L
CO2 SERPL-SCNC: 35 MMOL/L (ref 21–32)
COHGB MFR BLDV: 6.2 % (ref 0–1.5)
COHGB MFR BLDV: 6.6 % (ref 0–1.5)
CREAT SERPL-MCNC: 0.7 MG/DL (ref 0.6–1.1)
DEPRECATED RDW RBC AUTO: 14 % (ref 12.4–15.4)
DO-HGB MFR BLDV: 18 %
DO-HGB MFR BLDV: 18 %
EOSINOPHIL # BLD: 0 K/UL (ref 0–0.6)
EOSINOPHIL NFR BLD: 0.5 %
FLUAV RNA RESP QL NAA+PROBE: NOT DETECTED
FLUBV RNA RESP QL NAA+PROBE: NOT DETECTED
GFR SERPLBLD CREATININE-BSD FMLA CKD-EPI: >60 ML/MIN/{1.73_M2}
GLUCOSE SERPL-MCNC: 100 MG/DL (ref 70–99)
HCO3 BLDV-SCNC: 34.8 MMOL/L (ref 23–29)
HCO3 BLDV-SCNC: 36.2 MMOL/L (ref 23–29)
HCT VFR BLD AUTO: 43.2 % (ref 36–48)
HGB BLD-MCNC: 14.2 G/DL (ref 12–16)
LACTATE BLDV-SCNC: 0.9 MMOL/L (ref 0.4–1.9)
LYMPHOCYTES # BLD: 0.5 K/UL (ref 1–5.1)
LYMPHOCYTES NFR BLD: 5.8 %
MCH RBC QN AUTO: 29.1 PG (ref 26–34)
MCHC RBC AUTO-ENTMCNC: 32.9 G/DL (ref 31–36)
MCV RBC AUTO: 88.5 FL (ref 80–100)
METHGB MFR BLDV: 0.3 %
METHGB MFR BLDV: 0.7 %
MONOCYTES # BLD: 0.4 K/UL (ref 0–1.3)
MONOCYTES NFR BLD: 5.6 %
NEUTROPHILS # BLD: 6.9 K/UL (ref 1.7–7.7)
NEUTROPHILS NFR BLD: 87.4 %
NT-PROBNP SERPL-MCNC: 38 PG/ML (ref 0–124)
O2 CT VFR BLDV CALC: 14 VOL %
O2 CT VFR BLDV CALC: 15 VOL %
O2 THERAPY: ABNORMAL
O2 THERAPY: ABNORMAL
PCO2 BLDV: 68.8 MMHG (ref 40–50)
PCO2 BLDV: 74.2 MMHG (ref 40–50)
PH BLDV: 7.28 [PH] (ref 7.35–7.45)
PH BLDV: 7.33 [PH] (ref 7.35–7.45)
PLATELET # BLD AUTO: 151 K/UL (ref 135–450)
PMV BLD AUTO: 9 FL (ref 5–10.5)
PO2 BLDV: 41 MMHG (ref 25–40)
PO2 BLDV: 44.7 MMHG (ref 25–40)
POTASSIUM SERPL-SCNC: 4.3 MMOL/L (ref 3.5–5.1)
PROT SERPL-MCNC: 6.9 G/DL (ref 6.4–8.2)
RBC # BLD AUTO: 4.89 M/UL (ref 4–5.2)
SAO2 % BLDV: 81 %
SAO2 % BLDV: 81 %
SARS-COV-2 RNA RESP QL NAA+PROBE: DETECTED
SODIUM SERPL-SCNC: 139 MMOL/L (ref 136–145)
TROPONIN, HIGH SENSITIVITY: 7 NG/L (ref 0–14)
WBC # BLD AUTO: 7.9 K/UL (ref 4–11)

## 2023-12-15 PROCEDURE — 83605 ASSAY OF LACTIC ACID: CPT

## 2023-12-15 PROCEDURE — 83880 ASSAY OF NATRIURETIC PEPTIDE: CPT

## 2023-12-15 PROCEDURE — 6370000000 HC RX 637 (ALT 250 FOR IP): Performed by: EMERGENCY MEDICINE

## 2023-12-15 PROCEDURE — 85025 COMPLETE CBC W/AUTO DIFF WBC: CPT

## 2023-12-15 PROCEDURE — 93005 ELECTROCARDIOGRAM TRACING: CPT | Performed by: NURSE PRACTITIONER

## 2023-12-15 PROCEDURE — 87040 BLOOD CULTURE FOR BACTERIA: CPT

## 2023-12-15 PROCEDURE — 71045 X-RAY EXAM CHEST 1 VIEW: CPT

## 2023-12-15 PROCEDURE — 80053 COMPREHEN METABOLIC PANEL: CPT

## 2023-12-15 PROCEDURE — 87636 SARSCOV2 & INF A&B AMP PRB: CPT

## 2023-12-15 PROCEDURE — 82803 BLOOD GASES ANY COMBINATION: CPT

## 2023-12-15 PROCEDURE — 84484 ASSAY OF TROPONIN QUANT: CPT

## 2023-12-15 RX ORDER — NIRMATRELVIR AND RITONAVIR 150-100 MG
2 KIT ORAL EVERY 12 HOURS
COMMUNITY

## 2023-12-15 RX ORDER — GUAIFENESIN 600 MG/1
1200 TABLET, EXTENDED RELEASE ORAL ONCE
Status: COMPLETED | OUTPATIENT
Start: 2023-12-15 | End: 2023-12-15

## 2023-12-15 RX ORDER — PREDNISONE 50 MG/1
50 TABLET ORAL DAILY
Qty: 5 TABLET | Refills: 0 | Status: SHIPPED | OUTPATIENT
Start: 2023-12-15 | End: 2023-12-20

## 2023-12-15 RX ADMIN — GUAIFENESIN 1200 MG: 600 TABLET, EXTENDED RELEASE ORAL at 20:00

## 2023-12-15 ASSESSMENT — PAIN SCALES - GENERAL: PAINLEVEL_OUTOF10: 7

## 2023-12-15 ASSESSMENT — PAIN DESCRIPTION - FREQUENCY: FREQUENCY: CONTINUOUS

## 2023-12-15 ASSESSMENT — PAIN DESCRIPTION - ONSET: ONSET: ON-GOING

## 2023-12-15 ASSESSMENT — PAIN DESCRIPTION - LOCATION: LOCATION: HEAD

## 2023-12-15 ASSESSMENT — PAIN DESCRIPTION - PAIN TYPE: TYPE: ACUTE PAIN

## 2023-12-15 ASSESSMENT — PAIN DESCRIPTION - ORIENTATION: ORIENTATION: RIGHT;LEFT;ANTERIOR

## 2023-12-15 ASSESSMENT — PAIN - FUNCTIONAL ASSESSMENT
PAIN_FUNCTIONAL_ASSESSMENT: 0-10
PAIN_FUNCTIONAL_ASSESSMENT: ACTIVITIES ARE NOT PREVENTED

## 2023-12-15 ASSESSMENT — PAIN DESCRIPTION - DESCRIPTORS: DESCRIPTORS: ACHING

## 2023-12-15 NOTE — ED NOTES
Ambulated around nursing station without incident; HR 93 & Sat 95% on 2L.   Sierra Ramon made aware      Oneida Mejía RN  12/15/23 2825

## 2023-12-15 NOTE — ED PROVIDER NOTES
In addition to the advanced practice provider, I personally saw Johnson Sariah and performed a substantive portion of the visit including all aspects of the medical decision making. Medical Decision Making  Patient seen and examined at bedside. Lab workup reviewed which showed the patient tested positive for COVID-19 and she also has hypercapnia with respiratory acidosis. Patient is feeling better on reexamination and she was able to ambulate on 2 L of oxygen and stay at 95% oxygen saturation. We then repeated a blood gas which showed significant improvement in patient's hypercapnia and she is much closer to her baseline now. She was advised to continue using her home oxygen, but was advised to return to the ED should she experience shortness of breath despite being on her 2 L of home oxygen. She was provided with prescriptions for albuterol inhaler and prednisone. Patient already has a prescription ready from her primary care doctor for Paxlovid. Patient understands and agrees with plan going forward. EKG  Normal sinus rhythm with no acute ST elevation. SEP-1  Is this patient to be included in the SEP-1 Core Measure due to severe sepsis or septic shock?    No   Exclusion criteria - the patient is NOT to be included for SEP-1 Core Measure due to:  Viral etiology found or highly suspected (including COVID-19) without concomitant bacterial infection    Screenings     [place] Coma Scale  Eye Opening: Spontaneous  Best Verbal Response: Oriented  Best Motor Response: Obeys commands  [place] Coma Scale Score: 15             Patient Referrals:  None, None    Schedule an appointment as soon as possible for a visit       [facility]  [place]  [phone]  Go to   As needed, If symptoms worsen      Discharge Medications:  Discharge Medication List as of 12/15/2023  8:26 PM        START taking these medications    Details   albuterol-ipratropium

## 2023-12-15 NOTE — TELEPHONE ENCOUNTER
Patient tested positive for COVID symptoms are headache and nausea her oxygen level has been running 93% with 2L .  Can something be called in?

## 2023-12-15 NOTE — TELEPHONE ENCOUNTER
Pt left message saying she woke up today with a bad headache and did a covid test and it came back positive. Can we call her in something.     Ph # [phone]

## 2023-12-15 NOTE — ED PROVIDER NOTES
DIAGNOSTIC RESULTS   LABS:    Labs Reviewed   COVID-19 & INFLUENZA COMBO - Abnormal; Notable for the following components:       Result Value    SARS-CoV-2 RNA, RT PCR DETECTED (*)     All other components within normal limits   BLOOD GAS, VENOUS - Abnormal; Notable for the following components:    pH, Santiago 7.279 (*)     pCO2, Santiago 74.2 (*)     pO2, Santiago 44.7 (*)     HCO3, Venous 34.8 (*)     Base Excess, Santiago 5.2 (*)     Carboxyhemoglobin 6.2 (*)     All other components within normal limits   CBC WITH AUTO DIFFERENTIAL - Abnormal; Notable for the following components:    Lymphocytes Absolute 0.5 (*)     All other components within normal limits   COMPREHENSIVE METABOLIC PANEL - Abnormal; Notable for the following components:    CO2 35 (*)     Glucose 100 (*)     AST 12 (*)     All other components within normal limits   BLOOD GAS, VENOUS - Abnormal; Notable for the following components:    pH, Santiago 7.330 (*)     pCO2, Santiago 68.8 (*)     pO2, Santiago 41.0 (*)     HCO3, Venous 36.2 (*)     Base Excess, Santiago 7.7 (*)     Carboxyhemoglobin 6.6 (*)     All other components within normal limits   CULTURE, BLOOD 1    Narrative:     ORDER#: [id number]                          ORDERED BY: Jessy Whitaker  SOURCE: Blood right ac                     COLLECTED:  12/15/23 15:40  ANTIBIOTICS AT [facility].:                      RECEIVED :  12/15/23 21:50  If child <=2 yrs old please draw pediatric bottle. ~Blood Culture 1   CULTURE, BLOOD 2    Narrative:     ORDER#: [id number]                          ORDERED BY: Jessy Whitaker  SOURCE: Blood right hand                   COLLECTED:  12/15/23 15:40  ANTIBIOTICS AT [facility].:                      RECEIVED :  12/15/23 21:50  If child <=2 yrs old please draw pediatric bottle. ~Blood Culture #2   TROPONIN   BRAIN NATRIURETIC PEPTIDE   LACTATE, SEPSIS       When ordered only abnormal lab results are displayed. All other labs were within normal range or not returned as of this dictation.     EKG: Patient was dispositioned by attending physician after the end of my shift, please see his note    Based on clinical presentation, physical exam and diagnostics, the patient likely has a viral illness. I discussed symptomatic treatment, fluids, and rest. Patient is advised to follow-up with their family doctor within 24-48 hours and return to the ER if she does not improve as anticipated over the next several days, develops difficulty breathing, weakness, inability to take liquids, or has other concerns. Disposition Considerations (include 1 Tests not done, Shared Decision Making, Pt Expectation of Test or Tx.): Shared decision making: Initial differential diagnoses were discussed with this patient, along with physical exam findings and an explanation what evaluation studies were necessary and why. Labs and Imaging results were explained to the patient in detail, including explanation of what these results mean. All treatment and disposition options were discussed with the patient and a treatment plan with the patient's best short and long term care was made in collaboration with the patient. I did consider admission    Appropriate for outpatient management follow up      I am the Primary Clinician of Record.     FINAL IMPRESSION      1. COPD exacerbation ([address])    2. COVID-19          DISPOSITION/PLAN     DISPOSITION Decision To Discharge 12/15/2023 08:14:49 PM      PATIENT REFERRED TO:  None, None    Schedule an appointment as soon as possible for a visit       [facility]  [address] [place] [address]  Go to   As needed, If symptoms worsen      DISCHARGE MEDICATIONS:  Discharge Medication List as of 12/15/2023  8:26 PM        START taking these medications    Details   albuterol-ipratropium (COMBIVENT RESPIMAT)  MCG/ACT AERS inhaler Inhale 1 puff into the lungs every 6 hours as needed for Wheezing or Shortness of Breath, Disp-4 g,

## 2023-12-16 LAB
BACTERIA BLD CULT ORG #2: NORMAL
BACTERIA BLD CULT: NORMAL
EKG ATRIAL RATE: 81 BPM
EKG DIAGNOSIS: NORMAL
EKG P AXIS: 79 DEGREES
EKG P-R INTERVAL: 146 MS
EKG Q-T INTERVAL: 368 MS
EKG QRS DURATION: 82 MS
EKG QTC CALCULATION (BAZETT): 427 MS
EKG R AXIS: 81 DEGREES
EKG T AXIS: 40 DEGREES
EKG VENTRICULAR RATE: 81 BPM

## 2023-12-16 PROCEDURE — 93010 ELECTROCARDIOGRAM REPORT: CPT | Performed by: INTERNAL MEDICINE

## 2024-01-05 ENCOUNTER — TELEPHONE (OUTPATIENT)
Dept: PULMONOLOGY | Age: 59
End: 2024-01-05

## 2024-01-05 DIAGNOSIS — R05.9 COUGH, UNSPECIFIED TYPE: Primary | ICD-10-CM

## 2024-01-05 RX ORDER — PREDNISONE 10 MG/1
10 TABLET ORAL DAILY
COMMUNITY

## 2024-01-05 RX ORDER — AMOXICILLIN AND CLAVULANATE POTASSIUM 875; 125 MG/1; MG/1
1 TABLET, FILM COATED ORAL 2 TIMES DAILY
COMMUNITY

## 2024-01-05 NOTE — TELEPHONE ENCOUNTER
Pt calling complaining of the following symptoms:      Fever- NO    Sore throat- No    Cough- Yes not bad can't get mucus up when able to get up bright green     Fatigue- Yes    Body Aches- No    Sinus Drainage- NO          Color- no     Headache- NO    Duration of symptoms- Yesterday      Tried anything OTC first - Yes (what tried)- Mucinex    Pt asking if ABX can be called into their pharmacy     Name of Pharmacy-/ verified in [facility]-  [facility] [address]

## 2024-01-05 NOTE — TELEPHONE ENCOUNTER
Patient was informed that the medication was called in   The order for the cxr was placed she will go this done today or tomorrow

## 2024-01-06 ENCOUNTER — HOSPITAL ENCOUNTER (OUTPATIENT)
Dept: GENERAL RADIOLOGY | Age: 59
Discharge: HOME | End: 2024-01-06
Payer: COMMERCIAL

## 2024-01-06 ENCOUNTER — HOSPITAL ENCOUNTER (OUTPATIENT)
Age: 59
Discharge: HOME | End: 2024-01-06
Payer: COMMERCIAL

## 2024-01-06 DIAGNOSIS — J44.9 COPD, SEVERE (HCC): Primary | ICD-10-CM

## 2024-01-06 DIAGNOSIS — R05.9 COUGH, UNSPECIFIED TYPE: ICD-10-CM

## 2024-01-06 PROCEDURE — 71046 X-RAY EXAM CHEST 2 VIEWS: CPT

## 2024-01-06 RX ORDER — LEVOFLOXACIN 500 MG/1
500 TABLET, FILM COATED ORAL DAILY
Qty: 7 TABLET | Refills: 0 | Status: SHIPPED | OUTPATIENT
Start: 2024-01-06 | End: 2024-01-13

## 2024-01-12 RX ORDER — GUAIFENESIN 600 MG/1
600 TABLET, EXTENDED RELEASE ORAL 2 TIMES DAILY
Qty: 180 TABLET | Refills: 3 | Status: SHIPPED | OUTPATIENT
Start: 2024-01-12

## 2024-02-06 ENCOUNTER — TELEPHONE (OUTPATIENT)
Dept: PULMONOLOGY | Age: 59
End: 2024-02-06

## 2024-02-06 ENCOUNTER — OFFICE VISIT (OUTPATIENT)
Dept: PULMONOLOGY | Age: 59
End: 2024-02-06
Payer: COMMERCIAL

## 2024-02-06 VITALS
WEIGHT: 150 LBS | HEART RATE: 85 BPM | SYSTOLIC BLOOD PRESSURE: 124 MMHG | DIASTOLIC BLOOD PRESSURE: 80 MMHG | HEIGHT: 67 IN | OXYGEN SATURATION: 92 % | BODY MASS INDEX: 23.54 KG/M2

## 2024-02-06 DIAGNOSIS — Z86.16 HISTORY OF COVID-19: ICD-10-CM

## 2024-02-06 DIAGNOSIS — T17.500A MUCUS PLUGGING OF BRONCHI: ICD-10-CM

## 2024-02-06 DIAGNOSIS — J44.9 COPD, SEVERE (HCC): Primary | ICD-10-CM

## 2024-02-06 PROCEDURE — G8427 DOCREV CUR MEDS BY ELIG CLIN: HCPCS | Performed by: INTERNAL MEDICINE

## 2024-02-06 PROCEDURE — G8484 FLU IMMUNIZE NO ADMIN: HCPCS | Performed by: INTERNAL MEDICINE

## 2024-02-06 PROCEDURE — 3017F COLORECTAL CA SCREEN DOC REV: CPT | Performed by: INTERNAL MEDICINE

## 2024-02-06 PROCEDURE — 99214 OFFICE O/P EST MOD 30 MIN: CPT | Performed by: INTERNAL MEDICINE

## 2024-02-06 PROCEDURE — 3023F SPIROM DOC REV: CPT | Performed by: INTERNAL MEDICINE

## 2024-02-06 PROCEDURE — G8420 CALC BMI NORM PARAMETERS: HCPCS | Performed by: INTERNAL MEDICINE

## 2024-02-06 PROCEDURE — 1036F TOBACCO NON-USER: CPT | Performed by: INTERNAL MEDICINE

## 2024-02-06 RX ORDER — PREDNISONE 10 MG/1
TABLET ORAL
Qty: 10 TABLET | Refills: 0 | Status: SHIPPED | OUTPATIENT
Start: 2024-02-06

## 2024-02-06 RX ORDER — TIOTROPIUM BROMIDE 18 UG/1
18 CAPSULE ORAL; RESPIRATORY (INHALATION) DAILY
Qty: 30 CAPSULE | Refills: 3
Start: 2024-02-06

## 2024-02-06 RX ORDER — PREDNISONE 10 MG/1
TABLET ORAL
Qty: 18 TABLET | Refills: 0 | Status: SHIPPED | OUTPATIENT
Start: 2024-02-06

## 2024-02-06 ASSESSMENT — ENCOUNTER SYMPTOMS
APNEA: 0
DIARRHEA: 0
BACK PAIN: 0
WHEEZING: 0
CHOKING: 0
COUGH: 1
SINUS PRESSURE: 0
SHORTNESS OF BREATH: 1
ABDOMINAL PAIN: 0
CONSTIPATION: 0
VOMITING: 0
CHEST TIGHTNESS: 1
BLOOD IN STOOL: 0
SORE THROAT: 0
RHINORRHEA: 0
ABDOMINAL DISTENTION: 0
VOICE CHANGE: 0
STRIDOR: 0
COLOR CHANGE: 0

## 2024-02-06 NOTE — PROGRESS NOTES
done    Breast cancer screen  Never done    Shingles vaccine (1 of 2) Never done    Low dose CT lung screening &/or counseling  Never done    A1C test (Diabetic or Prediabetic)  08/21/2023    COVID-19 Vaccine (4 - 2023-24 season) 09/01/2023    Flu vaccine  Completed    Pneumococcal 0-64 years Vaccine  Completed    HIV screen  Completed    Hepatitis A vaccine  Aged Out    Hib vaccine  Aged Out    Polio vaccine  Aged Out    Meningococcal (ACWY) vaccine  Aged Out        Assessment/Plan:     Diagnosis Orders   1. COPD, severe (HCC)  [facility]      2.  Mucous plugging of airway    COPD, severe with FEV1 of 1.22 L [41% predicted] from January 2023.  Patient is currently on Dulera 200 and Spiriva Respimat 2.5 mcg.  Currently appears to have an exacerbation, treat with prednisone taper.  Continue to use Acapella valve.  Patient is willing to consider pulmonary rehabilitation and a referral would be made.    History of recurrent mucous plugging of airway requiring bronchoscopy x 2 last year.  CT chest without contrast from June revealed mucous plugging and stable 8 mm right lower lobe lung nodule-continue with annual LDCT imaging.    History of Aspergillus noted from respiratory cultures, intolerant to both voriconazole and posaconazole and hence discontinued.  Repeat culture from July was negative for mold.    Patient has already received PCV 20 and influenza vaccinations recently at pharmacy.    Return in about 3 months (around 5/6/2024).

## 2024-02-06 NOTE — TELEPHONE ENCOUNTER
Patient called and said for the prednisone taper it needs to be 18 and not the 10 that was sent to the pharmacy     PH: [phone]

## 2024-03-12 ENCOUNTER — TELEPHONE (OUTPATIENT)
Dept: PULMONOLOGY | Age: 59
End: 2024-03-12

## 2024-03-12 RX ORDER — PREDNISONE 10 MG/1
TABLET ORAL
Qty: 18 TABLET | Refills: 0 | Status: SHIPPED | OUTPATIENT
Start: 2024-03-12

## 2024-03-12 RX ORDER — DOXYCYCLINE HYCLATE 100 MG
100 TABLET ORAL 2 TIMES DAILY
Qty: 10 TABLET | Refills: 0 | Status: SHIPPED | OUTPATIENT
Start: 2024-03-12 | End: 2024-03-17

## 2024-03-12 RX ORDER — IPRATROPIUM BROMIDE AND ALBUTEROL SULFATE 2.5; .5 MG/3ML; MG/3ML
1 SOLUTION RESPIRATORY (INHALATION) EVERY 4 HOURS
Qty: 120 ML | Refills: 5 | Status: SHIPPED | OUTPATIENT
Start: 2024-03-12

## 2024-03-12 NOTE — TELEPHONE ENCOUNTER
Pt called asking for refills on:    Duoneb nebulizer solution.    She also requested something for her cough with green mucus that started 2 days ago.    [facility]    [phone]

## 2024-03-12 NOTE — TELEPHONE ENCOUNTER
Patient was informed that the antibiotic, prednisone, and refill on duoneb was called into the pharmacy

## 2024-03-12 NOTE — TELEPHONE ENCOUNTER
Patient has chest congestion and productive cough with green mucus. Can something be called in or is there something she can take OTC?

## 2024-04-19 ENCOUNTER — TELEPHONE (OUTPATIENT)
Dept: PULMONOLOGY | Age: 59
End: 2024-04-19

## 2024-04-19 NOTE — TELEPHONE ENCOUNTER
Incoming Triage Calls For Sick Patients:  As a new policy our physicians are asking that we get a little more info and testing before they start to send meds to pharmacies. Please Note that we will treat if Pulmonary/Lung related with proper and necessary steps.     Patient needs to answer yes or no to questions below:     Symptom Duration-2 DAYS   Cough(Productive)- YES PRODUCTIVE CLEAR  Chest Congestion-YES  Headache-NO  Fever-NO  Covid test, Flu or RSV taken- NO TEST TAKEN  Sore Throat-NO  Shortness of breath- NO  Wheezing- NO  Are you on Oxygen-YES  How Many Liters-2L  O2 stat-92  Body Aches-NO  Fatigue- NO  Any Over the counter meds tried- [email or web address]     Pharmacy-     [facility] #[id number] - [place], [place] - [address] - [place] [phone] - F [phone] [[id number]]

## 2024-04-19 NOTE — TELEPHONE ENCOUNTER
Patient called said has a lot of mucus in her chest,has a cough has little phlegm come up said it is a light yellow been going on for a few days wants to know if a steroid can be called in .    [facility] #[id number] [address] - P [phone] - F [phone]    PH: [phone]

## 2024-05-10 ENCOUNTER — APPOINTMENT (OUTPATIENT)
Dept: GENERAL RADIOLOGY | Age: 59
End: 2024-05-10
Payer: COMMERCIAL

## 2024-05-10 ENCOUNTER — TELEPHONE (OUTPATIENT)
Dept: CARDIAC REHAB | Age: 59
End: 2024-05-10

## 2024-05-10 ENCOUNTER — TELEPHONE (OUTPATIENT)
Dept: PULMONOLOGY | Age: 59
End: 2024-05-10

## 2024-05-10 ENCOUNTER — HOSPITAL ENCOUNTER (INPATIENT)
Age: 59
LOS: 1 days | Discharge: HOME | End: 2024-05-10
Attending: EMERGENCY MEDICINE | Admitting: INTERNAL MEDICINE
Payer: COMMERCIAL

## 2024-05-10 VITALS
RESPIRATION RATE: 16 BRPM | HEIGHT: 67 IN | SYSTOLIC BLOOD PRESSURE: 121 MMHG | OXYGEN SATURATION: 98 % | HEART RATE: 93 BPM | DIASTOLIC BLOOD PRESSURE: 73 MMHG | TEMPERATURE: 98.3 F | BODY MASS INDEX: 22.76 KG/M2 | WEIGHT: 145 LBS

## 2024-05-10 DIAGNOSIS — R09.02 HYPOXEMIA: ICD-10-CM

## 2024-05-10 DIAGNOSIS — J44.1 COPD EXACERBATION (HCC): Primary | ICD-10-CM

## 2024-05-10 DIAGNOSIS — R79.89 ELEVATED TROPONIN: ICD-10-CM

## 2024-05-10 PROBLEM — J96.00 ACUTE RESPIRATORY FAILURE (HCC): Status: ACTIVE | Noted: 2024-05-10

## 2024-05-10 LAB
ALBUMIN SERPL-MCNC: 4.2 G/DL (ref 3.4–5)
ALBUMIN/GLOB SERPL: 1.6 {RATIO} (ref 1.1–2.2)
ALP SERPL-CCNC: 68 U/L (ref 40–129)
ALT SERPL-CCNC: 23 U/L (ref 10–40)
ANION GAP SERPL CALCULATED.3IONS-SCNC: 11 MMOL/L (ref 3–16)
AST SERPL-CCNC: 42 U/L (ref 15–37)
BASE EXCESS BLDV CALC-SCNC: 7.6 MMOL/L (ref -3–3)
BASOPHILS # BLD: 0 K/UL (ref 0–0.2)
BASOPHILS NFR BLD: 0.6 %
BILIRUB SERPL-MCNC: 0.4 MG/DL (ref 0–1)
BUN SERPL-MCNC: 10 MG/DL (ref 7–20)
CALCIUM SERPL-MCNC: 9.6 MG/DL (ref 8.3–10.6)
CHLORIDE SERPL-SCNC: 97 MMOL/L (ref 99–110)
CO2 BLDV-SCNC: 81 MMOL/L
CO2 SERPL-SCNC: 30 MMOL/L (ref 21–32)
COHGB MFR BLDV: 7.3 % (ref 0–1.5)
CREAT SERPL-MCNC: 0.7 MG/DL (ref 0.6–1.1)
DEPRECATED RDW RBC AUTO: 13.7 % (ref 12.4–15.4)
EOSINOPHIL # BLD: 0.1 K/UL (ref 0–0.6)
EOSINOPHIL NFR BLD: 1.2 %
GFR SERPLBLD CREATININE-BSD FMLA CKD-EPI: >90 ML/MIN/{1.73_M2}
GLUCOSE SERPL-MCNC: 129 MG/DL (ref 70–99)
HCO3 BLDV-SCNC: 34.3 MMOL/L (ref 23–29)
HCT VFR BLD AUTO: 41.6 % (ref 36–48)
HGB BLD-MCNC: 14.1 G/DL (ref 12–16)
LYMPHOCYTES # BLD: 1.1 K/UL (ref 1–5.1)
LYMPHOCYTES NFR BLD: 17.7 %
MCH RBC QN AUTO: 29.5 PG (ref 26–34)
MCHC RBC AUTO-ENTMCNC: 33.9 G/DL (ref 31–36)
MCV RBC AUTO: 87 FL (ref 80–100)
METHGB MFR BLDV: 0.1 %
MONOCYTES # BLD: 0.3 K/UL (ref 0–1.3)
MONOCYTES NFR BLD: 5 %
NEUTROPHILS # BLD: 4.7 K/UL (ref 1.7–7.7)
NEUTROPHILS NFR BLD: 75.5 %
NT-PROBNP SERPL-MCNC: <36 PG/ML (ref 0–124)
O2 CT VFR BLDV CALC: 18 VOL %
O2 THERAPY: ABNORMAL
PCO2 BLDV: 55.7 MMHG (ref 40–50)
PH BLDV: 7.4 [PH] (ref 7.35–7.45)
PLATELET # BLD AUTO: 165 K/UL (ref 135–450)
PMV BLD AUTO: 8.4 FL (ref 5–10.5)
PO2 BLDV: 88.2 MMHG (ref 25–40)
POTASSIUM SERPL-SCNC: 3.6 MMOL/L (ref 3.5–5.1)
PROT SERPL-MCNC: 6.9 G/DL (ref 6.4–8.2)
RBC # BLD AUTO: 4.78 M/UL (ref 4–5.2)
SAO2 % BLDV: 98 %
SODIUM SERPL-SCNC: 138 MMOL/L (ref 136–145)
TROPONIN, HIGH SENSITIVITY: 16 NG/L (ref 0–14)
TROPONIN, HIGH SENSITIVITY: 16 NG/L (ref 0–14)
WBC # BLD AUTO: 6.2 K/UL (ref 4–11)

## 2024-05-10 PROCEDURE — 71046 X-RAY EXAM CHEST 2 VIEWS: CPT

## 2024-05-10 PROCEDURE — 6360000002 HC RX W HCPCS: Performed by: PHYSICIAN ASSISTANT

## 2024-05-10 PROCEDURE — 80053 COMPREHEN METABOLIC PANEL: CPT

## 2024-05-10 PROCEDURE — 96374 THER/PROPH/DIAG INJ IV PUSH: CPT

## 2024-05-10 PROCEDURE — 83880 ASSAY OF NATRIURETIC PEPTIDE: CPT

## 2024-05-10 PROCEDURE — 94640 AIRWAY INHALATION TREATMENT: CPT

## 2024-05-10 PROCEDURE — 99285 EMERGENCY DEPT VISIT HI MDM: CPT

## 2024-05-10 PROCEDURE — 85025 COMPLETE CBC W/AUTO DIFF WBC: CPT

## 2024-05-10 PROCEDURE — 1200000000 HC SEMI PRIVATE

## 2024-05-10 PROCEDURE — 2580000003 HC RX 258: Performed by: PHYSICIAN ASSISTANT

## 2024-05-10 PROCEDURE — 82803 BLOOD GASES ANY COMBINATION: CPT

## 2024-05-10 PROCEDURE — 84484 ASSAY OF TROPONIN QUANT: CPT

## 2024-05-10 PROCEDURE — 93005 ELECTROCARDIOGRAM TRACING: CPT | Performed by: EMERGENCY MEDICINE

## 2024-05-10 PROCEDURE — 6370000000 HC RX 637 (ALT 250 FOR IP): Performed by: PHYSICIAN ASSISTANT

## 2024-05-10 PROCEDURE — 94760 N-INVAS EAR/PLS OXIMETRY 1: CPT

## 2024-05-10 RX ORDER — 0.9 % SODIUM CHLORIDE 0.9 %
500 INTRAVENOUS SOLUTION INTRAVENOUS PRN
Status: CANCELLED | OUTPATIENT
Start: 2024-05-10

## 2024-05-10 RX ORDER — IPRATROPIUM BROMIDE AND ALBUTEROL SULFATE 2.5; .5 MG/3ML; MG/3ML
1 SOLUTION RESPIRATORY (INHALATION)
Status: DISCONTINUED | OUTPATIENT
Start: 2024-05-10 | End: 2024-05-10 | Stop reason: HOSPADM

## 2024-05-10 RX ORDER — ACETAMINOPHEN 325 MG/1
650 TABLET ORAL EVERY 6 HOURS PRN
Status: CANCELLED | OUTPATIENT
Start: 2024-05-10

## 2024-05-10 RX ORDER — PREDNISONE 10 MG/1
50 TABLET ORAL DAILY
Qty: 25 TABLET | Refills: 0 | Status: SHIPPED | OUTPATIENT
Start: 2024-05-10 | End: 2024-05-15

## 2024-05-10 RX ORDER — SODIUM CHLORIDE 0.9 % (FLUSH) 0.9 %
5-40 SYRINGE (ML) INJECTION EVERY 12 HOURS SCHEDULED
Status: CANCELLED | OUTPATIENT
Start: 2024-05-10

## 2024-05-10 RX ORDER — SODIUM CHLORIDE 0.9 % (FLUSH) 0.9 %
5-40 SYRINGE (ML) INJECTION PRN
Status: CANCELLED | OUTPATIENT
Start: 2024-05-10

## 2024-05-10 RX ORDER — IPRATROPIUM BROMIDE AND ALBUTEROL SULFATE 2.5; .5 MG/3ML; MG/3ML
1 SOLUTION RESPIRATORY (INHALATION)
Status: CANCELLED | OUTPATIENT
Start: 2024-05-10

## 2024-05-10 RX ORDER — GUAIFENESIN 600 MG/1
600 TABLET, EXTENDED RELEASE ORAL 2 TIMES DAILY
Qty: 180 TABLET | Refills: 3 | Status: SHIPPED | OUTPATIENT
Start: 2024-05-10

## 2024-05-10 RX ORDER — METHADONE HYDROCHLORIDE 10 MG/1
60 TABLET ORAL DAILY
Status: CANCELLED | OUTPATIENT
Start: 2024-05-10

## 2024-05-10 RX ORDER — ONDANSETRON 2 MG/ML
4 INJECTION INTRAMUSCULAR; INTRAVENOUS EVERY 6 HOURS PRN
Status: CANCELLED | OUTPATIENT
Start: 2024-05-10

## 2024-05-10 RX ORDER — GUAIFENESIN 600 MG/1
600 TABLET, EXTENDED RELEASE ORAL 2 TIMES DAILY
Status: CANCELLED | OUTPATIENT
Start: 2024-05-10

## 2024-05-10 RX ORDER — ONDANSETRON 4 MG/1
4 TABLET, ORALLY DISINTEGRATING ORAL EVERY 8 HOURS PRN
Status: CANCELLED | OUTPATIENT
Start: 2024-05-10

## 2024-05-10 RX ORDER — IPRATROPIUM BROMIDE AND ALBUTEROL SULFATE 2.5; .5 MG/3ML; MG/3ML
1 SOLUTION RESPIRATORY (INHALATION) ONCE
Status: COMPLETED | OUTPATIENT
Start: 2024-05-10 | End: 2024-05-10

## 2024-05-10 RX ORDER — ALBUTEROL SULFATE 2.5 MG/3ML
5 SOLUTION RESPIRATORY (INHALATION) ONCE
Status: COMPLETED | OUTPATIENT
Start: 2024-05-10 | End: 2024-05-10

## 2024-05-10 RX ORDER — DOXYCYCLINE 100 MG/1
100 CAPSULE ORAL 2 TIMES DAILY
Qty: 20 CAPSULE | Refills: 0 | Status: SHIPPED | OUTPATIENT
Start: 2024-05-10 | End: 2024-05-20

## 2024-05-10 RX ORDER — COVID-19 ANTIGEN TEST
1 KIT MISCELLANEOUS 2 TIMES DAILY PRN
Status: CANCELLED | OUTPATIENT
Start: 2024-05-10

## 2024-05-10 RX ORDER — IPRATROPIUM BROMIDE AND ALBUTEROL SULFATE 2.5; .5 MG/3ML; MG/3ML
1 SOLUTION RESPIRATORY (INHALATION) EVERY 4 HOURS
Status: CANCELLED | OUTPATIENT
Start: 2024-05-10

## 2024-05-10 RX ORDER — ENOXAPARIN SODIUM 100 MG/ML
40 INJECTION SUBCUTANEOUS DAILY
Status: CANCELLED | OUTPATIENT
Start: 2024-05-10

## 2024-05-10 RX ORDER — ALBUTEROL SULFATE 90 UG/1
2 AEROSOL, METERED RESPIRATORY (INHALATION) EVERY 6 HOURS PRN
Status: CANCELLED | OUTPATIENT
Start: 2024-05-10

## 2024-05-10 RX ORDER — ACETAMINOPHEN 650 MG/1
650 SUPPOSITORY RECTAL EVERY 6 HOURS PRN
Status: CANCELLED | OUTPATIENT
Start: 2024-05-10

## 2024-05-10 RX ORDER — PREDNISONE 20 MG/1
40 TABLET ORAL DAILY
Status: CANCELLED | OUTPATIENT
Start: 2024-05-13

## 2024-05-10 RX ORDER — SODIUM CHLORIDE 9 MG/ML
INJECTION, SOLUTION INTRAVENOUS PRN
Status: CANCELLED | OUTPATIENT
Start: 2024-05-10

## 2024-05-10 RX ORDER — POTASSIUM CHLORIDE 20 MEQ/1
40 TABLET, EXTENDED RELEASE ORAL PRN
Status: CANCELLED | OUTPATIENT
Start: 2024-05-10

## 2024-05-10 RX ORDER — POTASSIUM CHLORIDE 7.45 MG/ML
10 INJECTION INTRAVENOUS PRN
Status: CANCELLED | OUTPATIENT
Start: 2024-05-10

## 2024-05-10 RX ORDER — HYDRALAZINE HYDROCHLORIDE 20 MG/ML
10 INJECTION INTRAMUSCULAR; INTRAVENOUS EVERY 6 HOURS PRN
Status: CANCELLED | OUTPATIENT
Start: 2024-05-10

## 2024-05-10 RX ADMIN — IPRATROPIUM BROMIDE AND ALBUTEROL SULFATE 1 DOSE: .5; 3 SOLUTION RESPIRATORY (INHALATION) at 18:52

## 2024-05-10 RX ADMIN — WATER 125 MG: 1 INJECTION INTRAMUSCULAR; INTRAVENOUS; SUBCUTANEOUS at 19:04

## 2024-05-10 RX ADMIN — ALBUTEROL SULFATE 5 MG: 2.5 SOLUTION RESPIRATORY (INHALATION) at 18:51

## 2024-05-10 ASSESSMENT — ENCOUNTER SYMPTOMS
CHEST TIGHTNESS: 0
ABDOMINAL PAIN: 0
BACK PAIN: 1
NAUSEA: 0
DIARRHEA: 0
VOMITING: 0
SHORTNESS OF BREATH: 1
COUGH: 1
WHEEZING: 1

## 2024-05-10 ASSESSMENT — PAIN SCALES - GENERAL: PAINLEVEL_OUTOF10: 6

## 2024-05-10 NOTE — H&P
History and Physical  Dr. Jones  5/10/2024    PCP: None, None    Cc:   Chief Complaint   Patient presents with    Shortness of Breath     Pt c/o increase in SOB since yesterday. Pt c/o left shoulder pain. PT on 2L oxygen at night only but is has required oxygen all day today.     Shoulder Pain       HPI:  Mary Rosario is a 59 y.o. female who has a past medical history of Asthma and Pneumothorax.     Patient presents with Acute respiratory failure (HCC).  HPI  (1-3 for expanded problem focused, ?4 for detailed/comprehensive)      59 y.o. female with a history of asthma, COPD, former tobacco abuse and oxygen dependence who presents to the emergency department today stating she has had worsening shortness of breath since yesterday.  She states she normally only wears 2 L of oxygen at night but her oxygen saturations were dropping down to 82% today and therefore she has been wearing oxygen most of the day.  She denies chest pain but states she has had 5-10 episodes of a sharp pain around her left shoulder blade.  She denies heart palpitations, diaphoresis, lightheadedness or dizziness.     She states she normally only wears 2 L of oxygen at night but her oxygen saturations were dropping down to 82% today and therefore she has been wearing oxygen most of the day. She denies chest pain but states she has had 5-10 episodes of a sharp pain around her left shoulder blade     EKG completed shows no signs of acute ischemia or infarction.  Initial troponin mildly elevated at 16 and delta troponin pending.  proBNP <36 and chest x-ray shows no acute cardiopulmonary process.  She does have moderate emphysema. CBC is without leukocytosis or anemia.  BMP with a mild hyperglycemia of 129 and a mild hypochloremia of 97.  LFTs are unremarkable.  VBG with a pCO2 of 55.7, pO2 of 88.2 and HCO3 of 34.3.  Carboxyhemoglobin is 7.3    She remains on elevated o2 requirement  Will be admitted for iv steroids and continued treatments    Problem  normal S1 and S2 and no murmurs    Gastrointestinal: soft, non-tender, non-distended, normal bowel sounds, no masses or organomegaly    Extremities: no clubbing, no edema    Skin:No rashes or nodules noted.    Neurologic:negative         LABS:  Labs Reviewed   COMPREHENSIVE METABOLIC PANEL - Abnormal; Notable for the following components:       Result Value    Chloride 97 (*)     Glucose 129 (*)     AST 42 (*)     All other components within normal limits   TROPONIN - Abnormal; Notable for the following components:    Troponin, High Sensitivity 16 (*)     All other components within normal limits   BLOOD GAS, VENOUS - Abnormal; Notable for the following components:    pCO2, Santiago 55.7 (*)     pO2, Santiago 88.2 (*)     HCO3, Venous 34.3 (*)     Base Excess, Santiago 7.6 (*)     Carboxyhemoglobin 7.3 (*)     All other components within normal limits   CBC WITH AUTO DIFFERENTIAL   BRAIN NATRIURETIC PEPTIDE   TROPONIN         IMAGING:  Imaging results from computerized chart.  Any imaging that has been independently reviewed as noted elsewhere in chart.  XR CHEST (2 VW)    Result Date: 5/10/2024  EXAMINATION: TWO XRAY VIEWS OF THE CHEST 5/10/2024 5:42 pm COMPARISON: January 6, 2024 HISTORY: ORDERING SYSTEM PROVIDED HISTORY: Chest Discomfort TECHNOLOGIST PROVIDED HISTORY: Reason for exam:->Chest Discomfort Reason for Exam: chest discomfort and shortness of breath FINDINGS: The cardiomediastinal silhouette is normal in size. Moderate emphysema.  Stable scarring at the lung bases. No evidence of acute infiltrate. No pleural effusion or pneumothorax.     1. No acute cardiopulmonary process. 2. Moderate emphysema.         EKG:   EKG interpreted by self - it shows normal sinus at 99  Old chart reviewed, EKG dated 12.15.23 is reviewed, there is not difference noted.    Old study shows sinus at 81    Lab Results   Component Value Date/Time    GLUCOSE 129 05/10/2024 05:46 PM     Lab Results   Component Value Date/Time    POCGLU 134

## 2024-05-10 NOTE — ED PROVIDER NOTES
I independently performed a history and physical on Mary Rosario.   All diagnostic, treatment, and disposition decisions were made by myself in conjunction with the advanced practice provider.     For further details of Mary Rosario's emergency department encounter, please see Ghulam Sheirff PA-C's documentation.    Chief complaint: Shortness of Breath (Pt c/o increase in SOB since yesterday. Pt c/o left shoulder pain. PT on 2L oxygen at night only but is has required oxygen all day today. ) and Shoulder Pain      Patient reports she has had increasing cough and shortness of breath recently.  Cough is productive of yellow sputum and green sputum.  She denies any chest pain or fevers.  She is typically on 2 L of oxygen at night and now is requiring it during the day which is new for her.  She denies any other complaints.  On exam she has scattered rhonchi and wheezes.  Heart regular rate and rhythm.  History From: History from : Patient  Limitations to history : None      EKG  Normal sinus rhythm with a rate of 99, normal intervals, no definite ischemic findings, no acute change relative to EKG dated 15 December 2023       Labs Reviewed   COMPREHENSIVE METABOLIC PANEL - Abnormal; Notable for the following components:       Result Value    Chloride 97 (*)     Glucose 129 (*)     AST 42 (*)     All other components within normal limits   TROPONIN - Abnormal; Notable for the following components:    Troponin, High Sensitivity 16 (*)     All other components within normal limits   BLOOD GAS, VENOUS - Abnormal; Notable for the following components:    pCO2, Santiago 55.7 (*)     pO2, Santiago 88.2 (*)     HCO3, Venous 34.3 (*)     Base Excess, Santiago 7.6 (*)     Carboxyhemoglobin 7.3 (*)     All other components within normal limits   CBC WITH AUTO DIFFERENTIAL   BRAIN NATRIURETIC PEPTIDE   TROPONIN     XR CHEST (2 VW)   Final Result   1. No acute cardiopulmonary process.   2. Moderate emphysema.             Point-of-care  MD  05/11/24 0827

## 2024-05-10 NOTE — ED PROVIDER NOTES
[facility] EMERGENCY DEPARTMENT  EMERGENCY DEPARTMENT ENCOUNTER        Pt Name: Mary Rosario  MRN: [id number]  Birthdate 1965  Date of evaluation: 5/10/2024  Provider: Ghulam Sheriff PA-C  PCP: None, None  Note Started: 5:48 PM EDT 5/10/24       I have seen and evaluated this patient with my supervising physician Hamilton Ruiz MD.      CHIEF COMPLAINT       Chief Complaint   Patient presents with    Shortness of Breath     Pt c/o increase in SOB since yesterday. Pt c/o left shoulder pain. PT on 2L oxygen at night only but is has required oxygen all day today.     Shoulder Pain       HISTORY OF PRESENT ILLNESS: 1 or more Elements     History from : Patient    Limitations to history : None    Mary Rosario is a 59 y.o. female with a history of asthma, COPD, former tobacco abuse and oxygen dependence who presents to the emergency department today stating she has had worsening shortness of breath since yesterday.  She states she normally only wears 2 L of oxygen at night but her oxygen saturations were dropping down to 82% today and therefore she has been wearing oxygen most of the day.  She denies chest pain but states she has had 5-10 episodes of a sharp pain around her left shoulder blade.  She denies heart palpitations, diaphoresis, lightheadedness or dizziness.  She denies fevers or chills.  She states she has a chronic cough that she does not feel is worse        Nursing Notes were all reviewed and agreed with or any disagreements were addressed in the HPI.    REVIEW OF SYSTEMS :      Review of Systems   Constitutional:  Negative for chills and fever.   Respiratory:  Positive for cough, shortness of breath and wheezing. Negative for chest tightness.    Cardiovascular:  Negative for chest pain, palpitations and leg swelling.   Gastrointestinal:  Negative for abdominal pain, diarrhea, nausea and vomiting.   Genitourinary:  Negative for difficulty urinating, dysuria, flank pain,  to 82% today and therefore she has been wearing oxygen most of the day.  She denies chest pain but states she has had 5-10 episodes of a sharp pain around her left shoulder blade.  She denies heart palpitations, diaphoresis, lightheadedness or dizziness.  She denies fevers or chills.  She states she has a chronic cough that she does not feel is worse    EKG completed shows no signs of acute ischemia or infarction.  Initial troponin mildly elevated at 16 and delta troponin pending.  proBNP <36 and chest x-ray shows no acute cardiopulmonary process.  She does have moderate emphysema.     CBC is without leukocytosis or anemia.  BMP with a mild hyperglycemia of 129 and a mild hypochloremia of 97.  LFTs are unremarkable.  VBG with a pCO2 of 55.7, pO2 of 88.2 and HCO3 of 34.3.  Carboxyhemoglobin is 7.3    Patient given a breathing treatment as well as Solu-Medrol 125 mg IV    On reexamination, patient states she is feeling better.  Lung sounds are improved.  She is oxygenating at 98% on 2 L      Disposition Considerations (include 1 Tests not done, Shared Decision Making, Pt Expectation of Test or Tx.): I had a lengthy discussion with the patient regarding testing completed in the emergency department today as well as the results.  I do feel patient will require admission for further evaluation and treatment of her COPD exacerbation with hypoxia as well as mildly elevated troponin.  Patient is agreeable with this.  Dr. Amauri Jones is consulted who is agreeable with admission    I spoke with Dr. Jones. We thoroughly discussed the history, physical exam, laboratory and imaging studies, as well as, emergency department course. Based upon that discussion, we've decided to admit Mary Rosario for further observation and evaluation of Mary Rosario's dyspnea.  As I have deemed necessary from their history, physical, and studies, I have considered and evaluated Mary Rosario for the following diagnoses:  ACUTE CORONARY SYNDROME,

## 2024-05-10 NOTE — TELEPHONE ENCOUNTER
Called pt. Concerning Insurance coverage for Pulmonary rehab.  Advised that her insurance advised that they cover 70% after she meets her deductible.   Pt. Has decided to forego rehab at this time.

## 2024-05-10 NOTE — TELEPHONE ENCOUNTER
Patient left  and was wanting to know if she can get a refill on her Mucinex for 1200 mg instead of the 600mg as prescribed     [facility] #[id number] [facility] [address] [phone] - F [phone]    PH: [phone]

## 2024-05-10 NOTE — PROGRESS NOTES
Pt admitted for copd exac, resp fail    Full h+p to follow    Active Hospital Problems    Diagnosis Date Noted    COPD with acute exacerbation (HCC) [J44.1] 08/21/2022     Priority: Medium    Pain syndrome, chronic [G89.4] 08/21/2022     Priority: Medium    Acute respiratory failure (HCC) [J96.00] 05/10/2024    Elevated troponin [R79.89] 05/10/2024       Please use [employer] to contact me with any questions during the day.   The hospitalist service will provide cross-coverage for this patient from 7pm to 7am.    During those hours, contact the on-call hospitalist MD/KELECHI for questions.

## 2024-05-10 NOTE — PROGRESS NOTES
05/10/24 1918   RT Protocol   History Pulmonary Disease 2   Respiratory pattern 0   Breath sounds 2   Cough 0   Bronchodilator Assessment Score 4

## 2024-05-11 LAB
EKG ATRIAL RATE: 99 BPM
EKG DIAGNOSIS: NORMAL
EKG P AXIS: 70 DEGREES
EKG P-R INTERVAL: 134 MS
EKG Q-T INTERVAL: 350 MS
EKG QRS DURATION: 78 MS
EKG QTC CALCULATION (BAZETT): 449 MS
EKG R AXIS: 45 DEGREES
EKG T AXIS: 56 DEGREES
EKG VENTRICULAR RATE: 99 BPM

## 2024-05-11 PROCEDURE — 93010 ELECTROCARDIOGRAM REPORT: CPT | Performed by: INTERNAL MEDICINE

## 2024-05-11 NOTE — PROGRESS NOTES
Pharmacy Home Medication Reconciliation Note    A medication reconciliation has been completed for Mary Rosario 1965    Pharmacy: [facility] [address] [place] [address]    Information provided by: Patient    The patient's home medication list is as follows:  No current facility-administered medications on file prior to encounter.     Current Outpatient Medications on File Prior to Encounter   Medication Sig Dispense Refill    guaiFENesin (MUCINEX) 600 MG extended release tablet Take 1 tablet by mouth 2 times daily 180 tablet 3    ipratropium 0.5 mg-albuterol 2.5 mg (DUONEB) 0.5-2.5 (3) MG/3ML SOLN nebulizer solution Inhale 3 mLs into the lungs every 4 hours 120 mL 5    [DISCONTINUED] predniSONE (DELTASONE) 10 MG tablet 30mg x 3, 20mg x 3 and 10mg x3 18 tablet 0    tiotropium (SPIRIVA HANDIHALER) 18 MCG inhalation capsule Inhale 1 capsule into the lungs daily (Patient not taking: Reported on 5/10/2024) 30 capsule 3    [DISCONTINUED] predniSONE (DELTASONE) 10 MG tablet Take 3 tablets daily x 3 days, then 2 tablets daily x 3 days and then 1 tablet daily x 3 days. 10 tablet 0    [DISCONTINUED] predniSONE (DELTASONE) 10 MG tablet Take 3 tablets daily x 3 days, then 2 tablets daily x 3 days and then 1 tablet daily x 3 days. 18 tablet 0    [DISCONTINUED] guaiFENesin (MUCINEX) 600 MG extended release tablet Take 1 tablet by mouth 2 times daily 180 tablet 3    albuterol-ipratropium (COMBIVENT RESPIMAT)  MCG/ACT AERS inhaler Inhale 1 puff into the lungs every 6 hours as needed for Wheezing or Shortness of Breath 4 g 0    mometasone-formoterol (DULERA) 200-5 MCG/ACT inhaler Inhale 2 puffs into the lungs in the morning and 2 puffs in the evening. (Patient not taking: Reported on 5/10/2024) 13 g 5    albuterol sulfate HFA (PROVENTIL;VENTOLIN;PROAIR) 108 (90 Base) MCG/ACT inhaler Inhale 2 puffs into the lungs every 6 hours as needed for Wheezing 18 g 11    Naproxen Sodium 220 MG CAPS Take 1 tablet by mouth

## 2024-05-21 ENCOUNTER — OFFICE VISIT (OUTPATIENT)
Dept: PULMONOLOGY | Age: 59
End: 2024-05-21
Payer: COMMERCIAL

## 2024-05-21 VITALS
BODY MASS INDEX: 23.86 KG/M2 | SYSTOLIC BLOOD PRESSURE: 132 MMHG | HEART RATE: 101 BPM | DIASTOLIC BLOOD PRESSURE: 64 MMHG | OXYGEN SATURATION: 90 % | HEIGHT: 67 IN | WEIGHT: 152 LBS

## 2024-05-21 DIAGNOSIS — J44.9 COPD, SEVERE (HCC): ICD-10-CM

## 2024-05-21 DIAGNOSIS — T17.500A MUCUS PLUGGING OF BRONCHI: ICD-10-CM

## 2024-05-21 DIAGNOSIS — Z87.891 HISTORY OF SMOKING: Primary | ICD-10-CM

## 2024-05-21 PROCEDURE — G8427 DOCREV CUR MEDS BY ELIG CLIN: HCPCS | Performed by: INTERNAL MEDICINE

## 2024-05-21 PROCEDURE — 99214 OFFICE O/P EST MOD 30 MIN: CPT | Performed by: INTERNAL MEDICINE

## 2024-05-21 PROCEDURE — 3023F SPIROM DOC REV: CPT | Performed by: INTERNAL MEDICINE

## 2024-05-21 PROCEDURE — 3017F COLORECTAL CA SCREEN DOC REV: CPT | Performed by: INTERNAL MEDICINE

## 2024-05-21 PROCEDURE — 1111F DSCHRG MED/CURRENT MED MERGE: CPT | Performed by: INTERNAL MEDICINE

## 2024-05-21 PROCEDURE — G8420 CALC BMI NORM PARAMETERS: HCPCS | Performed by: INTERNAL MEDICINE

## 2024-05-21 PROCEDURE — 1036F TOBACCO NON-USER: CPT | Performed by: INTERNAL MEDICINE

## 2024-05-21 RX ORDER — TIOTROPIUM BROMIDE 18 UG/1
18 CAPSULE ORAL; RESPIRATORY (INHALATION) DAILY
Qty: 30 CAPSULE | Refills: 3 | Status: SHIPPED | OUTPATIENT
Start: 2024-05-21

## 2024-05-21 ASSESSMENT — ENCOUNTER SYMPTOMS
APNEA: 0
ABDOMINAL DISTENTION: 0
WHEEZING: 0
ABDOMINAL PAIN: 0
COUGH: 1
DIARRHEA: 0
STRIDOR: 0
CHOKING: 0
RHINORRHEA: 0
CONSTIPATION: 0
COLOR CHANGE: 0
CHEST TIGHTNESS: 0
SINUS PRESSURE: 0
VOMITING: 0
BLOOD IN STOOL: 0
VOICE CHANGE: 0
SORE THROAT: 0
SHORTNESS OF BREATH: 1
BACK PAIN: 0

## 2024-05-30 ENCOUNTER — TELEPHONE (OUTPATIENT)
Dept: PULMONOLOGY | Age: 59
End: 2024-05-30

## 2024-06-09 PROBLEM — R79.89 ELEVATED TROPONIN: Status: RESOLVED | Noted: 2024-05-10 | Resolved: 2024-06-09

## 2024-06-14 RX ORDER — PREDNISONE 20 MG/1
40 TABLET ORAL DAILY
Qty: 14 TABLET | Refills: 0 | Status: SHIPPED | OUTPATIENT
Start: 2024-06-14 | End: 2024-06-21

## 2024-06-19 ENCOUNTER — TELEPHONE (OUTPATIENT)
Dept: PULMONOLOGY | Age: 59
End: 2024-06-19

## 2024-06-19 NOTE — TELEPHONE ENCOUNTER
Patient called and said she recently went on a trip and lost her bag of medications. On prednisone for 7 days has only completed day 4 and still need the remainder of her prednisone days. Still feels tightness in chest, coughing up phlegm said it white/yellow not infection color looking. Also mentioned that the dulera is going to be expensive      PH: [phone]    [facility] #[id number] [facility] [address] - P [phone] - F [phone]

## 2024-06-19 NOTE — TELEPHONE ENCOUNTER
Dr. Catalan, Patient lost Prednisone.  It was ordered for 7 days and patient only completed 4 days.  Is it ok to call in the other 3 days?  Also, Dulera is too expensive. Request substitution.

## 2024-06-19 NOTE — TELEPHONE ENCOUNTER
Prednisone 20mg 2 BID x 3 days #6 NR phoned into [facility].      Spoke with patient re: substitution for Dulera.  Informed patient of recommendations from Dr. Catalan.  Patient to check with insurance and call office back with the cheaper alternative.

## 2024-06-28 ENCOUNTER — HOSPITAL ENCOUNTER (INPATIENT)
Age: 59
LOS: 4 days | Discharge: HOME | DRG: 190 | End: 2024-07-02
Attending: EMERGENCY MEDICINE | Admitting: STUDENT IN AN ORGANIZED HEALTH CARE EDUCATION/TRAINING PROGRAM
Payer: COMMERCIAL

## 2024-06-28 ENCOUNTER — APPOINTMENT (OUTPATIENT)
Dept: GENERAL RADIOLOGY | Age: 59
DRG: 190 | End: 2024-06-28
Payer: COMMERCIAL

## 2024-06-28 DIAGNOSIS — J44.1 COPD EXACERBATION (HCC): ICD-10-CM

## 2024-06-28 DIAGNOSIS — J96.01 ACUTE RESPIRATORY FAILURE WITH HYPOXEMIA (HCC): Primary | ICD-10-CM

## 2024-06-28 LAB
ALBUMIN SERPL-MCNC: 4.6 G/DL (ref 3.4–5)
ALBUMIN/GLOB SERPL: 1.4 {RATIO} (ref 1.1–2.2)
ALP SERPL-CCNC: 77 U/L (ref 40–129)
ALT SERPL-CCNC: 16 U/L (ref 10–40)
ANION GAP SERPL CALCULATED.3IONS-SCNC: 10 MMOL/L (ref 3–16)
AST SERPL-CCNC: 19 U/L (ref 15–37)
BASE EXCESS BLDV CALC-SCNC: 5.9 MMOL/L (ref -3–3)
BASOPHILS # BLD: 0.1 K/UL (ref 0–0.2)
BASOPHILS NFR BLD: 0.9 %
BILIRUB SERPL-MCNC: 0.3 MG/DL (ref 0–1)
BILIRUB UR QL STRIP.AUTO: NEGATIVE
BUN SERPL-MCNC: 7 MG/DL (ref 7–20)
CALCIUM SERPL-MCNC: 9.9 MG/DL (ref 8.3–10.6)
CHLORIDE SERPL-SCNC: 98 MMOL/L (ref 99–110)
CLARITY UR: CLEAR
CO2 BLDV-SCNC: 83 MMOL/L
CO2 SERPL-SCNC: 32 MMOL/L (ref 21–32)
COHGB MFR BLDV: 5.8 % (ref 0–1.5)
COLOR UR: YELLOW
CREAT SERPL-MCNC: 0.7 MG/DL (ref 0.6–1.1)
DEPRECATED RDW RBC AUTO: 14.2 % (ref 12.4–15.4)
DO-HGB MFR BLDV: 37 %
EOSINOPHIL # BLD: 0.3 K/UL (ref 0–0.6)
EOSINOPHIL NFR BLD: 3.7 %
GFR SERPLBLD CREATININE-BSD FMLA CKD-EPI: >90 ML/MIN/{1.73_M2}
GLUCOSE SERPL-MCNC: 105 MG/DL (ref 70–99)
GLUCOSE UR STRIP.AUTO-MCNC: NEGATIVE MG/DL
HCO3 BLDV-SCNC: 35 MMOL/L (ref 23–29)
HCT VFR BLD AUTO: 44.6 % (ref 36–48)
HGB BLD-MCNC: 15 G/DL (ref 12–16)
HGB UR QL STRIP.AUTO: NEGATIVE
KETONES UR STRIP.AUTO-MCNC: NEGATIVE MG/DL
LEUKOCYTE ESTERASE UR QL STRIP.AUTO: NEGATIVE
LYMPHOCYTES # BLD: 1.5 K/UL (ref 1–5.1)
LYMPHOCYTES NFR BLD: 19.5 %
MCH RBC QN AUTO: 29.8 PG (ref 26–34)
MCHC RBC AUTO-ENTMCNC: 33.5 G/DL (ref 31–36)
MCV RBC AUTO: 88.9 FL (ref 80–100)
METHGB MFR BLDV: 0.2 %
MONOCYTES # BLD: 0.4 K/UL (ref 0–1.3)
MONOCYTES NFR BLD: 4.9 %
NEUTROPHILS # BLD: 5.3 K/UL (ref 1.7–7.7)
NEUTROPHILS NFR BLD: 71 %
NITRITE UR QL STRIP.AUTO: NEGATIVE
O2 CT VFR BLDV CALC: 12 VOL %
O2 THERAPY: ABNORMAL
PCO2 BLDV: 69.1 MMHG (ref 40–50)
PH BLDV: 7.31 [PH] (ref 7.35–7.45)
PH UR STRIP.AUTO: 7 [PH] (ref 5–8)
PLATELET # BLD AUTO: 194 K/UL (ref 135–450)
PMV BLD AUTO: 8.4 FL (ref 5–10.5)
PO2 BLDV: 31.4 MMHG (ref 25–40)
POTASSIUM SERPL-SCNC: 3.9 MMOL/L (ref 3.5–5.1)
PROT SERPL-MCNC: 7.8 G/DL (ref 6.4–8.2)
PROT UR STRIP.AUTO-MCNC: NEGATIVE MG/DL
RBC # BLD AUTO: 5.02 M/UL (ref 4–5.2)
SAO2 % BLDV: 61 %
SODIUM SERPL-SCNC: 140 MMOL/L (ref 136–145)
SP GR UR STRIP.AUTO: <=1.005 (ref 1–1.03)
TROPONIN, HIGH SENSITIVITY: 11 NG/L (ref 0–14)
TROPONIN, HIGH SENSITIVITY: 12 NG/L (ref 0–14)
UA COMPLETE W REFLEX CULTURE PNL UR: NORMAL
UA DIPSTICK W REFLEX MICRO PNL UR: NORMAL
URN SPEC COLLECT METH UR: NORMAL
UROBILINOGEN UR STRIP-ACNC: 0.2 E.U./DL
WBC # BLD AUTO: 7.4 K/UL (ref 4–11)

## 2024-06-28 PROCEDURE — 6360000002 HC RX W HCPCS: Performed by: PHYSICIAN ASSISTANT

## 2024-06-28 PROCEDURE — 93005 ELECTROCARDIOGRAM TRACING: CPT | Performed by: INTERNAL MEDICINE

## 2024-06-28 PROCEDURE — 96374 THER/PROPH/DIAG INJ IV PUSH: CPT

## 2024-06-28 PROCEDURE — 84484 ASSAY OF TROPONIN QUANT: CPT

## 2024-06-28 PROCEDURE — 2700000000 HC OXYGEN THERAPY PER DAY

## 2024-06-28 PROCEDURE — 94761 N-INVAS EAR/PLS OXIMETRY MLT: CPT

## 2024-06-28 PROCEDURE — 94640 AIRWAY INHALATION TREATMENT: CPT

## 2024-06-28 PROCEDURE — 2580000003 HC RX 258: Performed by: PHYSICIAN ASSISTANT

## 2024-06-28 PROCEDURE — 85025 COMPLETE CBC W/AUTO DIFF WBC: CPT

## 2024-06-28 PROCEDURE — 82803 BLOOD GASES ANY COMBINATION: CPT

## 2024-06-28 PROCEDURE — 81003 URINALYSIS AUTO W/O SCOPE: CPT

## 2024-06-28 PROCEDURE — 71046 X-RAY EXAM CHEST 2 VIEWS: CPT

## 2024-06-28 PROCEDURE — 99285 EMERGENCY DEPT VISIT HI MDM: CPT

## 2024-06-28 PROCEDURE — 6370000000 HC RX 637 (ALT 250 FOR IP): Performed by: PHYSICIAN ASSISTANT

## 2024-06-28 PROCEDURE — 1200000000 HC SEMI PRIVATE

## 2024-06-28 PROCEDURE — 80053 COMPREHEN METABOLIC PANEL: CPT

## 2024-06-28 RX ORDER — IPRATROPIUM BROMIDE AND ALBUTEROL SULFATE 2.5; .5 MG/3ML; MG/3ML
1 SOLUTION RESPIRATORY (INHALATION) ONCE
Status: COMPLETED | OUTPATIENT
Start: 2024-06-28 | End: 2024-06-28

## 2024-06-28 RX ORDER — ALBUTEROL SULFATE 2.5 MG/3ML
2.5 SOLUTION RESPIRATORY (INHALATION) ONCE
Status: COMPLETED | OUTPATIENT
Start: 2024-06-28 | End: 2024-06-28

## 2024-06-28 RX ADMIN — IPRATROPIUM BROMIDE AND ALBUTEROL SULFATE 1 DOSE: .5; 3 SOLUTION RESPIRATORY (INHALATION) at 22:14

## 2024-06-28 RX ADMIN — IPRATROPIUM BROMIDE AND ALBUTEROL SULFATE 1 DOSE: .5; 3 SOLUTION RESPIRATORY (INHALATION) at 19:24

## 2024-06-28 RX ADMIN — ALBUTEROL SULFATE 2.5 MG: 2.5 SOLUTION RESPIRATORY (INHALATION) at 19:24

## 2024-06-28 RX ADMIN — WATER 80 MG: 1 INJECTION INTRAMUSCULAR; INTRAVENOUS; SUBCUTANEOUS at 19:58

## 2024-06-28 NOTE — ED PROVIDER NOTES
[facility] EMERGENCY DEPARTMENT  EMERGENCY DEPARTMENT ENCOUNTER        Pt Name: Mary Rosario  MRN: [id number]  Birthdate 1965  Date of evaluation: 6/28/2024  Provider: Shaylee Jeff PA-C  PCP: None, None  Note Started: 7:04 PM EDT 6/28/24       I have seen and evaluated this patient with my supervising physician Robinson Gavin MD.      CHIEF COMPLAINT       Chief Complaint   Patient presents with    Shortness of Breath     Patient arrives with sob, and cp, hx of copd, this has been going on for several days now.        HISTORY OF PRESENT ILLNESS: 1 or more Elements     History From: Patient  Limitations to history : None    Mary Rosario is a 59 y.o. female who presents to the emergency department today for evaluation for concerns of shortness of breath and COPD exacerbation.  For the past 2 to 3 days, patient has been expensing cough, congestion, chest tightness, wheezing, and shortness of breath.  Patient reports that she wears oxygen at night and does wear this during the day as needed.  The patient reports that today she noticed increasing wheezing.  She reports that she does not smoke however she states that she was around someone who was smoking and feels that this likely flared up her COPD.  Patient states that her cough is dry there is no sputum production or hemoptysis.  She has chest tightness but has no true chest pain.  She has no fevers.  No vomiting or diarrhea.  No urinary symptoms no other complaints    Nursing Notes were all reviewed and agreed with or any disagreements were addressed in the HPI.    REVIEW OF SYSTEMS :      Review of Systems   Constitutional:  Negative for activity change, appetite change, chills and fever.   HENT:  Negative for congestion and rhinorrhea.    Respiratory:  Positive for cough, chest tightness, shortness of breath and wheezing. Negative for stridor.    Cardiovascular:  Negative for chest pain.   Gastrointestinal:  Negative for

## 2024-06-29 LAB
ANION GAP SERPL CALCULATED.3IONS-SCNC: 10 MMOL/L (ref 3–16)
BASOPHILS # BLD: 0.1 K/UL (ref 0–0.2)
BASOPHILS NFR BLD: 1.4 %
BUN SERPL-MCNC: 14 MG/DL (ref 7–20)
CALCIUM SERPL-MCNC: 9.5 MG/DL (ref 8.3–10.6)
CHLORIDE SERPL-SCNC: 101 MMOL/L (ref 99–110)
CO2 SERPL-SCNC: 28 MMOL/L (ref 21–32)
CREAT SERPL-MCNC: 0.7 MG/DL (ref 0.6–1.1)
DEPRECATED RDW RBC AUTO: 14.2 % (ref 12.4–15.4)
EKG ATRIAL RATE: 103 BPM
EKG DIAGNOSIS: NORMAL
EKG P AXIS: 81 DEGREES
EKG P-R INTERVAL: 130 MS
EKG Q-T INTERVAL: 328 MS
EKG QRS DURATION: 74 MS
EKG QTC CALCULATION (BAZETT): 429 MS
EKG R AXIS: 49 DEGREES
EKG T AXIS: 57 DEGREES
EKG VENTRICULAR RATE: 103 BPM
EOSINOPHIL # BLD: 0 K/UL (ref 0–0.6)
EOSINOPHIL NFR BLD: 0 %
GFR SERPLBLD CREATININE-BSD FMLA CKD-EPI: >90 ML/MIN/{1.73_M2}
GLUCOSE SERPL-MCNC: 137 MG/DL (ref 70–99)
HCT VFR BLD AUTO: 44.7 % (ref 36–48)
HGB BLD-MCNC: 14.5 G/DL (ref 12–16)
LYMPHOCYTES # BLD: 0.4 K/UL (ref 1–5.1)
LYMPHOCYTES NFR BLD: 7.4 %
MCH RBC QN AUTO: 29.3 PG (ref 26–34)
MCHC RBC AUTO-ENTMCNC: 32.4 G/DL (ref 31–36)
MCV RBC AUTO: 90.5 FL (ref 80–100)
MONOCYTES # BLD: 0 K/UL (ref 0–1.3)
MONOCYTES NFR BLD: 0.7 %
NEUTROPHILS # BLD: 4.9 K/UL (ref 1.7–7.7)
NEUTROPHILS NFR BLD: 90.5 %
PLATELET # BLD AUTO: 156 K/UL (ref 135–450)
PMV BLD AUTO: 8.9 FL (ref 5–10.5)
POTASSIUM SERPL-SCNC: 4.7 MMOL/L (ref 3.5–5.1)
RBC # BLD AUTO: 4.94 M/UL (ref 4–5.2)
REPORT: NORMAL
RESP PATH DNA+RNA PNL NPH NAA+NON-PROBE: NORMAL
SODIUM SERPL-SCNC: 139 MMOL/L (ref 136–145)
WBC # BLD AUTO: 5.5 K/UL (ref 4–11)

## 2024-06-29 PROCEDURE — 1200000000 HC SEMI PRIVATE

## 2024-06-29 PROCEDURE — 36415 COLL VENOUS BLD VENIPUNCTURE: CPT

## 2024-06-29 PROCEDURE — 94761 N-INVAS EAR/PLS OXIMETRY MLT: CPT

## 2024-06-29 PROCEDURE — 2580000003 HC RX 258: Performed by: STUDENT IN AN ORGANIZED HEALTH CARE EDUCATION/TRAINING PROGRAM

## 2024-06-29 PROCEDURE — 6370000000 HC RX 637 (ALT 250 FOR IP): Performed by: NURSE PRACTITIONER

## 2024-06-29 PROCEDURE — 0202U NFCT DS 22 TRGT SARS-COV-2: CPT

## 2024-06-29 PROCEDURE — 6370000000 HC RX 637 (ALT 250 FOR IP): Performed by: STUDENT IN AN ORGANIZED HEALTH CARE EDUCATION/TRAINING PROGRAM

## 2024-06-29 PROCEDURE — 6360000002 HC RX W HCPCS: Performed by: STUDENT IN AN ORGANIZED HEALTH CARE EDUCATION/TRAINING PROGRAM

## 2024-06-29 PROCEDURE — 85025 COMPLETE CBC W/AUTO DIFF WBC: CPT

## 2024-06-29 PROCEDURE — 2700000000 HC OXYGEN THERAPY PER DAY

## 2024-06-29 PROCEDURE — 80048 BASIC METABOLIC PNL TOTAL CA: CPT

## 2024-06-29 PROCEDURE — 93010 ELECTROCARDIOGRAM REPORT: CPT | Performed by: INTERNAL MEDICINE

## 2024-06-29 PROCEDURE — 94640 AIRWAY INHALATION TREATMENT: CPT

## 2024-06-29 RX ORDER — ACETAMINOPHEN 325 MG/1
650 TABLET ORAL EVERY 6 HOURS PRN
Status: DISCONTINUED | OUTPATIENT
Start: 2024-06-29 | End: 2024-07-02 | Stop reason: HOSPADM

## 2024-06-29 RX ORDER — SODIUM CHLORIDE 0.9 % (FLUSH) 0.9 %
5-40 SYRINGE (ML) INJECTION PRN
Status: DISCONTINUED | OUTPATIENT
Start: 2024-06-29 | End: 2024-07-02 | Stop reason: HOSPADM

## 2024-06-29 RX ORDER — ENOXAPARIN SODIUM 100 MG/ML
40 INJECTION SUBCUTANEOUS DAILY
Status: DISCONTINUED | OUTPATIENT
Start: 2024-06-29 | End: 2024-07-02 | Stop reason: HOSPADM

## 2024-06-29 RX ORDER — ONDANSETRON 2 MG/ML
4 INJECTION INTRAMUSCULAR; INTRAVENOUS EVERY 6 HOURS PRN
Status: DISCONTINUED | OUTPATIENT
Start: 2024-06-29 | End: 2024-07-02 | Stop reason: HOSPADM

## 2024-06-29 RX ORDER — ONDANSETRON 4 MG/1
4 TABLET, ORALLY DISINTEGRATING ORAL EVERY 8 HOURS PRN
Status: DISCONTINUED | OUTPATIENT
Start: 2024-06-29 | End: 2024-07-02 | Stop reason: HOSPADM

## 2024-06-29 RX ORDER — PREDNISONE 20 MG/1
40 TABLET ORAL DAILY
Status: DISCONTINUED | OUTPATIENT
Start: 2024-07-01 | End: 2024-07-01

## 2024-06-29 RX ORDER — SODIUM CHLORIDE 0.9 % (FLUSH) 0.9 %
5-40 SYRINGE (ML) INJECTION EVERY 12 HOURS SCHEDULED
Status: DISCONTINUED | OUTPATIENT
Start: 2024-06-29 | End: 2024-07-02 | Stop reason: HOSPADM

## 2024-06-29 RX ORDER — IPRATROPIUM BROMIDE AND ALBUTEROL SULFATE 2.5; .5 MG/3ML; MG/3ML
1 SOLUTION RESPIRATORY (INHALATION) EVERY 4 HOURS
Status: DISCONTINUED | OUTPATIENT
Start: 2024-06-29 | End: 2024-06-29

## 2024-06-29 RX ORDER — SODIUM CHLORIDE 9 MG/ML
INJECTION, SOLUTION INTRAVENOUS PRN
Status: DISCONTINUED | OUTPATIENT
Start: 2024-06-29 | End: 2024-07-02 | Stop reason: HOSPADM

## 2024-06-29 RX ORDER — IPRATROPIUM BROMIDE AND ALBUTEROL SULFATE 2.5; .5 MG/3ML; MG/3ML
1 SOLUTION RESPIRATORY (INHALATION) EVERY 4 HOURS PRN
Status: DISCONTINUED | OUTPATIENT
Start: 2024-06-29 | End: 2024-07-02 | Stop reason: HOSPADM

## 2024-06-29 RX ORDER — ACETAMINOPHEN 650 MG/1
650 SUPPOSITORY RECTAL EVERY 6 HOURS PRN
Status: DISCONTINUED | OUTPATIENT
Start: 2024-06-29 | End: 2024-07-02 | Stop reason: HOSPADM

## 2024-06-29 RX ORDER — METHADONE HYDROCHLORIDE 10 MG/ML
60 CONCENTRATE ORAL DAILY
Status: DISCONTINUED | OUTPATIENT
Start: 2024-06-29 | End: 2024-07-02 | Stop reason: HOSPADM

## 2024-06-29 RX ORDER — IPRATROPIUM BROMIDE AND ALBUTEROL SULFATE 2.5; .5 MG/3ML; MG/3ML
1 SOLUTION RESPIRATORY (INHALATION)
Status: DISCONTINUED | OUTPATIENT
Start: 2024-06-30 | End: 2024-07-02 | Stop reason: HOSPADM

## 2024-06-29 RX ORDER — GUAIFENESIN 600 MG/1
1200 TABLET, EXTENDED RELEASE ORAL 2 TIMES DAILY
Status: DISCONTINUED | OUTPATIENT
Start: 2024-06-29 | End: 2024-07-02 | Stop reason: HOSPADM

## 2024-06-29 RX ORDER — POLYETHYLENE GLYCOL 3350 17 G/17G
17 POWDER, FOR SOLUTION ORAL DAILY PRN
Status: DISCONTINUED | OUTPATIENT
Start: 2024-06-29 | End: 2024-07-02 | Stop reason: HOSPADM

## 2024-06-29 RX ORDER — ALBUTEROL SULFATE 90 UG/1
2 AEROSOL, METERED RESPIRATORY (INHALATION) EVERY 6 HOURS PRN
Status: DISCONTINUED | OUTPATIENT
Start: 2024-06-29 | End: 2024-07-02 | Stop reason: HOSPADM

## 2024-06-29 RX ADMIN — WATER 40 MG: 1 INJECTION INTRAMUSCULAR; INTRAVENOUS; SUBCUTANEOUS at 18:39

## 2024-06-29 RX ADMIN — IPRATROPIUM BROMIDE AND ALBUTEROL SULFATE 1 DOSE: .5; 3 SOLUTION RESPIRATORY (INHALATION) at 16:16

## 2024-06-29 RX ADMIN — IPRATROPIUM BROMIDE AND ALBUTEROL SULFATE 1 DOSE: .5; 3 SOLUTION RESPIRATORY (INHALATION) at 12:10

## 2024-06-29 RX ADMIN — TIOTROPIUM BROMIDE INHALATION SPRAY 2 PUFF: 3.12 SPRAY, METERED RESPIRATORY (INHALATION) at 08:41

## 2024-06-29 RX ADMIN — WATER 40 MG: 1 INJECTION INTRAMUSCULAR; INTRAVENOUS; SUBCUTANEOUS at 12:23

## 2024-06-29 RX ADMIN — WATER 40 MG: 1 INJECTION INTRAMUSCULAR; INTRAVENOUS; SUBCUTANEOUS at 06:06

## 2024-06-29 RX ADMIN — IPRATROPIUM BROMIDE AND ALBUTEROL SULFATE 1 DOSE: .5; 3 SOLUTION RESPIRATORY (INHALATION) at 08:45

## 2024-06-29 RX ADMIN — AZITHROMYCIN MONOHYDRATE 500 MG: 500 INJECTION, POWDER, LYOPHILIZED, FOR SOLUTION INTRAVENOUS at 00:30

## 2024-06-29 RX ADMIN — IPRATROPIUM BROMIDE AND ALBUTEROL SULFATE 1 DOSE: .5; 3 SOLUTION RESPIRATORY (INHALATION) at 00:44

## 2024-06-29 RX ADMIN — IPRATROPIUM BROMIDE AND ALBUTEROL SULFATE 1 DOSE: .5; 3 SOLUTION RESPIRATORY (INHALATION) at 23:51

## 2024-06-29 RX ADMIN — ENOXAPARIN SODIUM 40 MG: 100 INJECTION SUBCUTANEOUS at 08:14

## 2024-06-29 RX ADMIN — IPRATROPIUM BROMIDE AND ALBUTEROL SULFATE 1 DOSE: .5; 3 SOLUTION RESPIRATORY (INHALATION) at 04:06

## 2024-06-29 RX ADMIN — METHADONE HYDROCHLORIDE 60 MG: 10 CONCENTRATE ORAL at 07:05

## 2024-06-29 RX ADMIN — Medication 2 PUFF: at 00:44

## 2024-06-29 RX ADMIN — SODIUM CHLORIDE, PRESERVATIVE FREE 10 ML: 5 INJECTION INTRAVENOUS at 08:14

## 2024-06-29 RX ADMIN — IPRATROPIUM BROMIDE AND ALBUTEROL SULFATE 1 DOSE: .5; 3 SOLUTION RESPIRATORY (INHALATION) at 20:47

## 2024-06-29 RX ADMIN — Medication 2 PUFF: at 20:47

## 2024-06-29 RX ADMIN — Medication 2 PUFF: at 08:46

## 2024-06-29 NOTE — PLAN OF CARE
Problem: Discharge Planning  Goal: Discharge to home or other facility with appropriate resources  6/29/2024 0825 by Michelle Jackson, RN  Outcome: Progressing  6/29/2024 0048 by Chela Simmons RN  Outcome: Progressing     Problem: ABCDS Injury Assessment  Goal: Absence of physical injury  6/29/2024 0825 by Michelle Jackson RN  Outcome: Progressing  6/29/2024 0048 by Chela Simmons RN  Outcome: Progressing

## 2024-06-29 NOTE — ED NOTES
How does patient ambulate?   [x]Low Fall Risk (ambulates by themselves without support)  []Stand by assist   []Contact Guard   []Front wheel walker  []Wheelchair   []Steady  []Bed bound  []History of Lower Extremity Amputation  []Unknown, did not assess in the emergency department   How does patient take pills?  [x]Whole with Water  []Crushed in applesauce  []Crushed in pudding  []Other  []Unknown no oral medications were given in the ED  Is patient alert?   [x]Alert  []Drowsy but responds to voice  []Doesn't respond to voice but responds to painful stimuli  []Unresponsive  Is patient oriented?   [x]To person  [x]To place  [x]To time  [x]To situation  []Confused  []Agitated  []Follows commands  If patient is disoriented or from a Skill Nursing Facility has family been notified of admission?   []Yes   [x]No  Patient belongings?   [x]Cell phone  [x]Wallet   []Dentures  [x]Clothing  Any specific patient or family belongings/needs/dynamics?     Miscellaneous comments/pending orders?  N/a     If there are any additional questions please reach out to the Emergency Department.

## 2024-06-29 NOTE — ED PROVIDER NOTES
I have personally performed a face to face diagnostic evaluation on this patient. I have fully participated in the care of this patient I personally saw the patient and performed a substantive portion of the visit including all aspects of the medical decision making.  I have reviewed and agree with all pertinent clinical information including history, physical exam, diagnostic tests, and the plan.      HPI: Mary Rosario presented with shortness of breath chest pain history of COPD which been going on for last 3 to 4 days history of severe COPD but patient wears oxygen at night does not wear oxygen regularly during the day.  Denies any fevers or chills.  Patient been having cough congestion chest tightness and wheezing.  No sputum production or hemoptysis.  No fevers no nausea vomiting or diarrhea.  Chief Complaint   Patient presents with    Shortness of Breath     Patient arrives with sob, and cp, hx of copd, this has been going on for several days now.       Review of Systems: See KELECHI note  Vital Signs: /74   Pulse 93   Temp 98.6 °F (37 °C)   Resp 20   Ht 1.702 m (5' 7\")   Wt 68 kg (150 lb)   SpO2 93%   BMI 23.49 kg/m²     Alert 59 y.o. female who appears short of breath  HENT: Atraumatic, oral mucosa moist  Neck: Grossly normal ROM  Chest/Lungs: Tachypnea, coarse breath sounds bilaterally, significant tightness, decreased air movement and scattered wheezing.,  Tachycardia  Musculoskeletal: Grossly normal ROM  Skin: No palor or diaphoresis    Medical Decision Making and Plan:  Pertinent Labs & Imaging studies reviewed. (See KELECHI chart for details)  I agree with KELECHI assessment and plan.  59-year-old female presents for severe COPD exacerbation.  Patient with significant hypoxia requiring 4 L nasal cannula patient was given multiple breathing treatments, Solu-Medrol.  CMP unremarkable troponin negative CBC unremarkable.  Patient has significant hypercapnia, mild acidosis.  Patient is normotensive.

## 2024-06-29 NOTE — H&P
CL 98*   CO2 32   BUN 7   CREATININE 0.7   GLUCOSE 105*     Hepatic:   Recent Labs     06/28/24 1955   AST 19   ALT 16   BILITOT 0.3   ALKPHOS 77     Lipids: No results found for: \"CHOL\", \"HDL\", \"TRIG\"  Hemoglobin A1C:   Lab Results   Component Value Date/Time    LABA1C 5.9 08/21/2022 12:35 PM     TSH: No results found for: \"TSH\"  Troponin: No results found for: \"TROPONINT\"  Lactic Acid: No results for input(s): \"LACTA\" in the last 72 hours.  BNP: No results for input(s): \"PROBNP\" in the last 72 hours.  UA:  Lab Results   Component Value Date/Time    NITRU Negative 06/28/2024 07:55 PM    COLORU Yellow 06/28/2024 07:55 PM    PHUR 7.0 06/28/2024 07:55 PM    PHUR 6.5 04/16/2023 04:25 PM    WBCUA 0 04/16/2023 04:25 PM    RBCUA 0 04/16/2023 04:25 PM    BACTERIA None Seen 04/16/2023 04:25 PM    CLARITYU Clear 06/28/2024 07:55 PM    LEUKOCYTESUR Negative 06/28/2024 07:55 PM    UROBILINOGEN 0.2 06/28/2024 07:55 PM    BILIRUBINUR Negative 06/28/2024 07:55 PM    BLOODU Negative 06/28/2024 07:55 PM    GLUCOSEU Negative 06/28/2024 07:55 PM    KETUA Negative 06/28/2024 07:55 PM     Urine Cultures:   Lab Results   Component Value Date/Time    LABURIN  08/07/2016 09:00 PM     <50,000 CFU/ml mixed skin/urogenital karrie. No further workup     Blood Cultures:   Lab Results   Component Value Date/Time    BC No Growth after 4 days of incubation. 12/15/2023 03:40 PM     Lab Results   Component Value Date/Time    BLOODCULT2 No Growth after 4 days of incubation. 12/15/2023 03:40 PM     Organism:   Lab Results   Component Value Date/Time    ORG Lilliam glabrata 07/05/2023 12:05 PM       Imaging/Diagnostics Last 24 Hours   XR CHEST (2 VW)    Result Date: 6/28/2024  EXAMINATION: TWO XRAY VIEWS OF THE CHEST 6/28/2024 7:13 pm COMPARISON: 05/10/2024 HISTORY: ORDERING SYSTEM PROVIDED HISTORY: cough, COPD TECHNOLOGIST PROVIDED HISTORY: Reason for exam:->cough, COPD Reason for Exam: SOB FINDINGS: No lung infiltrate or consolidation. No

## 2024-06-30 LAB
ANION GAP SERPL CALCULATED.3IONS-SCNC: 8 MMOL/L (ref 3–16)
BASOPHILS # BLD: 0 K/UL (ref 0–0.2)
BASOPHILS NFR BLD: 0.1 %
BUN SERPL-MCNC: 16 MG/DL (ref 7–20)
CALCIUM SERPL-MCNC: 9.2 MG/DL (ref 8.3–10.6)
CHLORIDE SERPL-SCNC: 105 MMOL/L (ref 99–110)
CO2 SERPL-SCNC: 25 MMOL/L (ref 21–32)
CREAT SERPL-MCNC: 0.6 MG/DL (ref 0.6–1.1)
DEPRECATED RDW RBC AUTO: 14.4 % (ref 12.4–15.4)
EOSINOPHIL # BLD: 0 K/UL (ref 0–0.6)
EOSINOPHIL NFR BLD: 0.1 %
GFR SERPLBLD CREATININE-BSD FMLA CKD-EPI: >90 ML/MIN/{1.73_M2}
GLUCOSE SERPL-MCNC: 133 MG/DL (ref 70–99)
HCT VFR BLD AUTO: 40.9 % (ref 36–48)
HGB BLD-MCNC: 13.5 G/DL (ref 12–16)
LYMPHOCYTES # BLD: 0.5 K/UL (ref 1–5.1)
LYMPHOCYTES NFR BLD: 3.3 %
MCH RBC QN AUTO: 30.1 PG (ref 26–34)
MCHC RBC AUTO-ENTMCNC: 33.1 G/DL (ref 31–36)
MCV RBC AUTO: 90.9 FL (ref 80–100)
MONOCYTES # BLD: 0.4 K/UL (ref 0–1.3)
MONOCYTES NFR BLD: 2.2 %
NEUTROPHILS # BLD: 15.2 K/UL (ref 1.7–7.7)
NEUTROPHILS NFR BLD: 94.3 %
PLATELET # BLD AUTO: 141 K/UL (ref 135–450)
PMV BLD AUTO: 9.3 FL (ref 5–10.5)
POTASSIUM SERPL-SCNC: 4.9 MMOL/L (ref 3.5–5.1)
RBC # BLD AUTO: 4.49 M/UL (ref 4–5.2)
SODIUM SERPL-SCNC: 138 MMOL/L (ref 136–145)
WBC # BLD AUTO: 16.1 K/UL (ref 4–11)

## 2024-06-30 PROCEDURE — 80048 BASIC METABOLIC PNL TOTAL CA: CPT

## 2024-06-30 PROCEDURE — 6370000000 HC RX 637 (ALT 250 FOR IP): Performed by: STUDENT IN AN ORGANIZED HEALTH CARE EDUCATION/TRAINING PROGRAM

## 2024-06-30 PROCEDURE — 36415 COLL VENOUS BLD VENIPUNCTURE: CPT

## 2024-06-30 PROCEDURE — 1200000000 HC SEMI PRIVATE

## 2024-06-30 PROCEDURE — 94640 AIRWAY INHALATION TREATMENT: CPT

## 2024-06-30 PROCEDURE — 2580000003 HC RX 258: Performed by: STUDENT IN AN ORGANIZED HEALTH CARE EDUCATION/TRAINING PROGRAM

## 2024-06-30 PROCEDURE — 6370000000 HC RX 637 (ALT 250 FOR IP): Performed by: INTERNAL MEDICINE

## 2024-06-30 PROCEDURE — 94680 O2 UPTK RST&XERS DIR SIMPLE: CPT

## 2024-06-30 PROCEDURE — 94761 N-INVAS EAR/PLS OXIMETRY MLT: CPT

## 2024-06-30 PROCEDURE — 2700000000 HC OXYGEN THERAPY PER DAY

## 2024-06-30 PROCEDURE — 6360000002 HC RX W HCPCS: Performed by: STUDENT IN AN ORGANIZED HEALTH CARE EDUCATION/TRAINING PROGRAM

## 2024-06-30 PROCEDURE — 6370000000 HC RX 637 (ALT 250 FOR IP): Performed by: NURSE PRACTITIONER

## 2024-06-30 PROCEDURE — 85025 COMPLETE CBC W/AUTO DIFF WBC: CPT

## 2024-06-30 RX ORDER — IBUPROFEN 400 MG/1
400 TABLET ORAL EVERY 6 HOURS PRN
Status: DISCONTINUED | OUTPATIENT
Start: 2024-06-30 | End: 2024-07-02 | Stop reason: HOSPADM

## 2024-06-30 RX ORDER — TIOTROPIUM BROMIDE 18 UG/1
18 CAPSULE ORAL; RESPIRATORY (INHALATION) DAILY
Qty: 30 CAPSULE | Refills: 0 | Status: SHIPPED | OUTPATIENT
Start: 2024-06-30 | End: 2024-07-02

## 2024-06-30 RX ORDER — PREDNISONE 20 MG/1
TABLET ORAL
Qty: 11 TABLET | Refills: 0 | Status: SHIPPED | OUTPATIENT
Start: 2024-07-01 | End: 2024-07-02

## 2024-06-30 RX ADMIN — ACETAMINOPHEN 650 MG: 325 TABLET ORAL at 06:58

## 2024-06-30 RX ADMIN — IPRATROPIUM BROMIDE AND ALBUTEROL SULFATE 1 DOSE: .5; 3 SOLUTION RESPIRATORY (INHALATION) at 07:42

## 2024-06-30 RX ADMIN — IPRATROPIUM BROMIDE AND ALBUTEROL SULFATE 1 DOSE: .5; 3 SOLUTION RESPIRATORY (INHALATION) at 03:04

## 2024-06-30 RX ADMIN — TIOTROPIUM BROMIDE INHALATION SPRAY 2 PUFF: 3.12 SPRAY, METERED RESPIRATORY (INHALATION) at 07:43

## 2024-06-30 RX ADMIN — IPRATROPIUM BROMIDE AND ALBUTEROL SULFATE 1 DOSE: .5; 3 SOLUTION RESPIRATORY (INHALATION) at 20:34

## 2024-06-30 RX ADMIN — WATER 40 MG: 1 INJECTION INTRAMUSCULAR; INTRAVENOUS; SUBCUTANEOUS at 18:29

## 2024-06-30 RX ADMIN — ACETAMINOPHEN 650 MG: 325 TABLET ORAL at 18:35

## 2024-06-30 RX ADMIN — IPRATROPIUM BROMIDE AND ALBUTEROL SULFATE 1 DOSE: .5; 3 SOLUTION RESPIRATORY (INHALATION) at 11:53

## 2024-06-30 RX ADMIN — WATER 40 MG: 1 INJECTION INTRAMUSCULAR; INTRAVENOUS; SUBCUTANEOUS at 00:46

## 2024-06-30 RX ADMIN — GUAIFENESIN 1200 MG: 600 TABLET, EXTENDED RELEASE ORAL at 18:30

## 2024-06-30 RX ADMIN — ACETAMINOPHEN 650 MG: 325 TABLET ORAL at 12:23

## 2024-06-30 RX ADMIN — SODIUM CHLORIDE, PRESERVATIVE FREE 10 ML: 5 INJECTION INTRAVENOUS at 08:37

## 2024-06-30 RX ADMIN — WATER 40 MG: 1 INJECTION INTRAMUSCULAR; INTRAVENOUS; SUBCUTANEOUS at 12:23

## 2024-06-30 RX ADMIN — Medication 2 PUFF: at 07:43

## 2024-06-30 RX ADMIN — IBUPROFEN 400 MG: 400 TABLET, FILM COATED ORAL at 14:20

## 2024-06-30 RX ADMIN — SODIUM CHLORIDE, PRESERVATIVE FREE 10 ML: 5 INJECTION INTRAVENOUS at 19:27

## 2024-06-30 RX ADMIN — Medication 2 PUFF: at 20:35

## 2024-06-30 RX ADMIN — ENOXAPARIN SODIUM 40 MG: 100 INJECTION SUBCUTANEOUS at 08:37

## 2024-06-30 RX ADMIN — METHADONE HYDROCHLORIDE 60 MG: 10 CONCENTRATE ORAL at 06:50

## 2024-06-30 RX ADMIN — AZITHROMYCIN MONOHYDRATE 500 MG: 500 INJECTION, POWDER, LYOPHILIZED, FOR SOLUTION INTRAVENOUS at 00:48

## 2024-06-30 RX ADMIN — WATER 40 MG: 1 INJECTION INTRAMUSCULAR; INTRAVENOUS; SUBCUTANEOUS at 06:50

## 2024-06-30 RX ADMIN — IBUPROFEN 400 MG: 400 TABLET, FILM COATED ORAL at 19:28

## 2024-06-30 RX ADMIN — IPRATROPIUM BROMIDE AND ALBUTEROL SULFATE 1 DOSE: .5; 3 SOLUTION RESPIRATORY (INHALATION) at 16:18

## 2024-06-30 ASSESSMENT — PAIN SCALES - GENERAL
PAINLEVEL_OUTOF10: 3
PAINLEVEL_OUTOF10: 0
PAINLEVEL_OUTOF10: 0
PAINLEVEL_OUTOF10: 3
PAINLEVEL_OUTOF10: 5
PAINLEVEL_OUTOF10: 3
PAINLEVEL_OUTOF10: 4
PAINLEVEL_OUTOF10: 0
PAINLEVEL_OUTOF10: 2
PAINLEVEL_OUTOF10: 4
PAINLEVEL_OUTOF10: 2
PAINLEVEL_OUTOF10: 2

## 2024-06-30 ASSESSMENT — PAIN DESCRIPTION - LOCATION
LOCATION: BACK
LOCATION: BACK

## 2024-06-30 NOTE — RT PROTOCOL NOTE
RT Inhaler-Nebulizer Bronchodilator Protocol Note    There is a bronchodilator order in the chart from a provider indicating to follow the RT Bronchodilator Protocol and there is an “Initiate RT Inhaler-Nebulizer Bronchodilator Protocol” order as well (see protocol at bottom of note).    CXR Findings:  No results found.    The findings from the last RT Protocol Assessment were as follows:   History Pulmonary Disease: Chronic pulmonary disease  Respiratory Pattern: Regular pattern and RR 12-20 bpm  Breath Sounds: Slightly diminished and/or crackles  Cough: Strong, spontaneous, non-productive  Indication for Bronchodilator Therapy:    Bronchodilator Assessment Score: 4    Aerosolized bronchodilator medication orders have been revised according to the RT Inhaler-Nebulizer Bronchodilator Protocol below.    Respiratory Therapist to perform RT Therapy Protocol Assessment initially then follow the protocol.  Repeat RT Therapy Protocol Assessment PRN for score 0-3 or on second treatment, BID, and PRN for scores above 3.    No Indications - adjust the frequency to every 6 hours PRN wheezing or bronchospasm, if no treatments needed after 48 hours then discontinue using Per Protocol order mode.     If indication present, adjust the RT bronchodilator orders based on the Bronchodilator Assessment Score as indicated below.  Use Inhaler orders unless patient has one or more of the following: on home nebulizer, not able to hold breath for 10 seconds, is not alert and oriented, cannot activate and use MDI correctly, or respiratory rate 25 breaths per minute or more, then use the equivalent nebulizer order(s) with same Frequency and PRN reasons based on the score.  If a patient is on this medication at home then do not decrease Frequency below that used at home.    0-3 - enter or revise RT bronchodilator order(s) to equivalent RT Bronchodilator order with Frequency of every 4 hours PRN for wheezing or increased work of breathing 
order mode.        4-6 - enter or revise RT Bronchodilator order(s) to two equivalent RT bronchodilator orders with one order with BID Frequency and one order with Frequency of every 4 hours PRN wheezing or increased work of breathing using Per Protocol order mode.        7-10 - enter or revise RT Bronchodilator order(s) to two equivalent RT bronchodilator orders with one order with TID Frequency and one order with Frequency of every 4 hours PRN wheezing or increased work of breathing using Per Protocol order mode.       11-13 - enter or revise RT Bronchodilator order(s) to one equivalent RT bronchodilator order with QID Frequency and an Albuterol order with Frequency of every 4 hours PRN wheezing or increased work of breathing using Per Protocol order mode.      Greater than 13 - enter or revise RT Bronchodilator order(s) to one equivalent RT bronchodilator order with every 4 hours Frequency and an Albuterol order with Frequency of every 2 hours PRN wheezing or increased work of breathing using Per Protocol order mode.     RT to enter RT Home Evaluation for COPD & MDI Assessment order using Per Protocol order mode.    Electronically signed by Irina Carver RCP on 6/29/2024 at 11:53 PM

## 2024-06-30 NOTE — PLAN OF CARE
Problem: Discharge Planning  Goal: Discharge to home or other facility with appropriate resources  6/30/2024 1007 by Michelle Jackson RN  Outcome: Progressing  6/30/2024 1006 by Michelle Jackson RN  Outcome: Progressing  6/30/2024 0211 by Chela Simmons RN  Outcome: Progressing     Problem: ABCDS Injury Assessment  Goal: Absence of physical injury  6/30/2024 1007 by Michelle Jackson RN  Outcome: Progressing  6/30/2024 1006 by Michelle Jackson RN  Outcome: Progressing  6/30/2024 0211 by Chela Simmons RN  Outcome: Progressing     Problem: Chronic Conditions and Co-morbidities  Goal: Patient's chronic conditions and co-morbidity symptoms are monitored and maintained or improved  Outcome: Progressing

## 2024-06-30 NOTE — DISCHARGE INSTR - COC
Continuity of Care Form    Patient Name: Mary Rosario   :  1965  MRN:  [id number]    Admit date:  2024  Discharge date:  ***    Code Status Order: Full Code   Advance Directives:     Admitting Physician:  Hans Velasquez MD  PCP: None, None    Discharging Nurse: ***  Discharging Hospital Unit/Room#: 3TN-3380/3380-01  Discharging Unit Phone Number: ***    Emergency Contact:   Extended Emergency Contact Information  Primary Emergency Contact: Trudi Giles  Home Phone: [phone]  Mobile Phone: [phone]  Relation: Child  Secondary Emergency Contact: Marie Wheatley  Home Phone: [phone]  Mobile Phone: [phone]  Relation: Child    Past Surgical History:  Past Surgical History:   Procedure Laterality Date    ABDOMEN SURGERY      BRONCHOSCOPY N/A 2023    BRONCHOSCOPY performed by Gregory Ravi MD at [facility] ENDOSCOPY    BRONCHOSCOPY N/A 2023    BRONCHOSCOPY ALVEOLAR LAVAGE performed by Gregory Ravi MD at [facility] ENDOSCOPY    BRONCHOSCOPY  2023    BRONCHOSCOPY THERAPUTIC ASPIRATION INITIAL performed by Gregory Ravi MD at [facility] ENDOSCOPY     SECTION      HYSTERECTOMY (CERVIX STATUS UNKNOWN)      INNER EAR SURGERY      MASTOIDECTOMY         Immunization History:   Immunization History   Administered Date(s) Administered    COVID-19, PFIZER PURPLE top, DILUTE for use, (age 12 y+), 30mcg/0.3mL 2021, 2021, 2021    Hep A, HAVRIX, VAQTA, (age 19y+), IM, 1mL 2019    Influenza Virus Vaccine 2024    Pneumococcal, PCV20, PREVNAR 20, (age 6w+), IM, 0.5mL 2024       Active Problems:  Patient Active Problem List   Diagnosis Code    COVID-19 virus infection U07.1    COPD with acute exacerbation (HCC) J44.1    Acute on chronic respiratory failure with hypoxia (HCC) J96.21    Pain syndrome, chronic G89.4    Acute respiratory failure with hypoxia and hypercapnia (HCC) J96.01, J96.02    Chronic obstructive

## 2024-07-01 ENCOUNTER — TELEPHONE (OUTPATIENT)
Dept: PULMONOLOGY | Age: 59
End: 2024-07-01

## 2024-07-01 LAB
ANION GAP SERPL CALCULATED.3IONS-SCNC: 7 MMOL/L (ref 3–16)
BASOPHILS # BLD: 0 K/UL (ref 0–0.2)
BASOPHILS NFR BLD: 0.1 %
BUN SERPL-MCNC: 16 MG/DL (ref 7–20)
CALCIUM SERPL-MCNC: 9.7 MG/DL (ref 8.3–10.6)
CHLORIDE SERPL-SCNC: 105 MMOL/L (ref 99–110)
CO2 SERPL-SCNC: 30 MMOL/L (ref 21–32)
CREAT SERPL-MCNC: 0.7 MG/DL (ref 0.6–1.1)
DEPRECATED RDW RBC AUTO: 14.3 % (ref 12.4–15.4)
EOSINOPHIL # BLD: 0 K/UL (ref 0–0.6)
EOSINOPHIL NFR BLD: 0 %
GFR SERPLBLD CREATININE-BSD FMLA CKD-EPI: >90 ML/MIN/{1.73_M2}
GLUCOSE SERPL-MCNC: 134 MG/DL (ref 70–99)
HCT VFR BLD AUTO: 40.3 % (ref 36–48)
HGB BLD-MCNC: 13 G/DL (ref 12–16)
LYMPHOCYTES # BLD: 0.4 K/UL (ref 1–5.1)
LYMPHOCYTES NFR BLD: 2.4 %
MCH RBC QN AUTO: 29.2 PG (ref 26–34)
MCHC RBC AUTO-ENTMCNC: 32.1 G/DL (ref 31–36)
MCV RBC AUTO: 90.8 FL (ref 80–100)
MONOCYTES # BLD: 0.4 K/UL (ref 0–1.3)
MONOCYTES NFR BLD: 2.2 %
NEUTROPHILS # BLD: 16.2 K/UL (ref 1.7–7.7)
NEUTROPHILS NFR BLD: 95.3 %
PLATELET # BLD AUTO: 151 K/UL (ref 135–450)
PMV BLD AUTO: 9.2 FL (ref 5–10.5)
POTASSIUM SERPL-SCNC: 4.9 MMOL/L (ref 3.5–5.1)
PROCALCITONIN SERPL IA-MCNC: 0.04 NG/ML (ref 0–0.15)
RBC # BLD AUTO: 4.44 M/UL (ref 4–5.2)
SODIUM SERPL-SCNC: 142 MMOL/L (ref 136–145)
WBC # BLD AUTO: 17 K/UL (ref 4–11)

## 2024-07-01 PROCEDURE — 80048 BASIC METABOLIC PNL TOTAL CA: CPT

## 2024-07-01 PROCEDURE — 85025 COMPLETE CBC W/AUTO DIFF WBC: CPT

## 2024-07-01 PROCEDURE — 6370000000 HC RX 637 (ALT 250 FOR IP): Performed by: INTERNAL MEDICINE

## 2024-07-01 PROCEDURE — 2580000003 HC RX 258: Performed by: NURSE PRACTITIONER

## 2024-07-01 PROCEDURE — 2580000003 HC RX 258: Performed by: STUDENT IN AN ORGANIZED HEALTH CARE EDUCATION/TRAINING PROGRAM

## 2024-07-01 PROCEDURE — 36415 COLL VENOUS BLD VENIPUNCTURE: CPT

## 2024-07-01 PROCEDURE — 6370000000 HC RX 637 (ALT 250 FOR IP): Performed by: STUDENT IN AN ORGANIZED HEALTH CARE EDUCATION/TRAINING PROGRAM

## 2024-07-01 PROCEDURE — 6370000000 HC RX 637 (ALT 250 FOR IP): Performed by: NURSE PRACTITIONER

## 2024-07-01 PROCEDURE — 6360000002 HC RX W HCPCS: Performed by: STUDENT IN AN ORGANIZED HEALTH CARE EDUCATION/TRAINING PROGRAM

## 2024-07-01 PROCEDURE — 94761 N-INVAS EAR/PLS OXIMETRY MLT: CPT

## 2024-07-01 PROCEDURE — 1200000000 HC SEMI PRIVATE

## 2024-07-01 PROCEDURE — 99223 1ST HOSP IP/OBS HIGH 75: CPT | Performed by: INTERNAL MEDICINE

## 2024-07-01 PROCEDURE — 6360000002 HC RX W HCPCS: Performed by: NURSE PRACTITIONER

## 2024-07-01 PROCEDURE — 84145 PROCALCITONIN (PCT): CPT

## 2024-07-01 PROCEDURE — 2700000000 HC OXYGEN THERAPY PER DAY

## 2024-07-01 PROCEDURE — 94640 AIRWAY INHALATION TREATMENT: CPT

## 2024-07-01 PROCEDURE — 94667 MNPJ CHEST WALL 1ST: CPT

## 2024-07-01 RX ADMIN — IBUPROFEN 400 MG: 400 TABLET, FILM COATED ORAL at 08:42

## 2024-07-01 RX ADMIN — WATER 40 MG: 1 INJECTION INTRAMUSCULAR; INTRAVENOUS; SUBCUTANEOUS at 20:08

## 2024-07-01 RX ADMIN — IPRATROPIUM BROMIDE AND ALBUTEROL SULFATE 1 DOSE: .5; 3 SOLUTION RESPIRATORY (INHALATION) at 12:58

## 2024-07-01 RX ADMIN — MICONAZOLE NITRATE 1 APPLICATOR: 20 CREAM VAGINAL at 22:41

## 2024-07-01 RX ADMIN — WATER 40 MG: 1 INJECTION INTRAMUSCULAR; INTRAVENOUS; SUBCUTANEOUS at 01:00

## 2024-07-01 RX ADMIN — IPRATROPIUM BROMIDE AND ALBUTEROL SULFATE 1 DOSE: .5; 3 SOLUTION RESPIRATORY (INHALATION) at 21:21

## 2024-07-01 RX ADMIN — GUAIFENESIN 1200 MG: 600 TABLET, EXTENDED RELEASE ORAL at 18:15

## 2024-07-01 RX ADMIN — ENOXAPARIN SODIUM 40 MG: 100 INJECTION SUBCUTANEOUS at 08:42

## 2024-07-01 RX ADMIN — SODIUM CHLORIDE, PRESERVATIVE FREE 10 ML: 5 INJECTION INTRAVENOUS at 20:08

## 2024-07-01 RX ADMIN — IPRATROPIUM BROMIDE AND ALBUTEROL SULFATE 1 DOSE: .5; 3 SOLUTION RESPIRATORY (INHALATION) at 08:32

## 2024-07-01 RX ADMIN — IBUPROFEN 400 MG: 400 TABLET, FILM COATED ORAL at 14:51

## 2024-07-01 RX ADMIN — IPRATROPIUM BROMIDE AND ALBUTEROL SULFATE 1 DOSE: .5; 3 SOLUTION RESPIRATORY (INHALATION) at 00:53

## 2024-07-01 RX ADMIN — IPRATROPIUM BROMIDE AND ALBUTEROL SULFATE 1 DOSE: .5; 3 SOLUTION RESPIRATORY (INHALATION) at 16:44

## 2024-07-01 RX ADMIN — METHADONE HYDROCHLORIDE 60 MG: 10 CONCENTRATE ORAL at 08:41

## 2024-07-01 RX ADMIN — Medication 2 PUFF: at 21:21

## 2024-07-01 RX ADMIN — Medication 2 PUFF: at 08:35

## 2024-07-01 RX ADMIN — IBUPROFEN 400 MG: 400 TABLET, FILM COATED ORAL at 22:20

## 2024-07-01 RX ADMIN — ACETAMINOPHEN 650 MG: 325 TABLET ORAL at 18:15

## 2024-07-01 RX ADMIN — SODIUM CHLORIDE, PRESERVATIVE FREE 10 ML: 5 INJECTION INTRAVENOUS at 08:45

## 2024-07-01 RX ADMIN — AZITHROMYCIN MONOHYDRATE 500 MG: 500 INJECTION, POWDER, LYOPHILIZED, FOR SOLUTION INTRAVENOUS at 01:00

## 2024-07-01 RX ADMIN — ACETAMINOPHEN 650 MG: 325 TABLET ORAL at 00:59

## 2024-07-01 RX ADMIN — WATER 40 MG: 1 INJECTION INTRAMUSCULAR; INTRAVENOUS; SUBCUTANEOUS at 08:42

## 2024-07-01 RX ADMIN — GUAIFENESIN 1200 MG: 600 TABLET, EXTENDED RELEASE ORAL at 06:30

## 2024-07-01 RX ADMIN — TIOTROPIUM BROMIDE INHALATION SPRAY 2 PUFF: 3.12 SPRAY, METERED RESPIRATORY (INHALATION) at 08:35

## 2024-07-01 ASSESSMENT — PAIN SCALES - GENERAL
PAINLEVEL_OUTOF10: 3
PAINLEVEL_OUTOF10: 4
PAINLEVEL_OUTOF10: 6
PAINLEVEL_OUTOF10: 3
PAINLEVEL_OUTOF10: 3
PAINLEVEL_OUTOF10: 4
PAINLEVEL_OUTOF10: 6
PAINLEVEL_OUTOF10: 6
PAINLEVEL_OUTOF10: 5
PAINLEVEL_OUTOF10: 2

## 2024-07-01 ASSESSMENT — PAIN DESCRIPTION - LOCATION: LOCATION: BACK

## 2024-07-01 NOTE — CONSULTS
PULMONARY AND CRITICAL CARE CONSULTATION NOTE    CONSULTING PHYSICIAN:      REASON FOR CONSULT:   Chief Complaint   Patient presents with    Shortness of Breath     Patient arrives with sob, and cp, hx of copd, this has been going on for several days now.        DATE OF CONSULT: 7/1/2024    HISTORY OF PRESENT ILLNESS: 59 y.o. year old female with past medical history of severe COPD with FEV1 42% on home oxygen at 2 L/min which she uses as needed during the day but continuous at night, history of aspergillus, who presented [facility] with complaints of increased shortness of breath.  Was being treated for COPD exasperation with DuoNebs, Solu-Medrol and Dulera and Spiriva.  Had episode of mucous plugging last night and required chest percussion per RT with expectoration of the mucous plug.  Overall, she states that she is feeling better with improved shortness of breath.  No wheezing or chest pain or hemoptysis.  At home, patient does not have any inhalers as she could not afford the inhalers because she had not met her deductible.  She is supposed to be on Dulera and Spiriva along with DuoNebs.  She is only been using DuoNebs at home.  Lab work showed normal WBCs with negative respiratory panel with COVID.  I reviewed the chest x-ray and my interpretation is as follows.  No infiltrates noted    REVIEW OF SYSTEMS:   CONSTITUTIONAL SYMPTOMS: The patient denies fever, fatigue, night sweats, weight loss or weight gain.   HEENT: No vision changes. No tinnitus, Denies sinus pain. No hoarseness, or dysphagia.   NECK: Patient denies swelling in the neck.   CARDIOVASCULAR: Denies chest pain, palpitation, syncope.  RESPIRATORY: See above.   GASTROINTESTINAL: Denies nausea, abdominal pain or change in bowel function.  GENITOURINARY: Denies obstructive symptoms. No history of incontinence.  BREASTS: No masses or lumps in the breasts.   SKIN: No rashes or itching.   MUSCULOSKELETAL: Denies weakness or bone pain.   NEUROLOGICAL: No

## 2024-07-01 NOTE — PLAN OF CARE
Problem: Discharge Planning  Goal: Discharge to home or other facility with appropriate resources  7/1/2024 1125 by Michelle Jackson RN  Outcome: Progressing  7/1/2024 1125 by Michelle Jackson RN  Outcome: Progressing  6/30/2024 2244 by Chela Simmons RN  Outcome: Progressing     Problem: ABCDS Injury Assessment  Goal: Absence of physical injury  7/1/2024 1125 by Michelle Jackson RN  Outcome: Progressing  7/1/2024 1125 by Michelle Jackson RN  Outcome: Progressing  6/30/2024 2244 by Chela Simmons RN  Outcome: Progressing     Problem: Chronic Conditions and Co-morbidities  Goal: Patient's chronic conditions and co-morbidity symptoms are monitored and maintained or improved  7/1/2024 1125 by Michelle Jackson RN  Outcome: Progressing  7/1/2024 1125 by Michelle Jackson RN  Outcome: Progressing  6/30/2024 2244 by Chela Simmons RN  Outcome: Progressing     Problem: Pain  Goal: Verbalizes/displays adequate comfort level or baseline comfort level  7/1/2024 1125 by Michelle Jackson RN  Outcome: Progressing  7/1/2024 1125 by Michelle Jackson RN  Outcome: Progressing  6/30/2024 2244 by Chela Simmons RN  Outcome: Progressing

## 2024-07-01 NOTE — TELEPHONE ENCOUNTER
----- Message from Melanie Jhaveri MD sent at 7/1/2024 11:20 AM EDT -----  Ms. her is admitted in the hospital.  She is needing oxygen to use even during daytime now.  She has oxygen at home but she was using it only at night.  She will need a portable oxygen concentrator.  Can you place an order?

## 2024-07-01 NOTE — PLAN OF CARE
Problem: Discharge Planning  Goal: Discharge to home or other facility with appropriate resources  6/30/2024 2244 by Chela Simmons RN  Outcome: Progressing  6/30/2024 1007 by Michelle Jackson RN  Outcome: Progressing  6/30/2024 1006 by Michelle Jackson RN  Outcome: Progressing     Problem: ABCDS Injury Assessment  Goal: Absence of physical injury  6/30/2024 2244 by Chela Simmons RN  Outcome: Progressing  6/30/2024 1007 by Michelle Jackson RN  Outcome: Progressing  6/30/2024 1006 by Michelle Jackson RN  Outcome: Progressing     Problem: Chronic Conditions and Co-morbidities  Goal: Patient's chronic conditions and co-morbidity symptoms are monitored and maintained or improved  6/30/2024 2244 by Chela Simmons RN  Outcome: Progressing  6/30/2024 1007 by Michelle Jackson RN  Outcome: Progressing     Problem: Pain  Goal: Verbalizes/displays adequate comfort level or baseline comfort level  Outcome: Progressing

## 2024-07-02 VITALS
OXYGEN SATURATION: 93 % | RESPIRATION RATE: 17 BRPM | WEIGHT: 146 LBS | TEMPERATURE: 98.2 F | HEIGHT: 67 IN | DIASTOLIC BLOOD PRESSURE: 73 MMHG | HEART RATE: 75 BPM | SYSTOLIC BLOOD PRESSURE: 135 MMHG | BODY MASS INDEX: 22.91 KG/M2

## 2024-07-02 LAB
ANION GAP SERPL CALCULATED.3IONS-SCNC: 6 MMOL/L (ref 3–16)
BUN SERPL-MCNC: 16 MG/DL (ref 7–20)
CALCIUM SERPL-MCNC: 9.1 MG/DL (ref 8.3–10.6)
CHLORIDE SERPL-SCNC: 104 MMOL/L (ref 99–110)
CO2 SERPL-SCNC: 30 MMOL/L (ref 21–32)
CREAT SERPL-MCNC: 0.7 MG/DL (ref 0.6–1.1)
DEPRECATED RDW RBC AUTO: 14.6 % (ref 12.4–15.4)
GFR SERPLBLD CREATININE-BSD FMLA CKD-EPI: >90 ML/MIN/{1.73_M2}
GLUCOSE SERPL-MCNC: 139 MG/DL (ref 70–99)
HCT VFR BLD AUTO: 40.8 % (ref 36–48)
HGB BLD-MCNC: 13 G/DL (ref 12–16)
MCH RBC QN AUTO: 28.7 PG (ref 26–34)
MCHC RBC AUTO-ENTMCNC: 31.7 G/DL (ref 31–36)
MCV RBC AUTO: 90.3 FL (ref 80–100)
PLATELET # BLD AUTO: 152 K/UL (ref 135–450)
PMV BLD AUTO: 9.2 FL (ref 5–10.5)
POTASSIUM SERPL-SCNC: 4.6 MMOL/L (ref 3.5–5.1)
RBC # BLD AUTO: 4.52 M/UL (ref 4–5.2)
SODIUM SERPL-SCNC: 140 MMOL/L (ref 136–145)
WBC # BLD AUTO: 11.9 K/UL (ref 4–11)

## 2024-07-02 PROCEDURE — 6370000000 HC RX 637 (ALT 250 FOR IP): Performed by: STUDENT IN AN ORGANIZED HEALTH CARE EDUCATION/TRAINING PROGRAM

## 2024-07-02 PROCEDURE — 6370000000 HC RX 637 (ALT 250 FOR IP): Performed by: NURSE PRACTITIONER

## 2024-07-02 PROCEDURE — 2700000000 HC OXYGEN THERAPY PER DAY

## 2024-07-02 PROCEDURE — 94761 N-INVAS EAR/PLS OXIMETRY MLT: CPT

## 2024-07-02 PROCEDURE — 2580000003 HC RX 258: Performed by: STUDENT IN AN ORGANIZED HEALTH CARE EDUCATION/TRAINING PROGRAM

## 2024-07-02 PROCEDURE — 99233 SBSQ HOSP IP/OBS HIGH 50: CPT | Performed by: INTERNAL MEDICINE

## 2024-07-02 PROCEDURE — 6360000002 HC RX W HCPCS: Performed by: STUDENT IN AN ORGANIZED HEALTH CARE EDUCATION/TRAINING PROGRAM

## 2024-07-02 PROCEDURE — 94640 AIRWAY INHALATION TREATMENT: CPT

## 2024-07-02 PROCEDURE — 94669 MECHANICAL CHEST WALL OSCILL: CPT

## 2024-07-02 PROCEDURE — 6370000000 HC RX 637 (ALT 250 FOR IP): Performed by: INTERNAL MEDICINE

## 2024-07-02 PROCEDURE — 85027 COMPLETE CBC AUTOMATED: CPT

## 2024-07-02 PROCEDURE — 80048 BASIC METABOLIC PNL TOTAL CA: CPT

## 2024-07-02 PROCEDURE — 36415 COLL VENOUS BLD VENIPUNCTURE: CPT

## 2024-07-02 RX ORDER — PREDNISONE 20 MG/1
40 TABLET ORAL DAILY
Status: DISCONTINUED | OUTPATIENT
Start: 2024-07-02 | End: 2024-07-02 | Stop reason: HOSPADM

## 2024-07-02 RX ORDER — ALBUTEROL SULFATE 90 UG/1
2 AEROSOL, METERED RESPIRATORY (INHALATION) EVERY 6 HOURS PRN
Qty: 18 G | Refills: 3 | Status: SHIPPED | OUTPATIENT
Start: 2024-07-02 | End: 2025-07-02

## 2024-07-02 RX ORDER — PREDNISONE 20 MG/1
TABLET ORAL
Qty: 11 TABLET | Refills: 0 | Status: SHIPPED | OUTPATIENT
Start: 2024-07-02 | End: 2024-07-09

## 2024-07-02 RX ORDER — TIOTROPIUM BROMIDE 18 UG/1
18 CAPSULE ORAL; RESPIRATORY (INHALATION) DAILY
Qty: 30 CAPSULE | Refills: 3 | Status: SHIPPED | OUTPATIENT
Start: 2024-07-02

## 2024-07-02 RX ADMIN — ACETAMINOPHEN 650 MG: 325 TABLET ORAL at 03:26

## 2024-07-02 RX ADMIN — IPRATROPIUM BROMIDE AND ALBUTEROL SULFATE 1 DOSE: .5; 3 SOLUTION RESPIRATORY (INHALATION) at 07:50

## 2024-07-02 RX ADMIN — IPRATROPIUM BROMIDE AND ALBUTEROL SULFATE 1 DOSE: .5; 3 SOLUTION RESPIRATORY (INHALATION) at 11:46

## 2024-07-02 RX ADMIN — ENOXAPARIN SODIUM 40 MG: 100 INJECTION SUBCUTANEOUS at 08:56

## 2024-07-02 RX ADMIN — ACETAMINOPHEN 650 MG: 325 TABLET ORAL at 10:27

## 2024-07-02 RX ADMIN — TIOTROPIUM BROMIDE INHALATION SPRAY 2 PUFF: 3.12 SPRAY, METERED RESPIRATORY (INHALATION) at 07:50

## 2024-07-02 RX ADMIN — IPRATROPIUM BROMIDE AND ALBUTEROL SULFATE 1 DOSE: .5; 3 SOLUTION RESPIRATORY (INHALATION) at 15:29

## 2024-07-02 RX ADMIN — IPRATROPIUM BROMIDE AND ALBUTEROL SULFATE 1 DOSE: .5; 3 SOLUTION RESPIRATORY (INHALATION) at 03:38

## 2024-07-02 RX ADMIN — METHADONE HYDROCHLORIDE 60 MG: 10 CONCENTRATE ORAL at 06:36

## 2024-07-02 RX ADMIN — GUAIFENESIN 1200 MG: 600 TABLET, EXTENDED RELEASE ORAL at 06:23

## 2024-07-02 RX ADMIN — SODIUM CHLORIDE, PRESERVATIVE FREE 10 ML: 5 INJECTION INTRAVENOUS at 08:56

## 2024-07-02 RX ADMIN — PREDNISONE 40 MG: 20 TABLET ORAL at 08:56

## 2024-07-02 RX ADMIN — Medication 2 PUFF: at 07:50

## 2024-07-02 ASSESSMENT — PAIN DESCRIPTION - ORIENTATION: ORIENTATION: MID

## 2024-07-02 ASSESSMENT — PAIN DESCRIPTION - DESCRIPTORS: DESCRIPTORS: ACHING;HEAVINESS

## 2024-07-02 ASSESSMENT — PAIN SCALES - GENERAL
PAINLEVEL_OUTOF10: 5
PAINLEVEL_OUTOF10: 6
PAINLEVEL_OUTOF10: 3
PAINLEVEL_OUTOF10: 6
PAINLEVEL_OUTOF10: 0

## 2024-07-02 ASSESSMENT — PAIN DESCRIPTION - LOCATION: LOCATION: CHEST

## 2024-07-02 NOTE — PLAN OF CARE
Problem: Discharge Planning  Goal: Discharge to home or other facility with appropriate resources  7/1/2024 2123 by Amdaa Callahan RN  Outcome: Progressing  7/1/2024 1125 by Michelle Jackson RN  Outcome: Progressing     Problem: ABCDS Injury Assessment  Goal: Absence of physical injury  7/1/2024 2123 by Amada Callahan RN  Outcome: Progressing  7/1/2024 1125 by Michelle Jackson RN  Outcome: Progressing     Problem: Chronic Conditions and Co-morbidities  Goal: Patient's chronic conditions and co-morbidity symptoms are monitored and maintained or improved  7/1/2024 2123 by Amada Callahan RN  Outcome: Progressing  7/1/2024 1125 by Michelle Jackson RN  Outcome: Progressing     Problem: Pain  Goal: Verbalizes/displays adequate comfort level or baseline comfort level  7/1/2024 2123 by Amada Callahan RN  Outcome: Progressing  7/1/2024 1125 by Michelle Jackson RN  Outcome: Progressing

## 2024-07-02 NOTE — PROGRESS NOTES
Hospitalist Progress Note      Name:  Mary Rosario /Age/Sex: 1965  (59 y.o. female)   MRN & CSN:  [id number] & [id number] Encounter Date/Time: 2024 7:03 AM EDT   Location:  [facility]3380/3380-01 PCP: None, None     Attending:Kiran Bone MD       Hospital Day: 3    Subjective:   Chief Complaint:   Chief Complaint   Patient presents with    Shortness of Breath     Patient arrives with sob, and cp, hx of copd, this has been going on for several days now.      Mary Rosario is a 59 y.o. female with a past medical history of asthma/COPD, opioid use disorder on methadone who presented with SOB.  Admitted for COPD exacerbation and treated with IV solumedrol, nebulizer and azithromycin.      Interval History:  Today, she is resting in bed.  She is feeling better and near her baseline.  She has intermittent productive cough, however much more improved. Denies CP.  No N/V/D or abd pain.  No fever or chills. She is hoping to discharge tonight or early in the am because she has an appt to get her AC replaced.     Independently reviewed interval ancillary notes from  N/A .     Assessment and Recommendations   Problem List  Principal Problem:    COPD exacerbation (HCC)  Resolved Problems:    * No resolved hospital problems. *     Assessment and Plan:    COPD Exacerbation with Acute Hypercapnic Respiratory Failure    - SOB and increased sputum production with wheezes and VBG: pH 7.31, pCO2 69.1.    -    - CXR showed no acute cardiopulmonary process   - Continue IV solumedrol 40 mg q6 hrs and duneb around the clock   - Respiratory panel pending    - Wears O2 at night and now on 3 lpm 93% NC   - If no improvement pulmonology will be consulted   - Continue dulera, spiriva, and and duonebs   - Mucinex 1200 mg bid added, hx of mucous plugging last 2023   - Acapella and IS with instructions   Opioid Use Disorder   - Continue home dose methadone, follow up with clinic as OP    Plan:   Continue course of IV 
    PULMONARY AND CRITICAL CARE MEDICINE PROGRESS NOTE      SUBJECTIVE: Patient is feeling better.  No further episodes of coughing fit due to mucous plugging.  Improved shortness of breath.    REVIEW OF SYSTEMS:   CONSTITUTIONAL SYMPTOMS: The patient denies fever, fatigue, night sweats, weight loss or weight gain.   HEENT: No vision changes. No tinnitus, Denies sinus pain. No hoarseness, or dysphagia.   CARDIOVASCULAR: Denies chest pain, palpitation, syncope.  RESPIRATORY: See above.   GASTROINTESTINAL: Denies nausea, abdominal pain or change in bowel function.  SKIN: No rashes or itching.   MUSCULOSKELETAL: Denies weakness or bone pain.   NEUROLOGICAL: No headaches or seizures.     MEDICATIONS:      predniSONE  40 mg Oral Daily    miconazole  1 applicator Vaginal Nightly    mometasone-formoterol  2 puff Inhalation BID RT    tiotropium  2 puff Inhalation Daily RT    sodium chloride flush  5-40 mL IntraVENous 2 times per day    enoxaparin  40 mg SubCUTAneous Daily    methadone  60 mg Oral Daily    guaiFENesin  1,200 mg Oral BID    ipratropium 0.5 mg-albuterol 2.5 mg  1 Dose Inhalation 4x Daily RT      sodium chloride       ibuprofen, albuterol sulfate HFA, sodium chloride flush, sodium chloride, ondansetron **OR** ondansetron, polyethylene glycol, acetaminophen **OR** acetaminophen, ipratropium 0.5 mg-albuterol 2.5 mg     ALLERGIES:   Allergies as of 06/28/2024 - Fully Reviewed 06/28/2024   Allergen Reaction Noted    Penicillins Shortness Of Breath 08/07/2016    Codeine Nausea And Vomiting 11/08/2016    Doxycycline Rash 06/28/2024    Posaconazole Nausea And Vomiting 07/05/2023        OBJECTIVE:   height is 1.702 m (5' 7\") and weight is 66.2 kg (146 lb). Her temporal temperature is 97.3 °F (36.3 °C). Her blood pressure is 154/76 (abnormal) and her pulse is 84. Her respiration is 20 and oxygen saturation is 90%.   I/O this shift:  In: 240 [P.O.:240]  Out: -      PHYSICAL EXAM:  CONSTITUTIONAL: She is a 59 
   06/29/24 0031   RT Protocol   History Pulmonary Disease 2   Respiratory pattern 0   Breath sounds 2   Cough 0   Bronchodilator Assessment Score 4       
   06/29/24 9499   RT Protocol   History Pulmonary Disease 2   Respiratory pattern 2   Breath sounds 4   Cough 1   Bronchodilator Assessment Score 9       
   06/30/24 1741   Resting (Room Air)   SpO2 86   HR 92   Resting (On O2)   SpO2 92   O2 Device Nasal cannula   O2 Flow Rate (l/min) 2 l/min   During Walk (On O2)   SpO2 91      O2 Device Nasal cannula   O2 Flow Rate (l/min) 4 l/min   Need Additional O2 Flow Rate Rows Yes   O2 Flow Rate (l/min) 3 l/min   O2 Saturation 89   O2 Flow Rate (l/min) 2 l/min   O2 Saturation 87   After Walk   Does the Patient Qualify for Home O2 Yes   Liter Flow at Rest 2   Liter Flow on Exertion 4       
CLINICAL PHARMACY NOTE: MEDS TO BEDS    Total # of Prescriptions Filled: 4   The following medications were delivered to the patient:  PREDNISONE 20MG TABS  TIOTRROPIUM BROMIDE MONOHYDR 18 CAPS  ALBUTEROL SULFATE  AERS  DULERA 200 - 5MCG/ACTAERO    Additional Documentation: Sherrie ESPINOSA approved to deliver medications to patient room, patient unable to sign due to condition  Yanet Nicolas Pharmacy Tech  
Called per RN for patient requesting treatment @ 6688. Patient was not in her bed/available @ 3725 when this RT went to do treatment.   
Patient is alert and oriented. Went over her discharge papers and her medications that are due.  Patient got a full oxygen tank from [employer] for her ride home.  Patient was given a list of [facility] doctors in the [facility] area so she can find a new primary care doctor.  Patient drove herself to the ER, she was escorted down to the ER parking area.  She was thankful for her care and appreciated everyone who took care of her.  
Patient was evaluated today for the diagnosis of COPD.  I entered a DME order for home oxygen at 2 - 4 liters lpm because the diagnosis and testing require the patient to have supplemental oxygen.  Condition will improve or be benefited by oxygen use.  The patient is  able to perform good mobility in a home setting and therefore does require the use of a pulse dose oxygen system at 4 liters.  The need for this equipment was discussed with the patient and she understands and is in agreement.   
Patient will be ready for discharge tomorrow.  Patient was switched to po Prednisone today and Dr. Jhaveri recommends discharge tomorrow.  
Pt HR jumped into 140's. This RN went to check on pt and pt felt as though she couldn't breathe. With percussion, pt coughed up quarter sized hard green mucous. RN called respiratory for breathing treatment. RT in route.   
This RN secure messaged NP regarding ABG values. NP and MD aware of values and no changes are made to plan of care at this time.   
    Drugs that require monitoring for toxicity include: IV solumedrol and the method of monitoring was/is daily BMP and CBC.    Discussed care with patient and nursing  All pertinent information and plan of care discussed with the attending physician.     Diet: ADULT DIET; Regular  Code Status: Full Code  DVT Prophylaxis: Lovenox  Disposition/Barriers to Discharge: From home to home  Surrogate Decision Maker/ POA: daughter Trudi    Review of Systems:      Pertinent positives and negatives discussed in HPI    Objective:   No intake or output data in the 24 hours ending 06/29/24 0703   Vitals:   Vitals:    06/29/24 0049 06/29/24 0127 06/29/24 0408 06/29/24 0541   BP:       Pulse: 94 94 95 99   Resp: 18  20    Temp:       TempSrc:       SpO2: 96%  96%    Weight:       Height:           Physical Exam:    Physical Examination:  Vitals:    06/29/24 0541   BP:    Pulse: 99   Resp:    Temp:    SpO2:       No intake/output data recorded.   Wt Readings from Last 3 Encounters:   06/28/24 68 kg (150 lb)   05/21/24 68.9 kg (152 lb)   05/10/24 65.8 kg (145 lb)     No intake or output data in the 24 hours ending 06/29/24 0703  Telemetry: Personally Reviewed Normal sinus rhythm  Constitutional: Cooperative and in no apparent distress, and appears well nourished  Skin: Warm and pink; no cyanosis, bruising, or clubbing  HEENT: Symmetric and normocephalic. Conjunctiva pink with clear sclera. Mucus membranes pink and moist.   Cardiovascular: regular rate and rhythm. S1 & S2, negative for murmurs. Peripheral pulses 2+, capillary refill < 3 seconds. No peripheral edema  Respiratory: Respirations symmetric and unlabored. Lungs clear to auscultation bilaterally, no crackles, or rhonchi +diminished, wheezes, 3 lpm at 93%   Gastrointestinal: Abdomen soft and round. Bowel sounds normoactive in all quadrants.  Musculoskeletal: No focal weakness.  Neurologic/Psych: Awake and orientated to person, place and time. Calm affect, appropriate 
[address], [phone]      [DISCONTINUED] ALBUTEROL IN Inhale into the lungs         Patient is no longer taking Combivent, Spiriva, or Dulera (cannot afford).    Of note, patient took daily Methadone 60 mg prior to ED arrival.    Timing of last doses updated.    Thank you,  Gina King CPhT      
  BILITOT 0.3   ALKPHOS 77       Lipids: No results found for: \"CHOL\", \"HDL\", \"TRIG\"  Hemoglobin A1C:   Lab Results   Component Value Date/Time    LABA1C 5.9 08/21/2022 12:35 PM     TSH: No results found for: \"TSH\"  Troponin: No results found for: \"TROPONINT\"  Lactic Acid: No results for input(s): \"LACTA\" in the last 72 hours.  BNP: No results for input(s): \"PROBNP\" in the last 72 hours.  UA:  Lab Results   Component Value Date/Time    NITRU Negative 06/28/2024 07:55 PM    COLORU Yellow 06/28/2024 07:55 PM    PHUR 7.0 06/28/2024 07:55 PM    PHUR 6.5 04/16/2023 04:25 PM    WBCUA 0 04/16/2023 04:25 PM    RBCUA 0 04/16/2023 04:25 PM    BACTERIA None Seen 04/16/2023 04:25 PM    CLARITYU Clear 06/28/2024 07:55 PM    LEUKOCYTESUR Negative 06/28/2024 07:55 PM    UROBILINOGEN 0.2 06/28/2024 07:55 PM    BILIRUBINUR Negative 06/28/2024 07:55 PM    BLOODU Negative 06/28/2024 07:55 PM    GLUCOSEU Negative 06/28/2024 07:55 PM    KETUA Negative 06/28/2024 07:55 PM     Urine Cultures:   Lab Results   Component Value Date/Time    LABURIN  08/07/2016 09:00 PM     <50,000 CFU/ml mixed skin/urogenital karrie. No further workup     Blood Cultures:   Lab Results   Component Value Date/Time    BC No Growth after 4 days of incubation. 12/15/2023 03:40 PM     Lab Results   Component Value Date/Time    BLOODCULT2 No Growth after 4 days of incubation. 12/15/2023 03:40 PM     Organism:   Lab Results   Component Value Date/Time    ORG Lilliam glabrata 07/05/2023 12:05 PM     Personally reviewed labs, diagnostic, device, and imaging results reviewed as a part of this visit    Electronically signed by MELISSA Gann CNP on 7/1/2024 at 7:51 AM

## 2024-07-02 NOTE — CARE COORDINATION
CM noted discharge order and that pt needs home oxygen.    KALEE contacted [employer] in house rep [phone] and he will work on order.     Wendy Escobar RN, BSN  [phone]   
Discharge Planning Note:  Chart reviewed for discharge needs  and it appears that patient has minimal needs for discharge at this time.   Risk Score 9 %     Primary Care Physician is No PCP on chart  Primary insurance is [facility]    Please notify case management if any discharge needs are identified.      Case management will continue to follow progress and update discharge plan as needed.   
individualized plan of care/goals and shares the quality data associated with the providers was provided to:     Patient Representative Name:       The Patient and/or Patient Representative Agree with the Discharge Plan?      Wendy Escobar RN, BSN  [phone]

## 2024-07-02 NOTE — DISCHARGE SUMMARY
Geovani Fernández RCP  Respiratory Therapist  Specialty: Respiratory Therapy     Progress Notes      Signed     Date of Service: 6/30/2024  5:45 PM     Signed            06/30/24 1741   Resting (Room Air)   SpO2 86   HR 92   Resting (On O2)   SpO2 92   O2 Device Nasal cannula   O2 Flow Rate (l/min) 2 l/min   During Walk (On O2)   SpO2 91      O2 Device Nasal cannula   O2 Flow Rate (l/min) 4 l/min   Need Additional O2 Flow Rate Rows Yes   O2 Flow Rate (l/min) 3 l/min   O2 Saturation 89   O2 Flow Rate (l/min) 2 l/min   O2 Saturation 87   After Walk   Does the Patient Qualify for Home O2 Yes   Liter Flow at Rest 2   Liter Flow on Exertion 4                     
have a nebulizer with solution at home and has existing home oxygen and portable oxygen.     She is also aware of community resources and drug assistance programs.     She was feeling better and was eager to be discharged home.      Consults.  IP CONSULT TO PULMONOLOGY  IP CONSULT TO FINANCIAL COUNSELOR    Physical examination on discharge day.   BP (!) 154/76   Pulse 84   Temp 97.3 °F (36.3 °C) (Temporal)   Resp 20   Ht 1.702 m (5' 7\")   Wt 67 kg (147 lb 12.8 oz)   SpO2 93%   BMI 23.15 kg/m²   General appearance.  Alert. Looks comfortable.aert and pleasant   HEENT. Sclera clear. Moist mucus membranes.  Cardiovascular. Regular rate and rhythm, normal S1, S2. No murmur.   Respiratory. Not using accessory muscles occasional scattered wheezes are noted .  Gastrointestinal. Abdomen soft, non-tender, not distended, normal bowel sounds  Neurology. Facial symmetry. No speech deficits. Moving all extremities equally.  Extremities. No edema in lower extremities.  Skin. Warm, dry, normal turgor    Condition at time of discharge stable     Medication instructions provided to patient at discharge.     Medication List        START taking these medications      predniSONE 20 MG tablet  Commonly known as: DELTASONE  Take 2 tablets by mouth daily for 4 days, THEN 1 tablet daily for 3 days.  Start taking on: July 2, 2024            CHANGE how you take these medications      ipratropium 0.5 mg-albuterol 2.5 mg 0.5-2.5 (3) MG/3ML Soln nebulizer solution  Commonly known as: DUONEB  Inhale 3 mLs into the lungs every 4 hours  What changed: Another medication with the same name was removed. Continue taking this medication, and follow the directions you see here.            CONTINUE taking these medications      albuterol sulfate  (90 Base) MCG/ACT inhaler  Commonly known as: PROVENTIL;VENTOLIN;PROAIR  Inhale 2 puffs into the lungs every 6 hours as needed for Wheezing     guaiFENesin 600 MG extended release tablet  Commonly

## 2024-07-23 ENCOUNTER — HOSPITAL ENCOUNTER (OUTPATIENT)
Dept: CT IMAGING | Age: 59
Discharge: HOME | End: 2024-07-23
Attending: INTERNAL MEDICINE
Payer: COMMERCIAL

## 2024-07-23 DIAGNOSIS — Z87.891 HISTORY OF SMOKING: ICD-10-CM

## 2024-07-23 PROCEDURE — 71271 CT THORAX LUNG CANCER SCR C-: CPT

## 2024-07-30 ENCOUNTER — TELEPHONE (OUTPATIENT)
Dept: PULMONOLOGY | Age: 59
End: 2024-07-30

## 2024-08-15 ENCOUNTER — TELEPHONE (OUTPATIENT)
Dept: PULMONOLOGY | Age: 59
End: 2024-08-15

## 2024-08-15 RX ORDER — PREDNISONE 10 MG/1
10 TABLET ORAL DAILY
COMMUNITY

## 2024-08-15 RX ORDER — AZITHROMYCIN 250 MG/1
250 TABLET, FILM COATED ORAL DAILY
COMMUNITY

## 2024-08-15 NOTE — TELEPHONE ENCOUNTER
Symptom Duration- since yesterday   Cough(Productive)-  she has a cough but unable to get anything up  Chest Congestion- yes  Shortness of breath- yes  Wheezing- yes  Are you on Oxygen- yes  How Many Liters- 2L  O2 stat- 88-90%  Any Over the counter meds tried- mucinex   taking inhaler as prescribed  Last round of steroids 07/02/2024  Pharmacy- [facility]   Allergies - PCN, Doxy, Codeine

## 2024-08-29 RX ORDER — ALBUTEROL SULFATE 90 UG/1
2 AEROSOL, METERED RESPIRATORY (INHALATION) EVERY 6 HOURS PRN
Qty: 54 G | Refills: 3 | Status: SHIPPED | OUTPATIENT
Start: 2024-08-29 | End: 2025-08-29

## 2024-09-02 ENCOUNTER — HOSPITAL ENCOUNTER (INPATIENT)
Age: 59
LOS: 4 days | Discharge: HOME | End: 2024-09-06
Attending: HOSPITALIST | Admitting: HOSPITALIST
Payer: COMMERCIAL

## 2024-09-02 ENCOUNTER — APPOINTMENT (OUTPATIENT)
Dept: CT IMAGING | Age: 59
End: 2024-09-02
Payer: COMMERCIAL

## 2024-09-02 ENCOUNTER — APPOINTMENT (OUTPATIENT)
Dept: GENERAL RADIOLOGY | Age: 59
End: 2024-09-02
Payer: COMMERCIAL

## 2024-09-02 DIAGNOSIS — R09.02 HYPOXEMIA: ICD-10-CM

## 2024-09-02 DIAGNOSIS — R91.1 PULMONARY NODULE: ICD-10-CM

## 2024-09-02 DIAGNOSIS — J18.9 PNEUMONIA DUE TO INFECTIOUS ORGANISM, UNSPECIFIED LATERALITY, UNSPECIFIED PART OF LUNG: Primary | ICD-10-CM

## 2024-09-02 PROBLEM — J96.12 ACUTE ON CHRONIC RESPIRATORY ACIDOSIS (HCC): Status: ACTIVE | Noted: 2024-09-02

## 2024-09-02 PROBLEM — J96.02 ACUTE ON CHRONIC RESPIRATORY ACIDOSIS (HCC): Status: ACTIVE | Noted: 2024-09-02

## 2024-09-02 LAB
ALBUMIN SERPL-MCNC: 4.4 G/DL (ref 3.4–5)
ALBUMIN/GLOB SERPL: 1.5 {RATIO} (ref 1.1–2.2)
ALP SERPL-CCNC: 65 U/L (ref 40–129)
ALT SERPL-CCNC: 15 U/L (ref 10–40)
ANION GAP SERPL CALCULATED.3IONS-SCNC: 11 MMOL/L (ref 3–16)
AST SERPL-CCNC: 24 U/L (ref 15–37)
BACTERIA URNS QL MICRO: ABNORMAL /HPF
BASE EXCESS BLDV CALC-SCNC: 6.3 MMOL/L (ref -3–3)
BASOPHILS # BLD: 0.1 K/UL (ref 0–0.2)
BASOPHILS NFR BLD: 0.5 %
BILIRUB SERPL-MCNC: 0.4 MG/DL (ref 0–1)
BILIRUB UR QL STRIP.AUTO: NEGATIVE
BUN SERPL-MCNC: 13 MG/DL (ref 7–20)
CALCIUM SERPL-MCNC: 9.9 MG/DL (ref 8.3–10.6)
CHLORIDE SERPL-SCNC: 98 MMOL/L (ref 99–110)
CLARITY UR: CLEAR
CO2 BLDV-SCNC: 87 MMOL/L
CO2 SERPL-SCNC: 32 MMOL/L (ref 21–32)
COHGB MFR BLDV: 3.5 % (ref 0–1.5)
COLOR UR: ABNORMAL
CREAT SERPL-MCNC: 0.6 MG/DL (ref 0.6–1.1)
DEPRECATED RDW RBC AUTO: 14.1 % (ref 12.4–15.4)
DO-HGB MFR BLDV: 36 %
EOSINOPHIL # BLD: 0 K/UL (ref 0–0.6)
EOSINOPHIL NFR BLD: 0.2 %
EPI CELLS #/AREA URNS AUTO: 8 /HPF (ref 0–5)
FLUAV RNA RESP QL NAA+PROBE: NOT DETECTED
FLUBV RNA RESP QL NAA+PROBE: NOT DETECTED
GFR SERPLBLD CREATININE-BSD FMLA CKD-EPI: >90 ML/MIN/{1.73_M2}
GLUCOSE SERPL-MCNC: 125 MG/DL (ref 70–99)
GLUCOSE UR STRIP.AUTO-MCNC: NEGATIVE MG/DL
HCO3 BLDV-SCNC: 36.5 MMOL/L (ref 23–29)
HCT VFR BLD AUTO: 47.6 % (ref 36–48)
HGB BLD-MCNC: 15.2 G/DL (ref 12–16)
HGB UR QL STRIP.AUTO: NEGATIVE
HYALINE CASTS #/AREA URNS AUTO: 8 /LPF (ref 0–8)
HYALINE CASTS: PRESENT
KETONES UR STRIP.AUTO-MCNC: 15 MG/DL
LACTATE BLDV-SCNC: 1 MMOL/L (ref 0.4–1.9)
LEUKOCYTE ESTERASE UR QL STRIP.AUTO: ABNORMAL
LYMPHOCYTES # BLD: 1.1 K/UL (ref 1–5.1)
LYMPHOCYTES NFR BLD: 10 %
MCH RBC QN AUTO: 29 PG (ref 26–34)
MCHC RBC AUTO-ENTMCNC: 31.9 G/DL (ref 31–36)
MCV RBC AUTO: 91 FL (ref 80–100)
METHGB MFR BLDV: 0.3 %
MONOCYTES # BLD: 0.5 K/UL (ref 0–1.3)
MONOCYTES NFR BLD: 4.3 %
NEUTROPHILS # BLD: 9.4 K/UL (ref 1.7–7.7)
NEUTROPHILS NFR BLD: 85 %
NITRITE UR QL STRIP.AUTO: NEGATIVE
NT-PROBNP SERPL-MCNC: <36 PG/ML (ref 0–124)
O2 CT VFR BLDV CALC: 13 VOL %
O2 THERAPY: ABNORMAL
PCO2 BLDV: 78.3 MMHG (ref 40–50)
PH BLDV: 7.28 [PH] (ref 7.35–7.45)
PH UR STRIP.AUTO: 6 [PH] (ref 5–8)
PLATELET # BLD AUTO: 216 K/UL (ref 135–450)
PMV BLD AUTO: 8.3 FL (ref 5–10.5)
PO2 BLDV: 31.2 MMHG (ref 25–40)
POTASSIUM SERPL-SCNC: 4.6 MMOL/L (ref 3.5–5.1)
PROCALCITONIN SERPL IA-MCNC: 0.05 NG/ML (ref 0–0.15)
PROT SERPL-MCNC: 7.4 G/DL (ref 6.4–8.2)
PROT UR STRIP.AUTO-MCNC: ABNORMAL MG/DL
RBC # BLD AUTO: 5.23 M/UL (ref 4–5.2)
RBC CLUMPS #/AREA URNS AUTO: 1 /HPF (ref 0–4)
SAO2 % BLDV: 63 %
SARS-COV-2 RNA RESP QL NAA+PROBE: NOT DETECTED
SODIUM SERPL-SCNC: 141 MMOL/L (ref 136–145)
SP GR UR STRIP.AUTO: 1.02 (ref 1–1.03)
TROPONIN, HIGH SENSITIVITY: 14 NG/L (ref 0–14)
UA COMPLETE W REFLEX CULTURE PNL UR: ABNORMAL
UA DIPSTICK W REFLEX MICRO PNL UR: YES
URN SPEC COLLECT METH UR: ABNORMAL
UROBILINOGEN UR STRIP-ACNC: 1 E.U./DL
WBC # BLD AUTO: 11 K/UL (ref 4–11)
WBC #/AREA URNS AUTO: 5 /HPF (ref 0–5)

## 2024-09-02 PROCEDURE — 2580000003 HC RX 258: Performed by: HOSPITALIST

## 2024-09-02 PROCEDURE — 2700000000 HC OXYGEN THERAPY PER DAY

## 2024-09-02 PROCEDURE — 96374 THER/PROPH/DIAG INJ IV PUSH: CPT

## 2024-09-02 PROCEDURE — 71260 CT THORAX DX C+: CPT

## 2024-09-02 PROCEDURE — 6360000004 HC RX CONTRAST MEDICATION: Performed by: PHYSICIAN ASSISTANT

## 2024-09-02 PROCEDURE — 6370000000 HC RX 637 (ALT 250 FOR IP): Performed by: INTERNAL MEDICINE

## 2024-09-02 PROCEDURE — 87040 BLOOD CULTURE FOR BACTERIA: CPT

## 2024-09-02 PROCEDURE — 84145 PROCALCITONIN (PCT): CPT

## 2024-09-02 PROCEDURE — 94640 AIRWAY INHALATION TREATMENT: CPT

## 2024-09-02 PROCEDURE — 82803 BLOOD GASES ANY COMBINATION: CPT

## 2024-09-02 PROCEDURE — 83605 ASSAY OF LACTIC ACID: CPT

## 2024-09-02 PROCEDURE — 6370000000 HC RX 637 (ALT 250 FOR IP): Performed by: HOSPITALIST

## 2024-09-02 PROCEDURE — 93005 ELECTROCARDIOGRAM TRACING: CPT | Performed by: PHYSICIAN ASSISTANT

## 2024-09-02 PROCEDURE — 80053 COMPREHEN METABOLIC PANEL: CPT

## 2024-09-02 PROCEDURE — 6370000000 HC RX 637 (ALT 250 FOR IP): Performed by: PHYSICIAN ASSISTANT

## 2024-09-02 PROCEDURE — 96361 HYDRATE IV INFUSION ADD-ON: CPT

## 2024-09-02 PROCEDURE — 1200000000 HC SEMI PRIVATE

## 2024-09-02 PROCEDURE — 6360000002 HC RX W HCPCS: Performed by: HOSPITALIST

## 2024-09-02 PROCEDURE — 83880 ASSAY OF NATRIURETIC PEPTIDE: CPT

## 2024-09-02 PROCEDURE — 71045 X-RAY EXAM CHEST 1 VIEW: CPT

## 2024-09-02 PROCEDURE — 84484 ASSAY OF TROPONIN QUANT: CPT

## 2024-09-02 PROCEDURE — 94761 N-INVAS EAR/PLS OXIMETRY MLT: CPT

## 2024-09-02 PROCEDURE — 2580000003 HC RX 258: Performed by: PHYSICIAN ASSISTANT

## 2024-09-02 PROCEDURE — 6360000002 HC RX W HCPCS: Performed by: PHYSICIAN ASSISTANT

## 2024-09-02 PROCEDURE — 99285 EMERGENCY DEPT VISIT HI MDM: CPT

## 2024-09-02 PROCEDURE — 81001 URINALYSIS AUTO W/SCOPE: CPT

## 2024-09-02 PROCEDURE — 87636 SARSCOV2 & INF A&B AMP PRB: CPT

## 2024-09-02 PROCEDURE — 85025 COMPLETE CBC W/AUTO DIFF WBC: CPT

## 2024-09-02 RX ORDER — IOPAMIDOL 755 MG/ML
75 INJECTION, SOLUTION INTRAVASCULAR
Status: COMPLETED | OUTPATIENT
Start: 2024-09-02 | End: 2024-09-02

## 2024-09-02 RX ORDER — IPRATROPIUM BROMIDE AND ALBUTEROL SULFATE 2.5; .5 MG/3ML; MG/3ML
1 SOLUTION RESPIRATORY (INHALATION) ONCE
Status: COMPLETED | OUTPATIENT
Start: 2024-09-02 | End: 2024-09-02

## 2024-09-02 RX ORDER — METHADONE HYDROCHLORIDE 10 MG/ML
60 CONCENTRATE ORAL DAILY
Status: DISCONTINUED | OUTPATIENT
Start: 2024-09-03 | End: 2024-09-06 | Stop reason: HOSPADM

## 2024-09-02 RX ORDER — POTASSIUM CHLORIDE 1500 MG/1
40 TABLET, EXTENDED RELEASE ORAL PRN
Status: DISCONTINUED | OUTPATIENT
Start: 2024-09-02 | End: 2024-09-06 | Stop reason: HOSPADM

## 2024-09-02 RX ORDER — POTASSIUM CHLORIDE 7.45 MG/ML
10 INJECTION INTRAVENOUS PRN
Status: DISCONTINUED | OUTPATIENT
Start: 2024-09-02 | End: 2024-09-06 | Stop reason: HOSPADM

## 2024-09-02 RX ORDER — ENOXAPARIN SODIUM 100 MG/ML
40 INJECTION SUBCUTANEOUS DAILY
Status: DISCONTINUED | OUTPATIENT
Start: 2024-09-02 | End: 2024-09-06 | Stop reason: HOSPADM

## 2024-09-02 RX ORDER — LEVOFLOXACIN 5 MG/ML
750 INJECTION, SOLUTION INTRAVENOUS EVERY 24 HOURS
Status: DISCONTINUED | OUTPATIENT
Start: 2024-09-03 | End: 2024-09-06 | Stop reason: HOSPADM

## 2024-09-02 RX ORDER — ACETAMINOPHEN 650 MG/1
650 SUPPOSITORY RECTAL EVERY 6 HOURS PRN
Status: DISCONTINUED | OUTPATIENT
Start: 2024-09-02 | End: 2024-09-06 | Stop reason: HOSPADM

## 2024-09-02 RX ORDER — MAGNESIUM SULFATE IN WATER 40 MG/ML
2000 INJECTION, SOLUTION INTRAVENOUS PRN
Status: DISCONTINUED | OUTPATIENT
Start: 2024-09-02 | End: 2024-09-06 | Stop reason: HOSPADM

## 2024-09-02 RX ORDER — 0.9 % SODIUM CHLORIDE 0.9 %
1000 INTRAVENOUS SOLUTION INTRAVENOUS ONCE
Status: COMPLETED | OUTPATIENT
Start: 2024-09-02 | End: 2024-09-02

## 2024-09-02 RX ORDER — SODIUM CHLORIDE 9 MG/ML
INJECTION, SOLUTION INTRAVENOUS PRN
Status: DISCONTINUED | OUTPATIENT
Start: 2024-09-02 | End: 2024-09-06 | Stop reason: HOSPADM

## 2024-09-02 RX ORDER — IPRATROPIUM BROMIDE AND ALBUTEROL SULFATE 2.5; .5 MG/3ML; MG/3ML
1 SOLUTION RESPIRATORY (INHALATION)
Status: DISCONTINUED | OUTPATIENT
Start: 2024-09-02 | End: 2024-09-02

## 2024-09-02 RX ORDER — IPRATROPIUM BROMIDE AND ALBUTEROL SULFATE 2.5; .5 MG/3ML; MG/3ML
1 SOLUTION RESPIRATORY (INHALATION) EVERY 4 HOURS
Status: DISCONTINUED | OUTPATIENT
Start: 2024-09-02 | End: 2024-09-02

## 2024-09-02 RX ORDER — POLYETHYLENE GLYCOL 3350 17 G/17G
17 POWDER, FOR SOLUTION ORAL DAILY PRN
Status: DISCONTINUED | OUTPATIENT
Start: 2024-09-02 | End: 2024-09-06 | Stop reason: HOSPADM

## 2024-09-02 RX ORDER — IPRATROPIUM BROMIDE AND ALBUTEROL SULFATE 2.5; .5 MG/3ML; MG/3ML
1 SOLUTION RESPIRATORY (INHALATION) EVERY 4 HOURS
Status: DISCONTINUED | OUTPATIENT
Start: 2024-09-02 | End: 2024-09-03

## 2024-09-02 RX ORDER — ALBUTEROL SULFATE 90 UG/1
2 AEROSOL, METERED RESPIRATORY (INHALATION) EVERY 6 HOURS PRN
Status: DISCONTINUED | OUTPATIENT
Start: 2024-09-02 | End: 2024-09-06 | Stop reason: HOSPADM

## 2024-09-02 RX ORDER — ONDANSETRON 2 MG/ML
4 INJECTION INTRAMUSCULAR; INTRAVENOUS EVERY 6 HOURS PRN
Status: DISCONTINUED | OUTPATIENT
Start: 2024-09-02 | End: 2024-09-06 | Stop reason: HOSPADM

## 2024-09-02 RX ORDER — ACETAMINOPHEN 325 MG/1
650 TABLET ORAL EVERY 6 HOURS PRN
Status: DISCONTINUED | OUTPATIENT
Start: 2024-09-02 | End: 2024-09-06 | Stop reason: HOSPADM

## 2024-09-02 RX ORDER — SODIUM CHLORIDE 0.9 % (FLUSH) 0.9 %
5-40 SYRINGE (ML) INJECTION PRN
Status: DISCONTINUED | OUTPATIENT
Start: 2024-09-02 | End: 2024-09-06 | Stop reason: HOSPADM

## 2024-09-02 RX ORDER — GUAIFENESIN 600 MG/1
600 TABLET, EXTENDED RELEASE ORAL 2 TIMES DAILY
Status: DISCONTINUED | OUTPATIENT
Start: 2024-09-02 | End: 2024-09-03

## 2024-09-02 RX ORDER — ONDANSETRON 2 MG/ML
4 INJECTION INTRAMUSCULAR; INTRAVENOUS EVERY 30 MIN PRN
Status: DISCONTINUED | OUTPATIENT
Start: 2024-09-02 | End: 2024-09-02 | Stop reason: ALTCHOICE

## 2024-09-02 RX ORDER — LEVOFLOXACIN 5 MG/ML
750 INJECTION, SOLUTION INTRAVENOUS ONCE
Status: COMPLETED | OUTPATIENT
Start: 2024-09-02 | End: 2024-09-02

## 2024-09-02 RX ORDER — SODIUM CHLORIDE 0.9 % (FLUSH) 0.9 %
5-40 SYRINGE (ML) INJECTION EVERY 12 HOURS SCHEDULED
Status: DISCONTINUED | OUTPATIENT
Start: 2024-09-02 | End: 2024-09-06 | Stop reason: HOSPADM

## 2024-09-02 RX ORDER — ONDANSETRON 4 MG/1
4 TABLET, ORALLY DISINTEGRATING ORAL EVERY 8 HOURS PRN
Status: DISCONTINUED | OUTPATIENT
Start: 2024-09-02 | End: 2024-09-06 | Stop reason: HOSPADM

## 2024-09-02 RX ORDER — BUDESONIDE AND FORMOTEROL FUMARATE DIHYDRATE 160; 4.5 UG/1; UG/1
2 AEROSOL RESPIRATORY (INHALATION)
Status: DISCONTINUED | OUTPATIENT
Start: 2024-09-02 | End: 2024-09-06 | Stop reason: HOSPADM

## 2024-09-02 RX ADMIN — Medication 2 PUFF: at 20:46

## 2024-09-02 RX ADMIN — ENOXAPARIN SODIUM 40 MG: 100 INJECTION SUBCUTANEOUS at 21:17

## 2024-09-02 RX ADMIN — Medication 10 ML: at 21:23

## 2024-09-02 RX ADMIN — IPRATROPIUM BROMIDE AND ALBUTEROL SULFATE 1 DOSE: .5; 3 SOLUTION RESPIRATORY (INHALATION) at 12:48

## 2024-09-02 RX ADMIN — SODIUM CHLORIDE 1000 ML: 9 INJECTION, SOLUTION INTRAVENOUS at 12:30

## 2024-09-02 RX ADMIN — WATER 40 MG: 1 INJECTION INTRAMUSCULAR; INTRAVENOUS; SUBCUTANEOUS at 18:01

## 2024-09-02 RX ADMIN — GUAIFENESIN 600 MG: 600 TABLET, EXTENDED RELEASE ORAL at 21:17

## 2024-09-02 RX ADMIN — LEVOFLOXACIN 750 MG: 5 INJECTION, SOLUTION INTRAVENOUS at 15:43

## 2024-09-02 RX ADMIN — IOPAMIDOL 75 ML: 755 INJECTION, SOLUTION INTRAVENOUS at 13:47

## 2024-09-02 RX ADMIN — ONDANSETRON 4 MG: 2 INJECTION INTRAMUSCULAR; INTRAVENOUS at 13:00

## 2024-09-02 RX ADMIN — IPRATROPIUM BROMIDE AND ALBUTEROL SULFATE 1 DOSE: .5; 3 SOLUTION RESPIRATORY (INHALATION) at 20:46

## 2024-09-02 ASSESSMENT — PAIN - FUNCTIONAL ASSESSMENT: PAIN_FUNCTIONAL_ASSESSMENT: NONE - DENIES PAIN

## 2024-09-02 ASSESSMENT — PAIN SCALES - GENERAL: PAINLEVEL_OUTOF10: 0

## 2024-09-02 NOTE — PROGRESS NOTES
Patient arrived to room 5568 alert and oriented. Patient ambulated to bathroom then to bed with standby assist. Oxygen placed on patient. Vitals taken and stable. Assessment completed. Epic orders reviewed and released. Call light in reach. No further needs.

## 2024-09-02 NOTE — H&P
HOSPITALISTS HISTORY AND PHYSICAL    2024 3:13 PM    Patient Information:  MARY ROSARIO is a 59 y.o. female [id number]  PCP:  None, None (Tel: None )    Chief complaint:    Chief Complaint   Patient presents with    Nausea     Came by [facility] from home, lives alone, with c/o nausea and \"not feeling right\". Hx COPD O2 RA 80% 4L O2 placed.        History of Present Illness:  Mary Rosario is a 59 y.o. female who presented with presents to ER with complaints of nausea and not feeling right.  Associate with shortness of breath.  Patient checked her pulse ox and it was 88% on 4 L normally wears about 3 L.  Patient has been having on and off cough unable to bring cough up.  Denies any fever.  Does not feeling out associated nausea patient on chronic methadone.  Denies missing any doses.  Denies any fevers chills nothing that makes it better or worse.  REVIEW OF SYSTEMS:   Constitutional: Negative for fever,chills or night sweats  ENT: Negative for rhinorrhea, epistaxis, hoarseness, sore throat.  Respiratory: Negative for shortness of breath,wheezing  Cardiovascular: Negative for chest pain, palpitations   Gastrointestinal: Negative for nausea, vomiting, diarrhea  Genitourinary: Negative for polyuria, dysuria   Hematologic/Lymphatic: Negative for bleeding tendency, easy bruising  Musculoskeletal: Negative for myalgias and arthralgias  Neurologic: Negative for confusion,dysarthria.  Skin: Negative for itching,rash, good capillary refill.   Psychiatric: Negative for depression,anxiety, agitation.  Endocrine: Negative for polydipsia,polyuria,heat /cold intolerance.    Past Medical History:   has a past medical history of Asthma and Pneumothorax.     Past Surgical History:   has a past surgical history that includes Hysterectomy;  section; Inner ear

## 2024-09-02 NOTE — ED PROVIDER NOTES
[facility]  EMERGENCY DEPARTMENT ENCOUNTER        Pt Name: Mary Rosario  MRN: [id number]  Birthdate 1965  Date of evaluation: 9/2/2024  Provider: Rafa Marina PA-C  PCP: None, None  Note Started: 12:32 PM EDT 9/2/24      KELECHI. I have evaluated this patient.      I personally saw the patient and independently provided 36 minutes of non-concurrent critical care time out of the total critical care time provided.  This excludes time spent doing separately billable procedures.  This includes time at the bedside, data interpretation, medication management, obtaining critical history from collateral sources if the patient is unable to provide it directly, and physician consultation.  Specifics of interventions taken and potentially life-threatening diagnostic considerations are listed above in the medical decision making.      CHIEF COMPLAINT       Chief Complaint   Patient presents with    Nausea     Came by [facility] from home, lives alone, with c/o nausea and \"not feeling right\". Hx COPD O2 RA 80% 4L O2 placed.       HISTORY OF PRESENT ILLNESS: 1 or more Elements     History From: patient  Limitations to history : None    Mary Rosario is a 59 y.o. female who presents to the emergency department the chief complaint of a 3 to 4-day history of some general fatigue, confusion, sleepiness and higher oxygen requirement.  Has history of COPD and chronically wears 2 L of oxygen just at night.  Typically does not wear this during the day that she has been having to wear more oxygen active during the day.  Presented here satting at 80% on room air.  Was placed on 4 L now satting in the mid 90s.  Denies headache, chest pain, abdominal pain, dysuria, hematuria, diarrhea, bloody stool.  States her mouth feels extremely dry.  States typically she wakes up multiple times in melanite to urinate but over the past 3 days she has been sleeping through the night and not waking up like she typically  sulfate 2000 mg in 50 mL IVPB premix (has no administration in time range)   enoxaparin (LOVENOX) injection 40 mg (has no administration in time range)   ondansetron (ZOFRAN-ODT) disintegrating tablet 4 mg (has no administration in time range)     Or   ondansetron (ZOFRAN) injection 4 mg (has no administration in time range)   polyethylene glycol (GLYCOLAX) packet 17 g (has no administration in time range)   acetaminophen (TYLENOL) tablet 650 mg (has no administration in time range)     Or   acetaminophen (TYLENOL) suppository 650 mg (has no administration in time range)   methylPREDNISolone sodium succ (SOLU-MEDROL) 40 mg in sterile water 1 mL injection (has no administration in time range)   ipratropium 0.5 mg-albuterol 2.5 mg (DUONEB) nebulizer solution 1 Dose (has no administration in time range)   sodium chloride 0.9 % bolus 1,000 mL (0 mLs IntraVENous Stopped 9/2/24 1341)   ipratropium 0.5 mg-albuterol 2.5 mg (DUONEB) nebulizer solution 1 Dose (1 Dose Inhalation Given 9/2/24 1248)   iopamidol (ISOVUE-370) 76 % injection 75 mL (75 mLs IntraVENous Given 9/2/24 1347)   levoFLOXacin (LEVAQUIN) 750 MG/150ML infusion 750 mg (0 mg IntraVENous Stopped 9/2/24 1714)                 Is this patient to be included in the SEP-1 Core Measure due to severe sepsis or septic shock?   No   Exclusion criteria - the patient is NOT to be included for SEP-1 Core Measure due to:  May have criteria for sepsis, but does not meet criteria for severe sepsis or septic shock    Chronic Conditions affecting care:    has a past medical history of Asthma and Pneumothorax.    CONSULTS: (Who and What was discussed)  IP CONSULT TO HOSPITALIST  IP CONSULT TO PULMONOLOGY  Dr. Ramirez     Social Determinants Significantly Affecting Health : None    Records Reviewed (External and Source)     CC/HPI Summary, DDx, ED Course, and Reassessment: Patient presented with some general fatigue, cough and higher oxygen requirement.  She typically only wears 2 L

## 2024-09-02 NOTE — ED NOTES
How does patient ambulate?   [x]Low Fall Risk (ambulates by themselves without support)  []Stand by assist   []Contact Guard   []Front wheel walker  []Wheelchair   []Steady  []Bed bound  []History of Lower Extremity Amputation  []Unknown, did not assess in the emergency department   How does patient take pills?  [x]Whole with Water  []Crushed in applesauce  []Crushed in pudding  []Other  []Unknown no oral medications were given in the ED  Is patient alert?   [x]Alert  []Drowsy but responds to voice  []Doesn't respond to voice but responds to painful stimuli  []Unresponsive  Is patient oriented?   [x]To person  [x]To place  [x]To time  []To situation  []Confused  []Agitated  [x]Follows commands  If patient is disoriented or from a Skill Nursing Facility has family been notified of admission?   []Yes   []No  Patient belongings?   [x]Cell phone  [x]Wallet   [x]Dentures  [x]Clothing  Any specific patient or family belongings/needs/dynamics?   N/a  Miscellaneous comments/pending orders?  A&O, ambulatory, baseline 2L at night, 3L O2 continuously, IV ATB for pneumonia     If there are any additional questions please reach out to the Emergency Department.   [id number]

## 2024-09-02 NOTE — ED PROVIDER NOTES
EKG:  Read by me in the absence of a cardiologist shows:  Sinus rhythm, normal rate, normal conduction intervals, normal axis, no acute injury pattern, no major change from prior study      I did not perform face-to-face evaluation and was not otherwise involved in this patient's care.  Please see PA/NP note for further information on this visit.        Araceli Rosario MD  09/02/24 [id number]

## 2024-09-03 LAB
ANION GAP SERPL CALCULATED.3IONS-SCNC: 10 MMOL/L (ref 3–16)
BASE EXCESS BLDA CALC-SCNC: 9.4 MMOL/L (ref -3–3)
BASOPHILS # BLD: 0 K/UL (ref 0–0.2)
BASOPHILS NFR BLD: 0.5 %
BUN SERPL-MCNC: 12 MG/DL (ref 7–20)
CALCIUM SERPL-MCNC: 9.5 MG/DL (ref 8.3–10.6)
CHLORIDE SERPL-SCNC: 97 MMOL/L (ref 99–110)
CO2 BLDA-SCNC: 86.7 MMOL/L
CO2 SERPL-SCNC: 30 MMOL/L (ref 21–32)
COHGB MFR BLDA: 1.1 % (ref 0–1.5)
CREAT SERPL-MCNC: <0.5 MG/DL (ref 0.6–1.1)
DEPRECATED RDW RBC AUTO: 13.7 % (ref 12.4–15.4)
EOSINOPHIL # BLD: 0 K/UL (ref 0–0.6)
EOSINOPHIL NFR BLD: 0.1 %
GFR SERPLBLD CREATININE-BSD FMLA CKD-EPI: >90 ML/MIN/{1.73_M2}
GLUCOSE SERPL-MCNC: 104 MG/DL (ref 70–99)
HCO3 BLDA-SCNC: 36.8 MMOL/L (ref 21–29)
HCT VFR BLD AUTO: 41 % (ref 36–48)
HGB BLD-MCNC: 13.7 G/DL (ref 12–16)
HGB BLDA-MCNC: 13.2 G/DL (ref 12–16)
LYMPHOCYTES # BLD: 1.5 K/UL (ref 1–5.1)
LYMPHOCYTES NFR BLD: 20.7 %
MCH RBC QN AUTO: 30.4 PG (ref 26–34)
MCHC RBC AUTO-ENTMCNC: 33.5 G/DL (ref 31–36)
MCV RBC AUTO: 90.6 FL (ref 80–100)
METHGB MFR BLDA: 0.4 %
MONOCYTES # BLD: 0.5 K/UL (ref 0–1.3)
MONOCYTES NFR BLD: 6.7 %
NEUTROPHILS # BLD: 5.2 K/UL (ref 1.7–7.7)
NEUTROPHILS NFR BLD: 72 %
O2 THERAPY: ABNORMAL
PCO2 BLDA: 61.7 MMHG (ref 35–45)
PH BLDA: 7.38 [PH] (ref 7.35–7.45)
PLATELET # BLD AUTO: 150 K/UL (ref 135–450)
PMV BLD AUTO: 8 FL (ref 5–10.5)
PO2 BLDA: 77.6 MMHG (ref 75–108)
POTASSIUM SERPL-SCNC: 4.7 MMOL/L (ref 3.5–5.1)
RBC # BLD AUTO: 4.52 M/UL (ref 4–5.2)
SAO2 % BLDA: 96.5 %
SODIUM SERPL-SCNC: 137 MMOL/L (ref 136–145)
WBC # BLD AUTO: 7.2 K/UL (ref 4–11)

## 2024-09-03 PROCEDURE — 85025 COMPLETE CBC W/AUTO DIFF WBC: CPT

## 2024-09-03 PROCEDURE — 99222 1ST HOSP IP/OBS MODERATE 55: CPT | Performed by: INTERNAL MEDICINE

## 2024-09-03 PROCEDURE — 2700000000 HC OXYGEN THERAPY PER DAY

## 2024-09-03 PROCEDURE — 80048 BASIC METABOLIC PNL TOTAL CA: CPT

## 2024-09-03 PROCEDURE — 6370000000 HC RX 637 (ALT 250 FOR IP): Performed by: HOSPITALIST

## 2024-09-03 PROCEDURE — 6370000000 HC RX 637 (ALT 250 FOR IP): Performed by: INTERNAL MEDICINE

## 2024-09-03 PROCEDURE — 1200000000 HC SEMI PRIVATE

## 2024-09-03 PROCEDURE — 5A09357 ASSISTANCE WITH RESPIRATORY VENTILATION, LESS THAN 24 CONSECUTIVE HOURS, CONTINUOUS POSITIVE AIRWAY PRESSURE: ICD-10-PCS | Performed by: INTERNAL MEDICINE

## 2024-09-03 PROCEDURE — 36415 COLL VENOUS BLD VENIPUNCTURE: CPT

## 2024-09-03 PROCEDURE — 94761 N-INVAS EAR/PLS OXIMETRY MLT: CPT

## 2024-09-03 PROCEDURE — 92610 EVALUATE SWALLOWING FUNCTION: CPT

## 2024-09-03 PROCEDURE — 6360000002 HC RX W HCPCS: Performed by: HOSPITALIST

## 2024-09-03 PROCEDURE — 94640 AIRWAY INHALATION TREATMENT: CPT

## 2024-09-03 PROCEDURE — 2580000003 HC RX 258: Performed by: HOSPITALIST

## 2024-09-03 PROCEDURE — 94660 CPAP INITIATION&MGMT: CPT

## 2024-09-03 PROCEDURE — 82803 BLOOD GASES ANY COMBINATION: CPT

## 2024-09-03 PROCEDURE — 92526 ORAL FUNCTION THERAPY: CPT

## 2024-09-03 RX ORDER — IPRATROPIUM BROMIDE AND ALBUTEROL SULFATE 2.5; .5 MG/3ML; MG/3ML
1 SOLUTION RESPIRATORY (INHALATION)
Status: DISCONTINUED | OUTPATIENT
Start: 2024-09-03 | End: 2024-09-06 | Stop reason: HOSPADM

## 2024-09-03 RX ORDER — GUAIFENESIN 600 MG/1
1200 TABLET, EXTENDED RELEASE ORAL 2 TIMES DAILY
Status: DISCONTINUED | OUTPATIENT
Start: 2024-09-03 | End: 2024-09-06 | Stop reason: HOSPADM

## 2024-09-03 RX ORDER — BUTALBITAL, ACETAMINOPHEN AND CAFFEINE 50; 325; 40 MG/1; MG/1; MG/1
1 TABLET ORAL EVERY 6 HOURS PRN
Status: DISCONTINUED | OUTPATIENT
Start: 2024-09-03 | End: 2024-09-06 | Stop reason: HOSPADM

## 2024-09-03 RX ADMIN — LEVOFLOXACIN 750 MG: 5 INJECTION, SOLUTION INTRAVENOUS at 17:24

## 2024-09-03 RX ADMIN — IPRATROPIUM BROMIDE AND ALBUTEROL SULFATE 1 DOSE: .5; 3 SOLUTION RESPIRATORY (INHALATION) at 19:55

## 2024-09-03 RX ADMIN — Medication 10 ML: at 08:16

## 2024-09-03 RX ADMIN — ENOXAPARIN SODIUM 40 MG: 100 INJECTION SUBCUTANEOUS at 08:02

## 2024-09-03 RX ADMIN — IPRATROPIUM BROMIDE AND ALBUTEROL SULFATE 1 DOSE: .5; 3 SOLUTION RESPIRATORY (INHALATION) at 04:01

## 2024-09-03 RX ADMIN — Medication 2 PUFF: at 19:56

## 2024-09-03 RX ADMIN — BUTALBITAL, ACETAMINOPHEN, AND CAFFEINE 1 TABLET: 325; 50; 40 TABLET ORAL at 08:02

## 2024-09-03 RX ADMIN — IPRATROPIUM BROMIDE AND ALBUTEROL SULFATE 1 DOSE: .5; 3 SOLUTION RESPIRATORY (INHALATION) at 00:13

## 2024-09-03 RX ADMIN — GUAIFENESIN 1200 MG: 600 TABLET, EXTENDED RELEASE ORAL at 20:25

## 2024-09-03 RX ADMIN — METHADONE HYDROCHLORIDE 60 MG: 10 CONCENTRATE ORAL at 08:40

## 2024-09-03 RX ADMIN — Medication 2 PUFF: at 08:31

## 2024-09-03 RX ADMIN — TIOTROPIUM BROMIDE INHALATION SPRAY 2 PUFF: 3.12 SPRAY, METERED RESPIRATORY (INHALATION) at 08:31

## 2024-09-03 RX ADMIN — GUAIFENESIN 600 MG: 600 TABLET, EXTENDED RELEASE ORAL at 08:02

## 2024-09-03 RX ADMIN — Medication 10 ML: at 20:25

## 2024-09-03 RX ADMIN — IPRATROPIUM BROMIDE AND ALBUTEROL SULFATE 1 DOSE: .5; 3 SOLUTION RESPIRATORY (INHALATION) at 08:30

## 2024-09-03 RX ADMIN — IPRATROPIUM BROMIDE AND ALBUTEROL SULFATE 1 DOSE: .5; 3 SOLUTION RESPIRATORY (INHALATION) at 11:36

## 2024-09-03 ASSESSMENT — PAIN DESCRIPTION - DESCRIPTORS: DESCRIPTORS: DULL;ACHING;POUNDING

## 2024-09-03 ASSESSMENT — PAIN SCALES - GENERAL: PAINLEVEL_OUTOF10: 3

## 2024-09-03 ASSESSMENT — PAIN DESCRIPTION - LOCATION: LOCATION: HEAD

## 2024-09-03 NOTE — CONSULTS
[facility] Pulmonary and Critical Care   Consult Note      Reason for Consult: COPD exacerbation  Requesting Physician: Terry Ramierz    Subjective:   CHIEF COMPLAINT: Altered mental status and shortness of breath with cough     HPI: Patient states that she felt very tired and \"delirious\" at home yesterday and lost track of time.  She has also been increasingly short of breath with a cough-initially unable to bring up any phlegm.  Hence she presented to the hospital for an evaluation.  Pulmonary consultation has been requested for management of COPD exacerbation.    Today she states that she still feels tired and her mind does not feel right, cough with expectoration of yellow phlegm.    Recurrent hospitalizations for COPD exacerbation and mucous plugging of airway.  History of severe COPD based on PFT from 2023 with FEV1 of 1.22 L [41% predicted].  2L O2 as needed and at nighttime.  History of mucous plugging of airway requiring bronchoscopy x 2.  Previously treated for Aspergillus noted from bronchial cultures with voriconazole and posaconazole, both of which were discontinued due to intolerance.  Former smoker.       The patient is a 59 y.o. female with significant past medical history of:      Diagnosis Date    Asthma     Pneumothorax         Past Surgical History:        Procedure Laterality Date    ABDOMEN SURGERY      BRONCHOSCOPY N/A 2023    BRONCHOSCOPY performed by Gregory Ravi MD at [facility] ENDOSCOPY    BRONCHOSCOPY N/A 2023    BRONCHOSCOPY ALVEOLAR LAVAGE performed by Gregory Ravi MD at [facility] ENDOSCOPY    BRONCHOSCOPY  2023    BRONCHOSCOPY THERAPUTIC ASPIRATION INITIAL performed by Gregory Ravi MD at [facility] ENDOSCOPY     SECTION      HYSTERECTOMY (CERVIX STATUS UNKNOWN)      INNER EAR SURGERY      MASTOIDECTOMY       Current Medications:    Current Facility-Administered Medications: butalbital-acetaminophen-caffeine (FIORICET, ESGIC)  erythema  Head: normocephalic and atraumatic  Eyes: pupils equal, round, and reactive to light, extraocular eye movements intact, conjunctivae normal  ENT: external ear and ear canal normal bilaterally, nose without deformity, nasal mucosa and turbinates normal  Neck: supple and non-tender without mass, no cervical lymphadenopathy  Pulmonary/Chest: clear to auscultation bilaterally- no wheezes, rales or rhonchi, normal air movement, no respiratory distress  Cardiovascular: normal rate, regular rhythm,  no murmurs, rubs, distal pulses intact, no carotid bruits  Abdomen: soft, non-tender, non-distended, normal bowel sounds, no masses or organomegaly  Lymph Nodes: Cervical, supraclavicular normal  Extremities: no cyanosis, clubbing or edema  Musculoskeletal: normal range of motion, no joint swelling, deformity or tenderness  Neurologic: alert, no focal neurologic deficits    Data Review:  CBC:   Lab Results   Component Value Date/Time    WBC 7.2 09/03/2024 06:26 AM    RBC 4.52 09/03/2024 06:26 AM     BMP:   Lab Results   Component Value Date/Time    GLUCOSE 104 09/03/2024 06:26 AM    CO2 30 09/03/2024 06:26 AM    BUN 12 09/03/2024 06:26 AM    CREATININE <0.5 09/03/2024 06:26 AM    CALCIUM 9.5 09/03/2024 06:26 AM     ABG:   Lab Results   Component Value Date/Time    YLP1GBQ 36.8 09/03/2024 11:03 AM    BEART 9.4 09/03/2024 11:03 AM    R5MSDQDG 96.5 09/03/2024 11:03 AM    PHART 7.384 09/03/2024 11:03 AM    OKM5JQT 61.7 09/03/2024 11:03 AM    PO2ART 77.6 09/03/2024 11:03 AM    YIL9XKN 86.7 09/03/2024 11:03 AM       Radiology: All pertinent images / reports were reviewed as a part of this visit.    EXAMINATION:  CTA OF THE CHEST 9/2/2024 1:47 pm     TECHNIQUE:  CTA of the chest was performed after the administration of intravenous  contrast.  Multiplanar reformatted images are provided for review.  MIP  images are provided for review. Automated exposure control, iterative  reconstruction, and/or weight based adjustment of

## 2024-09-03 NOTE — PROGRESS NOTES
prior. Calcification of aorta.     Left basilar atelectasis. Trace right pleural effusion or pleural thickening.       CBC:   Recent Labs     09/02/24  1223 09/03/24  0626   WBC 11.0 7.2   HGB 15.2 13.7    150     BMP:    Recent Labs     09/02/24  1223 09/03/24  0626    137   K 4.6 4.7   CL 98* 97*   CO2 32 30   BUN 13 12   CREATININE 0.6 <0.5*   GLUCOSE 125* 104*     Hepatic:   Recent Labs     09/02/24  1223   AST 24   ALT 15   BILITOT 0.4   ALKPHOS 65     Lipids: No results found for: \"CHOL\", \"HDL\", \"TRIG\"  Hemoglobin A1C:   Lab Results   Component Value Date/Time    LABA1C 5.9 08/21/2022 12:35 PM     TSH: No results found for: \"TSH\"  Troponin: No results found for: \"TROPONINT\"  Lactic Acid: No results for input(s): \"LACTA\" in the last 72 hours.  BNP:   Recent Labs     09/02/24  1223   PROBNP <36     UA:  Lab Results   Component Value Date/Time    NITRU Negative 09/02/2024 12:37 PM    COLORU DARK YELLOW 09/02/2024 12:37 PM    PHUR 6.0 09/02/2024 12:37 PM    PHUR 6.5 04/16/2023 04:25 PM    WBCUA 5 09/02/2024 12:37 PM    RBCUA 1 09/02/2024 12:37 PM    BACTERIA Rare 09/02/2024 12:37 PM    CLARITYU Clear 09/02/2024 12:37 PM    LEUKOCYTESUR TRACE 09/02/2024 12:37 PM    UROBILINOGEN 1.0 09/02/2024 12:37 PM    BILIRUBINUR Negative 09/02/2024 12:37 PM    BLOODU Negative 09/02/2024 12:37 PM    GLUCOSEU Negative 09/02/2024 12:37 PM    KETUA 15 09/02/2024 12:37 PM     Urine Cultures:   Lab Results   Component Value Date/Time    LABURIN  08/07/2016 09:00 PM     <50,000 CFU/ml mixed skin/urogenital karrie. No further workup     Blood Cultures:   Lab Results   Component Value Date/Time    BC No Growth after 4 days of incubation. 12/15/2023 03:40 PM     Lab Results   Component Value Date/Time    BLOODCULT2 No Growth after 4 days of incubation. 12/15/2023 03:40 PM     Organism:   Lab Results   Component Value Date/Time    ORG Lilliam glabrata 07/05/2023 12:05 PM         Electronically signed by Terry Ramirez MD on  9/3/2024 at 11:55 AM

## 2024-09-03 NOTE — PROGRESS NOTES
Speech Language Pathology  [facility]  [phone]  SLP Clinical Swallow Evaluation       Patient: Mary Rosario   : 1965   MRN: [id number]      Evaluation Date: 9/3/2024      Admitting Dx: Hypoxemia [R09.02]  Pneumonia due to infectious organism, unspecified laterality, unspecified part of lung [J18.9]  Acute on chronic respiratory acidosis (HCC) [J96.02, J96.12]  Treatment Diagnosis: Oropharyngeal Dysphagia   Pain: Denies                                  Recommendations      Recommended Diet and Intervention 9/3/2024:  Diet Solids Recommendation:  Regular texture diet  Liquid Consistency Recommendation:  Thin liquids  Recommended form of Meds: Meds whole with water         Compensatory strategies: Upright as possible with all PO intake , Small bites/sips     Discharge Recommendations:  No further follow-up appears indicated at this time.     History/Course of Treatment     H&P: Mary Rosario is a 59 y.o. female who presents to the emergency department the chief complaint of a 3 to 4-day history of some general fatigue, confusion, sleepiness and higher oxygen requirement.  Has history of COPD and chronically wears 2 L of oxygen just at night.  Typically does not wear this during the day that she has been having to wear more oxygen active during the day.  Presented here satting at 80% on room air.  Was placed on 4 L now satting in the mid 90s.  Denies headache, chest pain, abdominal pain, dysuria, hematuria, diarrhea, bloody stool.  States her mouth feels extremely dry.  States typically she wakes up multiple times in melanite to urinate but over the past 3 days she has been sleeping through the night and not waking up like she typically does and states she is waking up feeling very drowsy.     Imaging:  Chest X-ray:   IMPRESSION:  Left basilar atelectasis.     Trace right pleural effusion or pleural thickening.    CT Chest:  IMPRESSION:  No findings to

## 2024-09-03 NOTE — PROGRESS NOTES
Pt states when she blows in her Acapella flow meter her head is hurting beyond 3. Messaged the doctor, awaiting a return message.

## 2024-09-03 NOTE — PLAN OF CARE
Problem: Pain  Goal: Verbalizes/displays adequate comfort level or baseline comfort level  9/2/2024 2203 by Yasmine Helms, RN  Outcome: Progressing  9/2/2024 1640 by Jaimee Marlow, RN  Outcome: Progressing

## 2024-09-03 NOTE — PROGRESS NOTES
Shift assessment complete. VSS. Respirations unlabored. Bilateral expiratory wheezing. Coughing yellow mucous into a emesis bag. Dr. Ramirez ordered Fioricet for headache. Will monitor for effectiveness.

## 2024-09-03 NOTE — PROGRESS NOTES
09/03/24 1500   RT Protocol   History Pulmonary Disease 2   Respiratory pattern 0   Breath sounds 4   Cough 1   Bronchodilator Assessment Score 7

## 2024-09-04 ENCOUNTER — APPOINTMENT (OUTPATIENT)
Dept: GENERAL RADIOLOGY | Age: 59
End: 2024-09-04
Payer: COMMERCIAL

## 2024-09-04 LAB
ANION GAP SERPL CALCULATED.3IONS-SCNC: 11 MMOL/L (ref 3–16)
BASE EXCESS BLDA CALC-SCNC: 8.5 MMOL/L (ref -3–3)
BASOPHILS # BLD: 0 K/UL (ref 0–0.2)
BASOPHILS NFR BLD: 0.6 %
BUN SERPL-MCNC: 12 MG/DL (ref 7–20)
CALCIUM SERPL-MCNC: 9.8 MG/DL (ref 8.3–10.6)
CHLORIDE SERPL-SCNC: 100 MMOL/L (ref 99–110)
CO2 BLDA-SCNC: 83.2 MMOL/L
CO2 SERPL-SCNC: 30 MMOL/L (ref 21–32)
COHGB MFR BLDA: 1.5 % (ref 0–1.5)
CREAT SERPL-MCNC: 0.6 MG/DL (ref 0.6–1.1)
DEPRECATED RDW RBC AUTO: 14.1 % (ref 12.4–15.4)
EKG ATRIAL RATE: 84 BPM
EKG DIAGNOSIS: NORMAL
EKG P AXIS: 45 DEGREES
EKG P-R INTERVAL: 130 MS
EKG Q-T INTERVAL: 314 MS
EKG QRS DURATION: 76 MS
EKG QTC CALCULATION (BAZETT): 371 MS
EKG R AXIS: 51 DEGREES
EKG T AXIS: 47 DEGREES
EKG VENTRICULAR RATE: 84 BPM
EOSINOPHIL # BLD: 0.1 K/UL (ref 0–0.6)
EOSINOPHIL NFR BLD: 1.3 %
GFR SERPLBLD CREATININE-BSD FMLA CKD-EPI: >90 ML/MIN/{1.73_M2}
GLUCOSE SERPL-MCNC: 101 MG/DL (ref 70–99)
HCO3 BLDA-SCNC: 35.4 MMOL/L (ref 21–29)
HCT VFR BLD AUTO: 41.3 % (ref 36–48)
HGB BLD-MCNC: 13.5 G/DL (ref 12–16)
HGB BLDA-MCNC: 14 G/DL (ref 12–16)
LYMPHOCYTES # BLD: 1.4 K/UL (ref 1–5.1)
LYMPHOCYTES NFR BLD: 23.4 %
MCH RBC QN AUTO: 29.3 PG (ref 26–34)
MCHC RBC AUTO-ENTMCNC: 32.7 G/DL (ref 31–36)
MCV RBC AUTO: 89.7 FL (ref 80–100)
METHGB MFR BLDA: 0.6 %
MONOCYTES # BLD: 0.5 K/UL (ref 0–1.3)
MONOCYTES NFR BLD: 7.8 %
NEUTROPHILS # BLD: 4 K/UL (ref 1.7–7.7)
NEUTROPHILS NFR BLD: 66.9 %
O2 THERAPY: ABNORMAL
PCO2 BLDA: 57 MMHG (ref 35–45)
PH BLDA: 7.4 [PH] (ref 7.35–7.45)
PLATELET # BLD AUTO: 161 K/UL (ref 135–450)
PMV BLD AUTO: 8.5 FL (ref 5–10.5)
PO2 BLDA: 67.6 MMHG (ref 75–108)
POTASSIUM SERPL-SCNC: 4.2 MMOL/L (ref 3.5–5.1)
RBC # BLD AUTO: 4.6 M/UL (ref 4–5.2)
SAO2 % BLDA: 94.7 %
SODIUM SERPL-SCNC: 141 MMOL/L (ref 136–145)
WBC # BLD AUTO: 6 K/UL (ref 4–11)

## 2024-09-04 PROCEDURE — 6370000000 HC RX 637 (ALT 250 FOR IP): Performed by: INTERNAL MEDICINE

## 2024-09-04 PROCEDURE — 82803 BLOOD GASES ANY COMBINATION: CPT

## 2024-09-04 PROCEDURE — 36415 COLL VENOUS BLD VENIPUNCTURE: CPT

## 2024-09-04 PROCEDURE — 6360000002 HC RX W HCPCS: Performed by: INTERNAL MEDICINE

## 2024-09-04 PROCEDURE — 2580000003 HC RX 258: Performed by: INTERNAL MEDICINE

## 2024-09-04 PROCEDURE — 93010 ELECTROCARDIOGRAM REPORT: CPT | Performed by: INTERNAL MEDICINE

## 2024-09-04 PROCEDURE — 99232 SBSQ HOSP IP/OBS MODERATE 35: CPT | Performed by: INTERNAL MEDICINE

## 2024-09-04 PROCEDURE — 6360000002 HC RX W HCPCS: Performed by: HOSPITALIST

## 2024-09-04 PROCEDURE — 94640 AIRWAY INHALATION TREATMENT: CPT

## 2024-09-04 PROCEDURE — 2700000000 HC OXYGEN THERAPY PER DAY

## 2024-09-04 PROCEDURE — 2580000003 HC RX 258: Performed by: HOSPITALIST

## 2024-09-04 PROCEDURE — 1200000000 HC SEMI PRIVATE

## 2024-09-04 PROCEDURE — 94761 N-INVAS EAR/PLS OXIMETRY MLT: CPT

## 2024-09-04 PROCEDURE — 36600 WITHDRAWAL OF ARTERIAL BLOOD: CPT

## 2024-09-04 PROCEDURE — 80048 BASIC METABOLIC PNL TOTAL CA: CPT

## 2024-09-04 PROCEDURE — 6370000000 HC RX 637 (ALT 250 FOR IP): Performed by: HOSPITALIST

## 2024-09-04 PROCEDURE — 71045 X-RAY EXAM CHEST 1 VIEW: CPT

## 2024-09-04 PROCEDURE — 85025 COMPLETE CBC W/AUTO DIFF WBC: CPT

## 2024-09-04 RX ORDER — SENNA AND DOCUSATE SODIUM 50; 8.6 MG/1; MG/1
2 TABLET, FILM COATED ORAL DAILY
Status: DISCONTINUED | OUTPATIENT
Start: 2024-09-04 | End: 2024-09-06 | Stop reason: HOSPADM

## 2024-09-04 RX ADMIN — LEVOFLOXACIN 750 MG: 5 INJECTION, SOLUTION INTRAVENOUS at 15:47

## 2024-09-04 RX ADMIN — Medication 10 ML: at 20:37

## 2024-09-04 RX ADMIN — SODIUM CHLORIDE: 9 INJECTION, SOLUTION INTRAVENOUS at 15:45

## 2024-09-04 RX ADMIN — GUAIFENESIN 1200 MG: 600 TABLET, EXTENDED RELEASE ORAL at 08:06

## 2024-09-04 RX ADMIN — WATER 40 MG: 1 INJECTION INTRAMUSCULAR; INTRAVENOUS; SUBCUTANEOUS at 11:50

## 2024-09-04 RX ADMIN — GUAIFENESIN 1200 MG: 600 TABLET, EXTENDED RELEASE ORAL at 20:36

## 2024-09-04 RX ADMIN — Medication 10 ML: at 08:06

## 2024-09-04 RX ADMIN — METHADONE HYDROCHLORIDE 60 MG: 10 CONCENTRATE ORAL at 05:44

## 2024-09-04 RX ADMIN — ONDANSETRON 4 MG: 4 TABLET, ORALLY DISINTEGRATING ORAL at 20:39

## 2024-09-04 RX ADMIN — Medication 2 PUFF: at 08:19

## 2024-09-04 RX ADMIN — IPRATROPIUM BROMIDE AND ALBUTEROL SULFATE 1 DOSE: .5; 3 SOLUTION RESPIRATORY (INHALATION) at 08:19

## 2024-09-04 RX ADMIN — Medication 2 PUFF: at 20:46

## 2024-09-04 RX ADMIN — SENNOSIDES AND DOCUSATE SODIUM 2 TABLET: 50; 8.6 TABLET ORAL at 13:47

## 2024-09-04 RX ADMIN — ENOXAPARIN SODIUM 40 MG: 100 INJECTION SUBCUTANEOUS at 08:06

## 2024-09-04 RX ADMIN — IPRATROPIUM BROMIDE AND ALBUTEROL SULFATE 1 DOSE: .5; 3 SOLUTION RESPIRATORY (INHALATION) at 13:36

## 2024-09-04 RX ADMIN — IPRATROPIUM BROMIDE AND ALBUTEROL SULFATE 1 DOSE: .5; 3 SOLUTION RESPIRATORY (INHALATION) at 20:46

## 2024-09-04 NOTE — PLAN OF CARE
Problem: Pain  Goal: Verbalizes/displays adequate comfort level or baseline comfort level  Outcome: Progressing     Problem: Safety - Adult  Goal: Free from fall injury  Outcome: Progressing     Problem: Skin/Tissue Integrity  Goal: Absence of new skin breakdown  Description: 1.  Monitor for areas of redness and/or skin breakdown  2.  Assess vascular access sites hourly  3.  Every 4-6 hours minimum:  Change oxygen saturation probe site  4.  Every 4-6 hours:  If on nasal continuous positive airway pressure, respiratory therapy assess nares and determine need for appliance change or resting period.  Outcome: Progressing     Problem: Nutrition Deficit:  Goal: Optimize nutritional status  Outcome: Progressing     Problem: Respiratory - Adult  Goal: Achieves optimal ventilation and oxygenation  Outcome: Progressing

## 2024-09-04 NOTE — CARE COORDINATION
Discharge Planning:     (CM) reviewed the patient's chart to assess needs. Patient's Readmission Risk Score is 14%. Patient's medical insurance is [employer]. Patient's PCP is None listed.    Patient does not have a PCP list. Will need a [facility] appointment on discharge.    No needs anticipated, at this time. CM team to follow. Staff to inform CM if additional discharge needs arise.     Electronically signed by Chuck Miller on 9/4/24 at 8:23 AM EDT

## 2024-09-04 NOTE — PROGRESS NOTES
Patient discharged to home in stable condition. Written and verbal after care 
instructions given. Patient verbalizes understanding of instruction. The 
patient left ER in stable condition. Shift assessment. VSS. Patient in room alert and oriented x4. Denies pain, nausea and vomiting. Requested to have something for constipation, PRN Glycolax refused by the patient and requested to have oral form pill. NP notified. Ordered either suppository form or the prn Glycolax. Both refused by the patient. Will notify day shift regarding patient concern. No further intervention needed at this time. Plan of care ongoing.

## 2024-09-04 NOTE — PROGRESS NOTES
Patient refused to be put on BIPAP and wants to be on nasal cannula instead. States that she is having trouble and cannot sleep despite being educated with the BIPAP benefits.

## 2024-09-04 NOTE — PROGRESS NOTES
Physician Progress Note      PATIENT:               CLARITA DE SANTIAGO  [facility] #:                  [id number]  :                       1965  ADMIT DATE:       2024 11:17 AM  DISCH DATE:  RESPONDING  PROVIDER #:        Terry Ramirez MD          QUERY TEXT:    Pt admitted with COPD acute exacerbation.  Pt noted to have PNA. If possible,   please document in the progress notes and discharge summary if you are   evaluating and/or treating any of the following:    Note: CAP and HCAP indicate where the pneumonia was acquired, not a specific   type.    The medical record reflects the following:  Risk Factors: severe COPD, chronic O2 use  Clinical Indicators: MD notes 9/3 \"Recurrent hospitalizations for COPD   exacerbation and mucous plugging of airway.  History of severe COPD\"  Treatment: CBC, CMP, CXR, Pulmonology consult, Meds- IV Levaquin, Oxygen   therapy protocol  Options provided:  -- PNA treated as Gram negative pneumonia  -- Other - I will add my own diagnosis  -- Disagree - Not applicable / Not valid  -- Disagree - Clinically unable to determine / Unknown  -- Refer to Clinical Documentation Reviewer    PROVIDER RESPONSE TEXT:    PNA treated as Gram negative pneumonia    Query created by: Renate Han on 2024 9:07 AM      QUERY TEXT:    Patient admitted with PNA. Noted documentation of acute respiratory failure in   MD notes 24. In order to support the diagnosis of acute respiratory   failure, please include additional clinical indicators in your documentation.    Or please document if the diagnosis of acute respiratory failure has been   ruled out after further study.    The medical record reflects the following:  Risk Factors: COPD on chronic O2 use  Clinical Indicators: H/P  \"Acute on chronic hypoxic respiratory failure.   Normally wears about 3 L requiring higher\"  ED MD  \"Pulmonary:  Effort: Pulmonary effort is normal. No respiratory   distress. Mild rhonchi appreciated without  retractions or accessory muscle   use.\"; V/S flowsheet- patient on 2-3L via nasal cannula w/ O2 sat 92-97%  Treatment: CXR, Pulmonology consult, Oxygen therapy protocol, cont. O2 sat   monitoring    Acute Respiratory Failure Clinical Indicators per [employer] MS-DRG Training Guide and   Quick Reference Guide:  pO2 < 60 mmHg or SpO2 (pulse oximetry) < 91% breathing room air  pCO2 > 50 and pH < 7.35  P/F ratio (pO2 / FIO2) < 300  pO2 decrease or pCO2 increase by 10 mmHg from baseline (if known)  Supplemental oxygen of 40% or more  Presence of respiratory distress, tachypnea, dyspnea, shortness of breath,   wheezing  Unable to speak in complete sentences  Use of accessory muscles to breathe  Extreme anxiety and feeling of impending doom  Tripod position  Confusion/altered mental status/obtunded  Options provided:  -- Acute Respiratory Failure ruled out after study and Chronic Respiratory   Failure confirmed  -- Acute Respiratory Failure as evidenced by, Please document evidence.  -- Acute Respiratory Failure ruled out after study  -- Other - I will add my own diagnosis  -- Disagree - Not applicable / Not valid  -- Disagree - Clinically unable to determine / Unknown  -- Refer to Clinical Documentation Reviewer    PROVIDER RESPONSE TEXT:    Acute Respiratory Failure ruled out after study and Chronic Respiratory   Failure confirmed.    Query created by: Renate Han on 9/4/2024 9:14 AM      Electronically signed by:  Terry Ramirez MD 9/4/2024 9:36 AM

## 2024-09-04 NOTE — PROGRESS NOTES
V2.0    [facility] Progress Note      Name:  Mary Rosario /Age/Sex: 1965  (59 y.o. female)   MRN & CSN:  [id number] & [id number] Encounter Date/Time: 2024 11:55 AM EDT   Location:  [id number] PCP: None, None     Attending:Terry Ramirez MD       Hospital Day: 3    Assessment and Recommendations   Mary Rosario is a 59 y.o. female who presents with Acute on chronic respiratory acidosis (HCC)      Plan:   Acute on chronic hypoxic respiratory hypercapnic respiratory failure  Secondary to COPD exacerbation  Continue Levaquin and Solu-Medrol.  Appreciate pulmonary recommendation ABG ordered to ensure no CO2 retention  Use BiPAP as needed at nighttime and during sleep  Will need continuous oxygen 2 to 3 L    COPD with exacerbation  Start iv steroid     Pneumonia    Mucous plugging        Diet ADULT DIET; Regular   DVT Prophylaxis    Code Status Full Code   Disposition 24 hours.         Personally reviewed Lab Studies and Imaging         Subjective:     Chief Complaint:     Mary Rosario is a 59 y.o. female who presents with       Review of Systems:      Pertinent positives and negatives discussed in HPI    Objective:     Intake/Output Summary (Last 24 hours) at 2024 1415  Last data filed at 2024 0357  Gross per 24 hour   Intake 521.31 ml   Output --   Net 521.31 ml      Vitals:   Vitals:    24 0800 24 0820 24 1027 24 1148   BP: (!) 154/81   114/73   Pulse: 79 78 80 83   Resp: 18 18  18   Temp: 97.9 °F (36.6 °C)   98 °F (36.7 °C)   TempSrc: Oral   Oral   SpO2: (!) 86% 96%  96%   Weight:       Height:             Physical Exam:      General: NAD  Eyes: EOMI  ENT: neck supple  Cardiovascular: Regular rate.  Respiratory: Clear to auscultation  Gastrointestinal: Soft, non tender  Genitourinary: no suprapubic tenderness  Musculoskeletal: No edema  Skin: warm, dry  Neuro: Alert.  Psych: Mood appropriate.         Medications:   Medications:    methylPREDNISolone  40 mg  IntraVENous Daily    sennosides-docusate sodium  2 tablet Oral Daily    guaiFENesin  1,200 mg Oral BID    ipratropium 0.5 mg-albuterol 2.5 mg  1 Dose Inhalation TID RT    methadone  60 mg Oral Daily    budesonide-formoterol  2 puff Inhalation BID RT    levofloxacin  750 mg IntraVENous Q24H    sodium chloride flush  5-40 mL IntraVENous 2 times per day    enoxaparin  40 mg SubCUTAneous Daily      Infusions:    sodium chloride       PRN Meds: butalbital-acetaminophen-caffeine, 1 tablet, Q6H PRN  albuterol sulfate HFA, 2 puff, Q6H PRN  sodium chloride flush, 5-40 mL, PRN  sodium chloride, , PRN  potassium chloride, 40 mEq, PRN   Or  potassium alternative oral replacement, 40 mEq, PRN   Or  potassium chloride, 10 mEq, PRN  magnesium sulfate, 2,000 mg, PRN  ondansetron, 4 mg, Q8H PRN   Or  ondansetron, 4 mg, Q6H PRN  polyethylene glycol, 17 g, Daily PRN  acetaminophen, 650 mg, Q6H PRN   Or  acetaminophen, 650 mg, Q6H PRN        Labs and Imaging   CT CHEST PULMONARY EMBOLISM W CONTRAST    Result Date: 9/2/2024  EXAMINATION: CTA OF THE CHEST 9/2/2024 1:47 pm TECHNIQUE: CTA of the chest was performed after the administration of intravenous contrast.  Multiplanar reformatted images are provided for review.  MIP images are provided for review. Automated exposure control, iterative reconstruction, and/or weight based adjustment of the mA/kV was utilized to reduce the radiation dose to as low as reasonably achievable. COMPARISON: 07/23/2024 HISTORY: ORDERING SYSTEM PROVIDED HISTORY: Shortness of breath, hypoxia TECHNOLOGIST PROVIDED HISTORY: Reason for exam:->Shortness of breath, hypoxia Additional Contrast?->1 Reason for Exam: SOB, hypoxia FINDINGS: Pulmonary Arteries: Within the main, central most right, central most left pulmonary arteries, no evidence of filling defect to suggest large central pulmonary embolism.  Optimal peripheral branch evaluation beyond the level of the steve is limited by artifact. Mediastinum:

## 2024-09-04 NOTE — PROGRESS NOTES
[facility] Pulmonary/CCM Progress note      Admit Date: 9/2/2024    Chief Complaint: Altered mental status and shortness of breath with cough    Subjective:     Interval History: Feels better, states that she is not fully well.  Used BiPAP intermittently yesterday.  Improvement noted in hypercapnia.  Still has a cough, unable to bring up phlegm per report.  States that she feels more alert today.    Scheduled Meds:   methylPREDNISolone  40 mg IntraVENous Daily    sennosides-docusate sodium  2 tablet Oral Daily    guaiFENesin  1,200 mg Oral BID    ipratropium 0.5 mg-albuterol 2.5 mg  1 Dose Inhalation TID RT    methadone  60 mg Oral Daily    budesonide-formoterol  2 puff Inhalation BID RT    levofloxacin  750 mg IntraVENous Q24H    sodium chloride flush  5-40 mL IntraVENous 2 times per day    enoxaparin  40 mg SubCUTAneous Daily     Continuous Infusions:   sodium chloride 25 mL/hr at 09/04/24 1545     PRN Meds:butalbital-acetaminophen-caffeine, albuterol sulfate HFA, sodium chloride flush, sodium chloride, potassium chloride **OR** potassium alternative oral replacement **OR** potassium chloride, magnesium sulfate, ondansetron **OR** ondansetron, polyethylene glycol, acetaminophen **OR** acetaminophen    Review of Systems  Constitutional: Fatigue and malaise  Ears, nose, mouth, throat: negative for ear drainage, epistaxis, hoarseness, nasal congestion, sore throat and voice change  Respiratory: negative except for cough and shortness of breath  Cardiovascular: negative for chest pain, chest pressure/discomfort, irregular heart beat, lower extremity edema and palpitations  Gastrointestinal: negative for abdominal pain, constipation, diarrhea, jaundice, melena, odynophagia, reflux symptoms and vomiting  Hematologic/lymphatic: negative for bleeding, easy bruising, lymphadenopathy and petechiae  Musculoskeletal:negative for arthralgias, bone pain, muscle weakness, neck pain and stiff joints  Neurological: negative for  09/04/2024 05:35 AM    RBC 4.60 09/04/2024 05:35 AM     BMP:   Lab Results   Component Value Date/Time    GLUCOSE 101 09/04/2024 05:35 AM    CO2 30 09/04/2024 05:35 AM    BUN 12 09/04/2024 05:35 AM    CREATININE 0.6 09/04/2024 05:35 AM    CALCIUM 9.8 09/04/2024 05:35 AM     ABG:   Lab Results   Component Value Date/Time    UUL0GUE 35.4 09/04/2024 07:58 AM    BEART 8.5 09/04/2024 07:58 AM    S7RKQBYW 94.7 09/04/2024 07:58 AM    PHART 7.401 09/04/2024 07:58 AM    LWU7VTC 57.0 09/04/2024 07:58 AM    PO2ART 67.6 09/04/2024 07:58 AM    GQR6DEA 83.2 09/04/2024 07:58 AM       Radiology: All pertinent images / reports were reviewed as a part of this visit.    EXAMINATION:  CTA OF THE CHEST 9/2/2024 1:47 pm     TECHNIQUE:  CTA of the chest was performed after the administration of intravenous  contrast.  Multiplanar reformatted images are provided for review.  MIP  images are provided for review. Automated exposure control, iterative  reconstruction, and/or weight based adjustment of the mA/kV was utilized to  reduce the radiation dose to as low as reasonably achievable.     COMPARISON:  07/23/2024     HISTORY:  ORDERING SYSTEM PROVIDED HISTORY: Shortness of breath, hypoxia  TECHNOLOGIST PROVIDED HISTORY:  Reason for exam:->Shortness of breath, hypoxia  Additional Contrast?->1  Reason for Exam: SOB, hypoxia     FINDINGS:  Pulmonary Arteries: Within the main, central most right, central most left  pulmonary arteries, no evidence of filling defect to suggest large central  pulmonary embolism.  Optimal peripheral branch evaluation beyond the level of  the steve is limited by artifact.     Mediastinum: Calcification of the thoracic aorta.  Pulsation artifact is seen  within the ascending aorta.  There is otherwise no gross aortic intraluminal  flap.  Within the mediastinum, no pathologic lymphadenopathy by size  criteria.  No axillary adenopathy.  Probable tracheal diverticulum at the  thoracic inlet.     Lungs/pleura: Filling

## 2024-09-05 LAB
ANION GAP SERPL CALCULATED.3IONS-SCNC: 12 MMOL/L (ref 3–16)
BASOPHILS # BLD: 0 K/UL (ref 0–0.2)
BASOPHILS NFR BLD: 0.5 %
BUN SERPL-MCNC: 14 MG/DL (ref 7–20)
CALCIUM SERPL-MCNC: 10 MG/DL (ref 8.3–10.6)
CHLORIDE SERPL-SCNC: 101 MMOL/L (ref 99–110)
CO2 SERPL-SCNC: 28 MMOL/L (ref 21–32)
CREAT SERPL-MCNC: 0.7 MG/DL (ref 0.6–1.1)
DEPRECATED RDW RBC AUTO: 14.2 % (ref 12.4–15.4)
EOSINOPHIL # BLD: 0 K/UL (ref 0–0.6)
EOSINOPHIL NFR BLD: 0.2 %
GFR SERPLBLD CREATININE-BSD FMLA CKD-EPI: >90 ML/MIN/{1.73_M2}
GLUCOSE SERPL-MCNC: 122 MG/DL (ref 70–99)
HCT VFR BLD AUTO: 40.7 % (ref 36–48)
HGB BLD-MCNC: 13.9 G/DL (ref 12–16)
LYMPHOCYTES # BLD: 1.9 K/UL (ref 1–5.1)
LYMPHOCYTES NFR BLD: 25.7 %
MCH RBC QN AUTO: 30.7 PG (ref 26–34)
MCHC RBC AUTO-ENTMCNC: 34.1 G/DL (ref 31–36)
MCV RBC AUTO: 90.1 FL (ref 80–100)
MONOCYTES # BLD: 0.6 K/UL (ref 0–1.3)
MONOCYTES NFR BLD: 7.8 %
NEUTROPHILS # BLD: 4.9 K/UL (ref 1.7–7.7)
NEUTROPHILS NFR BLD: 65.8 %
PLATELET # BLD AUTO: 174 K/UL (ref 135–450)
PMV BLD AUTO: 8.3 FL (ref 5–10.5)
POTASSIUM SERPL-SCNC: 4.9 MMOL/L (ref 3.5–5.1)
RBC # BLD AUTO: 4.51 M/UL (ref 4–5.2)
SODIUM SERPL-SCNC: 141 MMOL/L (ref 136–145)
WBC # BLD AUTO: 7.4 K/UL (ref 4–11)

## 2024-09-05 PROCEDURE — 2700000000 HC OXYGEN THERAPY PER DAY

## 2024-09-05 PROCEDURE — 6370000000 HC RX 637 (ALT 250 FOR IP): Performed by: HOSPITALIST

## 2024-09-05 PROCEDURE — 2580000003 HC RX 258: Performed by: HOSPITALIST

## 2024-09-05 PROCEDURE — 6370000000 HC RX 637 (ALT 250 FOR IP): Performed by: INTERNAL MEDICINE

## 2024-09-05 PROCEDURE — 85025 COMPLETE CBC W/AUTO DIFF WBC: CPT

## 2024-09-05 PROCEDURE — 99232 SBSQ HOSP IP/OBS MODERATE 35: CPT | Performed by: INTERNAL MEDICINE

## 2024-09-05 PROCEDURE — 80048 BASIC METABOLIC PNL TOTAL CA: CPT

## 2024-09-05 PROCEDURE — 94640 AIRWAY INHALATION TREATMENT: CPT

## 2024-09-05 PROCEDURE — 2580000003 HC RX 258: Performed by: INTERNAL MEDICINE

## 2024-09-05 PROCEDURE — 6360000002 HC RX W HCPCS: Performed by: INTERNAL MEDICINE

## 2024-09-05 PROCEDURE — 6360000002 HC RX W HCPCS: Performed by: HOSPITALIST

## 2024-09-05 PROCEDURE — 1200000000 HC SEMI PRIVATE

## 2024-09-05 PROCEDURE — 36415 COLL VENOUS BLD VENIPUNCTURE: CPT

## 2024-09-05 RX ORDER — PREDNISONE 20 MG/1
40 TABLET ORAL DAILY
Status: DISCONTINUED | OUTPATIENT
Start: 2024-09-06 | End: 2024-09-06 | Stop reason: HOSPADM

## 2024-09-05 RX ORDER — LACTULOSE 10 G/15ML
20 SOLUTION ORAL ONCE
Status: COMPLETED | OUTPATIENT
Start: 2024-09-05 | End: 2024-09-05

## 2024-09-05 RX ADMIN — Medication 2 PUFF: at 08:40

## 2024-09-05 RX ADMIN — LACTULOSE 20 G: 10 SOLUTION ORAL at 11:18

## 2024-09-05 RX ADMIN — IPRATROPIUM BROMIDE AND ALBUTEROL SULFATE 1 DOSE: .5; 3 SOLUTION RESPIRATORY (INHALATION) at 12:51

## 2024-09-05 RX ADMIN — Medication 2 PUFF: at 21:33

## 2024-09-05 RX ADMIN — ONDANSETRON 4 MG: 2 INJECTION, SOLUTION INTRAMUSCULAR; INTRAVENOUS at 18:59

## 2024-09-05 RX ADMIN — Medication 10 ML: at 07:46

## 2024-09-05 RX ADMIN — ENOXAPARIN SODIUM 40 MG: 100 INJECTION SUBCUTANEOUS at 07:45

## 2024-09-05 RX ADMIN — Medication 10 ML: at 20:12

## 2024-09-05 RX ADMIN — ONDANSETRON 4 MG: 2 INJECTION, SOLUTION INTRAMUSCULAR; INTRAVENOUS at 13:10

## 2024-09-05 RX ADMIN — SODIUM CHLORIDE: 9 INJECTION, SOLUTION INTRAVENOUS at 16:20

## 2024-09-05 RX ADMIN — IPRATROPIUM BROMIDE AND ALBUTEROL SULFATE 1 DOSE: .5; 3 SOLUTION RESPIRATORY (INHALATION) at 08:38

## 2024-09-05 RX ADMIN — IPRATROPIUM BROMIDE AND ALBUTEROL SULFATE 1 DOSE: .5; 3 SOLUTION RESPIRATORY (INHALATION) at 21:32

## 2024-09-05 RX ADMIN — GUAIFENESIN 1200 MG: 600 TABLET, EXTENDED RELEASE ORAL at 20:12

## 2024-09-05 RX ADMIN — SENNOSIDES AND DOCUSATE SODIUM 2 TABLET: 50; 8.6 TABLET ORAL at 07:45

## 2024-09-05 RX ADMIN — WATER 40 MG: 1 INJECTION INTRAMUSCULAR; INTRAVENOUS; SUBCUTANEOUS at 07:45

## 2024-09-05 RX ADMIN — LEVOFLOXACIN 750 MG: 5 INJECTION, SOLUTION INTRAVENOUS at 16:21

## 2024-09-05 RX ADMIN — GUAIFENESIN 1200 MG: 600 TABLET, EXTENDED RELEASE ORAL at 07:45

## 2024-09-05 RX ADMIN — METHADONE HYDROCHLORIDE 60 MG: 10 CONCENTRATE ORAL at 05:28

## 2024-09-05 RX ADMIN — POLYETHYLENE GLYCOL 3350 17 G: 17 POWDER, FOR SOLUTION ORAL at 07:56

## 2024-09-05 ASSESSMENT — PAIN SCALES - GENERAL
PAINLEVEL_OUTOF10: 0

## 2024-09-05 NOTE — PROGRESS NOTES
BID    ipratropium 0.5 mg-albuterol 2.5 mg  1 Dose Inhalation TID RT    methadone  60 mg Oral Daily    budesonide-formoterol  2 puff Inhalation BID RT    levofloxacin  750 mg IntraVENous Q24H    sodium chloride flush  5-40 mL IntraVENous 2 times per day    enoxaparin  40 mg SubCUTAneous Daily      Infusions:    sodium chloride 25 mL/hr at 09/04/24 1545     PRN Meds: butalbital-acetaminophen-caffeine, 1 tablet, Q6H PRN  albuterol sulfate HFA, 2 puff, Q6H PRN  sodium chloride flush, 5-40 mL, PRN  sodium chloride, , PRN  potassium chloride, 40 mEq, PRN   Or  potassium alternative oral replacement, 40 mEq, PRN   Or  potassium chloride, 10 mEq, PRN  magnesium sulfate, 2,000 mg, PRN  ondansetron, 4 mg, Q8H PRN   Or  ondansetron, 4 mg, Q6H PRN  polyethylene glycol, 17 g, Daily PRN  acetaminophen, 650 mg, Q6H PRN   Or  acetaminophen, 650 mg, Q6H PRN        Labs and Imaging   CT CHEST PULMONARY EMBOLISM W CONTRAST    Result Date: 9/2/2024  EXAMINATION: CTA OF THE CHEST 9/2/2024 1:47 pm TECHNIQUE: CTA of the chest was performed after the administration of intravenous contrast.  Multiplanar reformatted images are provided for review.  MIP images are provided for review. Automated exposure control, iterative reconstruction, and/or weight based adjustment of the mA/kV was utilized to reduce the radiation dose to as low as reasonably achievable. COMPARISON: 07/23/2024 HISTORY: ORDERING SYSTEM PROVIDED HISTORY: Shortness of breath, hypoxia TECHNOLOGIST PROVIDED HISTORY: Reason for exam:->Shortness of breath, hypoxia Additional Contrast?->1 Reason for Exam: SOB, hypoxia FINDINGS: Pulmonary Arteries: Within the main, central most right, central most left pulmonary arteries, no evidence of filling defect to suggest large central pulmonary embolism.  Optimal peripheral branch evaluation beyond the level of the steve is limited by artifact. Mediastinum: Calcification of the thoracic aorta.  Pulsation artifact is seen within the

## 2024-09-05 NOTE — PROGRESS NOTES
Assessment complete. VSS. Patient resting in bed. Respirations even and easy. Call light in reach.  No needs expressed at this time. Will continue to monitor.      PAST SURGICAL HISTORY:  History of open heart surgery

## 2024-09-05 NOTE — PROGRESS NOTES
[facility] Pulmonary/CCM Progress note      Admit Date: 9/2/2024    Chief Complaint: Altered mental status and shortness of breath with cough    Subjective:     Interval History: Patient doing better, states that she cannot tolerate BiPAP well.  Cough is mostly nonproductive.  Requiring 2 L O2.  Wants to go home today.    Scheduled Meds:   methylPREDNISolone  40 mg IntraVENous Daily    sennosides-docusate sodium  2 tablet Oral Daily    guaiFENesin  1,200 mg Oral BID    ipratropium 0.5 mg-albuterol 2.5 mg  1 Dose Inhalation TID RT    methadone  60 mg Oral Daily    budesonide-formoterol  2 puff Inhalation BID RT    levofloxacin  750 mg IntraVENous Q24H    sodium chloride flush  5-40 mL IntraVENous 2 times per day    enoxaparin  40 mg SubCUTAneous Daily     Continuous Infusions:   sodium chloride 25 mL/hr at 09/04/24 1545     PRN Meds:butalbital-acetaminophen-caffeine, albuterol sulfate HFA, sodium chloride flush, sodium chloride, potassium chloride **OR** potassium alternative oral replacement **OR** potassium chloride, magnesium sulfate, ondansetron **OR** ondansetron, polyethylene glycol, acetaminophen **OR** acetaminophen    Review of Systems  Constitutional: Fatigue and malaise  Ears, nose, mouth, throat: negative for ear drainage, epistaxis, hoarseness, nasal congestion, sore throat and voice change  Respiratory: negative except for cough and shortness of breath  Cardiovascular: negative for chest pain, chest pressure/discomfort, irregular heart beat, lower extremity edema and palpitations  Gastrointestinal: negative for abdominal pain, constipation, diarrhea, jaundice, melena, odynophagia, reflux symptoms and vomiting  Hematologic/lymphatic: negative for bleeding, easy bruising, lymphadenopathy and petechiae  Musculoskeletal:negative for arthralgias, bone pain, muscle weakness, neck pain and stiff joints  Neurological: negative for dizziness, gait problems, headaches, seizures, speech problems, tremors and    Component Value Date/Time    GLUCOSE 122 09/05/2024 05:35 AM    CO2 28 09/05/2024 05:35 AM    BUN 14 09/05/2024 05:35 AM    CREATININE 0.7 09/05/2024 05:35 AM    CALCIUM 10.0 09/05/2024 05:35 AM     ABG:   Lab Results   Component Value Date/Time    YFA7GSZ 35.4 09/04/2024 07:58 AM    BEART 8.5 09/04/2024 07:58 AM    D3BAIVYJ 94.7 09/04/2024 07:58 AM    PHART 7.401 09/04/2024 07:58 AM    IZY3ESF 57.0 09/04/2024 07:58 AM    PO2ART 67.6 09/04/2024 07:58 AM    VIT1UHO 83.2 09/04/2024 07:58 AM       Radiology: All pertinent images / reports were reviewed as a part of this visit.    EXAMINATION:  CTA OF THE CHEST 9/2/2024 1:47 pm     TECHNIQUE:  CTA of the chest was performed after the administration of intravenous  contrast.  Multiplanar reformatted images are provided for review.  MIP  images are provided for review. Automated exposure control, iterative  reconstruction, and/or weight based adjustment of the mA/kV was utilized to  reduce the radiation dose to as low as reasonably achievable.     COMPARISON:  07/23/2024     HISTORY:  ORDERING SYSTEM PROVIDED HISTORY: Shortness of breath, hypoxia  TECHNOLOGIST PROVIDED HISTORY:  Reason for exam:->Shortness of breath, hypoxia  Additional Contrast?->1  Reason for Exam: SOB, hypoxia     FINDINGS:  Pulmonary Arteries: Within the main, central most right, central most left  pulmonary arteries, no evidence of filling defect to suggest large central  pulmonary embolism.  Optimal peripheral branch evaluation beyond the level of  the steve is limited by artifact.     Mediastinum: Calcification of the thoracic aorta.  Pulsation artifact is seen  within the ascending aorta.  There is otherwise no gross aortic intraluminal  flap.  Within the mediastinum, no pathologic lymphadenopathy by size  criteria.  No axillary adenopathy.  Probable tracheal diverticulum at the  thoracic inlet.     Lungs/pleura: Filling defect is seen within the trachea at the thoracic  inlet.  There is also

## 2024-09-06 VITALS
WEIGHT: 147 LBS | BODY MASS INDEX: 23.07 KG/M2 | HEART RATE: 93 BPM | OXYGEN SATURATION: 98 % | DIASTOLIC BLOOD PRESSURE: 54 MMHG | SYSTOLIC BLOOD PRESSURE: 104 MMHG | HEIGHT: 67 IN | RESPIRATION RATE: 18 BRPM | TEMPERATURE: 98.2 F

## 2024-09-06 LAB
BACTERIA BLD CULT ORG #2: NORMAL
BACTERIA BLD CULT: NORMAL

## 2024-09-06 PROCEDURE — 6370000000 HC RX 637 (ALT 250 FOR IP): Performed by: INTERNAL MEDICINE

## 2024-09-06 PROCEDURE — 6360000002 HC RX W HCPCS: Performed by: HOSPITALIST

## 2024-09-06 PROCEDURE — 94640 AIRWAY INHALATION TREATMENT: CPT

## 2024-09-06 PROCEDURE — 2700000000 HC OXYGEN THERAPY PER DAY

## 2024-09-06 PROCEDURE — 6370000000 HC RX 637 (ALT 250 FOR IP): Performed by: HOSPITALIST

## 2024-09-06 PROCEDURE — 2580000003 HC RX 258: Performed by: HOSPITALIST

## 2024-09-06 PROCEDURE — 99232 SBSQ HOSP IP/OBS MODERATE 35: CPT | Performed by: INTERNAL MEDICINE

## 2024-09-06 PROCEDURE — 94761 N-INVAS EAR/PLS OXIMETRY MLT: CPT

## 2024-09-06 RX ORDER — PREDNISONE 10 MG/1
10 TABLET ORAL DAILY
Qty: 30 TABLET | Refills: 0 | Status: SHIPPED | OUTPATIENT
Start: 2024-09-06 | End: 2024-09-16

## 2024-09-06 RX ADMIN — Medication 10 ML: at 07:56

## 2024-09-06 RX ADMIN — GUAIFENESIN 1200 MG: 600 TABLET, EXTENDED RELEASE ORAL at 07:57

## 2024-09-06 RX ADMIN — IPRATROPIUM BROMIDE AND ALBUTEROL SULFATE 1 DOSE: .5; 3 SOLUTION RESPIRATORY (INHALATION) at 09:04

## 2024-09-06 RX ADMIN — METHADONE HYDROCHLORIDE 60 MG: 10 CONCENTRATE ORAL at 05:59

## 2024-09-06 RX ADMIN — Medication 2 PUFF: at 09:04

## 2024-09-06 RX ADMIN — IPRATROPIUM BROMIDE AND ALBUTEROL SULFATE 1 DOSE: .5; 3 SOLUTION RESPIRATORY (INHALATION) at 14:28

## 2024-09-06 RX ADMIN — ENOXAPARIN SODIUM 40 MG: 100 INJECTION SUBCUTANEOUS at 07:56

## 2024-09-06 RX ADMIN — SENNOSIDES AND DOCUSATE SODIUM 2 TABLET: 50; 8.6 TABLET ORAL at 07:57

## 2024-09-06 RX ADMIN — PREDNISONE 40 MG: 20 TABLET ORAL at 07:57

## 2024-09-06 ASSESSMENT — PAIN SCALES - GENERAL
PAINLEVEL_OUTOF10: 0

## 2024-09-06 NOTE — CARE COORDINATION
09/06/24 1511   IMM Letter   IMM Letter given to Patient/Family/Significant other/Guardian/POA/by: IMM given by CM   IMM Letter date given: 09/06/24   IMM Letter time given: 1450

## 2024-09-06 NOTE — PLAN OF CARE
Problem: Pain  Goal: Verbalizes/displays adequate comfort level or baseline comfort level  Outcome: Progressing  Flowsheets (Taken 9/5/2024 1953)  Verbalizes/displays adequate comfort level or baseline comfort level: Encourage patient to monitor pain and request assistance     Problem: Safety - Adult  Goal: Free from fall injury  Outcome: Progressing     Problem: Skin/Tissue Integrity  Goal: Absence of new skin breakdown  Description: 1.  Monitor for areas of redness and/or skin breakdown  2.  Assess vascular access sites hourly  3.  Every 4-6 hours minimum:  Change oxygen saturation probe site  4.  Every 4-6 hours:  If on nasal continuous positive airway pressure, respiratory therapy assess nares and determine need for appliance change or resting period.  Outcome: Progressing     Problem: Respiratory - Adult  Goal: Achieves optimal ventilation and oxygenation  Outcome: Progressing

## 2024-09-06 NOTE — PROGRESS NOTES
CLINICAL PHARMACY NOTE: MEDS TO BEDS    Total # of Prescriptions Filled: 1   The following medications were delivered to the patient:  PREDNISONE 10MG TABS    Additional Documentation: Paula ESPINOSA approved to deliver medications to patient room=signed  [facility] tech

## 2024-09-06 NOTE — PROGRESS NOTES
RN discharge summary from [address] to home.     This patient has had a discharge order placed. They are returning home and being picked up in the lobby. Discharge paperwork has been printed, highlighted, and gone over with the patient by this RN. Patient understands teaching and has no further questions at this time. IV has been removed with no complications. Telemetry has been removed. Pt has all belongings present.

## 2024-09-06 NOTE — CARE COORDINATION
Discharge Planning:     (CM) spoke with the patient who will discharge home today. The patient requires oxygen at home and has a portable tank for her discharge home today. Patient does not have a PCP. CM gave the patient a PCP list and the phone number for [facility], patient would like to schedule her own appointment once she gets home. Patient has no further needs at this time.    Electronically signed by Chuck Miller on 9/6/24 at 3:10 PM EDT

## 2024-09-06 NOTE — PLAN OF CARE
Problem: Pain  Goal: Verbalizes/displays adequate comfort level or baseline comfort level  9/6/2024 1018 by Paula Flores RN  Outcome: Progressing  Flowsheets (Taken 9/6/2024 0445 by Yandel Escobar RN)  Verbalizes/displays adequate comfort level or baseline comfort level: Assess pain using appropriate pain scale  9/6/2024 0010 by Yandel Escobar RN  Outcome: Progressing  Flowsheets (Taken 9/5/2024 1953)  Verbalizes/displays adequate comfort level or baseline comfort level: Encourage patient to monitor pain and request assistance     Problem: Safety - Adult  Goal: Free from fall injury  9/6/2024 1018 by Paula Flores RN  Outcome: Progressing  9/6/2024 0010 by Yandel Escobar RN  Outcome: Progressing     Problem: Skin/Tissue Integrity  Goal: Absence of new skin breakdown  Description: 1.  Monitor for areas of redness and/or skin breakdown  2.  Assess vascular access sites hourly  3.  Every 4-6 hours minimum:  Change oxygen saturation probe site  4.  Every 4-6 hours:  If on nasal continuous positive airway pressure, respiratory therapy assess nares and determine need for appliance change or resting period.  9/6/2024 1018 by Paula Flores RN  Outcome: Progressing  9/6/2024 0010 by Yandel Escobar RN  Outcome: Progressing     Problem: Nutrition Deficit:  Goal: Optimize nutritional status  Outcome: Progressing     Problem: Respiratory - Adult  Goal: Achieves optimal ventilation and oxygenation  9/6/2024 1018 by Paula Flores RN  Outcome: Progressing  9/6/2024 0010 by Yandel Escobar RN  Outcome: Progressing  Flowsheets (Taken 9/5/2024 1958)  Achieves optimal ventilation and oxygenation: Assess for changes in respiratory status     Problem: Neurosensory - Adult  Goal: Achieves stable or improved neurological status  Outcome: Progressing  Goal: Remains free of injury related to seizures activity  Outcome: Progressing  Goal: Achieves maximal functionality and self care  Outcome: Progressing     Problem:

## 2024-09-06 NOTE — PLAN OF CARE
Problem: Pain  Goal: Verbalizes/displays adequate comfort level or baseline comfort level  9/6/2024 1514 by Paula Flores RN  Outcome: Completed  9/6/2024 1018 by Paula Flores RN  Outcome: Progressing  Flowsheets (Taken 9/6/2024 0445 by Yandel Escobar, RN)  Verbalizes/displays adequate comfort level or baseline comfort level: Assess pain using appropriate pain scale     Problem: Safety - Adult  Goal: Free from fall injury  9/6/2024 1514 by Paula Flores RN  Outcome: Completed  9/6/2024 1018 by Paula Flores RN  Outcome: Progressing     Problem: Skin/Tissue Integrity  Goal: Absence of new skin breakdown  Description: 1.  Monitor for areas of redness and/or skin breakdown  2.  Assess vascular access sites hourly  3.  Every 4-6 hours minimum:  Change oxygen saturation probe site  4.  Every 4-6 hours:  If on nasal continuous positive airway pressure, respiratory therapy assess nares and determine need for appliance change or resting period.  9/6/2024 1514 by Paula Flores RN  Outcome: Completed  9/6/2024 1018 by Paula Flores RN  Outcome: Progressing     Problem: Nutrition Deficit:  Goal: Optimize nutritional status  9/6/2024 1514 by Paula Flores RN  Outcome: Completed  9/6/2024 1018 by Paula Flores RN  Outcome: Progressing     Problem: Respiratory - Adult  Goal: Achieves optimal ventilation and oxygenation  9/6/2024 1514 by Paula Flores RN  Outcome: Completed  9/6/2024 1018 by Paula Flores RN  Outcome: Progressing     Problem: Neurosensory - Adult  Goal: Achieves stable or improved neurological status  9/6/2024 1514 by Paula Flores RN  Outcome: Completed  9/6/2024 1018 by Paula Flores RN  Outcome: Progressing  Goal: Remains free of injury related to seizures activity  9/6/2024 1514 by Paula Flores RN  Outcome: Completed  9/6/2024 1018 by Paula Folres RN  Outcome: Progressing  Goal: Achieves maximal functionality and self care  9/6/2024 1514 by Paula Flores RN  Outcome:

## 2024-09-06 NOTE — PROGRESS NOTES
[facility] Pulmonary/CCM Progress note      Admit Date: 9/2/2024    Chief Complaint: Altered mental status and shortness of breath with cough    Subjective:     Interval History: Patient was not discharged yesterday, states that she is at her baseline.  On 2 L O2.  Wants to go home.    Scheduled Meds:   predniSONE  40 mg Oral Daily    sennosides-docusate sodium  2 tablet Oral Daily    guaiFENesin  1,200 mg Oral BID    ipratropium 0.5 mg-albuterol 2.5 mg  1 Dose Inhalation TID RT    methadone  60 mg Oral Daily    budesonide-formoterol  2 puff Inhalation BID RT    levofloxacin  750 mg IntraVENous Q24H    sodium chloride flush  5-40 mL IntraVENous 2 times per day    enoxaparin  40 mg SubCUTAneous Daily     Continuous Infusions:   sodium chloride 25 mL/hr at 09/05/24 1620     PRN Meds:butalbital-acetaminophen-caffeine, albuterol sulfate HFA, sodium chloride flush, sodium chloride, potassium chloride **OR** potassium alternative oral replacement **OR** potassium chloride, magnesium sulfate, ondansetron **OR** ondansetron, polyethylene glycol, acetaminophen **OR** acetaminophen    Review of Systems  Constitutional: Fatigue and malaise  Ears, nose, mouth, throat: negative for ear drainage, epistaxis, hoarseness, nasal congestion, sore throat and voice change  Respiratory: negative except for cough and shortness of breath  Cardiovascular: negative for chest pain, chest pressure/discomfort, irregular heart beat, lower extremity edema and palpitations  Gastrointestinal: negative for abdominal pain, constipation, diarrhea, jaundice, melena, odynophagia, reflux symptoms and vomiting  Hematologic/lymphatic: negative for bleeding, easy bruising, lymphadenopathy and petechiae  Musculoskeletal:negative for arthralgias, bone pain, muscle weakness, neck pain and stiff joints  Neurological: negative for dizziness, gait problems, headaches, seizures, speech problems, tremors and weakness  Behavioral/Psych: negative for anxiety, behavior

## 2024-09-10 NOTE — DISCHARGE SUMMARY
MG tablet  Commonly known as: DELTASONE  Take 1 tablet by mouth daily for 10 days Prednisone taper:      Prednisone 10 mg 4 pills x 3 days -> 3 pills x 3 days -> 2 pills x 3 days -> 1 pill x 3 days  What changed: additional instructions            CONTINUE taking these medications      albuterol sulfate  (90 Base) MCG/ACT inhaler  Commonly known as: PROVENTIL;VENTOLIN;PROAIR  Inhale 2 puffs into the lungs every 6 hours as needed for Wheezing     azithromycin 250 MG tablet  Commonly known as: ZITHROMAX     guaiFENesin 600 MG extended release tablet  Commonly known as: MUCINEX  Take 1 tablet by mouth 2 times daily     ipratropium 0.5 mg-albuterol 2.5 mg 0.5-2.5 (3) MG/3ML Soln nebulizer solution  Commonly known as: DUONEB  Inhale 3 mLs into the lungs every 4 hours     methadone 10 MG/ML solution  Commonly known as: DOLOPHINE     mometasone-formoterol 200-5 MCG/ACT inhaler  Commonly known as: Dulera  Inhale 2 puffs into the lungs in the morning and 2 puffs in the evening.     Naproxen Sodium 220 MG Caps     tiotropium 18 MCG inhalation capsule  Commonly known as: Spiriva HandiHaler  Inhale 1 capsule into the lungs daily            STOP taking these medications      ALBUTEROL IN     pantoprazole 40 MG tablet  Commonly known as: PROTONIX     sodium chloride 0.65 % nasal spray  Commonly known as: CARMEN, BABY AYR               Where to Get Your Medications        These medications were sent to [facility] [place] [address] [address]  - P [phone] - F [phone]  [address] [address]      Phone: [phone]   predniSONE 10 MG tablet         Discharge recommendations given to patient.  Follow Up. pcp in 1 week   Disposition.  home  Activity. activity as tolerated  Diet: No diet orders on file      Spent 45  minutes in discharge process.    Signed:  Terry Ramirez MD     9/10/2024 4:17 PM

## 2024-09-11 ENCOUNTER — TELEPHONE (OUTPATIENT)
Dept: PULMONOLOGY | Age: 59
End: 2024-09-11

## 2024-09-11 RX ORDER — IPRATROPIUM BROMIDE AND ALBUTEROL SULFATE 2.5; .5 MG/3ML; MG/3ML
1 SOLUTION RESPIRATORY (INHALATION) EVERY 4 HOURS
Qty: 360 ML | Refills: 3 | Status: SHIPPED | OUTPATIENT
Start: 2024-09-11

## 2024-09-16 RX ORDER — ALBUTEROL SULFATE 0.83 MG/ML
2.5 SOLUTION RESPIRATORY (INHALATION) EVERY 6 HOURS PRN
Qty: 120 EACH | Refills: 5 | Status: SHIPPED | OUTPATIENT
Start: 2024-09-16

## 2024-09-17 ENCOUNTER — OFFICE VISIT (OUTPATIENT)
Dept: PULMONOLOGY | Age: 59
End: 2024-09-17
Payer: COMMERCIAL

## 2024-09-17 VITALS
HEIGHT: 67 IN | HEART RATE: 80 BPM | DIASTOLIC BLOOD PRESSURE: 68 MMHG | SYSTOLIC BLOOD PRESSURE: 120 MMHG | BODY MASS INDEX: 24.48 KG/M2 | OXYGEN SATURATION: 94 % | WEIGHT: 156 LBS

## 2024-09-17 DIAGNOSIS — J44.9 COPD, SEVERE (HCC): Primary | ICD-10-CM

## 2024-09-17 DIAGNOSIS — T17.500A MUCUS PLUGGING OF BRONCHI: ICD-10-CM

## 2024-09-17 PROCEDURE — 99214 OFFICE O/P EST MOD 30 MIN: CPT | Performed by: INTERNAL MEDICINE

## 2024-09-17 ASSESSMENT — ENCOUNTER SYMPTOMS
CONSTIPATION: 0
VOMITING: 0
APNEA: 0
SHORTNESS OF BREATH: 1
STRIDOR: 0
BLOOD IN STOOL: 0
VOICE CHANGE: 0
ABDOMINAL DISTENTION: 0
CHEST TIGHTNESS: 0
COLOR CHANGE: 0
COUGH: 1
DIARRHEA: 0
WHEEZING: 0
ABDOMINAL PAIN: 0
SINUS PRESSURE: 0
RHINORRHEA: 0
SORE THROAT: 0
BACK PAIN: 0

## 2024-09-19 ENCOUNTER — TELEPHONE (OUTPATIENT)
Dept: PULMONOLOGY | Age: 59
End: 2024-09-19

## 2024-09-19 DIAGNOSIS — J44.9 COPD, SEVERE (HCC): Primary | ICD-10-CM

## 2024-09-19 RX ORDER — ROFLUMILAST 250 UG/1
250 TABLET ORAL DAILY
Qty: 28 TABLET | Refills: 3 | Status: SHIPPED | OUTPATIENT
Start: 2024-09-19

## 2024-09-30 ENCOUNTER — TELEPHONE (OUTPATIENT)
Dept: PULMONOLOGY | Age: 59
End: 2024-09-30

## 2024-09-30 RX ORDER — PREDNISONE 10 MG/1
10 TABLET ORAL DAILY
COMMUNITY
End: 2024-10-04

## 2024-09-30 NOTE — TELEPHONE ENCOUNTER
Pt called and said yesterday she started coughing and wheezing, mucus is thick and hard to get up yellow/greenish in color.    No fever, sore throat or chills.    [facility]

## 2024-10-04 ENCOUNTER — OFFICE VISIT (OUTPATIENT)
Dept: PULMONOLOGY | Age: 59
End: 2024-10-04
Payer: COMMERCIAL

## 2024-10-04 VITALS
DIASTOLIC BLOOD PRESSURE: 60 MMHG | BODY MASS INDEX: 24.17 KG/M2 | SYSTOLIC BLOOD PRESSURE: 124 MMHG | WEIGHT: 154 LBS | OXYGEN SATURATION: 94 % | HEIGHT: 67 IN | HEART RATE: 101 BPM

## 2024-10-04 DIAGNOSIS — J44.9 COPD, SEVERE (HCC): Primary | ICD-10-CM

## 2024-10-04 DIAGNOSIS — T17.500A MUCUS PLUGGING OF BRONCHI: ICD-10-CM

## 2024-10-04 PROCEDURE — 99214 OFFICE O/P EST MOD 30 MIN: CPT | Performed by: INTERNAL MEDICINE

## 2024-10-04 ASSESSMENT — ENCOUNTER SYMPTOMS
COLOR CHANGE: 0
VOICE CHANGE: 0
CONSTIPATION: 0
ABDOMINAL DISTENTION: 0
SINUS PRESSURE: 0
ABDOMINAL PAIN: 0
DIARRHEA: 0
SHORTNESS OF BREATH: 1
BLOOD IN STOOL: 0
STRIDOR: 0
CHOKING: 0
CHEST TIGHTNESS: 0
SORE THROAT: 0
RHINORRHEA: 0
APNEA: 0
WHEEZING: 0
VOMITING: 0
BACK PAIN: 0
COUGH: 1

## 2024-10-04 NOTE — PROGRESS NOTES
Mary Rosario    YOB: 1965     Date of Service:  10/4/2024     Chief Complaint   Patient presents with    COPD       HPI recurrent exacerbations of COPD.  Had another exacerbation on 9/19-was treated with Augmentin and prednisone, now feels much better.  Typically she has significant mucoid secretions with thick phlegm.  She required recent hospitalization between 9/2 and 9/6 for acute exacerbation of COPD and acute hypercapnia.  Patient states that she is fully compliant with her inhalers-Dulera 200, Spiriva HandiHaler has now been switched over to Respimat 2.5 mcg by insurance.  She is here to discuss about bronchoscopy.    Allergies   Allergen Reactions    Penicillins Shortness Of Breath    Codeine Nausea And Vomiting    Doxycycline Rash     Blisters, sob.     Posaconazole Nausea And Vomiting     diarrhea     Outpatient Medications Marked as Taking for the 10/4/24 encounter (Office Visit) with Gregory Ravi MD   Medication Sig Dispense Refill    tiotropium (SPIRIVA RESPIMAT) 2.5 MCG/ACT AERS inhaler Inhale 2 puffs into the lungs daily 2 each 0    Roflumilast (DALIRESP) 250 MCG tablet Take 1 tablet by mouth daily 28 tablet 3    albuterol (PROVENTIL) (2.5 MG/3ML) 0.083% nebulizer solution Take 3 mLs by nebulization every 6 hours as needed for Wheezing 120 each 5    albuterol sulfate HFA (PROVENTIL;VENTOLIN;PROAIR) 108 (90 Base) MCG/ACT inhaler Inhale 2 puffs into the lungs every 6 hours as needed for Wheezing 54 g 3    azithromycin (ZITHROMAX) 250 MG tablet Take 1 tablet by mouth daily      mometasone-formoterol (DULERA) 200-5 MCG/ACT inhaler Inhale 2 puffs into the lungs in the morning and 2 puffs in the evening. 13 g 3    tiotropium (SPIRIVA HANDIHALER) 18 MCG inhalation capsule Inhale 1 capsule into the lungs daily 30 capsule 3    guaiFENesin (MUCINEX) 600 MG extended release tablet Take 1 tablet by mouth 2 times daily 180 tablet 3    Naproxen Sodium 220 MG CAPS Take 1 tablet by

## 2024-10-08 ENCOUNTER — TELEPHONE (OUTPATIENT)
Dept: PULMONOLOGY | Age: 59
End: 2024-10-08

## 2024-10-08 NOTE — TELEPHONE ENCOUNTER
Ani with [facility] PA Department called and said for her to complete PA for procedure on 10/15/24 there needs to be a referral from patients PCP. PCP will need to call patients insurance.     Corina Soto  PH: [phone]    Ani  PH: [phone]  EX: [phone]

## 2024-10-09 NOTE — TELEPHONE ENCOUNTER
Spoke with patient.  She states she has an appt with Dr. Soto tomorrow (10/10) at 2pm.  Patient will call office to let us know if referral was placed so the PA dept can be notified to complete PA.

## 2024-10-10 NOTE — TELEPHONE ENCOUNTER
ROMULO Law at PA dept letting her know the referral is here and scanned into patient's chart in Psychiatric. Told her to return my call. I will also try to call her tomorrow as well.

## 2024-10-11 NOTE — TELEPHONE ENCOUNTER
[facility] PA dept called and stated PCP has to send the referral directly to [facility] to get this approved for the bronch on 10/15/24. I spoke to the patient and told her the referral should have been faxed directly to [facility], not to us. She stated she would call them today to tell them to get it faxed to them.     I tried calling Dr Soto's office to tell them the referral needs faxed to [facility]. Their office was closed today unfortunately. I called [facility] and got their fax for referrals to be sent for PA for procedures. I also left another message with Ani in the PA dept letting her kow that I couldn't get in contact today with Dr Soto's office to get them to fax it so I have faxed it.     [facility] Referral Fax  [phone]

## 2024-10-21 ENCOUNTER — TELEPHONE (OUTPATIENT)
Dept: PULMONOLOGY | Age: 59
End: 2024-10-21

## 2024-10-21 DIAGNOSIS — R05.8 PRODUCTIVE COUGH: Primary | ICD-10-CM

## 2024-10-21 DIAGNOSIS — R06.2 WHEEZING: ICD-10-CM

## 2024-10-21 NOTE — TELEPHONE ENCOUNTER
Patient called and said she has congestion coughing up green phlegm,wheezing. Wants to know if something can be called in. Also wanted to know status on referral for bronchoscopy     PH: [phone]    [facility] [email or web address] #[id number] [facility] [address] - P [phone] - F [phone]

## 2024-10-21 NOTE — TELEPHONE ENCOUNTER
Gregory Ravi MD Ransick, Angela, MA  Caller: Unspecified (Today,  8:37 AM)  Prednisone 30mg x 3 days, 20mg x 3 days and 10mg x 3 days.  Phoned in prednisone per Dr Catalan. Spoke to Mary, she stated she reached out to [facility] and left a message. She said she has also had a hard time reaching them. She is going to try again later

## 2024-10-21 NOTE — TELEPHONE ENCOUNTER
Spoke to Mary and she stated she doesn't have a fever but her cough is worsening, coughing up green phlegm and has some wheezing. Wanting to know if she can get a steroid to keep it from worsening.     Regarding the bronch, I let her know I spoke to the [facility] PA team again Friday and the referral from [facility] had not been posted yet. I encouraged her to reach out to [facility] and find out how much longer the referral will be to be posted so the PA team can start the insurance PA process for the bronch. They cannot do anything further until the referral is posted. I refaxed the referral myself last week three times to ensure it would be done by [facility]. Mary said she is going to call [facility] today to get them to hurry the process.

## 2024-10-21 NOTE — TELEPHONE ENCOUNTER
ROMULO Law at [facility] PA dept to see if there is an update on the referral. Also spoke with [facility] provider services while patient was on the other line. They stated they were going to reach out to the referral dept to see what the hold was and to make sure they received the referral from Dr Soto's office.

## 2024-10-25 ENCOUNTER — TELEPHONE (OUTPATIENT)
Dept: PULMONOLOGY | Age: 59
End: 2024-10-25

## 2024-10-25 NOTE — TELEPHONE ENCOUNTER
Lynn Soto office called stating she put in a referral to do a PA so patient can get her bronchoscopy.

## 2024-10-29 NOTE — TELEPHONE ENCOUNTER
Patient called and said her PCP got the referral sent to [facility] and wanted to know if we had gotten everything approved that way the bronch can get scheduled. Informed patient enedelia will be back in 10/30/24. Patient understands

## 2024-10-30 RX ORDER — PREDNISONE 10 MG/1
TABLET ORAL
Qty: 18 TABLET | Refills: 0 | OUTPATIENT
Start: 2024-10-30 | End: 2024-10-30 | Stop reason: ALTCHOICE

## 2024-10-30 NOTE — PROGRESS NOTES
Patient reached __X__ yes  _____ no         MY Chart message sent  _____  VM instructions left ____ yes   phone number ________                                ____ no-office notified          Date _11/5/24________  Time ___0730____  Arrival ___0600  hosp-endo_    Nothing to eat or drink after midnight-follow your doctors prep instructions-this may include taking a second dose of your prep after midnight  Responsible adult 18 or older to stay on site while you are here-drive you home-stay with you after  Follow any instructions your doctors office has given you  Bring a complete list of all your medications and supplements including name,dose,how often taken the day of your procedure  If you normally take the following medications in the morning please do so the AM of your procedure with a small sip of water       Heart,blood pressure,seizure,thyroid or breathing medications-use your inhalers-bring any rescue inhalers with you DOS       DO NOT take blood pressure medications ending in \"shon\" or \"pril\" the AM of procedure or evening prior  Dr Pires patients are not to take any medications the AM of surgery  Take half or your normal dose of any long acting insulins the night before your procedure-do not take any diabetic medications the AM of procedure. If you take a weekly injection for diabetes or weight loss-do not take one week prior to surgery/procedure.If you have already taken your injection this week,contact your surgeon  Follow your doctors instructions regarding stopping or taking  any blood thinners-if you do not have instructions-call them  Any questions call your doctor  Other _______take methadone, inhalers am of procedure_______________________________________________________      VISITOR POLICY(subject to change)             The current policy is 2 visitors per patient.There are no children allowed.Mask at discretion of facility. Visiting hours are 8a-8p.Overnight visitors will be at the discretion

## 2024-10-30 NOTE — TELEPHONE ENCOUNTER
Good Morning Susannah,    I just checked and the referral is officially on file! You guys can get the patient rescheduled.     Clau Olivera, CRCR  Insurance Authorization Specialist  O: [id number]  F: [phone]    [id number] [employer]  HF[employer] MAP Award  2019, 2023-2024 [employer] [address] [place] [address] Workplaces  [id number], 2024 Best in [facility] [employer]  [employer]  [phone] Great Place to Work Certified   WARNING: This email and any attachments transmitted with it may contain Privileged or Confidential information, including patient information protected by federal and state privacy laws, and may be read or used only by the intended recipient. If you have received this email in error, please do not print or forward it, immediately delete the email and attachments, and contact the sender via separate email or at the telephone number listed in the email footer.   PA Auth approval: [id number]. Good from 11/5/2024-12/31/2024.       Rescheduled Bronchoscopy to Friday November 8 2024 at 7:30 am with arrival at 6:00 am. Spoke to Mary and made her aware of date and time and prep. Since she relies on transportation, she was wanting to know how long she would be there in order to get them back to pick her up. I told her 3-4 hours probably but to check with endoscopy when they call her ahead of time to prep for the procedure of how long she would be there.

## 2024-10-30 NOTE — TELEPHONE ENCOUNTER
Spoke to Mary and she said Dr Soto's office submitted the referral to [facility] last Friday. I told her I emailed Clau from [facility] PA dept and left a vm for Ani who are both dealing with the PA for the bronch and asked them to check [facility] to see if they see it being submitted. If it was submitted Friday, it should be shown by today.

## 2024-11-04 RX ORDER — TIOTROPIUM BROMIDE INHALATION SPRAY 3.12 UG/1
2 SPRAY, METERED RESPIRATORY (INHALATION) DAILY
Qty: 8 G | Refills: 5 | Status: SHIPPED | OUTPATIENT
Start: 2024-11-04

## 2024-11-05 ENCOUNTER — OFFICE VISIT (OUTPATIENT)
Dept: PULMONOLOGY | Age: 59
End: 2024-11-05
Payer: COMMERCIAL

## 2024-11-05 VITALS
OXYGEN SATURATION: 92 % | SYSTOLIC BLOOD PRESSURE: 112 MMHG | WEIGHT: 156.2 LBS | HEART RATE: 106 BPM | BODY MASS INDEX: 23.67 KG/M2 | HEIGHT: 68 IN | DIASTOLIC BLOOD PRESSURE: 66 MMHG

## 2024-11-05 DIAGNOSIS — J44.9 COPD, SEVERE (HCC): Primary | ICD-10-CM

## 2024-11-05 DIAGNOSIS — Z87.01 HISTORY OF PNEUMONIA: ICD-10-CM

## 2024-11-05 PROCEDURE — 99214 OFFICE O/P EST MOD 30 MIN: CPT | Performed by: INTERNAL MEDICINE

## 2024-11-05 ASSESSMENT — ENCOUNTER SYMPTOMS
STRIDOR: 0
RHINORRHEA: 0
SHORTNESS OF BREATH: 1
WHEEZING: 0
ABDOMINAL DISTENTION: 0
COLOR CHANGE: 0
VOICE CHANGE: 0
BACK PAIN: 0
COUGH: 1
SINUS PRESSURE: 0
DIARRHEA: 0
ABDOMINAL PAIN: 0
SORE THROAT: 0
VOMITING: 0
CHOKING: 0
CONSTIPATION: 0
BLOOD IN STOOL: 0
APNEA: 0
CHEST TIGHTNESS: 0

## 2024-11-05 NOTE — PROGRESS NOTES
place, and time. Mental status is at baseline.      Motor: No abnormal muscle tone.         Health Maintenance   Topic Date Due    Depression Screen  Never done    Hepatitis C screen  Never done    Hepatitis B vaccine (1 of 3 - 19+ 3-dose series) Never done    DTaP/Tdap/Td vaccine (1 - Tdap) Never done    Lipids  Never done    Breast cancer screen  Never done    Colorectal Cancer Screen  Never done    Shingles vaccine (1 of 2) Never done    A1C test (Diabetic or Prediabetic)  08/21/2023    Flu vaccine (1) 08/01/2024    COVID-19 Vaccine (4 - 2023-24 season) 09/01/2024    Lung Cancer Screening &/or Counseling  07/23/2025    Pneumococcal 0-64 years Vaccine  Completed    HIV screen  Completed    Hepatitis A vaccine  Aged Out    Hib vaccine  Aged Out    Polio vaccine  Aged Out    Meningococcal (ACWY) vaccine  Aged Out        Assessment/Plan:    Recurrent exacerbation despite optimal management of COPD, currently on Dulera 200, Spiriva Respimat 2.5 mcg and Daliresp 250 mcg daily.  History of mucous plugging requiring bronchoscopy x 2 in 2023-previously noted to have Aspergillus from bronchial wash.  Planned for bronchoscopy for airway inspection on 11/8.    Severe COPD, PFT from January 2023 showed FEV1 of 1.22 L [41% predicted] with no significant bronchodilator reversibility.    Will obtain repeat CT chest without contrast in December to evaluate for persistence of left lower lobe atelectasis.  Patient will be followed up following this.    Follow-up in 1 month

## 2024-11-08 ENCOUNTER — ANESTHESIA EVENT (OUTPATIENT)
Dept: ENDOSCOPY | Age: 59
End: 2024-11-08
Payer: COMMERCIAL

## 2024-11-08 ENCOUNTER — HOSPITAL ENCOUNTER (OUTPATIENT)
Age: 59
Setting detail: OUTPATIENT SURGERY
Discharge: HOME | End: 2024-11-08
Attending: INTERNAL MEDICINE | Admitting: INTERNAL MEDICINE
Payer: COMMERCIAL

## 2024-11-08 ENCOUNTER — ANESTHESIA (OUTPATIENT)
Dept: ENDOSCOPY | Age: 59
End: 2024-11-08
Payer: COMMERCIAL

## 2024-11-08 VITALS
OXYGEN SATURATION: 90 % | HEART RATE: 76 BPM | DIASTOLIC BLOOD PRESSURE: 61 MMHG | BODY MASS INDEX: 24.48 KG/M2 | WEIGHT: 156 LBS | RESPIRATION RATE: 13 BRPM | SYSTOLIC BLOOD PRESSURE: 124 MMHG | HEIGHT: 67 IN | TEMPERATURE: 97.9 F

## 2024-11-08 DIAGNOSIS — J44.1 CHRONIC OBSTRUCTIVE PULMONARY DISEASE WITH ACUTE EXACERBATION (HCC): ICD-10-CM

## 2024-11-08 DIAGNOSIS — J44.1 COPD EXACERBATION (HCC): Primary | ICD-10-CM

## 2024-11-08 PROBLEM — R93.89 ABNORMAL CT OF THE CHEST: Status: ACTIVE | Noted: 2024-11-08

## 2024-11-08 LAB
APPEARANCE BRONCH: ABNORMAL
CHLAMYDIA PNEUMONIAE SOURCE: NORMAL
CLOT SPEC QL: ABNORMAL
COLOR BRONCH: COLORLESS
DEPRECATED RDW RBC AUTO: 13.5 % (ref 12.4–15.4)
HCT VFR BLD AUTO: 43.4 % (ref 36–48)
HGB BLD-MCNC: 14.5 G/DL (ref 12–16)
INR PPP: 0.88 (ref 0.85–1.15)
LYMPHOCYTES NFR BRONCH MANUAL: 5 % (ref 5–10)
MACROPHAGES NFR BRONCH MANUAL: 83 % (ref 90–95)
MCH RBC QN AUTO: 29.6 PG (ref 26–34)
MCHC RBC AUTO-ENTMCNC: 33.4 G/DL (ref 31–36)
MCV RBC AUTO: 88.7 FL (ref 80–100)
MONONUC CELLS NFR FLD MANUAL: 3 %
NEUTROPHILS NFR BRONCH MANUAL: 9 % (ref 5–10)
NUC CELL # BRONCH MANUAL: 103 /CUMM
PATH REV: YES
PLATELET # BLD AUTO: 215 K/UL (ref 135–450)
PMV BLD AUTO: 8.4 FL (ref 5–10.5)
PROTHROMBIN TIME: 12.1 SEC (ref 11.9–14.9)
RBC # BLD AUTO: 4.89 M/UL (ref 4–5.2)
RBC # FLD MANUAL: <1000 /CUMM
SPECIMEN SOURCE: NORMAL
TOTAL CELLS COUNTED BRONCH: 100
WBC # BLD AUTO: 7.3 K/UL (ref 4–11)

## 2024-11-08 PROCEDURE — 87205 SMEAR GRAM STAIN: CPT

## 2024-11-08 PROCEDURE — 3700000001 HC ADD 15 MINUTES (ANESTHESIA): Performed by: INTERNAL MEDICINE

## 2024-11-08 PROCEDURE — 87486 CHLMYD PNEUM DNA AMP PROBE: CPT

## 2024-11-08 PROCEDURE — 87102 FUNGUS ISOLATION CULTURE: CPT

## 2024-11-08 PROCEDURE — 87581 M.PNEUMON DNA AMP PROBE: CPT

## 2024-11-08 PROCEDURE — 6360000002 HC RX W HCPCS: Performed by: REGISTERED NURSE

## 2024-11-08 PROCEDURE — 87070 CULTURE OTHR SPECIMN AEROBIC: CPT

## 2024-11-08 PROCEDURE — 2709999900 HC NON-CHARGEABLE SUPPLY: Performed by: INTERNAL MEDICINE

## 2024-11-08 PROCEDURE — 7100000010 HC PHASE II RECOVERY - FIRST 15 MIN: Performed by: INTERNAL MEDICINE

## 2024-11-08 PROCEDURE — 3700000000 HC ANESTHESIA ATTENDED CARE: Performed by: INTERNAL MEDICINE

## 2024-11-08 PROCEDURE — 88305 TISSUE EXAM BY PATHOLOGIST: CPT

## 2024-11-08 PROCEDURE — 85027 COMPLETE CBC AUTOMATED: CPT

## 2024-11-08 PROCEDURE — 87116 MYCOBACTERIA CULTURE: CPT

## 2024-11-08 PROCEDURE — 2500000003 HC RX 250 WO HCPCS: Performed by: REGISTERED NURSE

## 2024-11-08 PROCEDURE — 7100000011 HC PHASE II RECOVERY - ADDTL 15 MIN: Performed by: INTERNAL MEDICINE

## 2024-11-08 PROCEDURE — 6370000000 HC RX 637 (ALT 250 FOR IP): Performed by: INTERNAL MEDICINE

## 2024-11-08 PROCEDURE — 87077 CULTURE AEROBIC IDENTIFY: CPT

## 2024-11-08 PROCEDURE — 3609010800 HC BRONCHOSCOPY ALVEOLAR LAVAGE: Performed by: INTERNAL MEDICINE

## 2024-11-08 PROCEDURE — 87798 DETECT AGENT NOS DNA AMP: CPT

## 2024-11-08 PROCEDURE — 88112 CYTOPATH CELL ENHANCE TECH: CPT

## 2024-11-08 PROCEDURE — 87305 ASPERGILLUS AG IA: CPT

## 2024-11-08 PROCEDURE — 2580000003 HC RX 258: Performed by: INTERNAL MEDICINE

## 2024-11-08 PROCEDURE — 87186 SC STD MICRODIL/AGAR DIL: CPT

## 2024-11-08 PROCEDURE — 89051 BODY FLUID CELL COUNT: CPT

## 2024-11-08 PROCEDURE — 87206 SMEAR FLUORESCENT/ACID STAI: CPT

## 2024-11-08 PROCEDURE — 87106 FUNGI IDENTIFICATION YEAST: CPT

## 2024-11-08 PROCEDURE — 87541 LEGION PNEUMO DNA AMP PROB: CPT

## 2024-11-08 PROCEDURE — 87449 NOS EACH ORGANISM AG IA: CPT

## 2024-11-08 PROCEDURE — 85610 PROTHROMBIN TIME: CPT

## 2024-11-08 RX ORDER — LIDOCAINE HYDROCHLORIDE 20 MG/ML
INJECTION, SOLUTION INFILTRATION; PERINEURAL
Status: DISCONTINUED | OUTPATIENT
Start: 2024-11-08 | End: 2024-11-08 | Stop reason: SDUPTHER

## 2024-11-08 RX ORDER — MOMETASONE FUROATE AND FORMOTEROL FUMARATE DIHYDRATE 200; 5 UG/1; UG/1
2 AEROSOL RESPIRATORY (INHALATION) EVERY 12 HOURS
Qty: 13 G | Refills: 3 | Status: SHIPPED | OUTPATIENT
Start: 2024-11-08

## 2024-11-08 RX ORDER — SODIUM CHLORIDE 9 MG/ML
INJECTION, SOLUTION INTRAVENOUS CONTINUOUS
Status: DISCONTINUED | OUTPATIENT
Start: 2024-11-08 | End: 2024-11-08 | Stop reason: HOSPADM

## 2024-11-08 RX ORDER — ALBUTEROL SULFATE 90 UG/1
2 INHALANT RESPIRATORY (INHALATION) EVERY 6 HOURS PRN
Qty: 54 G | Refills: 3 | Status: SHIPPED | OUTPATIENT
Start: 2024-11-08 | End: 2025-11-08

## 2024-11-08 RX ORDER — LIDOCAINE HYDROCHLORIDE 40 MG/ML
SOLUTION TOPICAL PRN
Status: DISCONTINUED | OUTPATIENT
Start: 2024-11-08 | End: 2024-11-08 | Stop reason: HOSPADM

## 2024-11-08 RX ORDER — TIOTROPIUM BROMIDE INHALATION SPRAY 3.12 UG/1
2 SPRAY, METERED RESPIRATORY (INHALATION)
Qty: 1 EACH | Refills: 3 | Status: SHIPPED | OUTPATIENT
Start: 2024-11-08

## 2024-11-08 RX ORDER — PROPOFOL 10 MG/ML
INJECTION, EMULSION INTRAVENOUS
Status: DISCONTINUED | OUTPATIENT
Start: 2024-11-08 | End: 2024-11-08 | Stop reason: SDUPTHER

## 2024-11-08 RX ADMIN — LIDOCAINE HYDROCHLORIDE 50 MG: 20 INJECTION, SOLUTION INFILTRATION; PERINEURAL at 07:30

## 2024-11-08 RX ADMIN — PROPOFOL 50 MG: 10 INJECTION, EMULSION INTRAVENOUS at 07:32

## 2024-11-08 RX ADMIN — PHENYLEPHRINE HYDROCHLORIDE 100 MCG: 10 INJECTION INTRAVENOUS at 07:38

## 2024-11-08 RX ADMIN — SODIUM CHLORIDE: 9 INJECTION, SOLUTION INTRAVENOUS at 06:34

## 2024-11-08 RX ADMIN — PROPOFOL 140 MCG/KG/MIN: 10 INJECTION, EMULSION INTRAVENOUS at 07:31

## 2024-11-08 RX ADMIN — PROPOFOL 50 MG: 10 INJECTION, EMULSION INTRAVENOUS at 07:30

## 2024-11-08 ASSESSMENT — PAIN - FUNCTIONAL ASSESSMENT: PAIN_FUNCTIONAL_ASSESSMENT: NONE - DENIES PAIN

## 2024-11-08 ASSESSMENT — ENCOUNTER SYMPTOMS: SHORTNESS OF BREATH: 1

## 2024-11-08 NOTE — DISCHARGE INSTRUCTIONS
BRONCHOSCOPY DISCHARGE INSTRUCTIONS    Return to the ER for painful, persistent shortness of breath.    Do not drive, operate machinery, sign important papers or drink alcohol for 24 hours due to the sedation you received for the procedure.    You may resume your usual diet and medications.       ENDOSCOPY DISCHARGE INSTRUCTIONS    You may experience some lightheadedness for the next several hours.  Plan on quiet relaxation for the rest of today.  A responsible adult needs to stay with you today.  Because of the medications you received today-do not drive,operate machinery,or sign any contractual agreement for the next 24 hours.  Do not drink any alcoholic beverages or take any unprescribed medications tonight.  Eat bland food and avoid anything greasy or spicy initially-progress to your normal diet gradually.  Diet restrictions as instructed.  You may resume home medications as instructed.  It is not unusual to experience some mild cramping or gas pains, and you may not have a bowel movement for several days.  If you have any of the following problems, notify your physician or return to the hospital emergency room : fever, chills, excessive bleeding, excessive vomiting, difficulty swallowing, uncontrolled pain, increased abdominal distention, shortness of breath or any other problems.  If you had a polyp removed, avoid strenuous activity for 48 hours.Avoid the use of aspirin or related compounds for one week, unless otherwise instructed by your physician.  You may notice a small amount of blood in your next few bowel movements, but if a large amount passes, call your physician.  If you have a sore throat, you may use lozenges or salt water gargles.  If you had a bronchoscopy and you experience sudden or continued shortness of breath, chest pain, spitting up or vomiting blood, notify your physician or return to the hospital emergency room.         ANESTHESIA DISCHARGE INSTRUCTIONS    Wear your seatbelt home.  You  are under the influence of drugs-do not drink alcohol,drive,operate machinery,or make any important decisions or sign any legal documentsfor 24 hours  A responsible adult needs to be with you for 24 hours.  You may experience lightheadedness,dizziness,or sleepiness following surgery.  Rest at home today- increase activity as tolerated.  Progress slowly to a regular diet unless your physician has instructed you otherwise.Drink plenty of water.  If nausea becomes a problem call your physician.  Coughing,sore throat,and muscle aches are other side effects of anesthesia,and should improve with time.  Do not drive,operate machinery while taking narcotics.

## 2024-11-08 NOTE — H&P
Pt seen and examined. Recurrent mucus plugging of airway with COPD exacerbation. Plans for bronchoscopy and bronchial lavage for airway inspection. No change in H/P.

## 2024-11-08 NOTE — ANESTHESIA POSTPROCEDURE EVALUATION
Department of Anesthesiology  Postprocedure Note    Patient: Mary Rosario  MRN: [id number]  YOB: 1965  Date of evaluation: 11/8/2024    Procedure Summary       Date: 11/08/24 Room / Location: [place] [address] / [facility]    Anesthesia Start: 0726 Anesthesia Stop: 0752    Procedure: BRONCHOSCOPY DIAGNOSTIC OR CELL WASH ONLY Diagnosis:       Lung nodule      (Lung nodule [R91.1])    Surgeons: Gregory Ravi MD Responsible Provider: Mo Diaz MD    Anesthesia Type: MAC, general ASA Status: 3            Anesthesia Type: No value filed.    Marquis Phase I:      Marquis Phase II: Marquis Score: 10    Anesthesia Post Evaluation    Patient location during evaluation: PACU  Patient participation: complete - patient participated  Level of consciousness: awake and alert  Airway patency: patent  Nausea & Vomiting: no nausea and no vomiting  Cardiovascular status: blood pressure returned to baseline  Respiratory status: acceptable  Hydration status: stable  Pain management: satisfactory to patient    No notable events documented.

## 2024-11-08 NOTE — ANESTHESIA PRE PROCEDURE
Department of Anesthesiology  Preprocedure Note       Name:  Mary Rosario   Age:  59 y.o.  :  1965                                          MRN:  [id number]         Date:  2024      Surgeon: Surgeon(s):  Gregory Ravi MD    Procedure: Procedure(s):  BRONCHOSCOPY DIAGNOSTIC OR CELL WASH ONLY    Medications prior to admission:   Prior to Admission medications    Medication Sig Start Date End Date Taking? Authorizing Provider   SPIRIVA RESPIMAT 2.5 MCG/ACT AERS inhaler INHALE 2 PUFFS INTO THE LUNGS DAILY 24  Yes Gregory Ravi MD   albuterol sulfate HFA (PROVENTIL;VENTOLIN;PROAIR) 108 (90 Base) MCG/ACT inhaler Inhale 2 puffs into the lungs every 6 hours as needed for Wheezing 24 Yes Gregory Ravi MD   mometasone-formoterol (DULERA) 200-5 MCG/ACT inhaler Inhale 2 puffs into the lungs in the morning and 2 puffs in the evening. 24  Yes Rosio Hernandez APRN - CNP   guaiFENesin (MUCINEX) 600 MG extended release tablet Take 1 tablet by mouth 2 times daily 5/10/24  Yes Gregory Ravi MD   Naproxen Sodium 220 MG CAPS Take 1 tablet by mouth 2 times daily as needed for Pain (Arthritis.)   Yes Provider, MD Jena   methadone (DOLOPHINE) 10 MG/ML solution Take 6 mLs by mouth daily. Patient receives from [facility], [address] [place], [place] [id number], [phone]   Yes ProviderJena MD   Roflumilast (DALIRESP) 250 MCG tablet Take 1 tablet by mouth daily  Patient not taking: Reported on 2024   Gregory Ravi MD   albuterol (PROVENTIL) (2.5 MG/3ML) 0.083% nebulizer solution Take 3 mLs by nebulization every 6 hours as needed for Wheezing 24   Gregory Ravi MD   pantoprazole (PROTONIX) 40 MG tablet Take 1 tablet by mouth every morning (before breakfast)  Patient not taking: Reported on 2022 8/25/22 9/10/22  Terry Ramirez MD   ALBUTEROL IN

## 2024-11-08 NOTE — TELEPHONE ENCOUNTER
[facility] called to see if we could run a PA on Dulera if needed after sending a new rx to [facility] for this, Spiriva (generic) and albuterol ventolin. The Spiriva generic and ventolin will not require a PA she said as I checked with her first before submitting the rx to [facility].

## 2024-11-09 LAB
ASPERGILLUS GALACTO AG: NEGATIVE
GALACTOMANNAN AG SERPL IA-ACNC: 0.04
LOEFFLER MB STN SPEC: NORMAL
LOEFFLER MB STN SPEC: NORMAL

## 2024-11-10 LAB
ACID FAST STN SPEC QL: NORMAL
ACID FAST STN SPEC QL: NORMAL
BACTERIA SPEC RESP CULT: ABNORMAL
GRAM STN SPEC: ABNORMAL
GRAM STN SPEC: ABNORMAL
L PNEUMO DNA SPEC QL NAA+PROBE: NOT DETECTED
LEGIONELLA DNA SPEC NAA+PROBE: NOT DETECTED
M PNEUMO DNA SPEC QL NAA+PROBE: NOT DETECTED
ORGANISM: ABNORMAL
ORGANISM: ABNORMAL
SPECIMEN SOURCE: NORMAL
SPECIMEN SOURCE: NORMAL

## 2024-11-11 ENCOUNTER — TELEPHONE (OUTPATIENT)
Dept: PULMONOLOGY | Age: 59
End: 2024-11-11

## 2024-11-11 LAB
C PNEUM DNA SPEC QL NAA+PROBE: NOT DETECTED
CHLAMYDIA PNEUMONIAE SOURCE: NORMAL

## 2024-11-11 RX ORDER — LEVOFLOXACIN 750 MG/1
750 TABLET, FILM COATED ORAL DAILY
COMMUNITY
Start: 2024-11-11 | End: 2024-11-20

## 2024-11-11 NOTE — TELEPHONE ENCOUNTER
----- Message from Dr. Gregory Ravi MD sent at 11/11/2024 12:25 PM EST -----  Let pt know that the bronchoscopy showed pseudomonas infection. She should be treated with levaquin 750mg daily x 10 days.

## 2024-11-13 LAB
COCCIDIOIDES AG EIA: NORMAL NG/ML
FUNGUS SPEC CULT: ABNORMAL
LOEFFLER MB STN SPEC: ABNORMAL
ORGANISM: ABNORMAL
SPECIMEN SOURCE: NORMAL

## 2024-11-14 ENCOUNTER — TELEPHONE (OUTPATIENT)
Dept: PULMONOLOGY | Age: 59
End: 2024-11-14

## 2024-11-14 LAB
FUNGUS SPEC CULT: ABNORMAL
LOEFFLER MB STN SPEC: ABNORMAL
ORGANISM: ABNORMAL

## 2024-11-14 NOTE — TELEPHONE ENCOUNTER
Spoke to Mary and made appt for her 1 mo f/u after the bronch and treatment of the pneumonia infection. She was asking how she could be retested at her next visit to make sure this infection has dissepated? I told her I would talk to Dr Catalan for her and ask and get back to her on it. She is scheduled for 12/10/24 for her follow up.

## 2024-11-14 NOTE — TELEPHONE ENCOUNTER
Gregory Ravi MD  You30 minutes ago (12:27 PM)     I will discuss at the next visit.       Spoke to Mary and made her aware.

## 2024-11-19 ENCOUNTER — TELEPHONE (OUTPATIENT)
Dept: PULMONOLOGY | Age: 59
End: 2024-11-19

## 2024-11-19 DIAGNOSIS — J44.1 COPD EXACERBATION (HCC): Primary | ICD-10-CM

## 2024-11-19 LAB
ACID FAST STN SPEC QL: NORMAL
ACID FAST STN SPEC QL: NORMAL
FUNGUS SPEC CULT: ABNORMAL
LOEFFLER MB STN SPEC: ABNORMAL
MYCOBACTERIUM SPEC CULT: NORMAL
MYCOBACTERIUM SPEC CULT: NORMAL
ORGANISM: ABNORMAL

## 2024-11-19 NOTE — TELEPHONE ENCOUNTER
Having trouble getting mucus up. On Levofloxacin and has 2 more days left. Also has shoulder pain in upper left shoulder and it's been bad for 4 days. States it is located under her rotator cuff. She is worried this is due to the pseudomonas. No fever or chills.

## 2024-11-20 NOTE — TELEPHONE ENCOUNTER
Spoke to Mary and let her know what Dr Catalan said. She stated if the shoulder pain got any worse, she would go to her PCP. She has the acapella and will use it and she has already been taking the mucinex 600 mg.

## 2024-11-21 RX ORDER — PREDNISONE 20 MG/1
20 TABLET ORAL DAILY
COMMUNITY
Start: 2024-11-21 | End: 2024-11-22 | Stop reason: ALTCHOICE

## 2024-11-21 NOTE — TELEPHONE ENCOUNTER
Spoke to Mary and made her aware. Also spoke to pharmacist at [facility] and called in the Prednisone 20 mg once daily for 5 days.

## 2024-11-21 NOTE — TELEPHONE ENCOUNTER
Stated having burning on lower left back where lung is now and also have welts come up all over torso area. She just finished the Levaquin yesterday. When I asked her about the Levaquin, she said she has never had a reaction before to this antibiotic.

## 2024-11-22 ENCOUNTER — HOSPITAL ENCOUNTER (EMERGENCY)
Age: 59
Discharge: HOME | End: 2024-11-22
Attending: STUDENT IN AN ORGANIZED HEALTH CARE EDUCATION/TRAINING PROGRAM
Payer: COMMERCIAL

## 2024-11-22 ENCOUNTER — APPOINTMENT (OUTPATIENT)
Dept: CT IMAGING | Age: 59
End: 2024-11-22
Payer: COMMERCIAL

## 2024-11-22 VITALS
RESPIRATION RATE: 20 BRPM | HEIGHT: 67 IN | WEIGHT: 160 LBS | DIASTOLIC BLOOD PRESSURE: 88 MMHG | BODY MASS INDEX: 25.11 KG/M2 | SYSTOLIC BLOOD PRESSURE: 141 MMHG | OXYGEN SATURATION: 91 % | HEART RATE: 89 BPM | TEMPERATURE: 98.3 F

## 2024-11-22 DIAGNOSIS — J44.1 COPD EXACERBATION (HCC): Primary | ICD-10-CM

## 2024-11-22 LAB
ALBUMIN SERPL-MCNC: 4.4 G/DL (ref 3.4–5)
ALBUMIN/GLOB SERPL: 1.5 {RATIO} (ref 1.1–2.2)
ALP SERPL-CCNC: 66 U/L (ref 40–129)
ALT SERPL-CCNC: 9 U/L (ref 10–40)
ANION GAP SERPL CALCULATED.3IONS-SCNC: 12 MMOL/L (ref 3–16)
AST SERPL-CCNC: 12 U/L (ref 15–37)
BASE EXCESS BLDV CALC-SCNC: 1 MMOL/L (ref -3–3)
BASOPHILS # BLD: 0.1 K/UL (ref 0–0.2)
BASOPHILS NFR BLD: 0.7 %
BILIRUB SERPL-MCNC: <0.2 MG/DL (ref 0–1)
BUN SERPL-MCNC: 12 MG/DL (ref 7–20)
CALCIUM SERPL-MCNC: 9.9 MG/DL (ref 8.3–10.6)
CHLORIDE SERPL-SCNC: 103 MMOL/L (ref 99–110)
CO2 BLDV-SCNC: 66 MMOL/L
CO2 SERPL-SCNC: 26 MMOL/L (ref 21–32)
COHGB MFR BLDV: 5.3 % (ref 0–1.5)
CREAT SERPL-MCNC: 0.7 MG/DL (ref 0.6–1.1)
DEPRECATED RDW RBC AUTO: 13.6 % (ref 12.4–15.4)
DO-HGB MFR BLDV: 12 %
EKG ATRIAL RATE: 103 BPM
EKG DIAGNOSIS: NORMAL
EKG P AXIS: 74 DEGREES
EKG P-R INTERVAL: 128 MS
EKG Q-T INTERVAL: 342 MS
EKG QRS DURATION: 80 MS
EKG QTC CALCULATION (BAZETT): 448 MS
EKG R AXIS: 59 DEGREES
EKG T AXIS: 63 DEGREES
EKG VENTRICULAR RATE: 103 BPM
EOSINOPHIL # BLD: 0 K/UL (ref 0–0.6)
EOSINOPHIL NFR BLD: 0.1 %
FLUAV RNA RESP QL NAA+PROBE: NOT DETECTED
FLUBV RNA RESP QL NAA+PROBE: NOT DETECTED
GFR SERPLBLD CREATININE-BSD FMLA CKD-EPI: >90 ML/MIN/{1.73_M2}
GLUCOSE SERPL-MCNC: 154 MG/DL (ref 70–99)
HCO3 BLDV-SCNC: 27.7 MMOL/L (ref 23–29)
HCT VFR BLD AUTO: 42.9 % (ref 36–48)
HGB BLD-MCNC: 14.4 G/DL (ref 12–16)
LACTATE BLDV-SCNC: 2 MMOL/L (ref 0.4–1.9)
LYMPHOCYTES # BLD: 1.4 K/UL (ref 1–5.1)
LYMPHOCYTES NFR BLD: 15.3 %
MCH RBC QN AUTO: 29.4 PG (ref 26–34)
MCHC RBC AUTO-ENTMCNC: 33.6 G/DL (ref 31–36)
MCV RBC AUTO: 87.3 FL (ref 80–100)
METHGB MFR BLDV: 0.7 %
MONOCYTES # BLD: 0.6 K/UL (ref 0–1.3)
MONOCYTES NFR BLD: 6.7 %
NEUTROPHILS # BLD: 7.1 K/UL (ref 1.7–7.7)
NEUTROPHILS NFR BLD: 77.2 %
NT-PROBNP SERPL-MCNC: 37 PG/ML (ref 0–124)
O2 CT VFR BLDV CALC: 17 VOL %
O2 THERAPY: ABNORMAL
PCO2 BLDV: 50.8 MMHG (ref 40–50)
PH BLDV: 7.34 [PH] (ref 7.35–7.45)
PLATELET # BLD AUTO: 234 K/UL (ref 135–450)
PMV BLD AUTO: 8.4 FL (ref 5–10.5)
PO2 BLDV: 50.2 MMHG (ref 25–40)
POTASSIUM SERPL-SCNC: 3.7 MMOL/L (ref 3.5–5.1)
PROCALCITONIN SERPL IA-MCNC: 0.04 NG/ML (ref 0–0.15)
PROT SERPL-MCNC: 7.4 G/DL (ref 6.4–8.2)
RBC # BLD AUTO: 4.91 M/UL (ref 4–5.2)
SAO2 % BLDV: 88 %
SARS-COV-2 RNA RESP QL NAA+PROBE: NOT DETECTED
SODIUM SERPL-SCNC: 141 MMOL/L (ref 136–145)
TROPONIN, HIGH SENSITIVITY: 8 NG/L (ref 0–14)
WBC # BLD AUTO: 9.2 K/UL (ref 4–11)

## 2024-11-22 PROCEDURE — 6360000002 HC RX W HCPCS: Performed by: STUDENT IN AN ORGANIZED HEALTH CARE EDUCATION/TRAINING PROGRAM

## 2024-11-22 PROCEDURE — 84145 PROCALCITONIN (PCT): CPT

## 2024-11-22 PROCEDURE — 87040 BLOOD CULTURE FOR BACTERIA: CPT

## 2024-11-22 PROCEDURE — 2580000003 HC RX 258: Performed by: STUDENT IN AN ORGANIZED HEALTH CARE EDUCATION/TRAINING PROGRAM

## 2024-11-22 PROCEDURE — 71250 CT THORAX DX C-: CPT

## 2024-11-22 PROCEDURE — 99284 EMERGENCY DEPT VISIT MOD MDM: CPT

## 2024-11-22 PROCEDURE — 94640 AIRWAY INHALATION TREATMENT: CPT

## 2024-11-22 PROCEDURE — 6370000000 HC RX 637 (ALT 250 FOR IP): Performed by: STUDENT IN AN ORGANIZED HEALTH CARE EDUCATION/TRAINING PROGRAM

## 2024-11-22 PROCEDURE — 83880 ASSAY OF NATRIURETIC PEPTIDE: CPT

## 2024-11-22 PROCEDURE — 93005 ELECTROCARDIOGRAM TRACING: CPT | Performed by: STUDENT IN AN ORGANIZED HEALTH CARE EDUCATION/TRAINING PROGRAM

## 2024-11-22 PROCEDURE — 84484 ASSAY OF TROPONIN QUANT: CPT

## 2024-11-22 PROCEDURE — 87636 SARSCOV2 & INF A&B AMP PRB: CPT

## 2024-11-22 PROCEDURE — 96375 TX/PRO/DX INJ NEW DRUG ADDON: CPT

## 2024-11-22 PROCEDURE — 82803 BLOOD GASES ANY COMBINATION: CPT

## 2024-11-22 PROCEDURE — 96365 THER/PROPH/DIAG IV INF INIT: CPT

## 2024-11-22 PROCEDURE — 83605 ASSAY OF LACTIC ACID: CPT

## 2024-11-22 PROCEDURE — 93010 ELECTROCARDIOGRAM REPORT: CPT | Performed by: INTERNAL MEDICINE

## 2024-11-22 PROCEDURE — 80053 COMPREHEN METABOLIC PANEL: CPT

## 2024-11-22 PROCEDURE — 85025 COMPLETE CBC W/AUTO DIFF WBC: CPT

## 2024-11-22 RX ORDER — IPRATROPIUM BROMIDE AND ALBUTEROL SULFATE 2.5; .5 MG/3ML; MG/3ML
2 SOLUTION RESPIRATORY (INHALATION) ONCE
Status: COMPLETED | OUTPATIENT
Start: 2024-11-22 | End: 2024-11-22

## 2024-11-22 RX ORDER — PREDNISONE 50 MG/1
50 TABLET ORAL DAILY
Qty: 4 TABLET | Refills: 0 | Status: SHIPPED | OUTPATIENT
Start: 2024-11-23 | End: 2024-11-27

## 2024-11-22 RX ADMIN — IPRATROPIUM BROMIDE AND ALBUTEROL SULFATE 2 DOSE: .5; 3 SOLUTION RESPIRATORY (INHALATION) at 09:07

## 2024-11-22 RX ADMIN — CEFEPIME 2000 MG: 2 INJECTION, POWDER, FOR SOLUTION INTRAVENOUS at 09:02

## 2024-11-22 RX ADMIN — WATER 125 MG: 1 INJECTION INTRAMUSCULAR; INTRAVENOUS; SUBCUTANEOUS at 09:00

## 2024-11-22 ASSESSMENT — PAIN SCALES - GENERAL: PAINLEVEL_OUTOF10: 0

## 2024-11-22 NOTE — DISCHARGE INSTRUCTIONS
Follow-up with your family doctor as discussed and pulmonologist    You may continue taking your home medications as they are prescribed.    If you have been prescribed medications please make sure to monitor how to take them accordingly    Return if develop any new or worsening symptoms

## 2024-11-22 NOTE — ED NOTES
Patient contacted Marii Zhao LCSW ( Uzbek speaking)  Concerning several episodes of hypoglycemia  Called patient back with Marii Zhao providing interpretation  Patient had several episodes of low blood sugar and she treated with glucose tablets  She has an appointment with her endocrinologist ( Dr Carlos A Diaz) today for medication adjustment  In discussion with patient, she was not following the pre operative liver shrinking diet  She was following the post op clear liquid sippy diet that contains minimal carbohydrate  Reviewed the pre op liver shrinking diet   Patient will follow the partial pre op diet :  2 Premier shakes, 1/2 cup low fat  Milk ( takes with meds) one meal of 4 ounces lean protein, 1 cup vegetables and 1/2 cup starch to provide approximately  40 grams of carbohydrate per day  Patient will discuss with her endocrinologist tomorrow  Patient has Marii Zhao phone number for any further questions or concerns  Pt discharged home, discussed discharge papers with pt. Pt states understanding, all questions answered at this time. Pt is alert and oriented, respirations even and unlabored pt going home on home oxygen.

## 2024-11-22 NOTE — ED PROVIDER NOTES
CULTURE, BLOOD 1   CULTURE, BLOOD 2   CBC WITH AUTO DIFFERENTIAL   TROPONIN   BRAIN NATRIURETIC PEPTIDE   PROCALCITONIN   LACTATE, SEPSIS       (Any cultures that may have been sent were not resulted at the time of this patient visit)    MEDICAL DECISION MAKING / ED COURSE:     External Documentation Reviewed: Previous patient encounter documents & history available on EMR was reviewed:   Record from: Patient was seen on 11/8/2020 for for endoscopic.     Decision Rules/Clinical Scores utilized:               See Formal Diagnostic Results above for the lab and radiology tests and orders.    ED Reassessment:  See ED course    ED Course as of 11/22/24 0938 Fri Nov 22, 2024   0913 SARS-CoV-2 RNA, RT PCR: NOT DETECTED [AL]   0913 Influenza A: NOT DETECTED [AL]   0913 Influenza B: NOT DETECTED [AL]   0924 CT CHEST WO CONTRAST  \"IMPRESSION:  1. Unchanged solid subcentimeter bilateral pulmonary nodules.     RECOMMENDATIONS:  Lung-RADS 2 - Benign (v2022)     Management:  12 month screening LDCT  \" [AL]      ED Course User Index  [AL] Usman Castanon MD       Is this patient to be included in the SEP-1 core measure? No Exclusion criteria - the patient is NOT to be included for SEP-1 Core Measure due to: Alternative explanation for abnormal labs/vitals that do not relate to sepsis, see MDM for further explanation       MDM Summary:  History was obtained from: Patient and chart review      This is a 59-year-old female comes in with worsening shortness of breath and fatigue, she typically wears 2 L nasal cannula at her baseline is now requiring 4 L nasal cannula to maintain saturations above 88%.  Patient has increased work of breathing with tachypnea and intermittent retractions.  She has some signs of hypercapnic respiratory failure on her VBG nothing to profound as she is not altered.  She had a recent bronchoscopy and there was concern about a possible Pseudomonas pneumonia and is causing some of her symptoms she

## 2024-11-23 LAB
BACTERIA BLD CULT ORG #2: NORMAL
BACTERIA BLD CULT: NORMAL

## 2024-11-26 LAB
ACID FAST STN SPEC QL: NORMAL
ACID FAST STN SPEC QL: NORMAL
BACTERIA BLD CULT ORG #2: NORMAL
BACTERIA BLD CULT: NORMAL
MYCOBACTERIUM SPEC CULT: NORMAL
MYCOBACTERIUM SPEC CULT: NORMAL

## 2024-12-09 LAB
FUNGUS SPEC CULT: ABNORMAL
FUNGUS SPEC CULT: ABNORMAL
LOEFFLER MB STN SPEC: ABNORMAL
LOEFFLER MB STN SPEC: ABNORMAL
ORGANISM: ABNORMAL
ORGANISM: ABNORMAL

## 2024-12-10 ENCOUNTER — OFFICE VISIT (OUTPATIENT)
Dept: PULMONOLOGY | Age: 59
End: 2024-12-10
Payer: COMMERCIAL

## 2024-12-10 VITALS
WEIGHT: 156.8 LBS | OXYGEN SATURATION: 92 % | HEIGHT: 67 IN | HEART RATE: 102 BPM | BODY MASS INDEX: 24.61 KG/M2 | SYSTOLIC BLOOD PRESSURE: 108 MMHG | DIASTOLIC BLOOD PRESSURE: 66 MMHG

## 2024-12-10 DIAGNOSIS — Z87.01 HISTORY OF PNEUMONIA: ICD-10-CM

## 2024-12-10 DIAGNOSIS — J44.9 COPD, SEVERE (HCC): Primary | ICD-10-CM

## 2024-12-10 PROCEDURE — G2211 COMPLEX E/M VISIT ADD ON: HCPCS | Performed by: INTERNAL MEDICINE

## 2024-12-10 PROCEDURE — 99214 OFFICE O/P EST MOD 30 MIN: CPT | Performed by: INTERNAL MEDICINE

## 2024-12-10 RX ORDER — TIOTROPIUM BROMIDE INHALATION SPRAY 3.12 UG/1
2 SPRAY, METERED RESPIRATORY (INHALATION)
Qty: 1 EACH | Refills: 0 | Status: SHIPPED | COMMUNITY
Start: 2024-12-10

## 2024-12-10 RX ORDER — MOMETASONE FUROATE AND FORMOTEROL FUMARATE DIHYDRATE 200; 5 UG/1; UG/1
2 AEROSOL RESPIRATORY (INHALATION) EVERY 12 HOURS
Qty: 13 G | Refills: 3 | Status: SHIPPED | OUTPATIENT
Start: 2024-12-10

## 2024-12-10 ASSESSMENT — ENCOUNTER SYMPTOMS
ABDOMINAL PAIN: 0
COLOR CHANGE: 0
VOMITING: 0
CONSTIPATION: 0
BACK PAIN: 0
COUGH: 1
SHORTNESS OF BREATH: 1
ABDOMINAL DISTENTION: 0
CHEST TIGHTNESS: 0
SINUS PRESSURE: 0
BLOOD IN STOOL: 0
VOICE CHANGE: 0
APNEA: 0
SORE THROAT: 0
STRIDOR: 0
DIARRHEA: 0
CHOKING: 0
RHINORRHEA: 0
WHEEZING: 0

## 2024-12-10 NOTE — PROGRESS NOTES
Mary Rosario    YOB: 1965     Date of Service:  12/10/2024     Chief Complaint   Patient presents with    1 Month Follow-Up    Bronch f/u     11/8       HPI patient is status post bronchoscopy on 11/8, noted to have Pseudomonas from respiratory cultures and was treated with a course of Levaquin x 10 days.  Patient states that her dyspnea has improved significantly.  Currently using 2 L O2 as needed only.  Cough with mucoid phlegm-continues to use Mucinex every day.  Recent passing of her mother on the day of her bronchoscopy, patient is grieving.    Allergies   Allergen Reactions    Penicillins Shortness Of Breath    Pantoprazole      delirium    Codeine Nausea And Vomiting    Doxycycline Rash     Blisters, sob.     Posaconazole Nausea And Vomiting     Diarrhea, projectile vomiting     Outpatient Medications Marked as Taking for the 12/10/24 encounter (Office Visit) with Gregory Ravi MD   Medication Sig Dispense Refill    tiotropium (SPIRIVA RESPIMAT) 2.5 MCG/ACT AERS inhaler Inhale 2 puffs into the lungs daily 8 g 5    DULERA 200-5 MCG/ACT inhaler Inhale 2 puffs into the lungs in the morning and 2 puffs in the evening. 13 g 3    SPIRIVA RESPIMAT 2.5 MCG/ACT AERS inhaler Inhale 2 puffs into the lungs daily 1 each 0    tiotropium (SPIRIVA RESPIMAT) 2.5 MCG/ACT AERS inhaler Inhale 2 puffs into the lungs daily 1 each 0    albuterol sulfate HFA (PROVENTIL;VENTOLIN;PROAIR) 108 (90 Base) MCG/ACT inhaler Inhale 2 puffs into the lungs every 6 hours as needed for Wheezing 54 g 3    Roflumilast (DALIRESP) 250 MCG tablet Take 1 tablet by mouth daily 28 tablet 3    albuterol (PROVENTIL) (2.5 MG/3ML) 0.083% nebulizer solution Take 3 mLs by nebulization every 6 hours as needed for Wheezing 120 each 5    guaiFENesin (MUCINEX) 600 MG extended release tablet Take 1 tablet by mouth 2 times daily 180 tablet 3    Naproxen Sodium 220 MG CAPS Take 1 tablet by mouth 2 times daily as needed for Pain

## 2025-02-07 ENCOUNTER — TELEPHONE (OUTPATIENT)
Dept: PULMONOLOGY | Age: 60
End: 2025-02-07

## 2025-02-07 NOTE — TELEPHONE ENCOUNTER
Pt called and said that her health insurance [employer] says they need a PA for both Spiriva and Brad    [phone] x [phone]    Fax [phone]

## 2025-02-10 NOTE — TELEPHONE ENCOUNTER
Spiriva denied because quantity for a 30 day supply should be 4g. Also, needs to be run as name brand [id number].     LM on VM at pharmacy.

## 2025-02-10 NOTE — TELEPHONE ENCOUNTER
CARINE: [id number]  RX BIN: [id number]  RX PCN: [id number]  RX GROUP: [id number]    PA for Dulera is not required.  PA for Spiriva is pending.

## 2025-02-14 ENCOUNTER — TELEPHONE (OUTPATIENT)
Dept: PULMONOLOGY | Age: 60
End: 2025-02-14

## 2025-02-14 RX ORDER — PREDNISONE 10 MG/1
10 TABLET ORAL DAILY
COMMUNITY
Start: 2025-02-14 | End: 2025-02-26

## 2025-02-14 RX ORDER — LEVOFLOXACIN 750 MG/1
750 TABLET, FILM COATED ORAL DAILY
COMMUNITY
Start: 2025-02-14 | End: 2025-02-19

## 2025-02-14 NOTE — TELEPHONE ENCOUNTER
Pt called and said her airways feel tight, coughing up yellow, started this morning.    [facility]

## 2025-02-22 ENCOUNTER — APPOINTMENT (OUTPATIENT)
Dept: GENERAL RADIOLOGY | Age: 60
End: 2025-02-22
Payer: COMMERCIAL

## 2025-02-22 ENCOUNTER — HOSPITAL ENCOUNTER (INPATIENT)
Age: 60
LOS: 6 days | Discharge: HOME | End: 2025-02-28
Attending: INTERNAL MEDICINE | Admitting: INTERNAL MEDICINE
Payer: COMMERCIAL

## 2025-02-22 DIAGNOSIS — J44.1 CHRONIC OBSTRUCTIVE PULMONARY DISEASE WITH ACUTE EXACERBATION (HCC): ICD-10-CM

## 2025-02-22 DIAGNOSIS — J44.1 COPD WITH ACUTE EXACERBATION (HCC): ICD-10-CM

## 2025-02-22 DIAGNOSIS — R68.89 INCREASED OXYGEN DEMAND: ICD-10-CM

## 2025-02-22 DIAGNOSIS — J44.1 COPD EXACERBATION (HCC): Primary | ICD-10-CM

## 2025-02-22 DIAGNOSIS — R06.02 SHORTNESS OF BREATH: ICD-10-CM

## 2025-02-22 LAB
ALBUMIN SERPL-MCNC: 4.4 G/DL (ref 3.4–5)
ALBUMIN/GLOB SERPL: 1.6 {RATIO} (ref 1.1–2.2)
ALP SERPL-CCNC: 82 U/L (ref 40–129)
ALT SERPL-CCNC: 15 U/L (ref 10–40)
ANION GAP SERPL CALCULATED.3IONS-SCNC: 10 MMOL/L (ref 3–16)
AST SERPL-CCNC: 17 U/L (ref 15–37)
BASE EXCESS BLDV CALC-SCNC: 6.1 MMOL/L (ref -3–3)
BASOPHILS # BLD: 0.1 K/UL (ref 0–0.2)
BASOPHILS NFR BLD: 0.6 %
BILIRUB SERPL-MCNC: <0.2 MG/DL (ref 0–1)
BUN SERPL-MCNC: 12 MG/DL (ref 7–20)
CALCIUM SERPL-MCNC: 9.7 MG/DL (ref 8.3–10.6)
CHLORIDE SERPL-SCNC: 101 MMOL/L (ref 99–110)
CO2 BLDV-SCNC: 79 MMOL/L
CO2 SERPL-SCNC: 30 MMOL/L (ref 21–32)
COHGB MFR BLDV: 5 % (ref 0–1.5)
CREAT SERPL-MCNC: 0.7 MG/DL (ref 0.6–1.1)
DEPRECATED RDW RBC AUTO: 14.5 % (ref 12.4–15.4)
DO-HGB MFR BLDV: 16 %
EOSINOPHIL # BLD: 0 K/UL (ref 0–0.6)
EOSINOPHIL NFR BLD: 0.1 %
FLUAV RNA RESP QL NAA+PROBE: NOT DETECTED
FLUBV RNA RESP QL NAA+PROBE: NOT DETECTED
GFR SERPLBLD CREATININE-BSD FMLA CKD-EPI: >90 ML/MIN/{1.73_M2}
GLUCOSE SERPL-MCNC: 133 MG/DL (ref 70–99)
HCO3 BLDV-SCNC: 33.6 MMOL/L (ref 23–29)
HCT VFR BLD AUTO: 44.7 % (ref 36–48)
HGB BLD-MCNC: 14.5 G/DL (ref 12–16)
LACTATE BLDV-SCNC: 1.2 MMOL/L (ref 0.4–1.9)
LYMPHOCYTES # BLD: 1.6 K/UL (ref 1–5.1)
LYMPHOCYTES NFR BLD: 13.8 %
MCH RBC QN AUTO: 27.9 PG (ref 26–34)
MCHC RBC AUTO-ENTMCNC: 32.3 G/DL (ref 31–36)
MCV RBC AUTO: 86.4 FL (ref 80–100)
METHGB MFR BLDV: 0.6 %
MONOCYTES # BLD: 0.6 K/UL (ref 0–1.3)
MONOCYTES NFR BLD: 5.4 %
NEUTROPHILS # BLD: 9.4 K/UL (ref 1.7–7.7)
NEUTROPHILS NFR BLD: 80.1 %
NT-PROBNP SERPL-MCNC: 1182 PG/ML (ref 0–124)
O2 CT VFR BLDV CALC: 17 VOL %
O2 THERAPY: ABNORMAL
PCO2 BLDV: 59.1 MMHG (ref 40–50)
PH BLDV: 7.36 [PH] (ref 7.35–7.45)
PLATELET # BLD AUTO: 232 K/UL (ref 135–450)
PMV BLD AUTO: 8.1 FL (ref 5–10.5)
PO2 BLDV: 46.8 MMHG (ref 25–40)
POTASSIUM SERPL-SCNC: 4 MMOL/L (ref 3.5–5.1)
PROCALCITONIN SERPL IA-MCNC: 0.04 NG/ML (ref 0–0.15)
PROT SERPL-MCNC: 7.2 G/DL (ref 6.4–8.2)
RBC # BLD AUTO: 5.18 M/UL (ref 4–5.2)
SAO2 % BLDV: 84 %
SARS-COV-2 RNA RESP QL NAA+PROBE: NOT DETECTED
SODIUM SERPL-SCNC: 141 MMOL/L (ref 136–145)
TROPONIN, HIGH SENSITIVITY: 56 NG/L (ref 0–14)
WBC # BLD AUTO: 11.7 K/UL (ref 4–11)

## 2025-02-22 PROCEDURE — 94640 AIRWAY INHALATION TREATMENT: CPT

## 2025-02-22 PROCEDURE — 6360000002 HC RX W HCPCS: Performed by: PHYSICIAN ASSISTANT

## 2025-02-22 PROCEDURE — 6360000002 HC RX W HCPCS: Performed by: INTERNAL MEDICINE

## 2025-02-22 PROCEDURE — 84145 PROCALCITONIN (PCT): CPT

## 2025-02-22 PROCEDURE — 2700000000 HC OXYGEN THERAPY PER DAY

## 2025-02-22 PROCEDURE — 71045 X-RAY EXAM CHEST 1 VIEW: CPT

## 2025-02-22 PROCEDURE — 96374 THER/PROPH/DIAG INJ IV PUSH: CPT

## 2025-02-22 PROCEDURE — 1200000000 HC SEMI PRIVATE

## 2025-02-22 PROCEDURE — 80053 COMPREHEN METABOLIC PANEL: CPT

## 2025-02-22 PROCEDURE — 82803 BLOOD GASES ANY COMBINATION: CPT

## 2025-02-22 PROCEDURE — 84484 ASSAY OF TROPONIN QUANT: CPT

## 2025-02-22 PROCEDURE — 6370000000 HC RX 637 (ALT 250 FOR IP): Performed by: PHYSICIAN ASSISTANT

## 2025-02-22 PROCEDURE — 2500000003 HC RX 250 WO HCPCS: Performed by: INTERNAL MEDICINE

## 2025-02-22 PROCEDURE — 93005 ELECTROCARDIOGRAM TRACING: CPT | Performed by: EMERGENCY MEDICINE

## 2025-02-22 PROCEDURE — 83605 ASSAY OF LACTIC ACID: CPT

## 2025-02-22 PROCEDURE — 85025 COMPLETE CBC W/AUTO DIFF WBC: CPT

## 2025-02-22 PROCEDURE — 6370000000 HC RX 637 (ALT 250 FOR IP): Performed by: INTERNAL MEDICINE

## 2025-02-22 PROCEDURE — 99285 EMERGENCY DEPT VISIT HI MDM: CPT

## 2025-02-22 PROCEDURE — 2500000003 HC RX 250 WO HCPCS: Performed by: PHYSICIAN ASSISTANT

## 2025-02-22 PROCEDURE — 83880 ASSAY OF NATRIURETIC PEPTIDE: CPT

## 2025-02-22 PROCEDURE — 94761 N-INVAS EAR/PLS OXIMETRY MLT: CPT

## 2025-02-22 PROCEDURE — 87636 SARSCOV2 & INF A&B AMP PRB: CPT

## 2025-02-22 RX ORDER — ACETAMINOPHEN 325 MG/1
650 TABLET ORAL EVERY 6 HOURS PRN
Status: DISCONTINUED | OUTPATIENT
Start: 2025-02-22 | End: 2025-02-28 | Stop reason: HOSPADM

## 2025-02-22 RX ORDER — ENOXAPARIN SODIUM 100 MG/ML
40 INJECTION SUBCUTANEOUS DAILY
Status: DISCONTINUED | OUTPATIENT
Start: 2025-02-23 | End: 2025-02-28 | Stop reason: HOSPADM

## 2025-02-22 RX ORDER — BUDESONIDE AND FORMOTEROL FUMARATE DIHYDRATE 160; 4.5 UG/1; UG/1
2 AEROSOL RESPIRATORY (INHALATION)
Status: DISCONTINUED | OUTPATIENT
Start: 2025-02-22 | End: 2025-02-23

## 2025-02-22 RX ORDER — PREDNISONE 20 MG/1
40 TABLET ORAL DAILY
Status: DISCONTINUED | OUTPATIENT
Start: 2025-02-25 | End: 2025-02-23

## 2025-02-22 RX ORDER — ONDANSETRON 2 MG/ML
4 INJECTION INTRAMUSCULAR; INTRAVENOUS EVERY 6 HOURS PRN
Status: DISCONTINUED | OUTPATIENT
Start: 2025-02-22 | End: 2025-02-28 | Stop reason: HOSPADM

## 2025-02-22 RX ORDER — SODIUM CHLORIDE 0.9 % (FLUSH) 0.9 %
5-40 SYRINGE (ML) INJECTION EVERY 12 HOURS SCHEDULED
Status: DISCONTINUED | OUTPATIENT
Start: 2025-02-22 | End: 2025-02-28 | Stop reason: HOSPADM

## 2025-02-22 RX ORDER — POLYETHYLENE GLYCOL 3350 17 G/17G
17 POWDER, FOR SOLUTION ORAL DAILY PRN
Status: DISCONTINUED | OUTPATIENT
Start: 2025-02-22 | End: 2025-02-22

## 2025-02-22 RX ORDER — SODIUM CHLORIDE 9 MG/ML
INJECTION, SOLUTION INTRAVENOUS PRN
Status: DISCONTINUED | OUTPATIENT
Start: 2025-02-22 | End: 2025-02-28 | Stop reason: HOSPADM

## 2025-02-22 RX ORDER — POLYETHYLENE GLYCOL 3350 17 G/17G
17 POWDER, FOR SOLUTION ORAL DAILY
Status: DISCONTINUED | OUTPATIENT
Start: 2025-02-23 | End: 2025-02-28 | Stop reason: HOSPADM

## 2025-02-22 RX ORDER — IPRATROPIUM BROMIDE AND ALBUTEROL SULFATE 2.5; .5 MG/3ML; MG/3ML
1 SOLUTION RESPIRATORY (INHALATION)
Status: DISCONTINUED | OUTPATIENT
Start: 2025-02-23 | End: 2025-02-28 | Stop reason: HOSPADM

## 2025-02-22 RX ORDER — ALBUTEROL SULFATE 0.83 MG/ML
2.5 SOLUTION RESPIRATORY (INHALATION)
Status: DISCONTINUED | OUTPATIENT
Start: 2025-02-22 | End: 2025-02-28 | Stop reason: HOSPADM

## 2025-02-22 RX ORDER — IPRATROPIUM BROMIDE AND ALBUTEROL SULFATE 2.5; .5 MG/3ML; MG/3ML
1 SOLUTION RESPIRATORY (INHALATION)
Status: DISCONTINUED | OUTPATIENT
Start: 2025-02-23 | End: 2025-02-22

## 2025-02-22 RX ORDER — BUDESONIDE 0.5 MG/2ML
0.5 INHALANT ORAL
Status: DISCONTINUED | OUTPATIENT
Start: 2025-02-22 | End: 2025-02-22

## 2025-02-22 RX ORDER — SODIUM CHLORIDE 0.9 % (FLUSH) 0.9 %
5-40 SYRINGE (ML) INJECTION PRN
Status: DISCONTINUED | OUTPATIENT
Start: 2025-02-22 | End: 2025-02-28 | Stop reason: HOSPADM

## 2025-02-22 RX ORDER — ACETAMINOPHEN 650 MG/1
650 SUPPOSITORY RECTAL EVERY 6 HOURS PRN
Status: DISCONTINUED | OUTPATIENT
Start: 2025-02-22 | End: 2025-02-28 | Stop reason: HOSPADM

## 2025-02-22 RX ORDER — SENNOSIDES A AND B 8.6 MG/1
2 TABLET, FILM COATED ORAL NIGHTLY
Status: DISCONTINUED | OUTPATIENT
Start: 2025-02-22 | End: 2025-02-28 | Stop reason: HOSPADM

## 2025-02-22 RX ORDER — ONDANSETRON 4 MG/1
4 TABLET, ORALLY DISINTEGRATING ORAL EVERY 8 HOURS PRN
Status: DISCONTINUED | OUTPATIENT
Start: 2025-02-22 | End: 2025-02-28 | Stop reason: HOSPADM

## 2025-02-22 RX ORDER — GUAIFENESIN 600 MG/1
600 TABLET, EXTENDED RELEASE ORAL 2 TIMES DAILY
Status: DISCONTINUED | OUTPATIENT
Start: 2025-02-22 | End: 2025-02-28 | Stop reason: HOSPADM

## 2025-02-22 RX ORDER — ALBUTEROL SULFATE 0.83 MG/ML
2.5 SOLUTION RESPIRATORY (INHALATION) ONCE
Status: COMPLETED | OUTPATIENT
Start: 2025-02-22 | End: 2025-02-22

## 2025-02-22 RX ORDER — AZITHROMYCIN 250 MG/1
500 TABLET, FILM COATED ORAL DAILY
Status: COMPLETED | OUTPATIENT
Start: 2025-02-22 | End: 2025-02-24

## 2025-02-22 RX ORDER — IPRATROPIUM BROMIDE AND ALBUTEROL SULFATE 2.5; .5 MG/3ML; MG/3ML
1 SOLUTION RESPIRATORY (INHALATION) ONCE
Status: DISCONTINUED | OUTPATIENT
Start: 2025-02-22 | End: 2025-02-22

## 2025-02-22 RX ORDER — IPRATROPIUM BROMIDE AND ALBUTEROL SULFATE 2.5; .5 MG/3ML; MG/3ML
1 SOLUTION RESPIRATORY (INHALATION) ONCE
Status: COMPLETED | OUTPATIENT
Start: 2025-02-22 | End: 2025-02-22

## 2025-02-22 RX ORDER — ALBUTEROL SULFATE 0.83 MG/ML
2.5 SOLUTION RESPIRATORY (INHALATION) ONCE
Status: DISCONTINUED | OUTPATIENT
Start: 2025-02-22 | End: 2025-02-22

## 2025-02-22 RX ORDER — METHYLPREDNISOLONE SODIUM SUCCINATE 40 MG/ML
INJECTION INTRAMUSCULAR; INTRAVENOUS
Status: DISCONTINUED
Start: 2025-02-22 | End: 2025-02-22

## 2025-02-22 RX ADMIN — AZITHROMYCIN DIHYDRATE 500 MG: 250 TABLET ORAL at 21:50

## 2025-02-22 RX ADMIN — GUAIFENESIN 600 MG: 600 TABLET, EXTENDED RELEASE ORAL at 21:50

## 2025-02-22 RX ADMIN — WATER 40 MG: 1 INJECTION INTRAMUSCULAR; INTRAVENOUS; SUBCUTANEOUS at 19:55

## 2025-02-22 RX ADMIN — ALBUTEROL SULFATE 2.5 MG: 2.5 SOLUTION RESPIRATORY (INHALATION) at 19:13

## 2025-02-22 RX ADMIN — IPRATROPIUM BROMIDE AND ALBUTEROL SULFATE 1 DOSE: .5; 3 SOLUTION RESPIRATORY (INHALATION) at 19:13

## 2025-02-22 RX ADMIN — POLYETHYLENE GLYCOL 3350 17 G: 17 POWDER, FOR SOLUTION ORAL at 21:52

## 2025-02-22 RX ADMIN — ALBUTEROL SULFATE 2.5 MG: 2.5 SOLUTION RESPIRATORY (INHALATION) at 22:49

## 2025-02-22 RX ADMIN — SODIUM CHLORIDE, PRESERVATIVE FREE 10 ML: 5 INJECTION INTRAVENOUS at 21:52

## 2025-02-22 ASSESSMENT — PAIN SCALES - GENERAL
PAINLEVEL_OUTOF10: 0
PAINLEVEL_OUTOF10: 0

## 2025-02-22 ASSESSMENT — PAIN - FUNCTIONAL ASSESSMENT: PAIN_FUNCTIONAL_ASSESSMENT: 0-10

## 2025-02-23 ENCOUNTER — APPOINTMENT (OUTPATIENT)
Dept: CT IMAGING | Age: 60
End: 2025-02-23
Payer: COMMERCIAL

## 2025-02-23 LAB
ANION GAP SERPL CALCULATED.3IONS-SCNC: 10 MMOL/L (ref 3–16)
ANION GAP SERPL CALCULATED.3IONS-SCNC: 12 MMOL/L (ref 3–16)
BASE EXCESS BLDV CALC-SCNC: 2.8 MMOL/L (ref -3–3)
BASOPHILS # BLD: 0 K/UL (ref 0–0.2)
BASOPHILS NFR BLD: 0.3 %
BUN SERPL-MCNC: 14 MG/DL (ref 7–20)
BUN SERPL-MCNC: 17 MG/DL (ref 7–20)
CALCIUM SERPL-MCNC: 10 MG/DL (ref 8.3–10.6)
CALCIUM SERPL-MCNC: 9.1 MG/DL (ref 8.3–10.6)
CHLORIDE SERPL-SCNC: 101 MMOL/L (ref 99–110)
CHLORIDE SERPL-SCNC: 99 MMOL/L (ref 99–110)
CO2 BLDV-SCNC: 72 MMOL/L
CO2 SERPL-SCNC: 24 MMOL/L (ref 21–32)
CO2 SERPL-SCNC: 27 MMOL/L (ref 21–32)
COHGB MFR BLDV: 3.4 % (ref 0–1.5)
CREAT SERPL-MCNC: 0.8 MG/DL (ref 0.6–1.1)
CREAT SERPL-MCNC: 1 MG/DL (ref 0.6–1.1)
DEPRECATED RDW RBC AUTO: 14.5 % (ref 12.4–15.4)
DO-HGB MFR BLDV: 8 %
EKG ATRIAL RATE: 126 BPM
EKG DIAGNOSIS: NORMAL
EKG P AXIS: 80 DEGREES
EKG P-R INTERVAL: 144 MS
EKG Q-T INTERVAL: 294 MS
EKG QRS DURATION: 72 MS
EKG QTC CALCULATION (BAZETT): 425 MS
EKG R AXIS: 72 DEGREES
EKG T AXIS: 85 DEGREES
EKG VENTRICULAR RATE: 126 BPM
EOSINOPHIL # BLD: 0 K/UL (ref 0–0.6)
EOSINOPHIL NFR BLD: 0 %
GFR SERPLBLD CREATININE-BSD FMLA CKD-EPI: 65 ML/MIN/{1.73_M2}
GFR SERPLBLD CREATININE-BSD FMLA CKD-EPI: 85 ML/MIN/{1.73_M2}
GLUCOSE SERPL-MCNC: 150 MG/DL (ref 70–99)
GLUCOSE SERPL-MCNC: 187 MG/DL (ref 70–99)
HCO3 BLDV-SCNC: 30.5 MMOL/L (ref 23–29)
HCT VFR BLD AUTO: 44.7 % (ref 36–48)
HGB BLD-MCNC: 14.3 G/DL (ref 12–16)
INR PPP: 0.9 (ref 0.85–1.15)
LACTATE BLDV-SCNC: 2.6 MMOL/L (ref 0.4–2)
LACTATE BLDV-SCNC: 4.1 MMOL/L (ref 0.4–2)
LYMPHOCYTES # BLD: 0.9 K/UL (ref 1–5.1)
LYMPHOCYTES NFR BLD: 8.7 %
MAGNESIUM SERPL-MCNC: 2.23 MG/DL (ref 1.8–2.4)
MCH RBC QN AUTO: 28 PG (ref 26–34)
MCHC RBC AUTO-ENTMCNC: 31.9 G/DL (ref 31–36)
MCV RBC AUTO: 87.6 FL (ref 80–100)
METHGB MFR BLDV: 0.7 %
MONOCYTES # BLD: 0.2 K/UL (ref 0–1.3)
MONOCYTES NFR BLD: 1.6 %
NEUTROPHILS # BLD: 9.3 K/UL (ref 1.7–7.7)
NEUTROPHILS NFR BLD: 89.4 %
NT-PROBNP SERPL-MCNC: 947 PG/ML (ref 0–124)
O2 CT VFR BLDV CALC: 18 VOL %
O2 THERAPY: ABNORMAL
ORGANISM: ABNORMAL
PCO2 BLDV: 58.4 MMHG (ref 40–50)
PH BLDV: 7.33 [PH] (ref 7.35–7.45)
PHOSPHATE SERPL-MCNC: 4.1 MG/DL (ref 2.5–4.9)
PLATELET # BLD AUTO: 192 K/UL (ref 135–450)
PMV BLD AUTO: 8.1 FL (ref 5–10.5)
PO2 BLDV: 63.6 MMHG (ref 25–40)
POTASSIUM SERPL-SCNC: 4.5 MMOL/L (ref 3.5–5.1)
POTASSIUM SERPL-SCNC: 5.4 MMOL/L (ref 3.5–5.1)
PROTHROMBIN TIME: 12.4 SEC (ref 11.9–14.9)
RBC # BLD AUTO: 5.1 M/UL (ref 4–5.2)
REPORT: NORMAL
REPORT: NORMAL
RESP PATH DNA+RNA PNL L RESP NAA+NON-PRB: ABNORMAL
RESP PATH DNA+RNA PNL NPH NAA+NON-PROBE: NORMAL
SAO2 % BLDV: 92 %
SODIUM SERPL-SCNC: 135 MMOL/L (ref 136–145)
SODIUM SERPL-SCNC: 138 MMOL/L (ref 136–145)
TROPONIN, HIGH SENSITIVITY: 27 NG/L (ref 0–14)
WBC # BLD AUTO: 10.4 K/UL (ref 4–11)

## 2025-02-23 PROCEDURE — 0202U NFCT DS 22 TRGT SARS-COV-2: CPT

## 2025-02-23 PROCEDURE — 6360000002 HC RX W HCPCS: Performed by: INTERNAL MEDICINE

## 2025-02-23 PROCEDURE — 83605 ASSAY OF LACTIC ACID: CPT

## 2025-02-23 PROCEDURE — 94640 AIRWAY INHALATION TREATMENT: CPT

## 2025-02-23 PROCEDURE — 6370000000 HC RX 637 (ALT 250 FOR IP): Performed by: INTERNAL MEDICINE

## 2025-02-23 PROCEDURE — 87633 RESP VIRUS 12-25 TARGETS: CPT

## 2025-02-23 PROCEDURE — 85025 COMPLETE CBC W/AUTO DIFF WBC: CPT

## 2025-02-23 PROCEDURE — 84100 ASSAY OF PHOSPHORUS: CPT

## 2025-02-23 PROCEDURE — 83735 ASSAY OF MAGNESIUM: CPT

## 2025-02-23 PROCEDURE — 71260 CT THORAX DX C+: CPT

## 2025-02-23 PROCEDURE — 2500000003 HC RX 250 WO HCPCS: Performed by: INTERNAL MEDICINE

## 2025-02-23 PROCEDURE — 82803 BLOOD GASES ANY COMBINATION: CPT

## 2025-02-23 PROCEDURE — 6360000004 HC RX CONTRAST MEDICATION: Performed by: STUDENT IN AN ORGANIZED HEALTH CARE EDUCATION/TRAINING PROGRAM

## 2025-02-23 PROCEDURE — 36415 COLL VENOUS BLD VENIPUNCTURE: CPT

## 2025-02-23 PROCEDURE — 93010 ELECTROCARDIOGRAM REPORT: CPT | Performed by: INTERNAL MEDICINE

## 2025-02-23 PROCEDURE — 83880 ASSAY OF NATRIURETIC PEPTIDE: CPT

## 2025-02-23 PROCEDURE — 1200000000 HC SEMI PRIVATE

## 2025-02-23 PROCEDURE — 80048 BASIC METABOLIC PNL TOTAL CA: CPT

## 2025-02-23 PROCEDURE — 87449 NOS EACH ORGANISM AG IA: CPT

## 2025-02-23 PROCEDURE — 85610 PROTHROMBIN TIME: CPT

## 2025-02-23 PROCEDURE — 2580000003 HC RX 258: Performed by: STUDENT IN AN ORGANIZED HEALTH CARE EDUCATION/TRAINING PROGRAM

## 2025-02-23 PROCEDURE — 84484 ASSAY OF TROPONIN QUANT: CPT

## 2025-02-23 PROCEDURE — 99223 1ST HOSP IP/OBS HIGH 75: CPT | Performed by: STUDENT IN AN ORGANIZED HEALTH CARE EDUCATION/TRAINING PROGRAM

## 2025-02-23 PROCEDURE — 94761 N-INVAS EAR/PLS OXIMETRY MLT: CPT

## 2025-02-23 PROCEDURE — 2700000000 HC OXYGEN THERAPY PER DAY

## 2025-02-23 RX ORDER — METHADONE HYDROCHLORIDE 10 MG/ML
60 CONCENTRATE ORAL DAILY
Status: DISCONTINUED | OUTPATIENT
Start: 2025-02-23 | End: 2025-02-28 | Stop reason: HOSPADM

## 2025-02-23 RX ORDER — 0.9 % SODIUM CHLORIDE 0.9 %
500 INTRAVENOUS SOLUTION INTRAVENOUS ONCE
Status: COMPLETED | OUTPATIENT
Start: 2025-02-23 | End: 2025-02-23

## 2025-02-23 RX ORDER — IOPAMIDOL 755 MG/ML
75 INJECTION, SOLUTION INTRAVASCULAR
Status: COMPLETED | OUTPATIENT
Start: 2025-02-23 | End: 2025-02-23

## 2025-02-23 RX ORDER — SODIUM CHLORIDE 9 MG/ML
INJECTION, SOLUTION INTRAVENOUS CONTINUOUS
Status: ACTIVE | OUTPATIENT
Start: 2025-02-23 | End: 2025-02-24

## 2025-02-23 RX ORDER — ARFORMOTEROL TARTRATE 15 UG/2ML
15 SOLUTION RESPIRATORY (INHALATION)
Status: DISCONTINUED | OUTPATIENT
Start: 2025-02-23 | End: 2025-02-28 | Stop reason: HOSPADM

## 2025-02-23 RX ORDER — BUDESONIDE 0.5 MG/2ML
0.5 INHALANT ORAL
Status: DISCONTINUED | OUTPATIENT
Start: 2025-02-23 | End: 2025-02-28 | Stop reason: HOSPADM

## 2025-02-23 RX ORDER — 0.9 % SODIUM CHLORIDE 0.9 %
500 INTRAVENOUS SOLUTION INTRAVENOUS ONCE
Status: DISCONTINUED | OUTPATIENT
Start: 2025-02-23 | End: 2025-02-28 | Stop reason: HOSPADM

## 2025-02-23 RX ORDER — CALCIUM CARBONATE 500 MG/1
500 TABLET, CHEWABLE ORAL 3 TIMES DAILY PRN
Status: DISCONTINUED | OUTPATIENT
Start: 2025-02-23 | End: 2025-02-28 | Stop reason: HOSPADM

## 2025-02-23 RX ORDER — SODIUM CHLORIDE, SODIUM LACTATE, POTASSIUM CHLORIDE, AND CALCIUM CHLORIDE .6; .31; .03; .02 G/100ML; G/100ML; G/100ML; G/100ML
500 INJECTION, SOLUTION INTRAVENOUS ONCE
Status: DISCONTINUED | OUTPATIENT
Start: 2025-02-23 | End: 2025-02-23

## 2025-02-23 RX ADMIN — SENNOSIDES 17.2 MG: 8.6 TABLET, COATED ORAL at 22:04

## 2025-02-23 RX ADMIN — IPRATROPIUM BROMIDE AND ALBUTEROL SULFATE 1 DOSE: .5; 3 SOLUTION RESPIRATORY (INHALATION) at 08:48

## 2025-02-23 RX ADMIN — WATER 40 MG: 1 INJECTION INTRAMUSCULAR; INTRAVENOUS; SUBCUTANEOUS at 00:44

## 2025-02-23 RX ADMIN — SODIUM CHLORIDE, PRESERVATIVE FREE 10 ML: 5 INJECTION INTRAVENOUS at 10:05

## 2025-02-23 RX ADMIN — WATER 40 MG: 1 INJECTION INTRAMUSCULAR; INTRAVENOUS; SUBCUTANEOUS at 10:08

## 2025-02-23 RX ADMIN — POLYETHYLENE GLYCOL 3350 17 G: 17 POWDER, FOR SOLUTION ORAL at 10:03

## 2025-02-23 RX ADMIN — IPRATROPIUM BROMIDE AND ALBUTEROL SULFATE 1 DOSE: .5; 3 SOLUTION RESPIRATORY (INHALATION) at 05:00

## 2025-02-23 RX ADMIN — IPRATROPIUM BROMIDE AND ALBUTEROL SULFATE 1 DOSE: .5; 3 SOLUTION RESPIRATORY (INHALATION) at 00:41

## 2025-02-23 RX ADMIN — IPRATROPIUM BROMIDE AND ALBUTEROL SULFATE 1 DOSE: .5; 3 SOLUTION RESPIRATORY (INHALATION) at 12:45

## 2025-02-23 RX ADMIN — BUDESONIDE 500 MCG: 0.5 INHALANT RESPIRATORY (INHALATION) at 08:48

## 2025-02-23 RX ADMIN — ARFORMOTEROL TARTRATE 15 MCG: 15 SOLUTION RESPIRATORY (INHALATION) at 21:10

## 2025-02-23 RX ADMIN — IOPAMIDOL 75 ML: 755 INJECTION, SOLUTION INTRAVENOUS at 14:49

## 2025-02-23 RX ADMIN — METHADONE HYDROCHLORIDE 60 MG: 10 CONCENTRATE ORAL at 10:03

## 2025-02-23 RX ADMIN — GUAIFENESIN 600 MG: 600 TABLET, EXTENDED RELEASE ORAL at 10:03

## 2025-02-23 RX ADMIN — SODIUM CHLORIDE 500 ML: 9 INJECTION, SOLUTION INTRAVENOUS at 16:08

## 2025-02-23 RX ADMIN — ENOXAPARIN SODIUM 40 MG: 100 INJECTION SUBCUTANEOUS at 10:03

## 2025-02-23 RX ADMIN — Medication 2 PUFF: at 00:41

## 2025-02-23 RX ADMIN — AZITHROMYCIN DIHYDRATE 500 MG: 250 TABLET ORAL at 22:04

## 2025-02-23 RX ADMIN — WATER 40 MG: 1 INJECTION INTRAMUSCULAR; INTRAVENOUS; SUBCUTANEOUS at 12:50

## 2025-02-23 RX ADMIN — IPRATROPIUM BROMIDE AND ALBUTEROL SULFATE 1 DOSE: .5; 3 SOLUTION RESPIRATORY (INHALATION) at 21:10

## 2025-02-23 RX ADMIN — GUAIFENESIN 600 MG: 600 TABLET, EXTENDED RELEASE ORAL at 22:04

## 2025-02-23 RX ADMIN — WATER 40 MG: 1 INJECTION INTRAMUSCULAR; INTRAVENOUS; SUBCUTANEOUS at 22:03

## 2025-02-23 RX ADMIN — BUDESONIDE 500 MCG: 0.5 INHALANT RESPIRATORY (INHALATION) at 21:10

## 2025-02-23 RX ADMIN — SENNOSIDES 17.2 MG: 8.6 TABLET, COATED ORAL at 00:44

## 2025-02-23 RX ADMIN — IPRATROPIUM BROMIDE AND ALBUTEROL SULFATE 1 DOSE: .5; 3 SOLUTION RESPIRATORY (INHALATION) at 16:12

## 2025-02-23 RX ADMIN — ARFORMOTEROL TARTRATE 15 MCG: 15 SOLUTION RESPIRATORY (INHALATION) at 08:48

## 2025-02-23 RX ADMIN — SODIUM CHLORIDE, PRESERVATIVE FREE 10 ML: 5 INJECTION INTRAVENOUS at 22:05

## 2025-02-23 NOTE — ACP (ADVANCE CARE PLANNING)
Advanced Care Planning Note.    Purpose of Encounter: Advanced care planning in light of acute COPd exacerbation  Parties In Attendance: Patient  Decisional Capacity: Yes  Subjective: Patient with SOB  Objective: Cr 0.8  Goals of Care Determination: Patient wants full support (CPR, vent, surgery, HD, trach, PEG)  Plan:  IV steroids, nebs, Abx, PT/OT, Pulm consult  Code Status: Full code   Time spent on Advanced care Plannin minutes  Advanced Care Planning Documents: Completed advanced directives on chart, daughters share the POA.    Db Ramirez MD  2025 4:32 PM

## 2025-02-23 NOTE — ED NOTES
Updated patient on inpatient room assignment, patient VSS on baseline O2 requirement, no questions/concerns at this time

## 2025-02-23 NOTE — ED NOTES
Care assumed at this time     Patient resting in bed, VSS on baseline 2 L O2 NC. Patient requesting to use restroom. Ambulated independently with portable oxygen to restroom without difficulty.     Patient updated on plan of care so far, no question/concerns at this time.

## 2025-02-23 NOTE — PROGRESS NOTES
4 Eyes Skin Assessment     NAME:  Mary Rosario  YOB: 1965  MEDICAL RECORD NUMBER:  [id number]    The patient is being assessed for  Admission    I agree that at least one RN has performed a thorough Head to Toe Skin Assessment on the patient. ALL assessment sites listed below have been assessed.      Areas assessed by both nurses:    Head, Face, Ears, Shoulders, Back, Chest, Arms, Elbows, Hands, Sacrum. Buttock, Coccyx, Ischium, Legs. Feet and Heels, and Under Medical Devices         Does the Patient have a Wound? No noted wound(s)       Gonzalo Prevention initiated by RN: No  Wound Care Orders initiated by RN: No    Pressure Injury (Stage 3,4, Unstageable, DTI, NWPT, and Complex wounds) if present, place Wound referral order by RN under : No    New Ostomies, if present place, Ostomy referral order under : No     Nurse 1 eSignature: Electronically signed by Jordin Dunlap RN on 2/23/25 at 2:20 AM EST    **SHARE this note so that the co-signing nurse can place an eSignature**    Nurse 2 eSignature: {Esignature:[id number]}

## 2025-02-23 NOTE — PROGRESS NOTES
Pt admitted into room 5559 via WC. Ambulates independently in room. Has home O2 with her. Hooked up to 2 LPM O2 via NC. A&O x4. Able to make needs known. No complaints of pain/discomfort. No S/S of respiratory distress/SOB. RT at bedside for breathing treatments. Care ongoing.

## 2025-02-23 NOTE — PROGRESS NOTES
Pharmacy Home Medication Reconciliation Note    A medication reconciliation has been completed for Mary Rosario 1965    Pharmacy[facility] [address] [place]  Information provided by: patient    The patient's home medication list is as follows:  No current facility-administered medications on file prior to encounter.     Current Outpatient Medications on File Prior to Encounter   Medication Sig Dispense Refill    predniSONE (DELTASONE) 10 MG tablet Take 4 tablets by mouth daily Take 4 tablets by mouth once daily for 5 days.      tiotropium (SPIRIVA RESPIMAT) 2.5 MCG/ACT AERS inhaler Inhale 2 puffs into the lungs daily 8 g 5    DULERA 200-5 MCG/ACT inhaler Inhale 2 puffs into the lungs in the morning and 2 puffs in the evening. 13 g 3    albuterol sulfate HFA (PROVENTIL;VENTOLIN;PROAIR) 108 (90 Base) MCG/ACT inhaler Inhale 2 puffs into the lungs every 6 hours as needed for Wheezing 54 g 3    albuterol (PROVENTIL) (2.5 MG/3ML) 0.083% nebulizer solution Take 3 mLs by nebulization every 6 hours as needed for Wheezing 120 each 5    guaiFENesin (MUCINEX) 600 MG extended release tablet Take 1 tablet by mouth 2 times daily 180 tablet 3    Naproxen Sodium 220 MG CAPS Take 1 tablet by mouth 2 times daily as needed for Pain (Arthritis.)      methadone (DOLOPHINE) 10 MG/ML solution Take 6 mLs by mouth daily. Patient receives from [facility], [address] [place] [address], [phone]      [DISCONTINUED] SPIRIVA RESPIMAT 2.5 MCG/ACT AERS inhaler Inhale 2 puffs into the lungs daily 1 each 0    [DISCONTINUED] tiotropium (SPIRIVA RESPIMAT) 2.5 MCG/ACT AERS inhaler Inhale 2 puffs into the lungs daily 1 each 0    Roflumilast (DALIRESP) 250 MCG tablet Take 1 tablet by mouth daily (Patient not taking: Reported on 2/22/2025) 28 tablet 3    [DISCONTINUED] pantoprazole (PROTONIX) 40 MG tablet Take 1 tablet by mouth every morning (before breakfast) (Patient not taking: Reported on  9/6/2022) 30 tablet 3    [DISCONTINUED] ALBUTEROL IN Inhale into the lungs         Patient is no longer taking Daliresp.    Of note, patient took all AM meds and has used Albuterol inhaler and nebulizer solution each a few times today. Patient takes Methadone 60 mg daily from [facility] in [place] (see med list).    Timing of last doses updated.    Thank you,  Gina King CPhT

## 2025-02-23 NOTE — PROGRESS NOTES
02/22/25 1921   RT Protocol   History Pulmonary Disease 2   Respiratory pattern 2   Breath sounds 4   Cough 0   Indications for Bronchodilator Therapy On home bronchodilators   Bronchodilator Assessment Score 8

## 2025-02-23 NOTE — CONSULTS
Pulmonary and Critical Care Medicine    CC: SOB   Date: 2025  Admit Date:  2025  Reason for Consultation: SOB  Consult Requesting Physician: Db Ramirez MD     HPI     This is a 58 y/o F w/ a hx of COPD, lung nodules, tobacco use who presented to [facility] with SOB. Patent was having SOB for the past 1 week. She was given levoflox and prednisone, which she finished but given symptoms persistent she presented to the hospital. Respiratory viral panel is send but pending, covid-19 and influenza is negative. She was admitted to the medical floor with AE-COPD, started on nebs and steroids. On my exam patient was in room 5559 she was on 2L NC, no chest pain, no LE swelling, still has wheezing, not much improved, no abd pain.     ROS: negative except stated above    PMH:  has a past medical history of Arthritis, Asthma, Chronic pain, COPD (chronic obstructive pulmonary disease) (HCC), COVID-19, Opiate addiction (HCC), and Pneumothorax.   PSH:  has a past surgical history that includes Hysterectomy;  section; Inner ear surgery; mastoidectomy; Abdomen surgery; bronchoscopy (N/A, 2023); bronchoscopy (N/A, 2023); bronchoscopy (2023); and bronchoscopy (N/A, 2024).    SH:  reports that she quit smoking about 2 years ago. Her smoking use included cigarettes. She started smoking about 34 years ago. She has a 32 pack-year smoking history. She has been exposed to tobacco smoke. She has never used smokeless tobacco. She reports that she does not drink alcohol and does not use drugs.   FH: family history is not on file.    Allergies: Penicillins, Pantoprazole, Codeine, Doxycycline, and Posaconazole     OBJECTIVE DATA       PHYSICAL EXAM:   Temp  Av.1 °F (36.7 °C)  Min: 97.7 °F (36.5 °C)  Max: 98.4 °F (36.9 °C)  Pulse  Av.2  Min: 76  Max: 123  BP  Min: 116/67  Max: 136/80  SpO2  Av.3 %  Min: 91 %  Max: 96 %    General: NAD  Neuro: A0x3  Lung: bilateral expiratory wheezing, equal   Heart:

## 2025-02-23 NOTE — ED PROVIDER NOTES
[facility] EMERGENCY DEPARTMENT  EMERGENCY DEPARTMENT ENCOUNTER        Pt Name: Mary Rosario  MRN: [id number]  Birthdate 1965  Date of evaluation: 2/22/2025  Provider: Shaylee Jeff PA-C  PCP: None, None  Note Started: 7:59 PM EST 2/22/25      KELECHI. I have evaluated this patient.        CHIEF COMPLAINT       Chief Complaint   Patient presents with    Shortness of Breath     Pt brought to the hospital due to her COPD, states that she's had difficulty getting up and moving around.         HISTORY OF PRESENT ILLNESS: 1 or more Elements     History From: Patient  Limitations to history : None    Mary Rosario is a 59 y.o. female who presents to the emergency department today for evaluation for concerns of shortness of breath and concerns of her COPD flaring up.  The patient reports that she has a history of COPD, she states that she has oxygen which she only wears as needed at nighttime.  Patient reports that she has been wearing 2-1/2 L which is more than her normal and has been wearing this consistently over the past couple of days.  Patient reports that starting last week she had cough, congestion and had productive cough she states that she did call her primary care physician, and she states that she had Levaquin called in which she finished, as well as steroids, she states that she took her last 40 mg dose today.  Patient reports that since yesterday, she has had worsening shortness of breath, she states that is difficult for her to even walk down the [place] without feeling shortness of breath and increasing chest tightness and wheezing.  Patient reports that she has been setting her alarm to give herself breathing treatments every couple of hours however continues to have symptoms.  She denies any chest pain.  She has not had any fevers.  No vomiting or diarrhea    Nursing Notes were all reviewed and agreed with or any disagreements were addressed in the HPI.    REVIEW OF SYSTEMS :      Review  100.2

## 2025-02-23 NOTE — PROGRESS NOTES
Hospitalist Progress Note      PCP: None, None    Date of Admission: 2/22/2025    Chief Complaint: SOB    Hospital Course: 58 yo F with severe COPD, chronic respiratory failure with hypoxia on 2 L O2 via NC at night, chronic pain syndrome on Methadone was admitted as inpatient for acute COPD exacerbation, acute on chronic respiratory failure with hypoxia and SIRS.  Started on IV steroids, nebs and Abx.  PNA panel and respiratory panel requested.    Followed by Pulm.      CT PE protocol requested.    Subjective:  Patient continues to feel SOB.  No CP, HA or abdominal pain.  No fevers.       Medications:  Reviewed    Infusion Medications    sodium chloride       Scheduled Medications    methylPREDNISolone  40 mg IntraVENous Q6H    budesonide  0.5 mg Nebulization BID RT    arformoterol tartrate  15 mcg Nebulization BID RT    methadone  60 mg Oral Daily    sodium chloride  500 mL IntraVENous Once    sodium chloride flush  5-40 mL IntraVENous 2 times per day    enoxaparin  40 mg SubCUTAneous Daily    azithromycin  500 mg Oral Daily    guaiFENesin  600 mg Oral BID    ipratropium 0.5 mg-albuterol 2.5 mg  1 Dose Inhalation Q4H RT    senna  2 tablet Oral Nightly    polyethylene glycol  17 g Oral Daily     PRN Meds: sodium chloride flush, sodium chloride, ondansetron **OR** ondansetron, acetaminophen **OR** acetaminophen, albuterol      Intake/Output Summary (Last 24 hours) at 2/23/2025 1608  Last data filed at 2/23/2025 0407  Gross per 24 hour   Intake 300 ml   Output --   Net 300 ml       Physical Exam Performed:    /80   Pulse (!) 102   Temp 98.1 °F (36.7 °C) (Oral)   Resp 18   Ht 1.702 m (5' 7\")   Wt 71.1 kg (156 lb 12.8 oz)   SpO2 91%   BMI 24.56 kg/m²     General appearance: No apparent distress, appears stated age and cooperative.  HEENT: Pupils equal, round, and reactive to light. Conjunctivae/corneas clear.  Neck: Supple, with full range of motion. No jugular venous distention. Trachea

## 2025-02-23 NOTE — H&P
V2.0  History and Physical      Name:  Mary Rosario /Age/Sex: 1965  (59 y.o. female)   MRN & CSN:  [id number] & [id number] Encounter Date/Time: 2025 8:46 PM EST   Location:  [facility]-5559/5559-01 PCP: None, None       Date of Admission: 2025     Date of Service: Pt seen/examined on .25. and Admitted to Inpatient with expected LOS greater than two midnights due to medical therapy.     Hospital Day: 1    Assessment and Plan:   Mary Rosario is a 59 y.o. female with a past medical history of COPD who presents with COPD exacerbation (HCC)    Hospital problem list  Hospital Problems             Last Modified POA    * (Principal) COPD exacerbation (HCC) 2025 Yes    Pain syndrome, chronic 2025 Yes    Mucus plugging of bronchi 2025 Yes    Shortness of breath 2025 Yes    Ex-smoker 2025 Yes    Abnormal CT of the chest 2025 Yes       Assessment :     COPD with acute exacerbation  Shortness of breath   Pain syndrome, chronic  On methadone   Mucus plugging of bronchi  Ex-smoker  Constipation      Plan:    Admit to med-surg   Start IV solumedrol 40 mg every 6 hours x 48 hours then transition to oral Prednisone   Duoneb every 4 hours and albuterol every 2 hours PRN  Symbicort BID which she takes at home  Azithromycin 500 mg daily x 3 is ordered   Optimize laxatives per patient request     Disposition:   Current Living situation: Home  Expected Disposition: Home  Estimated D/C: 2-3 days  PT/OT Eval Status:     Diet ADULT DIET; Regular   DVT Prophylaxis [x] Lovenox, []  Heparin, [] SCDs, [] Ambulation,  [] Eliquis, [] Xarelto, [] Coumadin   Code Status Full Code   Surrogate Decision Maker/ POA Documented in the chart      Personally reviewed Lab Studies and Imaging     Discussed management of the case with ER provider who recommended admission     EKG interpreted personally and results sinus tachycardia     Imaging that was interpreted personally includes CXR and results

## 2025-02-23 NOTE — PLAN OF CARE
Problem: Pain  Goal: Verbalizes/displays adequate comfort level or baseline comfort level  Outcome: Progressing  Flowsheets (Taken 2/23/2025 0830)  Verbalizes/displays adequate comfort level or baseline comfort level:   Encourage patient to monitor pain and request assistance   Assess pain using appropriate pain scale   Implement non-pharmacological measures as appropriate and evaluate response   Administer analgesics based on type and severity of pain and evaluate response     Problem: Safety - Adult  Goal: Free from fall injury  Outcome: Progressing     Problem: Respiratory - Adult  Goal: Achieves optimal ventilation and oxygenation  Outcome: Progressing  Flowsheets (Taken 2/23/2025 0800)  Achieves optimal ventilation and oxygenation:   Assess for changes in respiratory status   Assess for changes in mentation and behavior   Position to facilitate oxygenation and minimize respiratory effort   Oxygen supplementation based on oxygen saturation or arterial blood gases   Assess and instruct to report shortness of breath or any respiratory difficulty   Respiratory therapy support as indicated   Encourage broncho-pulmonary hygiene including cough, deep breathe, incentive spirometry   Initiate smoking cessation protocol as indicated     Problem: Cardiovascular - Adult  Goal: Maintains optimal cardiac output and hemodynamic stability  Outcome: Progressing  Flowsheets (Taken 2/23/2025 0800)  Maintains optimal cardiac output and hemodynamic stability:   Monitor blood pressure and heart rate   Monitor urine output and notify Licensed Independent Practitioner for values outside of normal range   Assess for signs of decreased cardiac output   Administer fluid and/or volume expanders as ordered  Goal: Absence of cardiac dysrhythmias or at baseline  Outcome: Progressing  Flowsheets (Taken 2/23/2025 0800)  Absence of cardiac dysrhythmias or at baseline:   Monitor cardiac rate and rhythm   Assess for signs of decreased cardiac  use good hand hygiene technique   Administer medications as ordered  Goal: Absence of fever/infection during anticipated neutropenic period  Outcome: Progressing  Flowsheets (Taken 2/23/2025 0800)  Absence of fever/infection during anticipated neutropenic period: Monitor white blood cell count

## 2025-02-24 LAB
ANION GAP SERPL CALCULATED.3IONS-SCNC: 8 MMOL/L (ref 3–16)
BASOPHILS # BLD: 0 K/UL (ref 0–0.2)
BASOPHILS NFR BLD: 0.2 %
BUN SERPL-MCNC: 14 MG/DL (ref 7–20)
CALCIUM SERPL-MCNC: 9.5 MG/DL (ref 8.3–10.6)
CHLORIDE SERPL-SCNC: 105 MMOL/L (ref 99–110)
CO2 SERPL-SCNC: 29 MMOL/L (ref 21–32)
CREAT SERPL-MCNC: 0.6 MG/DL (ref 0.6–1.1)
DEPRECATED RDW RBC AUTO: 14.6 % (ref 12.4–15.4)
EOSINOPHIL # BLD: 0 K/UL (ref 0–0.6)
EOSINOPHIL NFR BLD: 0.3 %
GFR SERPLBLD CREATININE-BSD FMLA CKD-EPI: >90 ML/MIN/{1.73_M2}
GLUCOSE BLD-MCNC: 113 MG/DL (ref 70–99)
GLUCOSE SERPL-MCNC: 165 MG/DL (ref 70–99)
HCT VFR BLD AUTO: 41.4 % (ref 36–48)
HGB BLD-MCNC: 13.6 G/DL (ref 12–16)
LACTATE BLDV-SCNC: 1.8 MMOL/L (ref 0.4–2)
LEGIONELLA AG UR QL: NORMAL
LYMPHOCYTES # BLD: 0.7 K/UL (ref 1–5.1)
LYMPHOCYTES NFR BLD: 4.2 %
MCH RBC QN AUTO: 28.1 PG (ref 26–34)
MCHC RBC AUTO-ENTMCNC: 32.8 G/DL (ref 31–36)
MCV RBC AUTO: 85.7 FL (ref 80–100)
MONOCYTES # BLD: 0.6 K/UL (ref 0–1.3)
MONOCYTES NFR BLD: 3.4 %
NEUTROPHILS # BLD: 15.6 K/UL (ref 1.7–7.7)
NEUTROPHILS NFR BLD: 91.9 %
PERFORMED ON: ABNORMAL
PLATELET # BLD AUTO: 203 K/UL (ref 135–450)
PMV BLD AUTO: 8.2 FL (ref 5–10.5)
POTASSIUM SERPL-SCNC: 4.7 MMOL/L (ref 3.5–5.1)
RBC # BLD AUTO: 4.83 M/UL (ref 4–5.2)
S PNEUM AG UR QL: NORMAL
SODIUM SERPL-SCNC: 142 MMOL/L (ref 136–145)
WBC # BLD AUTO: 17 K/UL (ref 4–11)

## 2025-02-24 PROCEDURE — 6370000000 HC RX 637 (ALT 250 FOR IP): Performed by: INTERNAL MEDICINE

## 2025-02-24 PROCEDURE — 2500000003 HC RX 250 WO HCPCS: Performed by: INTERNAL MEDICINE

## 2025-02-24 PROCEDURE — 2700000000 HC OXYGEN THERAPY PER DAY

## 2025-02-24 PROCEDURE — 94761 N-INVAS EAR/PLS OXIMETRY MLT: CPT

## 2025-02-24 PROCEDURE — 36415 COLL VENOUS BLD VENIPUNCTURE: CPT

## 2025-02-24 PROCEDURE — 94640 AIRWAY INHALATION TREATMENT: CPT

## 2025-02-24 PROCEDURE — 6360000002 HC RX W HCPCS: Performed by: INTERNAL MEDICINE

## 2025-02-24 PROCEDURE — 6360000002 HC RX W HCPCS: Performed by: STUDENT IN AN ORGANIZED HEALTH CARE EDUCATION/TRAINING PROGRAM

## 2025-02-24 PROCEDURE — 83605 ASSAY OF LACTIC ACID: CPT

## 2025-02-24 PROCEDURE — 80048 BASIC METABOLIC PNL TOTAL CA: CPT

## 2025-02-24 PROCEDURE — 85025 COMPLETE CBC W/AUTO DIFF WBC: CPT

## 2025-02-24 PROCEDURE — 2580000003 HC RX 258: Performed by: STUDENT IN AN ORGANIZED HEALTH CARE EDUCATION/TRAINING PROGRAM

## 2025-02-24 PROCEDURE — 1200000000 HC SEMI PRIVATE

## 2025-02-24 PROCEDURE — 99233 SBSQ HOSP IP/OBS HIGH 50: CPT | Performed by: STUDENT IN AN ORGANIZED HEALTH CARE EDUCATION/TRAINING PROGRAM

## 2025-02-24 PROCEDURE — 2580000003 HC RX 258: Performed by: NURSE PRACTITIONER

## 2025-02-24 RX ADMIN — ARFORMOTEROL TARTRATE 15 MCG: 15 SOLUTION RESPIRATORY (INHALATION) at 08:20

## 2025-02-24 RX ADMIN — SODIUM CHLORIDE, PRESERVATIVE FREE 10 ML: 5 INJECTION INTRAVENOUS at 22:43

## 2025-02-24 RX ADMIN — IPRATROPIUM BROMIDE AND ALBUTEROL SULFATE 1 DOSE: .5; 3 SOLUTION RESPIRATORY (INHALATION) at 03:43

## 2025-02-24 RX ADMIN — ENOXAPARIN SODIUM 40 MG: 100 INJECTION SUBCUTANEOUS at 09:55

## 2025-02-24 RX ADMIN — AZITHROMYCIN DIHYDRATE 500 MG: 250 TABLET ORAL at 22:41

## 2025-02-24 RX ADMIN — SODIUM CHLORIDE, PRESERVATIVE FREE 10 ML: 5 INJECTION INTRAVENOUS at 04:12

## 2025-02-24 RX ADMIN — CEFEPIME 2000 MG: 2 INJECTION, POWDER, FOR SOLUTION INTRAVENOUS at 13:21

## 2025-02-24 RX ADMIN — IPRATROPIUM BROMIDE AND ALBUTEROL SULFATE 1 DOSE: .5; 3 SOLUTION RESPIRATORY (INHALATION) at 08:20

## 2025-02-24 RX ADMIN — IPRATROPIUM BROMIDE AND ALBUTEROL SULFATE 1 DOSE: .5; 3 SOLUTION RESPIRATORY (INHALATION) at 12:08

## 2025-02-24 RX ADMIN — GUAIFENESIN 600 MG: 600 TABLET, EXTENDED RELEASE ORAL at 09:54

## 2025-02-24 RX ADMIN — POLYETHYLENE GLYCOL 3350 17 G: 17 POWDER, FOR SOLUTION ORAL at 04:24

## 2025-02-24 RX ADMIN — WATER 40 MG: 1 INJECTION INTRAMUSCULAR; INTRAVENOUS; SUBCUTANEOUS at 16:03

## 2025-02-24 RX ADMIN — WATER 40 MG: 1 INJECTION INTRAMUSCULAR; INTRAVENOUS; SUBCUTANEOUS at 09:55

## 2025-02-24 RX ADMIN — POLYETHYLENE GLYCOL 3350 17 G: 17 POWDER, FOR SOLUTION ORAL at 09:56

## 2025-02-24 RX ADMIN — METHADONE HYDROCHLORIDE 60 MG: 10 CONCENTRATE ORAL at 09:55

## 2025-02-24 RX ADMIN — IPRATROPIUM BROMIDE AND ALBUTEROL SULFATE 1 DOSE: .5; 3 SOLUTION RESPIRATORY (INHALATION) at 22:25

## 2025-02-24 RX ADMIN — IPRATROPIUM BROMIDE AND ALBUTEROL SULFATE 1 DOSE: .5; 3 SOLUTION RESPIRATORY (INHALATION) at 16:01

## 2025-02-24 RX ADMIN — BUDESONIDE 500 MCG: 0.5 INHALANT RESPIRATORY (INHALATION) at 22:25

## 2025-02-24 RX ADMIN — BUDESONIDE 500 MCG: 0.5 INHALANT RESPIRATORY (INHALATION) at 08:20

## 2025-02-24 RX ADMIN — ARFORMOTEROL TARTRATE 15 MCG: 15 SOLUTION RESPIRATORY (INHALATION) at 22:25

## 2025-02-24 RX ADMIN — IPRATROPIUM BROMIDE AND ALBUTEROL SULFATE 1 DOSE: .5; 3 SOLUTION RESPIRATORY (INHALATION) at 00:11

## 2025-02-24 RX ADMIN — CEFEPIME 2000 MG: 2 INJECTION, POWDER, FOR SOLUTION INTRAVENOUS at 18:29

## 2025-02-24 RX ADMIN — SODIUM CHLORIDE: 9 INJECTION, SOLUTION INTRAVENOUS at 00:27

## 2025-02-24 RX ADMIN — WATER 40 MG: 1 INJECTION INTRAMUSCULAR; INTRAVENOUS; SUBCUTANEOUS at 04:12

## 2025-02-24 RX ADMIN — SODIUM CHLORIDE, PRESERVATIVE FREE 10 ML: 5 INJECTION INTRAVENOUS at 09:55

## 2025-02-24 RX ADMIN — WATER 40 MG: 1 INJECTION INTRAMUSCULAR; INTRAVENOUS; SUBCUTANEOUS at 22:41

## 2025-02-24 RX ADMIN — GUAIFENESIN 600 MG: 600 TABLET, EXTENDED RELEASE ORAL at 22:41

## 2025-02-24 RX ADMIN — SENNOSIDES 17.2 MG: 8.6 TABLET, COATED ORAL at 22:42

## 2025-02-24 ASSESSMENT — PAIN SCALES - GENERAL
PAINLEVEL_OUTOF10: 0
PAINLEVEL_OUTOF10: 0

## 2025-02-24 NOTE — CARE COORDINATION
Discharge Planning:     (CM) reviewed the patient's chart to assess needs. Patient's Readmission Risk Score is 12%. Patient's medical insurance is [employer]. Patient's PCP is none listed.     Patient will need a [facility] appt and home oxygen eval at the time of discharge.    No needs anticipated, at this time. CM team to follow. Staff to inform CM if additional discharge needs arise.     Electronically signed by Chuck Miller on 2/24/25 at 8:23 AM EST

## 2025-02-24 NOTE — PROGRESS NOTES
02/24/25 0016   RT Protocol   History Pulmonary Disease 2   Respiratory pattern 0   Breath sounds 4   Cough 0   Indications for Bronchodilator Therapy On home bronchodilators   Bronchodilator Assessment Score 6

## 2025-02-24 NOTE — PROGRESS NOTES
Pulmonary and Critical Care Medicine    CC: SOB   Date: 2025  Admit Date:  2025  Reason for Consultation: SOB  Consult Requesting Physician: Db Ramirez MD     HPI     This is a 60 y/o F w/ a hx of COPD, lung nodules, tobacco use,nocturnal oxygen who presented to [facility] with SOB.    Overnight:  She is 2.5L NC  Today feels mildly improved  Has productive cough it is yellow     ROS: negative except stated above    OBJECTIVE DATA       PHYSICAL EXAM:   Temp  Av.2 °F (36.8 °C)  Min: 97.9 °F (36.6 °C)  Max: 98.4 °F (36.9 °C)  Pulse  Av  Min: 82  Max: 108  BP  Min: 121/75  Max: 154/86  SpO2  Av.1 %  Min: 90 %  Max: 94 %    General: NAD  Neuro: A0x3  Lung: bilateral expiratory wheezing, equal   Heart: regular rate    MEDICATIONS: Reviewed  Scheduled Meds:   methylPREDNISolone  40 mg IntraVENous Q6H    budesonide  0.5 mg Nebulization BID RT    arformoterol tartrate  15 mcg Nebulization BID RT    methadone  60 mg Oral Daily    sodium chloride  500 mL IntraVENous Once    sodium chloride flush  5-40 mL IntraVENous 2 times per day    enoxaparin  40 mg SubCUTAneous Daily    azithromycin  500 mg Oral Daily    guaiFENesin  600 mg Oral BID    ipratropium 0.5 mg-albuterol 2.5 mg  1 Dose Inhalation Q4H RT    senna  2 tablet Oral Nightly    polyethylene glycol  17 g Oral Daily     Continuous Infusions:   sodium chloride       PRN Meds:.calcium carbonate, sodium chloride flush, sodium chloride, ondansetron **OR** ondansetron, acetaminophen **OR** acetaminophen, albuterol     LABS: Reviewed.   Recent Labs     25  0637 25  1617 25  0616    135* 142   K 5.4* 4.5 4.7    99 105   CO2 27 24 29   PHOS 4.1  --   --    BUN 14 17 14   CREATININE 0.8 1.0 0.6     Recent Labs     25  1832 25  0637 25  0616   WBC 11.7* 10.4 17.0*   HGB 14.5 14.3 13.6   HCT 44.7 44.7 41.4   MCV 86.4 87.6 85.7    192 203     Lab Results   Component Value Date/Time    PROTIME 12.4

## 2025-02-25 LAB
ANION GAP SERPL CALCULATED.3IONS-SCNC: 8 MMOL/L (ref 3–16)
BASOPHILS # BLD: 0 K/UL (ref 0–0.2)
BASOPHILS NFR BLD: 0.1 %
BUN SERPL-MCNC: 15 MG/DL (ref 7–20)
CALCIUM SERPL-MCNC: 9.6 MG/DL (ref 8.3–10.6)
CHLORIDE SERPL-SCNC: 99 MMOL/L (ref 99–110)
CO2 SERPL-SCNC: 30 MMOL/L (ref 21–32)
CREAT SERPL-MCNC: 0.7 MG/DL (ref 0.6–1.1)
DEPRECATED RDW RBC AUTO: 14.4 % (ref 12.4–15.4)
EOSINOPHIL # BLD: 0 K/UL (ref 0–0.6)
EOSINOPHIL NFR BLD: 0 %
GFR SERPLBLD CREATININE-BSD FMLA CKD-EPI: >90 ML/MIN/{1.73_M2}
GLUCOSE BLD-MCNC: 210 MG/DL (ref 70–99)
GLUCOSE SERPL-MCNC: 212 MG/DL (ref 70–99)
HCT VFR BLD AUTO: 41.7 % (ref 36–48)
HGB BLD-MCNC: 13.5 G/DL (ref 12–16)
LYMPHOCYTES # BLD: 0.7 K/UL (ref 1–5.1)
LYMPHOCYTES NFR BLD: 4.9 %
MCH RBC QN AUTO: 28.2 PG (ref 26–34)
MCHC RBC AUTO-ENTMCNC: 32.4 G/DL (ref 31–36)
MCV RBC AUTO: 87.3 FL (ref 80–100)
MONOCYTES # BLD: 0.4 K/UL (ref 0–1.3)
MONOCYTES NFR BLD: 2.9 %
NEUTROPHILS # BLD: 13.1 K/UL (ref 1.7–7.7)
NEUTROPHILS NFR BLD: 92.1 %
PERFORMED ON: ABNORMAL
PLATELET # BLD AUTO: 176 K/UL (ref 135–450)
PMV BLD AUTO: 8.2 FL (ref 5–10.5)
POTASSIUM SERPL-SCNC: 4.7 MMOL/L (ref 3.5–5.1)
RBC # BLD AUTO: 4.78 M/UL (ref 4–5.2)
SODIUM SERPL-SCNC: 137 MMOL/L (ref 136–145)
WBC # BLD AUTO: 14.2 K/UL (ref 4–11)

## 2025-02-25 PROCEDURE — 99232 SBSQ HOSP IP/OBS MODERATE 35: CPT | Performed by: STUDENT IN AN ORGANIZED HEALTH CARE EDUCATION/TRAINING PROGRAM

## 2025-02-25 PROCEDURE — 6360000002 HC RX W HCPCS: Performed by: INTERNAL MEDICINE

## 2025-02-25 PROCEDURE — 6360000002 HC RX W HCPCS: Performed by: STUDENT IN AN ORGANIZED HEALTH CARE EDUCATION/TRAINING PROGRAM

## 2025-02-25 PROCEDURE — 94640 AIRWAY INHALATION TREATMENT: CPT

## 2025-02-25 PROCEDURE — 85025 COMPLETE CBC W/AUTO DIFF WBC: CPT

## 2025-02-25 PROCEDURE — 6370000000 HC RX 637 (ALT 250 FOR IP): Performed by: INTERNAL MEDICINE

## 2025-02-25 PROCEDURE — 2700000000 HC OXYGEN THERAPY PER DAY

## 2025-02-25 PROCEDURE — 1200000000 HC SEMI PRIVATE

## 2025-02-25 PROCEDURE — 36415 COLL VENOUS BLD VENIPUNCTURE: CPT

## 2025-02-25 PROCEDURE — 94761 N-INVAS EAR/PLS OXIMETRY MLT: CPT

## 2025-02-25 PROCEDURE — 2500000003 HC RX 250 WO HCPCS: Performed by: INTERNAL MEDICINE

## 2025-02-25 PROCEDURE — 80048 BASIC METABOLIC PNL TOTAL CA: CPT

## 2025-02-25 PROCEDURE — 2580000003 HC RX 258: Performed by: STUDENT IN AN ORGANIZED HEALTH CARE EDUCATION/TRAINING PROGRAM

## 2025-02-25 RX ADMIN — SODIUM CHLORIDE, PRESERVATIVE FREE 10 ML: 5 INJECTION INTRAVENOUS at 20:25

## 2025-02-25 RX ADMIN — GUAIFENESIN 600 MG: 600 TABLET, EXTENDED RELEASE ORAL at 20:25

## 2025-02-25 RX ADMIN — WATER 40 MG: 1 INJECTION INTRAMUSCULAR; INTRAVENOUS; SUBCUTANEOUS at 04:13

## 2025-02-25 RX ADMIN — IPRATROPIUM BROMIDE AND ALBUTEROL SULFATE 1 DOSE: .5; 3 SOLUTION RESPIRATORY (INHALATION) at 12:26

## 2025-02-25 RX ADMIN — ARFORMOTEROL TARTRATE 15 MCG: 15 SOLUTION RESPIRATORY (INHALATION) at 21:07

## 2025-02-25 RX ADMIN — CEFEPIME 2000 MG: 2 INJECTION, POWDER, FOR SOLUTION INTRAVENOUS at 02:31

## 2025-02-25 RX ADMIN — BUDESONIDE 500 MCG: 0.5 INHALANT RESPIRATORY (INHALATION) at 21:07

## 2025-02-25 RX ADMIN — SODIUM CHLORIDE, PRESERVATIVE FREE 10 ML: 5 INJECTION INTRAVENOUS at 08:53

## 2025-02-25 RX ADMIN — SENNOSIDES 17.2 MG: 8.6 TABLET, COATED ORAL at 20:25

## 2025-02-25 RX ADMIN — IPRATROPIUM BROMIDE AND ALBUTEROL SULFATE 1 DOSE: .5; 3 SOLUTION RESPIRATORY (INHALATION) at 02:57

## 2025-02-25 RX ADMIN — ARFORMOTEROL TARTRATE 15 MCG: 15 SOLUTION RESPIRATORY (INHALATION) at 08:11

## 2025-02-25 RX ADMIN — CEFEPIME 2000 MG: 2 INJECTION, POWDER, FOR SOLUTION INTRAVENOUS at 11:10

## 2025-02-25 RX ADMIN — WATER 40 MG: 1 INJECTION INTRAMUSCULAR; INTRAVENOUS; SUBCUTANEOUS at 17:07

## 2025-02-25 RX ADMIN — GUAIFENESIN 600 MG: 600 TABLET, EXTENDED RELEASE ORAL at 08:38

## 2025-02-25 RX ADMIN — CEFEPIME 2000 MG: 2 INJECTION, POWDER, FOR SOLUTION INTRAVENOUS at 19:15

## 2025-02-25 RX ADMIN — ENOXAPARIN SODIUM 40 MG: 100 INJECTION SUBCUTANEOUS at 08:38

## 2025-02-25 RX ADMIN — IPRATROPIUM BROMIDE AND ALBUTEROL SULFATE 1 DOSE: .5; 3 SOLUTION RESPIRATORY (INHALATION) at 21:07

## 2025-02-25 RX ADMIN — IPRATROPIUM BROMIDE AND ALBUTEROL SULFATE 1 DOSE: .5; 3 SOLUTION RESPIRATORY (INHALATION) at 16:26

## 2025-02-25 RX ADMIN — WATER 40 MG: 1 INJECTION INTRAMUSCULAR; INTRAVENOUS; SUBCUTANEOUS at 08:39

## 2025-02-25 RX ADMIN — IPRATROPIUM BROMIDE AND ALBUTEROL SULFATE 1 DOSE: .5; 3 SOLUTION RESPIRATORY (INHALATION) at 08:11

## 2025-02-25 RX ADMIN — BUDESONIDE 500 MCG: 0.5 INHALANT RESPIRATORY (INHALATION) at 08:11

## 2025-02-25 RX ADMIN — METHADONE HYDROCHLORIDE 60 MG: 10 CONCENTRATE ORAL at 08:39

## 2025-02-25 ASSESSMENT — PAIN SCALES - GENERAL: PAINLEVEL_OUTOF10: 0

## 2025-02-25 NOTE — PROGRESS NOTES
Physician Progress Note      PATIENT:               CLARITA DE SANTIAGO  [facility] #:                  [id number]  :                       1965  ADMIT DATE:       2025 6:02 PM  DISCH DATE:  RESPONDING  PROVIDER #:        Db Ramirez MD          QUERY TEXT:    Internal Medicine,    Pt admitted with PNA, respiratory failure and noted to have documentation of   SIRS. If possible, please document in the progress notes and discharge summary   if you are evaluating and /or treating any of the following:  The medical record reflects the following:    Risk Factors: PNA  Clinical Indicators: HR up to 123, RR up to 23, WBC 11.7-17,  O2 sats in 80s,   lactate 4.1, BNP elevation, troponin elevation  Treatment: labs, imaging, Zithromax, IV Maxipime, steroids, Duonebs, O2,   medical management    Thank you  Options provided:  -- Sepsis, due to pseudomonas PNA present on admission  -- Sepsis, due to pseudomonas PNA  developed following admission  -- *** (localized infection such as pneumonia, UTI, or cellulitis) without   Sepsis  -- Sepsis was ruled out  -- Other - I will add my own diagnosis  -- Disagree - Not applicable / Not valid  -- Disagree - Clinically unable to determine / Unknown  -- Refer to Clinical Documentation Reviewer    PROVIDER RESPONSE TEXT:    This patient has sepsis due to pseudomonas PNA which was present on admission.    Query created by: Virgie Bautista on 2025 4:49 PM      Electronically signed by:  Db Ramirez MD 2025 9:48 PM

## 2025-02-25 NOTE — PROGRESS NOTES
Shift assessment completed. VSS. Morning meds are given. Pt refused breakfast. No additional needs expressed at this time. Will continue to monitor.

## 2025-02-25 NOTE — ADT AUTH CERT
Admission Diagnosis: COPD exacerbation (HCC) and DX codes: J44.1,Increased oxygen demand and DX codes: R68.89

## 2025-02-25 NOTE — PROGRESS NOTES
Hospitalist Progress Note      PCP: None, None    Date of Admission: 2/22/2025    Chief Complaint: SOB    Hospital Course: 60 yo F with severe COPD, chronic respiratory failure with hypoxia on 2 L O2 via NC at night, chronic pain syndrome on Methadone was admitted as inpatient for acute COPD exacerbation, acute on chronic respiratory failure with hypoxia and SIRS.  Started on IV steroids, nebs and Abx.  PNA panel with Pseudomonas and respiratory panel negative.    Followed by Pulm.      CT PE protocol   IMPRESSION:  Suboptimal opacification of the pulmonary arterial system with no central  pulmonary embolism seen.  Emphysema without acute pulmonary abnormality..    Subjective:  Patient is still SOB and coughing.  No CP, HA or abdominal pain.  No fevers.       Medications:  Reviewed    Infusion Medications    sodium chloride       Scheduled Medications    cefepime  2,000 mg IntraVENous Q8H    methylPREDNISolone  40 mg IntraVENous Q6H    budesonide  0.5 mg Nebulization BID RT    arformoterol tartrate  15 mcg Nebulization BID RT    methadone  60 mg Oral Daily    sodium chloride  500 mL IntraVENous Once    sodium chloride flush  5-40 mL IntraVENous 2 times per day    enoxaparin  40 mg SubCUTAneous Daily    azithromycin  500 mg Oral Daily    guaiFENesin  600 mg Oral BID    ipratropium 0.5 mg-albuterol 2.5 mg  1 Dose Inhalation Q4H RT    senna  2 tablet Oral Nightly    polyethylene glycol  17 g Oral Daily     PRN Meds: calcium carbonate, sodium chloride flush, sodium chloride, ondansetron **OR** ondansetron, acetaminophen **OR** acetaminophen, albuterol      Intake/Output Summary (Last 24 hours) at 2/24/2025 2148  Last data filed at 2/23/2025 2205  Gross per 24 hour   Intake 10 ml   Output --   Net 10 ml       Physical Exam Performed:    BP (!) 154/86   Pulse 94   Temp 98.4 °F (36.9 °C) (Oral)   Resp 22   Ht 1.702 m (5' 7\")   Wt 71.1 kg (156 lb 12.8 oz)   SpO2 92%   BMI 24.56 kg/m²     General appearance: No       XR CHEST PORTABLE   Final Result   Chronic obstructive lung changes with mild discoid atelectasis or scarring   which is less prominent with no obvious infiltrate.             IP CONSULT TO PULMONOLOGY    Assessment/Plan:    Active Hospital Problems    Diagnosis     Pain syndrome, chronic [G89.4]      Priority: Medium    Abnormal CT of the chest [R93.89]     COPD exacerbation (HCC) [J44.1]     Shortness of breath [R06.02]     Ex-smoker [Z87.891]     Mucus plugging of bronchi [T17.500A]      Acute on chronic respiratory failure with hypoxia  Negative viral panel  DC droplet plus  On 2 L O2 via NC at night at baseline  Wean O2 as tolerated  Pulm consult appreciated  Acute COPD exacerbation  Continue Solumedrol 40 mg IV q6h  Changed to IV Cefepime given Pseudomonas PNA  Continue Duoneb  Continue Brovana and Pulmicort  SIRS  S/P IVF  Changed to IV Cefepime for Pseudomonas PNA  Repeat LA improved  Chronic pain syndrome  Resumed Methadone    DVT Prophylaxis: Lovenox  Diet: ADULT DIET; Regular  Code Status: Full Code  PT/OT Eval Status: Requested    Dispo - Home     Discussed with patient, nursing and CM.    Slow respiratory recovery here.    Db Ramirez MD

## 2025-02-25 NOTE — PROGRESS NOTES
Pulmonary and Critical Care Medicine    CC: SOB   Date: 2025  Admit Date:  2025  Reason for Consultation: SOB  Consult Requesting Physician: Db Ramirez MD     HPI     This is a 58 y/o F w/ a hx of COPD, lung nodules, tobacco use,nocturnal oxygen who presented to [facility] with SOB.    Overnight:  She is on 4L NC today  Feels better but not back to baseline     ROS: negative except stated above    OBJECTIVE DATA       PHYSICAL EXAM:   Temp  Av.3 °F (36.8 °C)  Min: 97.9 °F (36.6 °C)  Max: 99 °F (37.2 °C)  Pulse  Av  Min: 82  Max: 94  BP  Min: 119/79  Max: 148/84  SpO2  Av.3 %  Min: 88 %  Max: 93 %    General: NAD  Neuro: A0x3  Lung: scant bilateral expiratory wheezing, equal   Heart: regular rate    MEDICATIONS: Reviewed  Scheduled Meds:   methylPREDNISolone  40 mg IntraVENous Q8H    cefepime  2,000 mg IntraVENous Q8H    budesonide  0.5 mg Nebulization BID RT    arformoterol tartrate  15 mcg Nebulization BID RT    methadone  60 mg Oral Daily    sodium chloride  500 mL IntraVENous Once    sodium chloride flush  5-40 mL IntraVENous 2 times per day    enoxaparin  40 mg SubCUTAneous Daily    guaiFENesin  600 mg Oral BID    ipratropium 0.5 mg-albuterol 2.5 mg  1 Dose Inhalation Q4H RT    senna  2 tablet Oral Nightly    polyethylene glycol  17 g Oral Daily     Continuous Infusions:   sodium chloride       PRN Meds:.sulfur hexafluoride microspheres, calcium carbonate, sodium chloride flush, sodium chloride, ondansetron **OR** ondansetron, acetaminophen **OR** acetaminophen, albuterol     LABS: Reviewed.   Recent Labs     25  0637 25  1617 25  0616 25  0522    135* 142 137   K 5.4* 4.5 4.7 4.7    99 105 99   CO2 27 24 29 30   PHOS 4.1  --   --   --    BUN 14 17 14 15   CREATININE 0.8 1.0 0.6 0.7     Recent Labs     25  0637 25  0616 25  0522   WBC 10.4 17.0* 14.2*   HGB 14.3 13.6 13.5   HCT 44.7 41.4 41.7   MCV 87.6 85.7 87.3    203 176      Lab Results   Component Value Date/Time    PROTIME 12.4 02/23/2025 06:36 AM    PROTIME 12.1 11/08/2024 06:13 AM    PROTIME 12.9 04/17/2023 07:20 AM    INR 0.90 02/23/2025 06:36 AM    INR 0.88 11/08/2024 06:13 AM    INR 0.97 04/17/2023 07:20 AM     Lab Results   Component Value Date/Time    KUE4QNY 35.4 09/04/2024 07:58 AM    BEART 8.5 09/04/2024 07:58 AM    U0NTWEKQ 94.7 09/04/2024 07:58 AM    PHART 7.401 09/04/2024 07:58 AM    DAR6RJX 57.0 09/04/2024 07:58 AM    PO2ART 67.6 09/04/2024 07:58 AM    LAR9VRA 83.2 09/04/2024 07:58 AM       Intake/Output Summary (Last 24 hours) at 2/25/2025 1437  Last data filed at 2/25/2025 0926  Gross per 24 hour   Intake 240 ml   Output --   Net 240 ml      In: 240 [P.O.:240]  Out: -      IMAGES: Reviewed       ASSESSMENT AND PLAN:     Acute hypoxic respiratory failure   Compensated hypercapnic respiratory failure   AE-COPD  Distal mucus plugging  Respiratory cx positive for PA  Hx of bronchoscopy  Hx of pseudomonas respiratory infection   Lactic acidosis   On methadone     Recommendations:  - she is slowly improving   - continue cefepime   - wbc improving  - continue solu-medrol, nebs as ordered  - continue flutter valve  - TTE pending     Moderate risk 35 minutes     Arsen Torres MD  Pulmonary and Critical Care Medicine   [facility]

## 2025-02-25 NOTE — PROGRESS NOTES
Hospitalist Progress Note      PCP: None, None    Date of Admission: 2/22/2025    Chief Complaint: SOB    Hospital Course: 58 yo F with severe COPD, chronic respiratory failure with hypoxia on 2 L O2 via NC at night, chronic pain syndrome on Methadone was admitted as inpatient for acute COPD exacerbation, acute on chronic respiratory failure with hypoxia and SIRS.  Started on IV steroids, nebs and Abx.  PNA panel with Pseudomonas and respiratory panel negative.    Followed by Pulm.      CT PE protocol   IMPRESSION:  Suboptimal opacification of the pulmonary arterial system with no central  pulmonary embolism seen.  Emphysema without acute pulmonary abnormality..    Subjective:  Patient is remains SOB and still coughing.  No CP, HA or abdominal pain.  No fevers.       Medications:  Reviewed    Infusion Medications    sodium chloride       Scheduled Medications    methylPREDNISolone  40 mg IntraVENous Q8H    cefepime  2,000 mg IntraVENous Q8H    budesonide  0.5 mg Nebulization BID RT    arformoterol tartrate  15 mcg Nebulization BID RT    methadone  60 mg Oral Daily    sodium chloride  500 mL IntraVENous Once    sodium chloride flush  5-40 mL IntraVENous 2 times per day    enoxaparin  40 mg SubCUTAneous Daily    guaiFENesin  600 mg Oral BID    ipratropium 0.5 mg-albuterol 2.5 mg  1 Dose Inhalation Q4H RT    senna  2 tablet Oral Nightly    polyethylene glycol  17 g Oral Daily     PRN Meds: sulfur hexafluoride microspheres, calcium carbonate, sodium chloride flush, sodium chloride, ondansetron **OR** ondansetron, acetaminophen **OR** acetaminophen, albuterol      Intake/Output Summary (Last 24 hours) at 2/25/2025 1710  Last data filed at 2/25/2025 0926  Gross per 24 hour   Intake 240 ml   Output --   Net 240 ml       Physical Exam Performed:    /68   Pulse 86   Temp 98.3 °F (36.8 °C) (Oral)   Resp 20   Ht 1.702 m (5' 7\")   Wt 71.1 kg (156 lb 12.8 oz)   SpO2 94% Comment: Simultaneous filing. User may  opacification of the pulmonary arterial system with no central   pulmonary embolism seen.  Emphysema without acute pulmonary abnormality.         XR CHEST PORTABLE   Final Result   Chronic obstructive lung changes with mild discoid atelectasis or scarring   which is less prominent with no obvious infiltrate.             IP CONSULT TO PULMONOLOGY    Assessment/Plan:    Active Hospital Problems    Diagnosis     Pain syndrome, chronic [G89.4]      Priority: Medium    Abnormal CT of the chest [R93.89]     COPD exacerbation (HCC) [J44.1]     Shortness of breath [R06.02]     Ex-smoker [Z87.891]     Mucus plugging of bronchi [T17.500A]      Acute on chronic respiratory failure with hypoxia  Negative viral panel  DC droplet plus  On 2 L O2 via NC at night at baseline  Wean O2 as tolerated  Pulm consult appreciated  Acute COPD exacerbation  Wean Solumedrol 40 mg IV q6h to q8h  Continue Cefepime IV given Pseudomonas PNA  Continue Duoneb  Continue Brovana and Pulmicort  SIRS  S/P IVF  Continue IV Cefepime for Pseudomonas PNA  Repeat LA improved  Chronic pain syndrome  Resumed Methadone    DVT Prophylaxis: Lovenox  Diet: ADULT DIET; Regular  Code Status: Full Code  PT/OT Eval Status: Requested    Dispo - Home     Discussed with patient, nursing and CM.    Discussed with Dr Torres (Pulm).  Slow recovery as expected.    Db Ramirez MD

## 2025-02-26 ENCOUNTER — APPOINTMENT (OUTPATIENT)
Age: 60
End: 2025-02-26
Attending: INTERNAL MEDICINE
Payer: COMMERCIAL

## 2025-02-26 LAB
ANION GAP SERPL CALCULATED.3IONS-SCNC: 8 MMOL/L (ref 3–16)
BASOPHILS # BLD: 0 K/UL (ref 0–0.2)
BASOPHILS NFR BLD: 0.1 %
BUN SERPL-MCNC: 18 MG/DL (ref 7–20)
CALCIUM SERPL-MCNC: 9.5 MG/DL (ref 8.3–10.6)
CHLORIDE SERPL-SCNC: 100 MMOL/L (ref 99–110)
CO2 SERPL-SCNC: 29 MMOL/L (ref 21–32)
CREAT SERPL-MCNC: 0.6 MG/DL (ref 0.6–1.1)
DEPRECATED RDW RBC AUTO: 14.6 % (ref 12.4–15.4)
ECHO AO ASC DIAM: 3.2 CM
ECHO AO ASCENDING AORTA INDEX: 1.76 CM/M2
ECHO AO ROOT DIAM: 2.8 CM
ECHO AO ROOT INDEX: 1.54 CM/M2
ECHO AV AREA PEAK VELOCITY: 2.2 CM2
ECHO AV AREA/BSA PEAK VELOCITY: 1.2 CM2/M2
ECHO AV PEAK GRADIENT: 9 MMHG
ECHO AV PEAK VELOCITY: 1.5 M/S
ECHO AV VELOCITY RATIO: 0.93
ECHO BSA: 1.83 M2
ECHO IVC INSP: 1 CM
ECHO IVC PROX: 2.4 CM
ECHO LA AREA 2C: 14.8 CM2
ECHO LA AREA 4C: 13.5 CM2
ECHO LA MAJOR AXIS: 4.1 CM
ECHO LA MINOR AXIS: 4.4 CM
ECHO LA VOL BP: 40 ML (ref 22–52)
ECHO LA VOL MOD A2C: 42 ML (ref 22–52)
ECHO LA VOL MOD A4C: 36 ML (ref 22–52)
ECHO LA VOL/BSA BIPLANE: 22 ML/M2 (ref 16–34)
ECHO LA VOLUME INDEX MOD A2C: 23 ML/M2 (ref 16–34)
ECHO LA VOLUME INDEX MOD A4C: 20 ML/M2 (ref 16–34)
ECHO LV E' LATERAL VELOCITY: 9.9 CM/S
ECHO LV E' SEPTAL VELOCITY: 8.49 CM/S
ECHO LV EDV A2C: 62 ML
ECHO LV EDV A4C: 54 ML
ECHO LV EDV INDEX A4C: 30 ML/M2
ECHO LV EDV NDEX A2C: 34 ML/M2
ECHO LV EF PHYSICIAN: 58 %
ECHO LV EJECTION FRACTION A2C: 49 %
ECHO LV EJECTION FRACTION A4C: 60 %
ECHO LV EJECTION FRACTION BIPLANE: 58 % (ref 55–100)
ECHO LV ESV A2C: 32 ML
ECHO LV ESV A4C: 22 ML
ECHO LV ESV INDEX A2C: 18 ML/M2
ECHO LV ESV INDEX A4C: 12 ML/M2
ECHO LV FRACTIONAL SHORTENING: 38 % (ref 28–44)
ECHO LV INTERNAL DIMENSION DIASTOLE INDEX: 2.31 CM/M2
ECHO LV INTERNAL DIMENSION DIASTOLIC: 4.2 CM (ref 3.9–5.3)
ECHO LV INTERNAL DIMENSION SYSTOLIC INDEX: 1.43 CM/M2
ECHO LV INTERNAL DIMENSION SYSTOLIC: 2.6 CM
ECHO LV IVSD: 0.8 CM (ref 0.6–0.9)
ECHO LV MASS 2D: 93 G (ref 67–162)
ECHO LV MASS INDEX 2D: 51.1 G/M2 (ref 43–95)
ECHO LV POSTERIOR WALL DIASTOLIC: 0.7 CM (ref 0.6–0.9)
ECHO LV RELATIVE WALL THICKNESS RATIO: 0.33
ECHO LVOT AREA: 2.3 CM2
ECHO LVOT DIAM: 1.7 CM
ECHO LVOT MEAN GRADIENT: 4 MMHG
ECHO LVOT PEAK GRADIENT: 8 MMHG
ECHO LVOT PEAK VELOCITY: 1.4 M/S
ECHO LVOT STROKE VOLUME INDEX: 34.3 ML/M2
ECHO LVOT SV: 62.4 ML
ECHO LVOT VTI: 27.5 CM
ECHO MV A VELOCITY: 0.99 M/S
ECHO MV E VELOCITY: 0.68 M/S
ECHO MV E/A RATIO: 0.69
ECHO MV E/E' LATERAL: 6.87
ECHO MV E/E' RATIO (AVERAGED): 7.44
ECHO MV E/E' SEPTAL: 8.01
ECHO PV MAX VELOCITY: 1.4 M/S
ECHO PV PEAK GRADIENT: 8 MMHG
ECHO RA AREA 4C: 14 CM2
ECHO RA END SYSTOLIC VOLUME APICAL 4 CHAMBER INDEX BSA: 19 ML/M2
ECHO RA VOLUME: 35 ML
ECHO RV BASAL DIMENSION: 3.3 CM
ECHO RV FREE WALL PEAK S': 15.6 CM/S
ECHO RV LONGITUDINAL DIMENSION: 6.4 CM
ECHO RV MID DIMENSION: 2.7 CM
ECHO RV TAPSE: 2.1 CM (ref 1.7–?)
EOSINOPHIL # BLD: 0.1 K/UL (ref 0–0.6)
EOSINOPHIL NFR BLD: 0.5 %
GFR SERPLBLD CREATININE-BSD FMLA CKD-EPI: >90 ML/MIN/{1.73_M2}
GLUCOSE BLD-MCNC: 109 MG/DL (ref 70–99)
GLUCOSE BLD-MCNC: 109 MG/DL (ref 70–99)
GLUCOSE BLD-MCNC: 138 MG/DL (ref 70–99)
GLUCOSE SERPL-MCNC: 171 MG/DL (ref 70–99)
HCT VFR BLD AUTO: 41.4 % (ref 36–48)
HGB BLD-MCNC: 13.4 G/DL (ref 12–16)
LYMPHOCYTES # BLD: 0.5 K/UL (ref 1–5.1)
LYMPHOCYTES NFR BLD: 4.3 %
MCH RBC QN AUTO: 28.2 PG (ref 26–34)
MCHC RBC AUTO-ENTMCNC: 32.5 G/DL (ref 31–36)
MCV RBC AUTO: 86.7 FL (ref 80–100)
MONOCYTES # BLD: 0.5 K/UL (ref 0–1.3)
MONOCYTES NFR BLD: 3.8 %
NEUTROPHILS # BLD: 11.4 K/UL (ref 1.7–7.7)
NEUTROPHILS NFR BLD: 91.3 %
PERFORMED ON: ABNORMAL
PLATELET # BLD AUTO: 186 K/UL (ref 135–450)
PMV BLD AUTO: 8 FL (ref 5–10.5)
POTASSIUM SERPL-SCNC: 4.2 MMOL/L (ref 3.5–5.1)
RBC # BLD AUTO: 4.77 M/UL (ref 4–5.2)
SODIUM SERPL-SCNC: 137 MMOL/L (ref 136–145)
WBC # BLD AUTO: 12.5 K/UL (ref 4–11)

## 2025-02-26 PROCEDURE — 6370000000 HC RX 637 (ALT 250 FOR IP): Performed by: INTERNAL MEDICINE

## 2025-02-26 PROCEDURE — 6360000002 HC RX W HCPCS: Performed by: INTERNAL MEDICINE

## 2025-02-26 PROCEDURE — 80048 BASIC METABOLIC PNL TOTAL CA: CPT

## 2025-02-26 PROCEDURE — 6360000002 HC RX W HCPCS: Performed by: STUDENT IN AN ORGANIZED HEALTH CARE EDUCATION/TRAINING PROGRAM

## 2025-02-26 PROCEDURE — 2500000003 HC RX 250 WO HCPCS: Performed by: INTERNAL MEDICINE

## 2025-02-26 PROCEDURE — 93306 TTE W/DOPPLER COMPLETE: CPT | Performed by: INTERNAL MEDICINE

## 2025-02-26 PROCEDURE — 85025 COMPLETE CBC W/AUTO DIFF WBC: CPT

## 2025-02-26 PROCEDURE — 94761 N-INVAS EAR/PLS OXIMETRY MLT: CPT

## 2025-02-26 PROCEDURE — 6370000000 HC RX 637 (ALT 250 FOR IP): Performed by: NURSE PRACTITIONER

## 2025-02-26 PROCEDURE — 2580000003 HC RX 258: Performed by: STUDENT IN AN ORGANIZED HEALTH CARE EDUCATION/TRAINING PROGRAM

## 2025-02-26 PROCEDURE — 1200000000 HC SEMI PRIVATE

## 2025-02-26 PROCEDURE — 2700000000 HC OXYGEN THERAPY PER DAY

## 2025-02-26 PROCEDURE — 99232 SBSQ HOSP IP/OBS MODERATE 35: CPT | Performed by: STUDENT IN AN ORGANIZED HEALTH CARE EDUCATION/TRAINING PROGRAM

## 2025-02-26 PROCEDURE — 93306 TTE W/DOPPLER COMPLETE: CPT

## 2025-02-26 PROCEDURE — 94640 AIRWAY INHALATION TREATMENT: CPT

## 2025-02-26 RX ORDER — NYSTATIN 100000 [USP'U]/ML
5 SUSPENSION ORAL 4 TIMES DAILY
Status: DISCONTINUED | OUTPATIENT
Start: 2025-02-26 | End: 2025-02-28 | Stop reason: HOSPADM

## 2025-02-26 RX ADMIN — WATER 40 MG: 1 INJECTION INTRAMUSCULAR; INTRAVENOUS; SUBCUTANEOUS at 08:41

## 2025-02-26 RX ADMIN — CEFEPIME 2000 MG: 2 INJECTION, POWDER, FOR SOLUTION INTRAVENOUS at 18:09

## 2025-02-26 RX ADMIN — BUDESONIDE 500 MCG: 0.5 INHALANT RESPIRATORY (INHALATION) at 20:19

## 2025-02-26 RX ADMIN — SODIUM CHLORIDE, PRESERVATIVE FREE 10 ML: 5 INJECTION INTRAVENOUS at 08:53

## 2025-02-26 RX ADMIN — GUAIFENESIN 600 MG: 600 TABLET, EXTENDED RELEASE ORAL at 19:49

## 2025-02-26 RX ADMIN — CEFEPIME 2000 MG: 2 INJECTION, POWDER, FOR SOLUTION INTRAVENOUS at 03:22

## 2025-02-26 RX ADMIN — IPRATROPIUM BROMIDE AND ALBUTEROL SULFATE 1 DOSE: .5; 3 SOLUTION RESPIRATORY (INHALATION) at 12:05

## 2025-02-26 RX ADMIN — SODIUM CHLORIDE, PRESERVATIVE FREE 10 ML: 5 INJECTION INTRAVENOUS at 19:50

## 2025-02-26 RX ADMIN — GUAIFENESIN 600 MG: 600 TABLET, EXTENDED RELEASE ORAL at 08:46

## 2025-02-26 RX ADMIN — ENOXAPARIN SODIUM 40 MG: 100 INJECTION SUBCUTANEOUS at 08:43

## 2025-02-26 RX ADMIN — WATER 40 MG: 1 INJECTION INTRAMUSCULAR; INTRAVENOUS; SUBCUTANEOUS at 01:11

## 2025-02-26 RX ADMIN — WATER 40 MG: 1 INJECTION INTRAMUSCULAR; INTRAVENOUS; SUBCUTANEOUS at 17:58

## 2025-02-26 RX ADMIN — ARFORMOTEROL TARTRATE 15 MCG: 15 SOLUTION RESPIRATORY (INHALATION) at 08:23

## 2025-02-26 RX ADMIN — BUDESONIDE 500 MCG: 0.5 INHALANT RESPIRATORY (INHALATION) at 08:23

## 2025-02-26 RX ADMIN — POLYETHYLENE GLYCOL 3350 17 G: 17 POWDER, FOR SOLUTION ORAL at 08:43

## 2025-02-26 RX ADMIN — IPRATROPIUM BROMIDE AND ALBUTEROL SULFATE 1 DOSE: .5; 3 SOLUTION RESPIRATORY (INHALATION) at 05:09

## 2025-02-26 RX ADMIN — ARFORMOTEROL TARTRATE 15 MCG: 15 SOLUTION RESPIRATORY (INHALATION) at 20:18

## 2025-02-26 RX ADMIN — IPRATROPIUM BROMIDE AND ALBUTEROL SULFATE 1 DOSE: .5; 3 SOLUTION RESPIRATORY (INHALATION) at 20:18

## 2025-02-26 RX ADMIN — SENNOSIDES 17.2 MG: 8.6 TABLET, COATED ORAL at 19:49

## 2025-02-26 RX ADMIN — IPRATROPIUM BROMIDE AND ALBUTEROL SULFATE 1 DOSE: .5; 3 SOLUTION RESPIRATORY (INHALATION) at 08:22

## 2025-02-26 RX ADMIN — CEFEPIME 2000 MG: 2 INJECTION, POWDER, FOR SOLUTION INTRAVENOUS at 11:10

## 2025-02-26 RX ADMIN — NYSTATIN 500000 UNITS: 100000 SUSPENSION ORAL at 22:16

## 2025-02-26 RX ADMIN — METHADONE HYDROCHLORIDE 60 MG: 10 CONCENTRATE ORAL at 08:44

## 2025-02-26 RX ADMIN — IPRATROPIUM BROMIDE AND ALBUTEROL SULFATE 1 DOSE: .5; 3 SOLUTION RESPIRATORY (INHALATION) at 00:56

## 2025-02-26 RX ADMIN — IPRATROPIUM BROMIDE AND ALBUTEROL SULFATE 1 DOSE: .5; 3 SOLUTION RESPIRATORY (INHALATION) at 16:13

## 2025-02-26 ASSESSMENT — PAIN SCALES - GENERAL
PAINLEVEL_OUTOF10: 0

## 2025-02-26 NOTE — PROGRESS NOTES
Pulmonary and Critical Care Medicine    CC: SOB   Date: 2025  Admit Date:  2025  Reason for Consultation: SOB  Consult Requesting Physician: Db Ramirez MD     HPI     This is a 58 y/o F w/ a hx of COPD, lung nodules, tobacco use,nocturnal oxygen who presented to [facility] with SOB.    Overnight:  She is down to 2L NC  Feeling much improved    ROS: negative except stated above    OBJECTIVE DATA       PHYSICAL EXAM:   Temp  Av.5 °F (36.9 °C)  Min: 98 °F (36.7 °C)  Max: 99.1 °F (37.3 °C)  Pulse  Av.8  Min: 80  Max: 103  BP  Min: 120/76  Max: 144/78  SpO2  Av.8 %  Min: 90 %  Max: 95 %    General: NAD  Neuro: A0x3  Lung: scant bilateral expiratory wheezing, equal   Heart: regular rate    MEDICATIONS: Reviewed  Scheduled Meds:   methylPREDNISolone  40 mg IntraVENous Q8H    cefepime  2,000 mg IntraVENous Q8H    budesonide  0.5 mg Nebulization BID RT    arformoterol tartrate  15 mcg Nebulization BID RT    methadone  60 mg Oral Daily    sodium chloride  500 mL IntraVENous Once    sodium chloride flush  5-40 mL IntraVENous 2 times per day    enoxaparin  40 mg SubCUTAneous Daily    guaiFENesin  600 mg Oral BID    ipratropium 0.5 mg-albuterol 2.5 mg  1 Dose Inhalation Q4H RT    senna  2 tablet Oral Nightly    polyethylene glycol  17 g Oral Daily     Continuous Infusions:   sodium chloride       PRN Meds:.sulfur hexafluoride microspheres, calcium carbonate, sodium chloride flush, sodium chloride, ondansetron **OR** ondansetron, acetaminophen **OR** acetaminophen, albuterol     LABS: Reviewed.   Recent Labs     25  0616 25  0522 25  0540    137 137   K 4.7 4.7 4.2    99 100   CO2 29 30 29   BUN 14 15 18   CREATININE 0.6 0.7 0.6     Recent Labs     25  0616 25  0522 25  0540   WBC 17.0* 14.2* 12.5*   HGB 13.6 13.5 13.4   HCT 41.4 41.7 41.4   MCV 85.7 87.3 86.7    176 186     Lab Results   Component Value Date/Time    PROTIME 12.4 2025 06:36 AM

## 2025-02-26 NOTE — PROGRESS NOTES
Hospitalist Progress Note      PCP: None, None    Date of Admission: 2/22/2025    Chief Complaint: SOB    Hospital Course: 58 yo F with severe COPD, chronic respiratory failure with hypoxia on 2 L O2 via NC at night, chronic pain syndrome on Methadone was admitted as inpatient for acute COPD exacerbation, acute on chronic respiratory failure with hypoxia and SIRS.  Started on IV steroids, nebs and Abx.  PNA panel with Pseudomonas and respiratory panel negative.    Followed by Pulm.      CT PE protocol   IMPRESSION:  Suboptimal opacification of the pulmonary arterial system with no central  pulmonary embolism seen.  Emphysema without acute pulmonary abnormality..    Subjective:  Patient finally less SOB but still coughing.  No CP, HA or abdominal pain.  No fevers.       Medications:  Reviewed    Infusion Medications    sodium chloride       Scheduled Medications    methylPREDNISolone  40 mg IntraVENous Q8H    cefepime  2,000 mg IntraVENous Q8H    budesonide  0.5 mg Nebulization BID RT    arformoterol tartrate  15 mcg Nebulization BID RT    methadone  60 mg Oral Daily    sodium chloride  500 mL IntraVENous Once    sodium chloride flush  5-40 mL IntraVENous 2 times per day    enoxaparin  40 mg SubCUTAneous Daily    guaiFENesin  600 mg Oral BID    ipratropium 0.5 mg-albuterol 2.5 mg  1 Dose Inhalation Q4H RT    senna  2 tablet Oral Nightly    polyethylene glycol  17 g Oral Daily     PRN Meds: sulfur hexafluoride microspheres, calcium carbonate, sodium chloride flush, sodium chloride, ondansetron **OR** ondansetron, acetaminophen **OR** acetaminophen, albuterol    No intake or output data in the 24 hours ending 02/26/25 1256      Physical Exam Performed:    BP (!) 144/78   Pulse 80   Temp 98.6 °F (37 °C) (Oral)   Resp 18   Ht 1.702 m (5' 7\")   Wt 70.8 kg (156 lb)   SpO2 91%   BMI 24.43 kg/m²     General appearance: No apparent distress, appears stated age and cooperative.  HEENT: Pupils equal, round, and

## 2025-02-26 NOTE — PLAN OF CARE
Problem: Pain  Goal: Verbalizes/displays adequate comfort level or baseline comfort level  2/26/2025 0954 by Jamila Coy RN  Outcome: Progressing     Problem: Safety - Adult  Goal: Free from fall injury  2/26/2025 0954 by Jamila Coy RN  Outcome: Progressing     Problem: Respiratory - Adult  Goal: Achieves optimal ventilation and oxygenation  2/26/2025 0954 by Jamila Coy RN  Outcome: Progressing  Flowsheets (Taken 2/25/2025 2024 by Gina White RN)  Achieves optimal ventilation and oxygenation:   Assess for changes in respiratory status   Assess for changes in mentation and behavior     Problem: Cardiovascular - Adult  Goal: Maintains optimal cardiac output and hemodynamic stability  2/26/2025 0954 by Jamila Coy RN  Outcome: Progressing     Problem: Cardiovascular - Adult  Goal: Absence of cardiac dysrhythmias or at baseline  2/26/2025 0954 by Jamila Coy RN  Outcome: Progressing     Problem: Gastrointestinal - Adult  Goal: Minimal or absence of nausea and vomiting  2/26/2025 0954 by Jamila Coy RN  Outcome: Progressing  Flowsheets (Taken 2/25/2025 2024 by Gina White RN)  Minimal or absence of nausea and vomiting: Administer IV fluids as ordered to ensure adequate hydration     Problem: Gastrointestinal - Adult  Goal: Maintains or returns to baseline bowel function  2/26/2025 0954 by Jamila Coy RN  Outcome: Progressing  Flowsheets (Taken 2/25/2025 2024 by Gina White, RN)  Maintains or returns to baseline bowel function: Assess bowel function     Problem: Gastrointestinal - Adult  Goal: Maintains adequate nutritional intake  2/26/2025 0954 by Jamila Coy RN  Outcome: Progressing  Flowsheets (Taken 2/25/2025 2024 by Gina White RN)  Maintains adequate nutritional intake:   Monitor percentage of each meal consumed   Monitor intake and output, weight and lab values     Problem: Infection - Adult  Goal: Absence of infection at discharge  2/26/2025 0954 by Jamila Coy  RN  Outcome: Progressing  Flowsheets (Taken 2/25/2025 2024 by Gina White RN)  Absence of infection at discharge:   Assess and monitor for signs and symptoms of infection   Monitor lab/diagnostic results     Problem: Infection - Adult  Goal: Absence of infection during hospitalization  2/26/2025 0954 by Jamila Coy RN  Outcome: Progressing  Flowsheets (Taken 2/25/2025 2024 by Gina White RN)  Absence of infection during hospitalization: Assess and monitor for signs and symptoms of infection     Problem: Infection - Adult  Goal: Absence of fever/infection during anticipated neutropenic period  2/26/2025 0954 by Jamila Coy RN  Outcome: Progressing  Flowsheets (Taken 2/25/2025 2024 by Gina White RN)  Absence of fever/infection during anticipated neutropenic period: Monitor white blood cell count     Problem: ABCDS Injury Assessment  Goal: Absence of physical injury  2/26/2025 0954 by Jamila Coy RN  Outcome: Progressing

## 2025-02-26 NOTE — PROGRESS NOTES
Shift assessment complete. VSS. Morning meds given. Bed locked and low position. Call light within reach. No additional needs expressed at this time. Will continue to monitor.

## 2025-02-27 PROCEDURE — 2700000000 HC OXYGEN THERAPY PER DAY

## 2025-02-27 PROCEDURE — 99232 SBSQ HOSP IP/OBS MODERATE 35: CPT | Performed by: STUDENT IN AN ORGANIZED HEALTH CARE EDUCATION/TRAINING PROGRAM

## 2025-02-27 PROCEDURE — 6360000002 HC RX W HCPCS: Performed by: INTERNAL MEDICINE

## 2025-02-27 PROCEDURE — 6370000000 HC RX 637 (ALT 250 FOR IP): Performed by: INTERNAL MEDICINE

## 2025-02-27 PROCEDURE — 2580000003 HC RX 258: Performed by: STUDENT IN AN ORGANIZED HEALTH CARE EDUCATION/TRAINING PROGRAM

## 2025-02-27 PROCEDURE — 6370000000 HC RX 637 (ALT 250 FOR IP): Performed by: NURSE PRACTITIONER

## 2025-02-27 PROCEDURE — 94761 N-INVAS EAR/PLS OXIMETRY MLT: CPT

## 2025-02-27 PROCEDURE — 6360000002 HC RX W HCPCS: Performed by: STUDENT IN AN ORGANIZED HEALTH CARE EDUCATION/TRAINING PROGRAM

## 2025-02-27 PROCEDURE — 2500000003 HC RX 250 WO HCPCS: Performed by: INTERNAL MEDICINE

## 2025-02-27 PROCEDURE — 94640 AIRWAY INHALATION TREATMENT: CPT

## 2025-02-27 PROCEDURE — 1200000000 HC SEMI PRIVATE

## 2025-02-27 RX ADMIN — IPRATROPIUM BROMIDE AND ALBUTEROL SULFATE 1 DOSE: .5; 3 SOLUTION RESPIRATORY (INHALATION) at 16:02

## 2025-02-27 RX ADMIN — CEFEPIME 2000 MG: 2 INJECTION, POWDER, FOR SOLUTION INTRAVENOUS at 02:35

## 2025-02-27 RX ADMIN — ENOXAPARIN SODIUM 40 MG: 100 INJECTION SUBCUTANEOUS at 08:39

## 2025-02-27 RX ADMIN — SENNOSIDES 17.2 MG: 8.6 TABLET, COATED ORAL at 20:53

## 2025-02-27 RX ADMIN — ARFORMOTEROL TARTRATE 15 MCG: 15 SOLUTION RESPIRATORY (INHALATION) at 19:52

## 2025-02-27 RX ADMIN — ARFORMOTEROL TARTRATE 15 MCG: 15 SOLUTION RESPIRATORY (INHALATION) at 07:50

## 2025-02-27 RX ADMIN — BUDESONIDE 500 MCG: 0.5 INHALANT RESPIRATORY (INHALATION) at 19:52

## 2025-02-27 RX ADMIN — NYSTATIN 500000 UNITS: 100000 SUSPENSION ORAL at 08:39

## 2025-02-27 RX ADMIN — GUAIFENESIN 600 MG: 600 TABLET, EXTENDED RELEASE ORAL at 20:53

## 2025-02-27 RX ADMIN — CEFEPIME 2000 MG: 2 INJECTION, POWDER, FOR SOLUTION INTRAVENOUS at 23:00

## 2025-02-27 RX ADMIN — SODIUM CHLORIDE, PRESERVATIVE FREE 10 ML: 5 INJECTION INTRAVENOUS at 08:39

## 2025-02-27 RX ADMIN — WATER 40 MG: 1 INJECTION INTRAMUSCULAR; INTRAVENOUS; SUBCUTANEOUS at 00:37

## 2025-02-27 RX ADMIN — IPRATROPIUM BROMIDE AND ALBUTEROL SULFATE 1 DOSE: .5; 3 SOLUTION RESPIRATORY (INHALATION) at 19:52

## 2025-02-27 RX ADMIN — METHADONE HYDROCHLORIDE 60 MG: 10 CONCENTRATE ORAL at 08:39

## 2025-02-27 RX ADMIN — CEFEPIME 2000 MG: 2 INJECTION, POWDER, FOR SOLUTION INTRAVENOUS at 12:15

## 2025-02-27 RX ADMIN — IPRATROPIUM BROMIDE AND ALBUTEROL SULFATE 1 DOSE: .5; 3 SOLUTION RESPIRATORY (INHALATION) at 00:42

## 2025-02-27 RX ADMIN — CEFEPIME 2000 MG: 2 INJECTION, POWDER, FOR SOLUTION INTRAVENOUS at 21:59

## 2025-02-27 RX ADMIN — WATER 40 MG: 1 INJECTION INTRAMUSCULAR; INTRAVENOUS; SUBCUTANEOUS at 08:39

## 2025-02-27 RX ADMIN — GUAIFENESIN 600 MG: 600 TABLET, EXTENDED RELEASE ORAL at 08:40

## 2025-02-27 RX ADMIN — BUDESONIDE 500 MCG: 0.5 INHALANT RESPIRATORY (INHALATION) at 07:50

## 2025-02-27 RX ADMIN — IPRATROPIUM BROMIDE AND ALBUTEROL SULFATE 1 DOSE: .5; 3 SOLUTION RESPIRATORY (INHALATION) at 04:23

## 2025-02-27 RX ADMIN — IPRATROPIUM BROMIDE AND ALBUTEROL SULFATE 1 DOSE: .5; 3 SOLUTION RESPIRATORY (INHALATION) at 11:50

## 2025-02-27 RX ADMIN — WATER 40 MG: 1 INJECTION INTRAMUSCULAR; INTRAVENOUS; SUBCUTANEOUS at 17:45

## 2025-02-27 RX ADMIN — IPRATROPIUM BROMIDE AND ALBUTEROL SULFATE 1 DOSE: .5; 3 SOLUTION RESPIRATORY (INHALATION) at 07:50

## 2025-02-27 RX ADMIN — SODIUM CHLORIDE, PRESERVATIVE FREE 10 ML: 5 INJECTION INTRAVENOUS at 20:54

## 2025-02-27 ASSESSMENT — PAIN SCALES - GENERAL: PAINLEVEL_OUTOF10: 0

## 2025-02-27 NOTE — PROGRESS NOTES
Pulmonary and Critical Care Medicine    CC: SOB   Date: 2025  Admit Date:  2025  Reason for Consultation: SOB  Consult Requesting Physician: Db Ramirez MD     HPI     This is a 60 y/o F w/ a hx of COPD, lung nodules, tobacco use,nocturnal oxygen who presented to [facility] with SOB.    Overnight:  She is down to 2.5L NC  Improved but not baseline    ROS: negative except stated above    OBJECTIVE DATA       PHYSICAL EXAM:   Temp  Av.2 °F (36.8 °C)  Min: 97.7 °F (36.5 °C)  Max: 98.6 °F (37 °C)  Pulse  Av.3  Min: 80  Max: 93  BP  Min: 109/63  Max: 150/71  SpO2  Av.5 %  Min: 92 %  Max: 96 %    General: NAD  Neuro: A0x3  Lung: scant bilateral expiratory wheezing, equal   Heart: regular rate    MEDICATIONS: Reviewed  Scheduled Meds:   nystatin  5 mL Oral 4x Daily    methylPREDNISolone  40 mg IntraVENous Q8H    cefepime  2,000 mg IntraVENous Q8H    budesonide  0.5 mg Nebulization BID RT    arformoterol tartrate  15 mcg Nebulization BID RT    methadone  60 mg Oral Daily    sodium chloride  500 mL IntraVENous Once    sodium chloride flush  5-40 mL IntraVENous 2 times per day    enoxaparin  40 mg SubCUTAneous Daily    guaiFENesin  600 mg Oral BID    ipratropium 0.5 mg-albuterol 2.5 mg  1 Dose Inhalation Q4H RT    senna  2 tablet Oral Nightly    polyethylene glycol  17 g Oral Daily     Continuous Infusions:   sodium chloride       PRN Meds:.sulfur hexafluoride microspheres, calcium carbonate, sodium chloride flush, sodium chloride, ondansetron **OR** ondansetron, acetaminophen **OR** acetaminophen, albuterol     LABS: Reviewed.   Recent Labs     25  0522 25  0540    137   K 4.7 4.2   CL 99 100   CO2 30 29   BUN 15 18   CREATININE 0.7 0.6     Recent Labs     25  0522 25  0540   WBC 14.2* 12.5*   HGB 13.5 13.4   HCT 41.7 41.4   MCV 87.3 86.7    186     Lab Results   Component Value Date/Time    PROTIME 12.4 2025 06:36 AM    PROTIME 12.1 2024 06:13 AM     PROTIME 12.9 04/17/2023 07:20 AM    INR 0.90 02/23/2025 06:36 AM    INR 0.88 11/08/2024 06:13 AM    INR 0.97 04/17/2023 07:20 AM     Lab Results   Component Value Date/Time    PZS0PFS 35.4 09/04/2024 07:58 AM    BEART 8.5 09/04/2024 07:58 AM    S2JWLWKW 94.7 09/04/2024 07:58 AM    PHART 7.401 09/04/2024 07:58 AM    MNZ5TRC 57.0 09/04/2024 07:58 AM    PO2ART 67.6 09/04/2024 07:58 AM    JXV5EQE 83.2 09/04/2024 07:58 AM     No intake or output data in the 24 hours ending 02/27/25 1224     No intake/output data recorded.     IMAGES: Reviewed       ASSESSMENT AND PLAN:     Acute hypoxic respiratory failure   Compensated hypercapnic respiratory failure   AE-COPD  Distal mucus plugging  Respiratory cx positive for PA  Hx of bronchoscopy  Hx of pseudomonas respiratory infection   Lactic acidosis   On methadone     Recommendations:  - she is slowly improving   - continue cefepime   - wbc improving  - continue solu-medrol, nebs as ordered  - continue flutter valve  - TTE nl  - hoping to discharge tomorrow on PO prednisone taper and levoflox to finish 7 day course.  - f/u with pulmonary as scheduled on 3/18    Pulmonary medicine will sign off.     Moderate risk 35 minutes     Arsen Torres MD  Pulmonary and Critical Care Medicine   [facility]

## 2025-02-27 NOTE — PROGRESS NOTES
Hospitalist Progress Note      PCP: None, None    Date of Admission: 2/22/2025    Chief Complaint: SOB    Hospital Course: 58 yo F with severe COPD, chronic respiratory failure with hypoxia on 2 L O2 via NC at night, chronic pain syndrome on Methadone was admitted as inpatient for acute COPD exacerbation, acute on chronic respiratory failure with hypoxia and SIRS.  Started on IV steroids, nebs and Abx.  PNA panel with Pseudomonas and respiratory panel negative.    Followed by Pulm.      CT PE protocol   IMPRESSION:  Suboptimal opacification of the pulmonary arterial system with no central  pulmonary embolism seen.  Emphysema without acute pulmonary abnormality..    Subjective:  Patient anxious and remains SOB.  Cannot expectorate.  Does not feel ready for home.  No CP, HA or abdominal pain.  No fevers.       Medications:  Reviewed    Infusion Medications    sodium chloride       Scheduled Medications    nystatin  5 mL Oral 4x Daily    methylPREDNISolone  40 mg IntraVENous Q8H    cefepime  2,000 mg IntraVENous Q8H    budesonide  0.5 mg Nebulization BID RT    arformoterol tartrate  15 mcg Nebulization BID RT    methadone  60 mg Oral Daily    sodium chloride  500 mL IntraVENous Once    sodium chloride flush  5-40 mL IntraVENous 2 times per day    enoxaparin  40 mg SubCUTAneous Daily    guaiFENesin  600 mg Oral BID    ipratropium 0.5 mg-albuterol 2.5 mg  1 Dose Inhalation Q4H RT    senna  2 tablet Oral Nightly    polyethylene glycol  17 g Oral Daily     PRN Meds: sulfur hexafluoride microspheres, calcium carbonate, sodium chloride flush, sodium chloride, ondansetron **OR** ondansetron, acetaminophen **OR** acetaminophen, albuterol    No intake or output data in the 24 hours ending 02/27/25 0952      Physical Exam Performed:    /79   Pulse 91   Temp 97.7 °F (36.5 °C) (Oral)   Resp 18   Ht 1.702 m (5' 7\")   Wt 70.8 kg (156 lb)   SpO2 93%   BMI 24.43 kg/m²     General appearance: No apparent distress,  discoid atelectasis or scarring   which is less prominent with no obvious infiltrate.             IP CONSULT TO PULMONOLOGY    Assessment/Plan:    Active Hospital Problems    Diagnosis     Pain syndrome, chronic [G89.4]      Priority: Medium    Abnormal CT of the chest [R93.89]     COPD exacerbation (HCC) [J44.1]     Shortness of breath [R06.02]     Ex-smoker [Z87.891]     Mucus plugging of bronchi [T17.500A]      Acute on chronic respiratory failure with hypoxia  Negative viral panel  DC droplet plus  On 2 L O2 via NC at night at baseline  Wean O2 as tolerated  Pulm consult appreciated  Acute COPD exacerbation  Continue Solumedrol 40 mg IV q8h (will not wean yet)  Continue Cefepime IV given Pseudomonas PNA  Continue Duoneb  Continue Brovana and Pulmicort  SIRS  S/P IVF  Continue IV Cefepime for Pseudomonas PNA  Repeat LA improved  Chronic pain syndrome  Resumed Methadone    DVT Prophylaxis: Lovenox  Diet: ADULT DIET; Regular  Code Status: Full Code  PT/OT Eval Status: Requested    Dispo - Home     Discussed with patient, nursing and CM.    Respiratory status still not improved for her to go home.    Hopefully home in next 48 hrs.  Continue IV Solumedrol q8h with nebs.    Db Ramirez MD

## 2025-02-28 VITALS
HEART RATE: 90 BPM | BODY MASS INDEX: 24.48 KG/M2 | WEIGHT: 156 LBS | SYSTOLIC BLOOD PRESSURE: 130 MMHG | HEIGHT: 67 IN | DIASTOLIC BLOOD PRESSURE: 77 MMHG | OXYGEN SATURATION: 93 % | TEMPERATURE: 98 F | RESPIRATION RATE: 18 BRPM

## 2025-02-28 PROCEDURE — 94640 AIRWAY INHALATION TREATMENT: CPT

## 2025-02-28 PROCEDURE — 6360000002 HC RX W HCPCS: Performed by: STUDENT IN AN ORGANIZED HEALTH CARE EDUCATION/TRAINING PROGRAM

## 2025-02-28 PROCEDURE — 6360000002 HC RX W HCPCS: Performed by: INTERNAL MEDICINE

## 2025-02-28 PROCEDURE — 6370000000 HC RX 637 (ALT 250 FOR IP): Performed by: INTERNAL MEDICINE

## 2025-02-28 PROCEDURE — 2580000003 HC RX 258: Performed by: STUDENT IN AN ORGANIZED HEALTH CARE EDUCATION/TRAINING PROGRAM

## 2025-02-28 PROCEDURE — 2500000003 HC RX 250 WO HCPCS: Performed by: INTERNAL MEDICINE

## 2025-02-28 PROCEDURE — 2700000000 HC OXYGEN THERAPY PER DAY

## 2025-02-28 PROCEDURE — 94761 N-INVAS EAR/PLS OXIMETRY MLT: CPT

## 2025-02-28 RX ORDER — PREDNISONE 10 MG/1
TABLET ORAL
Qty: 37 TABLET | Refills: 0 | Status: SHIPPED | OUTPATIENT
Start: 2025-02-28

## 2025-02-28 RX ORDER — NYSTATIN 100000 [USP'U]/ML
5 SUSPENSION ORAL 4 TIMES DAILY
Qty: 90 ML | Refills: 0 | Status: SHIPPED | OUTPATIENT
Start: 2025-02-28 | End: 2025-03-05

## 2025-02-28 RX ORDER — GUAIFENESIN 600 MG/1
600 TABLET, EXTENDED RELEASE ORAL 2 TIMES DAILY
Qty: 28 TABLET | Refills: 0 | Status: SHIPPED | OUTPATIENT
Start: 2025-02-28 | End: 2025-03-14

## 2025-02-28 RX ORDER — ALBUTEROL SULFATE 0.83 MG/ML
2.5 SOLUTION RESPIRATORY (INHALATION) EVERY 6 HOURS PRN
Qty: 120 EACH | Refills: 0 | Status: SHIPPED | OUTPATIENT
Start: 2025-02-28

## 2025-02-28 RX ORDER — ALBUTEROL SULFATE 90 UG/1
2 INHALANT RESPIRATORY (INHALATION) EVERY 4 HOURS PRN
Qty: 54 G | Refills: 0 | Status: SHIPPED | OUTPATIENT
Start: 2025-02-28 | End: 2026-02-28

## 2025-02-28 RX ORDER — MOMETASONE FUROATE AND FORMOTEROL FUMARATE DIHYDRATE 200; 5 UG/1; UG/1
2 AEROSOL RESPIRATORY (INHALATION) EVERY 12 HOURS
Qty: 13 G | Refills: 0 | Status: SHIPPED | OUTPATIENT
Start: 2025-02-28

## 2025-02-28 RX ORDER — LEVOFLOXACIN 750 MG/1
750 TABLET, FILM COATED ORAL DAILY
Qty: 4 TABLET | Refills: 0 | Status: SHIPPED | OUTPATIENT
Start: 2025-02-28 | End: 2025-03-04

## 2025-02-28 RX ADMIN — ARFORMOTEROL TARTRATE 15 MCG: 15 SOLUTION RESPIRATORY (INHALATION) at 07:28

## 2025-02-28 RX ADMIN — IPRATROPIUM BROMIDE AND ALBUTEROL SULFATE 1 DOSE: .5; 3 SOLUTION RESPIRATORY (INHALATION) at 00:06

## 2025-02-28 RX ADMIN — WATER 40 MG: 1 INJECTION INTRAMUSCULAR; INTRAVENOUS; SUBCUTANEOUS at 02:37

## 2025-02-28 RX ADMIN — IPRATROPIUM BROMIDE AND ALBUTEROL SULFATE 1 DOSE: .5; 3 SOLUTION RESPIRATORY (INHALATION) at 04:39

## 2025-02-28 RX ADMIN — CEFEPIME 2000 MG: 2 INJECTION, POWDER, FOR SOLUTION INTRAVENOUS at 09:47

## 2025-02-28 RX ADMIN — BUDESONIDE 500 MCG: 0.5 INHALANT RESPIRATORY (INHALATION) at 07:28

## 2025-02-28 RX ADMIN — IPRATROPIUM BROMIDE AND ALBUTEROL SULFATE 1 DOSE: .5; 3 SOLUTION RESPIRATORY (INHALATION) at 12:10

## 2025-02-28 RX ADMIN — METHADONE HYDROCHLORIDE 60 MG: 10 CONCENTRATE ORAL at 09:47

## 2025-02-28 RX ADMIN — SODIUM CHLORIDE, PRESERVATIVE FREE 10 ML: 5 INJECTION INTRAVENOUS at 09:33

## 2025-02-28 RX ADMIN — WATER 40 MG: 1 INJECTION INTRAMUSCULAR; INTRAVENOUS; SUBCUTANEOUS at 09:31

## 2025-02-28 RX ADMIN — GUAIFENESIN 600 MG: 600 TABLET, EXTENDED RELEASE ORAL at 09:31

## 2025-02-28 RX ADMIN — IPRATROPIUM BROMIDE AND ALBUTEROL SULFATE 1 DOSE: .5; 3 SOLUTION RESPIRATORY (INHALATION) at 07:28

## 2025-02-28 RX ADMIN — ENOXAPARIN SODIUM 40 MG: 100 INJECTION SUBCUTANEOUS at 09:32

## 2025-02-28 ASSESSMENT — PAIN SCALES - GENERAL: PAINLEVEL_OUTOF10: 0

## 2025-02-28 NOTE — PROGRESS NOTES
Mary Rosario was evaluated today and a DME order was entered for a nebulizer compressor in order to administer Albuterol due to the diagnosis of COPD.  The need for this equipment and treatment was discussed with the patient and she understands and is in agreement.

## 2025-02-28 NOTE — PROGRESS NOTES
CLINICAL PHARMACY NOTE: MEDS TO BEDS    Total # of Prescriptions Filled: 3   The following medications were delivered to the patient:  Prednisone  Albuterol  levofloxacin    Additional Documentation:  Patient picked up. She declined albuterol soln, spiriva, nystatin, and guaifenisen. She has at home

## 2025-02-28 NOTE — CARE COORDINATION
Case Management -  Discharge Note      Patient Name: aMry Rosario                   YOB: 1965  Room: [id number]/5559-01            Readmission Risk (Low < 19, Mod (19-27), High > 27): Readmission Risk Score: 11.7    Current PCP: None, None      (IMM) Important Message from Medicare:    Has pt received appropriate compliance notices before being discharged if required: N/A  Compliance doc:  [] 2nd IMM; [] Code 44 [] Rojas  Date Given:  Given By:       PT AM-PAC:   /24  OT AM-PAC:   /24    Patient/patient representative has been educated on the benefits of [facility] as well as the possible risks of declining recommended services. Patient/patient representative has acknowledged the information provided and decided on the following discharge plan. Patient/ patient representative has been provided freedom of choice regarding service provider, supported by basic dialogue that supports the patient's individualized plan of care/goals.       [facility] agency notified of discharge:  [] Yes [] No  [x] NA    Family notified of discharge:  [] Yes  [] No  [x] NA    Facility notified of discharge:  [] Yes  [] No  [x] NA    Pt is being discharged with Outpt IV Antibiotics  [] Yes [x] No  [] NA  If yes, make sure ANU is faxed to [facility] agency, and meds are called in to pharmacy by RN from ANU orders only.      Financial    Payor: AMEENA / Plan: AMEENA HAWK JUNG / Product Type: *No Product type* /     Pharmacy:  Potential assistance Purchasing Medications:    Meds-to-Beds request: Yes      [facility] [id number] [facility] [address] - P [phone] - F [phone]  [address]  [facility] [address]  Phone: [phone] Fax: [phone]      Notes:    Additional Case Management Notes: Patient will discharge home today.       Electronically signed by Chuck Miller on 2/28/25 at 1:29 PM EST

## 2025-02-28 NOTE — PROGRESS NOTES
Discharge instructions given to patient and family. Pt states understanding. IV removed and dressing applied. Belongings gathered and sent with patient. Meds provided from pharmacy. Pt transported out and d/c to home with family.

## 2025-02-28 NOTE — DISCHARGE SUMMARY
Hospital Medicine Discharge Summary    Patient: Mary oRsario     Gender: female  : 1965   Age: 59 y.o.  MRN: [id number]    Admitting Physician: Lor Burgos MD  Discharge Physician: Db Ramirez MD     Code Status: Full Code     Admit Date: 2025   Discharge Date:   25    Disposition:  Home    Discharge Diagnoses:    Active Hospital Problems    Diagnosis Date Noted    Pain syndrome, chronic [G89.4] 2022     Priority: Medium    Abnormal CT of the chest [R93.89] 2024    COPD exacerbation (HCC) [J44.1] 05/10/2024    Shortness of breath [R06.02] 2023    Ex-smoker [Z87.891] 2023    Mucus plugging of bronchi [T17.500A] 2023       Follow-up appointments:  one week    Outpatient to do list: F/U with PCP and Pulm    Condition at Discharge:  Stable    Hospital Course:    60 yo F with severe COPD, chronic respiratory failure with hypoxia on 2 L O2 via NC at night, chronic pain syndrome on Methadone was admitted as inpatient for acute COPD exacerbation, acute on chronic respiratory failure with hypoxia and SIRS.  Started on IV steroids, nebs and Abx.  PNA panel with Pseudomonas and respiratory panel negative.     Followed by Pulm.       CT PE protocol   IMPRESSION:  Suboptimal opacification of the pulmonary arterial system with no central  pulmonary embolism seen.  Emphysema without acute pulmonary abnormality..     Written for nebulizer.  Will finish steroid taper and Levaquin per Pulm.    F/U with PCP and Pulm.    Discharge Medications:   Current Discharge Medication List        START taking these medications    Details   levoFLOXacin (LEVAQUIN) 750 MG tablet Take 1 tablet by mouth daily for 4 days  Qty: 4 tablet, Refills: 0      nystatin (MYCOSTATIN) 408274 UNIT/ML suspension Take 5 mLs by mouth 4 times daily for 18 doses  Qty: 90 mL, Refills: 0           Current Discharge Medication List        CONTINUE these medications which have CHANGED    Details   albuterol

## 2025-03-05 ENCOUNTER — APPOINTMENT (OUTPATIENT)
Dept: GENERAL RADIOLOGY | Age: 60
DRG: 193 | End: 2025-03-05
Payer: COMMERCIAL

## 2025-03-05 ENCOUNTER — HOSPITAL ENCOUNTER (INPATIENT)
Age: 60
LOS: 6 days | Discharge: HOME | DRG: 193 | End: 2025-03-11
Attending: EMERGENCY MEDICINE | Admitting: INTERNAL MEDICINE
Payer: COMMERCIAL

## 2025-03-05 DIAGNOSIS — J10.1 INFLUENZA A: ICD-10-CM

## 2025-03-05 DIAGNOSIS — J44.1 ACUTE EXACERBATION OF CHRONIC OBSTRUCTIVE PULMONARY DISEASE (COPD) (HCC): ICD-10-CM

## 2025-03-05 DIAGNOSIS — J96.01 ACUTE RESPIRATORY FAILURE WITH HYPOXIA: Primary | ICD-10-CM

## 2025-03-05 PROBLEM — J15.1 PSEUDOMONAS PNEUMONIA (HCC): Status: ACTIVE | Noted: 2025-03-05

## 2025-03-05 LAB
ALBUMIN SERPL-MCNC: 3.8 G/DL (ref 3.4–5)
ALBUMIN/GLOB SERPL: 1.4 {RATIO} (ref 1.1–2.2)
ALP SERPL-CCNC: 58 U/L (ref 40–129)
ALT SERPL-CCNC: 23 U/L (ref 10–40)
ANION GAP SERPL CALCULATED.3IONS-SCNC: 8 MMOL/L (ref 3–16)
AST SERPL-CCNC: 18 U/L (ref 15–37)
BASE EXCESS BLDA CALC-SCNC: 1.7 MMOL/L (ref -3–3)
BASE EXCESS BLDA CALC-SCNC: 11 MMOL/L (ref -3–3)
BASE EXCESS BLDA CALC-SCNC: 8 MMOL/L (ref -3–3)
BASE EXCESS BLDV CALC-SCNC: 7.1 MMOL/L (ref -3–3)
BASOPHILS # BLD: 0.1 K/UL (ref 0–0.2)
BASOPHILS NFR BLD: 0.5 %
BILIRUB SERPL-MCNC: 0.3 MG/DL (ref 0–1)
BUN SERPL-MCNC: 19 MG/DL (ref 7–20)
CALCIUM SERPL-MCNC: 9.2 MG/DL (ref 8.3–10.6)
CHLORIDE SERPL-SCNC: 99 MMOL/L (ref 99–110)
CO2 BLDA-SCNC: 36 MMOL/L
CO2 BLDA-SCNC: 38 MMOL/L
CO2 BLDA-SCNC: 74.7 MMOL/L
CO2 BLDV-SCNC: 77 MMOL/L
CO2 SERPL-SCNC: 30 MMOL/L (ref 21–32)
COHGB MFR BLDA: 1.3 % (ref 0–1.5)
COHGB MFR BLDV: 4 % (ref 0–1.5)
CREAT SERPL-MCNC: 0.7 MG/DL (ref 0.6–1.1)
DEPRECATED RDW RBC AUTO: 14.5 % (ref 12.4–15.4)
EKG ATRIAL RATE: 131 BPM
EKG DIAGNOSIS: NORMAL
EKG P AXIS: 75 DEGREES
EKG P-R INTERVAL: 112 MS
EKG Q-T INTERVAL: 304 MS
EKG QRS DURATION: 70 MS
EKG QTC CALCULATION (BAZETT): 448 MS
EKG R AXIS: 78 DEGREES
EKG T AXIS: 70 DEGREES
EKG VENTRICULAR RATE: 131 BPM
EOSINOPHIL # BLD: 0.3 K/UL (ref 0–0.6)
EOSINOPHIL NFR BLD: 2.4 %
FLUAV RNA RESP QL NAA+PROBE: DETECTED
FLUBV RNA RESP QL NAA+PROBE: NOT DETECTED
GFR SERPLBLD CREATININE-BSD FMLA CKD-EPI: >90 ML/MIN/{1.73_M2}
GLUCOSE BLD-MCNC: 160 MG/DL (ref 70–99)
GLUCOSE BLD-MCNC: 186 MG/DL (ref 70–99)
GLUCOSE SERPL-MCNC: 177 MG/DL (ref 70–99)
HCO3 BLDA-SCNC: 31.1 MMOL/L (ref 21–29)
HCO3 BLDA-SCNC: 34.2 MMOL/L (ref 21–29)
HCO3 BLDA-SCNC: 36.1 MMOL/L (ref 21–29)
HCO3 BLDV-SCNC: 32.7 MMOL/L (ref 23–29)
HCT VFR BLD AUTO: 41.9 % (ref 36–48)
HGB BLD-MCNC: 13.5 G/DL (ref 12–16)
HGB BLDA-MCNC: 12.3 G/DL (ref 12–16)
INR PPP: 0.86 (ref 0.85–1.15)
LACTATE BLD-SCNC: 0.86 MMOL/L (ref 0.4–2)
LACTATE BLDV-SCNC: 1.3 MMOL/L (ref 0.4–1.9)
LYMPHOCYTES # BLD: 1.3 K/UL (ref 1–5.1)
LYMPHOCYTES NFR BLD: 10.2 %
MCH RBC QN AUTO: 28 PG (ref 26–34)
MCHC RBC AUTO-ENTMCNC: 32.2 G/DL (ref 31–36)
MCV RBC AUTO: 86.9 FL (ref 80–100)
METHGB MFR BLDA: 1.1 %
METHGB MFR BLDV: 0.8 %
MONOCYTES # BLD: 0.6 K/UL (ref 0–1.3)
MONOCYTES NFR BLD: 4.9 %
NEUTROPHILS # BLD: 10.7 K/UL (ref 1.7–7.7)
NEUTROPHILS NFR BLD: 82 %
NT-PROBNP SERPL-MCNC: 77 PG/ML (ref 0–124)
O2 CT VFR BLDV CALC: 19 VOL %
O2 THERAPY: ABNORMAL
O2 THERAPY: ABNORMAL
PCO2 BLDA: 60.9 MM HG (ref 35–45)
PCO2 BLDA: 70.1 MM HG (ref 35–45)
PCO2 BLDA: 74 MMHG (ref 35–45)
PCO2 BLDV: 48.8 MMHG (ref 40–50)
PERFORMED ON: ABNORMAL
PERFORMED ON: ABNORMAL
PH BLDA: 7.23 [PH] (ref 7.35–7.45)
PH BLDA: 7.3 [PH] (ref 7.35–7.45)
PH BLDA: 7.38 [PH] (ref 7.35–7.45)
PH BLDV: 7.43 [PH] (ref 7.35–7.45)
PLATELET # BLD AUTO: 197 K/UL (ref 135–450)
PMV BLD AUTO: 8.3 FL (ref 5–10.5)
PO2 BLDA: 169 MMHG (ref 75–108)
PO2 BLDA: 56.1 MM HG (ref 75–108)
PO2 BLDA: 70 MM HG (ref 75–108)
PO2 BLDV: 115 MMHG (ref 25–40)
POC SAMPLE TYPE: ABNORMAL
POC SAMPLE TYPE: ABNORMAL
POTASSIUM SERPL-SCNC: 4.6 MMOL/L (ref 3.5–5.1)
PROCALCITONIN SERPL IA-MCNC: 0.11 NG/ML (ref 0–0.15)
PROT SERPL-MCNC: 6.5 G/DL (ref 6.4–8.2)
PROTHROMBIN TIME: 11.9 SEC (ref 11.9–14.9)
RBC # BLD AUTO: 4.82 M/UL (ref 4–5.2)
SAO2 % BLDA: 84 % (ref 93–100)
SAO2 % BLDA: 93 % (ref 93–100)
SAO2 % BLDA: 99.6 %
SAO2 % BLDV: 99 %
SARS-COV-2 RNA RESP QL NAA+PROBE: NOT DETECTED
SODIUM SERPL-SCNC: 137 MMOL/L (ref 136–145)
TROPONIN, HIGH SENSITIVITY: 27 NG/L (ref 0–14)
TROPONIN, HIGH SENSITIVITY: 32 NG/L (ref 0–14)
WBC # BLD AUTO: 13.1 K/UL (ref 4–11)

## 2025-03-05 PROCEDURE — 2500000003 HC RX 250 WO HCPCS: Performed by: EMERGENCY MEDICINE

## 2025-03-05 PROCEDURE — 6360000002 HC RX W HCPCS: Performed by: INTERNAL MEDICINE

## 2025-03-05 PROCEDURE — 6370000000 HC RX 637 (ALT 250 FOR IP): Performed by: INTERNAL MEDICINE

## 2025-03-05 PROCEDURE — 36600 WITHDRAWAL OF ARTERIAL BLOOD: CPT

## 2025-03-05 PROCEDURE — 6360000002 HC RX W HCPCS: Performed by: STUDENT IN AN ORGANIZED HEALTH CARE EDUCATION/TRAINING PROGRAM

## 2025-03-05 PROCEDURE — 99285 EMERGENCY DEPT VISIT HI MDM: CPT

## 2025-03-05 PROCEDURE — 83880 ASSAY OF NATRIURETIC PEPTIDE: CPT

## 2025-03-05 PROCEDURE — 85610 PROTHROMBIN TIME: CPT

## 2025-03-05 PROCEDURE — 2500000003 HC RX 250 WO HCPCS: Performed by: STUDENT IN AN ORGANIZED HEALTH CARE EDUCATION/TRAINING PROGRAM

## 2025-03-05 PROCEDURE — 93010 ELECTROCARDIOGRAM REPORT: CPT | Performed by: INTERNAL MEDICINE

## 2025-03-05 PROCEDURE — 94660 CPAP INITIATION&MGMT: CPT

## 2025-03-05 PROCEDURE — 5A09457 ASSISTANCE WITH RESPIRATORY VENTILATION, 24-96 CONSECUTIVE HOURS, CONTINUOUS POSITIVE AIRWAY PRESSURE: ICD-10-PCS | Performed by: EMERGENCY MEDICINE

## 2025-03-05 PROCEDURE — 71045 X-RAY EXAM CHEST 1 VIEW: CPT

## 2025-03-05 PROCEDURE — 84484 ASSAY OF TROPONIN QUANT: CPT

## 2025-03-05 PROCEDURE — 2580000003 HC RX 258: Performed by: INTERNAL MEDICINE

## 2025-03-05 PROCEDURE — 5A0945A ASSISTANCE WITH RESPIRATORY VENTILATION, 24-96 CONSECUTIVE HOURS, HIGH NASAL FLOW/VELOCITY: ICD-10-PCS | Performed by: EMERGENCY MEDICINE

## 2025-03-05 PROCEDURE — 36415 COLL VENOUS BLD VENIPUNCTURE: CPT

## 2025-03-05 PROCEDURE — 2500000003 HC RX 250 WO HCPCS: Performed by: INTERNAL MEDICINE

## 2025-03-05 PROCEDURE — 85025 COMPLETE CBC W/AUTO DIFF WBC: CPT

## 2025-03-05 PROCEDURE — 2700000000 HC OXYGEN THERAPY PER DAY

## 2025-03-05 PROCEDURE — 93005 ELECTROCARDIOGRAM TRACING: CPT | Performed by: EMERGENCY MEDICINE

## 2025-03-05 PROCEDURE — 87040 BLOOD CULTURE FOR BACTERIA: CPT

## 2025-03-05 PROCEDURE — 96365 THER/PROPH/DIAG IV INF INIT: CPT

## 2025-03-05 PROCEDURE — 99291 CRITICAL CARE FIRST HOUR: CPT | Performed by: INTERNAL MEDICINE

## 2025-03-05 PROCEDURE — 94761 N-INVAS EAR/PLS OXIMETRY MLT: CPT

## 2025-03-05 PROCEDURE — 87636 SARSCOV2 & INF A&B AMP PRB: CPT

## 2025-03-05 PROCEDURE — 84145 PROCALCITONIN (PCT): CPT

## 2025-03-05 PROCEDURE — 83605 ASSAY OF LACTIC ACID: CPT

## 2025-03-05 PROCEDURE — 2580000003 HC RX 258: Performed by: EMERGENCY MEDICINE

## 2025-03-05 PROCEDURE — 6370000000 HC RX 637 (ALT 250 FOR IP): Performed by: EMERGENCY MEDICINE

## 2025-03-05 PROCEDURE — 82803 BLOOD GASES ANY COMBINATION: CPT

## 2025-03-05 PROCEDURE — 82947 ASSAY GLUCOSE BLOOD QUANT: CPT

## 2025-03-05 PROCEDURE — 94640 AIRWAY INHALATION TREATMENT: CPT

## 2025-03-05 PROCEDURE — 6360000002 HC RX W HCPCS: Performed by: EMERGENCY MEDICINE

## 2025-03-05 PROCEDURE — 80053 COMPREHEN METABOLIC PANEL: CPT

## 2025-03-05 PROCEDURE — 2000000000 HC ICU R&B

## 2025-03-05 RX ORDER — POTASSIUM CHLORIDE 7.45 MG/ML
10 INJECTION INTRAVENOUS PRN
Status: DISCONTINUED | OUTPATIENT
Start: 2025-03-05 | End: 2025-03-11 | Stop reason: HOSPADM

## 2025-03-05 RX ORDER — LEVOFLOXACIN 5 MG/ML
750 INJECTION, SOLUTION INTRAVENOUS ONCE
Status: COMPLETED | OUTPATIENT
Start: 2025-03-05 | End: 2025-03-05

## 2025-03-05 RX ORDER — MAGNESIUM SULFATE IN WATER 40 MG/ML
2000 INJECTION, SOLUTION INTRAVENOUS PRN
Status: DISCONTINUED | OUTPATIENT
Start: 2025-03-05 | End: 2025-03-11 | Stop reason: HOSPADM

## 2025-03-05 RX ORDER — SODIUM CHLORIDE 9 MG/ML
INJECTION, SOLUTION INTRAVENOUS PRN
Status: DISCONTINUED | OUTPATIENT
Start: 2025-03-05 | End: 2025-03-11 | Stop reason: HOSPADM

## 2025-03-05 RX ORDER — IPRATROPIUM BROMIDE AND ALBUTEROL SULFATE 2.5; .5 MG/3ML; MG/3ML
3 SOLUTION RESPIRATORY (INHALATION) ONCE
Status: COMPLETED | OUTPATIENT
Start: 2025-03-05 | End: 2025-03-05

## 2025-03-05 RX ORDER — SODIUM CHLORIDE 9 MG/ML
INJECTION, SOLUTION INTRAVENOUS CONTINUOUS
Status: DISCONTINUED | OUTPATIENT
Start: 2025-03-05 | End: 2025-03-06

## 2025-03-05 RX ORDER — ETOMIDATE 2 MG/ML
INJECTION INTRAVENOUS
Status: DISCONTINUED
Start: 2025-03-05 | End: 2025-03-05 | Stop reason: WASHOUT

## 2025-03-05 RX ORDER — ENOXAPARIN SODIUM 100 MG/ML
40 INJECTION SUBCUTANEOUS DAILY
Status: DISCONTINUED | OUTPATIENT
Start: 2025-03-05 | End: 2025-03-11 | Stop reason: HOSPADM

## 2025-03-05 RX ORDER — FAMOTIDINE 20 MG/1
20 TABLET, FILM COATED ORAL 2 TIMES DAILY
Status: DISCONTINUED | OUTPATIENT
Start: 2025-03-05 | End: 2025-03-11 | Stop reason: HOSPADM

## 2025-03-05 RX ORDER — SODIUM CHLORIDE 0.9 % (FLUSH) 0.9 %
5-40 SYRINGE (ML) INJECTION PRN
Status: DISCONTINUED | OUTPATIENT
Start: 2025-03-05 | End: 2025-03-11 | Stop reason: HOSPADM

## 2025-03-05 RX ORDER — PROPOFOL 10 MG/ML
5-50 INJECTION, EMULSION INTRAVENOUS CONTINUOUS
Status: DISCONTINUED | OUTPATIENT
Start: 2025-03-05 | End: 2025-03-06

## 2025-03-05 RX ORDER — POLYETHYLENE GLYCOL 3350 17 G/17G
17 POWDER, FOR SOLUTION ORAL DAILY PRN
Status: DISCONTINUED | OUTPATIENT
Start: 2025-03-05 | End: 2025-03-11 | Stop reason: HOSPADM

## 2025-03-05 RX ORDER — ACETAMINOPHEN 650 MG/1
650 SUPPOSITORY RECTAL EVERY 6 HOURS PRN
Status: DISCONTINUED | OUTPATIENT
Start: 2025-03-05 | End: 2025-03-11 | Stop reason: HOSPADM

## 2025-03-05 RX ORDER — ROCURONIUM BROMIDE 10 MG/ML
INJECTION, SOLUTION INTRAVENOUS
Status: DISCONTINUED
Start: 2025-03-05 | End: 2025-03-05 | Stop reason: WASHOUT

## 2025-03-05 RX ORDER — IPRATROPIUM BROMIDE AND ALBUTEROL SULFATE 2.5; .5 MG/3ML; MG/3ML
1 SOLUTION RESPIRATORY (INHALATION)
Status: DISCONTINUED | OUTPATIENT
Start: 2025-03-05 | End: 2025-03-11 | Stop reason: HOSPADM

## 2025-03-05 RX ORDER — OSELTAMIVIR PHOSPHATE 75 MG/1
75 CAPSULE ORAL 2 TIMES DAILY
Status: COMPLETED | OUTPATIENT
Start: 2025-03-05 | End: 2025-03-09

## 2025-03-05 RX ORDER — ARFORMOTEROL TARTRATE 15 UG/2ML
15 SOLUTION RESPIRATORY (INHALATION)
Status: DISCONTINUED | OUTPATIENT
Start: 2025-03-05 | End: 2025-03-11 | Stop reason: HOSPADM

## 2025-03-05 RX ORDER — BUDESONIDE 0.5 MG/2ML
0.5 INHALANT ORAL
Status: DISCONTINUED | OUTPATIENT
Start: 2025-03-05 | End: 2025-03-11 | Stop reason: HOSPADM

## 2025-03-05 RX ORDER — ONDANSETRON 2 MG/ML
4 INJECTION INTRAMUSCULAR; INTRAVENOUS EVERY 6 HOURS PRN
Status: DISCONTINUED | OUTPATIENT
Start: 2025-03-05 | End: 2025-03-11 | Stop reason: HOSPADM

## 2025-03-05 RX ORDER — POTASSIUM CHLORIDE 1500 MG/1
40 TABLET, EXTENDED RELEASE ORAL PRN
Status: DISCONTINUED | OUTPATIENT
Start: 2025-03-05 | End: 2025-03-11 | Stop reason: HOSPADM

## 2025-03-05 RX ORDER — SODIUM CHLORIDE 0.9 % (FLUSH) 0.9 %
5-40 SYRINGE (ML) INJECTION EVERY 12 HOURS SCHEDULED
Status: DISCONTINUED | OUTPATIENT
Start: 2025-03-05 | End: 2025-03-11 | Stop reason: HOSPADM

## 2025-03-05 RX ORDER — ONDANSETRON 4 MG/1
4 TABLET, ORALLY DISINTEGRATING ORAL EVERY 8 HOURS PRN
Status: DISCONTINUED | OUTPATIENT
Start: 2025-03-05 | End: 2025-03-11 | Stop reason: HOSPADM

## 2025-03-05 RX ORDER — 0.9 % SODIUM CHLORIDE 0.9 %
30 INTRAVENOUS SOLUTION INTRAVENOUS ONCE
Status: COMPLETED | OUTPATIENT
Start: 2025-03-05 | End: 2025-03-05

## 2025-03-05 RX ORDER — ACETAMINOPHEN 325 MG/1
650 TABLET ORAL EVERY 6 HOURS PRN
Status: DISCONTINUED | OUTPATIENT
Start: 2025-03-05 | End: 2025-03-11 | Stop reason: HOSPADM

## 2025-03-05 RX ORDER — OSELTAMIVIR PHOSPHATE 75 MG/1
75 CAPSULE ORAL ONCE
Status: DISCONTINUED | OUTPATIENT
Start: 2025-03-05 | End: 2025-03-05

## 2025-03-05 RX ADMIN — IPRATROPIUM BROMIDE AND ALBUTEROL SULFATE 1 DOSE: .5; 3 SOLUTION RESPIRATORY (INHALATION) at 19:33

## 2025-03-05 RX ADMIN — SODIUM CHLORIDE, PRESERVATIVE FREE 10 ML: 5 INJECTION INTRAVENOUS at 21:28

## 2025-03-05 RX ADMIN — ENOXAPARIN SODIUM 40 MG: 100 INJECTION SUBCUTANEOUS at 14:03

## 2025-03-05 RX ADMIN — OSELTAMIVIR 75 MG: 75 CAPSULE ORAL at 21:28

## 2025-03-05 RX ADMIN — CEFEPIME 2000 MG: 2 INJECTION, POWDER, FOR SOLUTION INTRAVENOUS at 09:27

## 2025-03-05 RX ADMIN — FAMOTIDINE 20 MG: 20 TABLET, FILM COATED ORAL at 21:28

## 2025-03-05 RX ADMIN — LEVOFLOXACIN 750 MG: 5 INJECTION, SOLUTION INTRAVENOUS at 05:57

## 2025-03-05 RX ADMIN — OSELTAMIVIR 75 MG: 75 CAPSULE ORAL at 14:04

## 2025-03-05 RX ADMIN — WATER 125 MG: 1 INJECTION INTRAMUSCULAR; INTRAVENOUS; SUBCUTANEOUS at 05:55

## 2025-03-05 RX ADMIN — CEFEPIME 2000 MG: 2 INJECTION, POWDER, FOR SOLUTION INTRAVENOUS at 15:32

## 2025-03-05 RX ADMIN — IPRATROPIUM BROMIDE AND ALBUTEROL SULFATE 1 DOSE: .5; 3 SOLUTION RESPIRATORY (INHALATION) at 15:36

## 2025-03-05 RX ADMIN — IPRATROPIUM BROMIDE AND ALBUTEROL SULFATE 3 DOSE: .5; 3 SOLUTION RESPIRATORY (INHALATION) at 05:21

## 2025-03-05 RX ADMIN — WATER 40 MG: 1 INJECTION INTRAMUSCULAR; INTRAVENOUS; SUBCUTANEOUS at 19:02

## 2025-03-05 RX ADMIN — WATER 40 MG: 1 INJECTION INTRAMUSCULAR; INTRAVENOUS; SUBCUTANEOUS at 13:58

## 2025-03-05 RX ADMIN — BUDESONIDE 500 MCG: 0.5 INHALANT RESPIRATORY (INHALATION) at 19:33

## 2025-03-05 RX ADMIN — ARFORMOTEROL TARTRATE 15 MCG: 15 SOLUTION RESPIRATORY (INHALATION) at 11:25

## 2025-03-05 RX ADMIN — ARFORMOTEROL TARTRATE 15 MCG: 15 SOLUTION RESPIRATORY (INHALATION) at 19:33

## 2025-03-05 RX ADMIN — SODIUM CHLORIDE 2124 ML: 0.9 INJECTION, SOLUTION INTRAVENOUS at 05:58

## 2025-03-05 RX ADMIN — SODIUM CHLORIDE: 9 INJECTION, SOLUTION INTRAVENOUS at 09:21

## 2025-03-05 RX ADMIN — BUDESONIDE 500 MCG: 0.5 INHALANT RESPIRATORY (INHALATION) at 11:25

## 2025-03-05 RX ADMIN — FAMOTIDINE 20 MG: 20 TABLET, FILM COATED ORAL at 14:03

## 2025-03-05 ASSESSMENT — PAIN SCALES - GENERAL: PAINLEVEL_OUTOF10: 0

## 2025-03-05 NOTE — ED PROVIDER NOTES
[facility] Emergency Department [facility]    I, Chris Mon MD, am the primary clinician of record.    CHIEF COMPLAINT  Chief Complaint   Patient presents with    Shortness of Breath     Patient arrives from work via [facility] due to acute shortness of breath; hx for COPD         HISTORY OF PRESENT ILLNESS  Mary Rosario is a 59 y.o. female  who presents to the ED complaining of acute worsening shortness of breath tonight.  This feels like her typical COPD when it gets really bad.  She does have a 2 L nightly baseline oxygen requirement but nothing during the daytime typically.  Denies any chest pains at all.  She has not had a fever.  Denies any significant discolored sputum with her coughing paroxysms.  She arrived severely hypoxic by EMS, requiring a DuoNeb breathing treatment and 6 L of nasal cannula on the way here.  She was described as tripoding by EMS.  No abd pain or vomiting/diarrhea.    The patient was most recently admitted from February 22 until 28th of this year for acute severe COPD exacerbation.  She was found to have Pseudomonas pneumonia and finished a course of Levaquin.    No other complaints, modifying factors or associated symptoms.     I have reviewed the following from the nursing documentation.    Past Medical History:   Diagnosis Date    Arthritis     Asthma     Chronic pain     COPD (chronic obstructive pulmonary disease) (HCC)     COVID-19     Opiate addiction ([facility])     stopped 2021    Pneumothorax      Past Surgical History:   Procedure Laterality Date    ABDOMEN SURGERY      BRONCHOSCOPY N/A 4/16/2023    BRONCHOSCOPY performed by Gregory Ravi MD at [facility] ENDOSCOPY    BRONCHOSCOPY N/A 7/5/2023    BRONCHOSCOPY ALVEOLAR LAVAGE performed by Gregory Ravi MD at [facility] ENDOSCOPY    BRONCHOSCOPY  7/5/2023    BRONCHOSCOPY THERAPUTIC ASPIRATION INITIAL performed by Gregory Ravi MD at [facility] ENDOSCOPY    BRONCHOSCOPY N/A  personally reviewed all labs for this visit.   Results for orders placed or performed during the hospital encounter of 03/05/25   COVID-19 & Influenza Combo    Specimen: Nasopharyngeal Swab   Result Value Ref Range    SARS-CoV-2 RNA, RT PCR NOT DETECTED NOT DETECTED    Influenza A DETECTED (A) NOT DETECTED    Influenza B NOT DETECTED NOT DETECTED   Blood Gas, Venous   Result Value Ref Range    pH, Santiago 7.434 7.350 - 7.450    pCO2, Santiago 48.8 40.0 - 50.0 mmHg    PO2, Santiago 115.0 (H) 25.0 - 40.0 mmHg    HCO3, Venous 32.7 (H) 23.0 - 29.0 mmol/L    Base Excess, Santiago 7.1 (H) -3.0 - 3.0 mmol/L    O2 Sat, Santiago 99 Not Established %    Carboxyhemoglobin 4.0 (H) 0.0 - 1.5 %    MetHgb, Santiago 0.8 <1.5 %    TC02 (Calc), Santiago 77 Not Established mmol/L    O2 Content, Santiago 19 Not Established VOL %    O2 Therapy Unknown    CBC with Auto Differential   Result Value Ref Range    WBC 13.1 (H) 4.0 - 11.0 K/uL    RBC 4.82 4.00 - 5.20 M/uL    Hemoglobin 13.5 12.0 - 16.0 g/dL    Hematocrit 41.9 36.0 - 48.0 %    MCV 86.9 80.0 - 100.0 fL    MCH 28.0 26.0 - 34.0 pg    MCHC 32.2 31.0 - 36.0 g/dL    RDW 14.5 12.4 - 15.4 %    Platelets 197 135 - 450 K/uL    MPV 8.3 5.0 - 10.5 fL    Neutrophils % 82.0 %    Lymphocytes % 10.2 %    Monocytes % 4.9 %    Eosinophils % 2.4 %    Basophils % 0.5 %    Neutrophils Absolute 10.7 (H) 1.7 - 7.7 K/uL    Lymphocytes Absolute 1.3 1.0 - 5.1 K/uL    Monocytes Absolute 0.6 0.0 - 1.3 K/uL    Eosinophils Absolute 0.3 0.0 - 0.6 K/uL    Basophils Absolute 0.1 0.0 - 0.2 K/uL   Comprehensive Metabolic Panel w/ Reflex to MG   Result Value Ref Range    Sodium 137 136 - 145 mmol/L    Potassium reflex Magnesium 4.6 3.5 - 5.1 mmol/L    Chloride 99 99 - 110 mmol/L    CO2 30 21 - 32 mmol/L    Anion Gap 8 3 - 16    Glucose 177 (H) 70 - 99 mg/dL    BUN 19 7 - 20 mg/dL    Creatinine 0.7 0.6 - 1.1 mg/dL    Est, Glom Filt Rate >90 >60    Calcium 9.2 8.3 - 10.6 mg/dL    Total Protein 6.5 6.4 - 8.2 g/dL    Albumin 3.8 3.4 - 5.0 g/dL

## 2025-03-05 NOTE — ED NOTES
Patient Name: Mary Rosario  : 1965 59 y.o.  MRN: [id number]  ED Room #: ED-0001/01     Chief complaint:   Chief Complaint   Patient presents with    Shortness of Breath     Patient arrives from work via [facility] due to acute shortness of breath; hx for COPD      Hospital Problem/Diagnosis:   Hospital Problems             Last Modified POA    * (Principal) COPD exacerbation (HCC) 3/5/2025 Yes         O2 Flow Rate:O2 Device: None (Room air)   (if applicable)  Cardiac Rhythm:   (if applicable)  Active LDA's:           How does patient ambulate? Contact Guard    2. How does patient take pills? Unknown, no oral medications were given in the Emergency Department    3. Is patient alert? Alert    4. Is patient oriented? To Person, To Place, To Time, To Situation, and Follows Commands    5.   Patient arrived from:  home  Facility Name: ___________________________________________    6. If patient is disoriented or from a Skill Nursing Facility has family been notified of admission? No    7. Patient belongings? Belongings: Cell Phone, Wallet, and Clothing    Disposition of belongings? Kept with Patient     8. Any specific patient or family belongings/needs/dynamics?   a.     9. Miscellaneous comments/pending orders?  a. bipap     If there are any additional questions please reach out to the Emergency Department.

## 2025-03-05 NOTE — PLAN OF CARE
Patient admitted for COPDE 2/2 Influenza A. Skeleton orders placed, defer med rec and remaining care to primary team.     Thelma Laguerre MD  Night Physician

## 2025-03-05 NOTE — PROGRESS NOTES
PT arrived in ICU on BiPAP. Dr. Bone and  at bedside. Per Dr. Jhaveri pt to remain on BiPAP for now and will repeat ABG at 10 AM.     Electronically signed by NEEL Linder RCP on 3/5/25 at 9:12 AM EST

## 2025-03-05 NOTE — PROGRESS NOTES
COMPLEX ROUNDS CHECKLIST    C Comfort - Pain Management/Sedation [] Propofol ( )  [] Fentanyl ( )  [] Precedex ( )  [] Versed ( )  [] PRN Pain Medication  [x] N/A      O Organisms   *Active isolation*   [x] Droplet   M Mechanical Ventilation/Oxygenation [x]O2 Device: PAP (positive airway pressure)           FiO2 : 45 %      B: Both Spontaneous Awakening and Breathing Trials  Pulse: (!) 124  SpO2: 90 %      P Prophylaxis [x] ABX (Days on abx 1)  [] GI   [x] VTE    [x] Bowel regimen ( )   L Lines/Labs [] Central Venous Access: No Central Lines (Line Days:0)  [] No Central Lines (Line Days:0)  [] No Central Lines (Line Days:0)  [] Urinary Catheter: None (Urinary Catheter Days: 0)   [] Routine Labs ordered for next day   E Enteral/Parenteral Nutrition [] Tube feed (Rate **, Goal **)  [] TPN   [] PO Diet  [x] NPO  [] Advance as tolerated   [] Awaiting/Following recommendation from SLP   X X-ray (Do we need one tomorrow?)   [] CXR   [] KUB  [] No   C  A  R  E Care Team comments/ Family Concerns Team Members Present During Rounds: Primary RN, Charge RN, Critical Care Provider, Hospitalist, Pharmacist, and Respiratory Therapist   Daily Goals/Plan of the day  Repeat ABG    This note is completed during interdisciplinary rounds in collaboration with the provider. Please see the critical care and/or hospitalist note for additional clarification.

## 2025-03-05 NOTE — H&P
HOSPITALISTS HISTORY AND PHYSICAL    3/5/2025 7:42 AM    Patient Information:  MARY ROSARIO is a 59 y.o. female [id number]  PCP:  None, None (Tel: None )    Chief complaint:    Chief Complaint   Patient presents with    Shortness of Breath     Patient arrives from work via [facility] due to acute shortness of breath; hx for COPD         History of Present Illness:  Mary Rosario is a 59 y.o. female with severe COPD (followed by Dr Ravi), chronic respiratory failure with hypoxia on 2 L O2 via NC at night, chronic pain syndrome on Methadone was admitted at [facility] b/w  to 25 for acute COPD exacerbation, acute on chronic respiratory failure with hypoxia and SIRS secondary to Pseudomonas pneumonia and respiratory panel negative.  She was written for a nebulizer, Levaquin and steroid taper.  She was tripod breathing on arrival to ER with worsening SOB.  She was 85% on RA in ER, tachypneic and in distress.  No CP, HA, abdominal pain or fevers.  No nausea, vomiting or dysphagia.  She is currently tachycardic and slow to arouse on BiPap.  She remains SOB on BiPap.  She is now influenza A positive.  Otherwise complete ROS is negative unless listed above.      REVIEW OF SYSTEMS:   Pertinent positives as noted in HPI.  All other systems were reviewed and are negative.      Past Medical History:   has a past medical history of Arthritis, Asthma, Chronic pain, COPD (chronic obstructive pulmonary disease) (HCC), COVID-19, Opiate addiction (HCC), and Pneumothorax.     Past Surgical History:   has a past surgical history that includes Hysterectomy;  section; Inner ear surgery; mastoidectomy; Abdomen surgery; bronchoscopy (N/A, 2023); bronchoscopy (N/A, 2023); bronchoscopy (2023); and bronchoscopy (N/A, 2024).     Medications:  No current facility-administered medications on file  about 34 years ago. She has a 32 pack-year smoking history. She has been exposed to tobacco smoke. She has never used smokeless tobacco. She reports that she does not drink alcohol and does not use drugs.     Family History:  family history is not on file.     Physical Exam:  BP (!) 173/68   Pulse (!) 130   Temp 99.3 °F (37.4 °C) (Oral)   Resp 11   SpO2 95%     General appearance:  On BiPap, slow to arouse, tachypneic  Eyes: Sclera clear, pupils equal  ENT: Moist mucus membranes, no thrush. Trachea midline.  Cardiovascular: Tachycardia No murmur, gallop, rub. No edema in lower extremities  Respiratory: VERY poor AE BL.    Gastrointestinal: Abdomen soft, non-tender, not distended, normal bowel sounds  Musculoskeletal: No cyanosis in digits, neck supple  Neurology: Grossly intact. Alert and oriented in time, place and person. No speech or motor deficits  Psychiatry: Appropriate affect. Not agitated  Skin: Warm, dry, normal turgor, no rash  Brisk capillary refill, peripheral pulses palpable   Labs:  CBC:   Lab Results   Component Value Date/Time    WBC 13.1 03/05/2025 05:08 AM    RBC 4.82 03/05/2025 05:08 AM    HGB 13.5 03/05/2025 05:08 AM    HCT 41.9 03/05/2025 05:08 AM    MCV 86.9 03/05/2025 05:08 AM    MCH 28.0 03/05/2025 05:08 AM    MCHC 32.2 03/05/2025 05:08 AM    RDW 14.5 03/05/2025 05:08 AM     03/05/2025 05:08 AM    MPV 8.3 03/05/2025 05:08 AM     BMP:    Lab Results   Component Value Date/Time     03/05/2025 05:08 AM    K 4.6 03/05/2025 05:08 AM    CL 99 03/05/2025 05:08 AM    CO2 30 03/05/2025 05:08 AM    BUN 19 03/05/2025 05:08 AM    CREATININE 0.7 03/05/2025 05:08 AM    CALCIUM 9.2 03/05/2025 05:08 AM    GFRAA >60 09/10/2022 04:53 AM    LABGLOM >90 03/05/2025 05:08 AM    LABGLOM >60 12/15/2023 03:40 PM    GLUCOSE 177 03/05/2025 05:08 AM     XR CHEST PORTABLE   Final Result   No evidence of acute cardiopulmonary process.               Problem List  Principal Problem:    COPD exacerbation

## 2025-03-05 NOTE — CONSULTS
PULMONARY AND CRITICAL CARE MEDICINE CONSULT NOTE      Name: Mary Rosario  Sex: female  : 1965  MRN: [id number]  Admission Date: 3/5/2025  Admission Diagnosis: Influenza A [J10.1]  Acute exacerbation of chronic obstructive pulmonary disease (COPD) (HCC) [J44.1]  COPD exacerbation (HCC) [J44.1]  Acute respiratory failure with hypoxia (HCC) [J96.01]  Acute on chronic respiratory failure with hypoxia and hypercapnia (HCC) [J96.21, J96.22]    HPI: Patient is a 59 y.o. female with past medical history significant for severe COPD, chronic hypercapnic and hypoxic respiratory failure, tobacco abuse, lung nodules, chronic pain syndrome on methadone who presented to hospital with shortness of breath.  She was recently hospitalized from - for acute exacerbation of COPD with distal mucous plugging and Pseudomonas respiratory infection.  Patient was noted to be in respiratory failure in the ER and was placed on BiPAP.  ABG was suggestive of acute on chronic hypercapnic respiratory failure.  Patient was also noted to positive for influenza A.  When I saw the patient in the ICU patient was alert and awake and talking.  She is wheezing.  I have adjusted the BiPAP settings to 16/8 with which she was pulling good tidal volumes and good minute ventilation.  Sinus tachycardia was noted.    14 point ROS could not be obtained due to patient factors.  On BiPAP      Past Medical History:   Diagnosis Date    Arthritis     Asthma     Chronic pain     COPD (chronic obstructive pulmonary disease) ([facility])     COVID-19     Opiate addiction ([facility])     stopped     Pneumothorax      Past Surgical History:   Procedure Laterality Date    ABDOMEN SURGERY      BRONCHOSCOPY N/A 2023    BRONCHOSCOPY performed by Gregory Ravi MD at [facility] ENDOSCOPY    BRONCHOSCOPY N/A 2023    BRONCHOSCOPY ALVEOLAR LAVAGE performed by Gregory Ravi MD at [facility] ENDOSCOPY    BRONCHOSCOPY  2023     Faxed labs orders to Kelsey at [facility] via [facility] and through fax.   BRONCHOSCOPY THERAPUTIC ASPIRATION INITIAL performed by Gregory Ravi MD at [facility] ENDOSCOPY    BRONCHOSCOPY N/A 2024    BRONCHOSCOPY ALVEOLAR LAVAGE performed by Gregory Ravi MD at [facility] ENDOSCOPY     SECTION      HYSTERECTOMY (CERVIX STATUS UNKNOWN)      INNER EAR SURGERY      MASTOIDECTOMY       Social History     Socioeconomic History    Marital status: Single     Spouse name: Not on file    Number of children: Not on file    Years of education: Not on file    Highest education level: Not on file   Occupational History    Not on file   Tobacco Use    Smoking status: Former     Current packs/day: 0.00     Average packs/day: 1 pack/day for 32.0 years (32.0 ttl pk-yrs)     Types: Cigarettes     Start date: 1991     Quit date: 2023     Years since quittin.1     Passive exposure: Past    Smokeless tobacco: Never   Vaping Use    Vaping status: Never Used   Substance and Sexual Activity    Alcohol use: No    Drug use: No    Sexual activity: Not on file   Other Topics Concern    Not on file   Social History Narrative    Not on file     Social Determinants of Health     Financial Resource Strain: Low Risk  (7/3/2023)    Overall Financial Resource Strain (CARDIA)     Difficulty of Paying Living Expenses: Not very hard   Food Insecurity: No Food Insecurity (2025)    Hunger Vital Sign     Worried About Running Out of Food in the Last Year: Never true     Ran Out of Food in the Last Year: Never true   Transportation Needs: No Transportation Needs (2025)    PRAPARE - Transportation     Lack of Transportation (Medical): No     Lack of Transportation (Non-Medical): No   Physical Activity: Inactive (7/3/2023)    Exercise Vital Sign     Days of Exercise per Week: 0 days     Minutes of Exercise per Session: 0 min   Stress: No Stress Concern Present (7/3/2023)    [employer] - Occupational Stress Questionnaire     Feeling of Stress : Not

## 2025-03-06 ENCOUNTER — APPOINTMENT (OUTPATIENT)
Dept: GENERAL RADIOLOGY | Age: 60
DRG: 193 | End: 2025-03-06
Payer: COMMERCIAL

## 2025-03-06 LAB
ALBUMIN SERPL-MCNC: 3.4 G/DL (ref 3.4–5)
ALBUMIN/GLOB SERPL: 1.4 {RATIO} (ref 1.1–2.2)
ALP SERPL-CCNC: 48 U/L (ref 40–129)
ALT SERPL-CCNC: 20 U/L (ref 10–40)
ANION GAP SERPL CALCULATED.3IONS-SCNC: 5 MMOL/L (ref 3–16)
AST SERPL-CCNC: 17 U/L (ref 15–37)
BASE EXCESS BLDA CALC-SCNC: 12 MMOL/L (ref -3–3)
BASE EXCESS BLDA CALC-SCNC: 18 MMOL/L (ref -3–3)
BASOPHILS # BLD: 0 K/UL (ref 0–0.2)
BASOPHILS NFR BLD: 0.2 %
BILIRUB SERPL-MCNC: 0.3 MG/DL (ref 0–1)
BUN SERPL-MCNC: 13 MG/DL (ref 7–20)
CA-I BLD-SCNC: 1.26 MMOL/L (ref 1.12–1.32)
CALCIUM SERPL-MCNC: 9.1 MG/DL (ref 8.3–10.6)
CHLORIDE SERPL-SCNC: 101 MMOL/L (ref 99–110)
CO2 BLDA-SCNC: 38 MMOL/L
CO2 BLDA-SCNC: 43 MMOL/L
CO2 SERPL-SCNC: 32 MMOL/L (ref 21–32)
CREAT SERPL-MCNC: 0.5 MG/DL (ref 0.6–1.1)
DEPRECATED RDW RBC AUTO: 14.8 % (ref 12.4–15.4)
EOSINOPHIL # BLD: 0 K/UL (ref 0–0.6)
EOSINOPHIL NFR BLD: 0.1 %
GFR SERPLBLD CREATININE-BSD FMLA CKD-EPI: >90 ML/MIN/{1.73_M2}
GLUCOSE BLD-MCNC: 154 MG/DL (ref 70–99)
GLUCOSE BLD-MCNC: 164 MG/DL (ref 70–99)
GLUCOSE SERPL-MCNC: 123 MG/DL (ref 70–99)
HCO3 BLDA-SCNC: 36.6 MMOL/L (ref 21–29)
HCO3 BLDA-SCNC: 41.6 MMOL/L (ref 21–29)
HCT VFR BLD AUTO: 37 % (ref 36–48)
HCT VFR BLD AUTO: 42 % (ref 36–48)
HGB BLD CALC-MCNC: 14.1 GM/DL (ref 12–16)
HGB BLD-MCNC: 12.2 G/DL (ref 12–16)
INR PPP: 0.93 (ref 0.85–1.15)
LACTATE BLD-SCNC: 0.95 MMOL/L (ref 0.4–2)
LYMPHOCYTES # BLD: 0.3 K/UL (ref 1–5.1)
LYMPHOCYTES NFR BLD: 2.9 %
MCH RBC QN AUTO: 28.3 PG (ref 26–34)
MCHC RBC AUTO-ENTMCNC: 32.9 G/DL (ref 31–36)
MCV RBC AUTO: 85.8 FL (ref 80–100)
MONOCYTES # BLD: 0.4 K/UL (ref 0–1.3)
MONOCYTES NFR BLD: 3.3 %
NEUTROPHILS # BLD: 10.9 K/UL (ref 1.7–7.7)
NEUTROPHILS NFR BLD: 93.5 %
PCO2 BLDA: 58.5 MM HG (ref 35–45)
PCO2 BLDA: 59.1 MM HG (ref 35–45)
PERFORMED ON: ABNORMAL
PERFORMED ON: ABNORMAL
PH BLDA: 7.4 [PH] (ref 7.35–7.45)
PH BLDA: 7.46 [PH] (ref 7.35–7.45)
PLATELET # BLD AUTO: 146 K/UL (ref 135–450)
PMV BLD AUTO: 7.9 FL (ref 5–10.5)
PO2 BLDA: 44.1 MM HG (ref 75–108)
PO2 BLDA: 44.2 MM HG (ref 75–108)
POC SAMPLE TYPE: ABNORMAL
POC SAMPLE TYPE: ABNORMAL
POTASSIUM BLD-SCNC: 4.5 MMOL/L (ref 3.5–5.1)
POTASSIUM SERPL-SCNC: 4.3 MMOL/L (ref 3.5–5.1)
PROCALCITONIN SERPL IA-MCNC: 0.12 NG/ML (ref 0–0.15)
PROT SERPL-MCNC: 5.8 G/DL (ref 6.4–8.2)
PROTHROMBIN TIME: 12.7 SEC (ref 11.9–14.9)
RBC # BLD AUTO: 4.32 M/UL (ref 4–5.2)
SAO2 % BLDA: 78 % (ref 93–100)
SAO2 % BLDA: 81 % (ref 93–100)
SODIUM BLD-SCNC: 144 MMOL/L (ref 136–145)
SODIUM SERPL-SCNC: 138 MMOL/L (ref 136–145)
WBC # BLD AUTO: 11.6 K/UL (ref 4–11)

## 2025-03-06 PROCEDURE — 84145 PROCALCITONIN (PCT): CPT

## 2025-03-06 PROCEDURE — 2500000003 HC RX 250 WO HCPCS: Performed by: INTERNAL MEDICINE

## 2025-03-06 PROCEDURE — 6360000002 HC RX W HCPCS: Performed by: INTERNAL MEDICINE

## 2025-03-06 PROCEDURE — 6360000002 HC RX W HCPCS: Performed by: STUDENT IN AN ORGANIZED HEALTH CARE EDUCATION/TRAINING PROGRAM

## 2025-03-06 PROCEDURE — 6370000000 HC RX 637 (ALT 250 FOR IP): Performed by: INTERNAL MEDICINE

## 2025-03-06 PROCEDURE — 2700000000 HC OXYGEN THERAPY PER DAY

## 2025-03-06 PROCEDURE — 82330 ASSAY OF CALCIUM: CPT

## 2025-03-06 PROCEDURE — 84295 ASSAY OF SERUM SODIUM: CPT

## 2025-03-06 PROCEDURE — 36415 COLL VENOUS BLD VENIPUNCTURE: CPT

## 2025-03-06 PROCEDURE — 84132 ASSAY OF SERUM POTASSIUM: CPT

## 2025-03-06 PROCEDURE — 82947 ASSAY GLUCOSE BLOOD QUANT: CPT

## 2025-03-06 PROCEDURE — 83605 ASSAY OF LACTIC ACID: CPT

## 2025-03-06 PROCEDURE — 85014 HEMATOCRIT: CPT

## 2025-03-06 PROCEDURE — 71045 X-RAY EXAM CHEST 1 VIEW: CPT

## 2025-03-06 PROCEDURE — 2000000000 HC ICU R&B

## 2025-03-06 PROCEDURE — 2580000003 HC RX 258: Performed by: INTERNAL MEDICINE

## 2025-03-06 PROCEDURE — 94660 CPAP INITIATION&MGMT: CPT

## 2025-03-06 PROCEDURE — 82803 BLOOD GASES ANY COMBINATION: CPT

## 2025-03-06 PROCEDURE — 85025 COMPLETE CBC W/AUTO DIFF WBC: CPT

## 2025-03-06 PROCEDURE — 99291 CRITICAL CARE FIRST HOUR: CPT | Performed by: INTERNAL MEDICINE

## 2025-03-06 PROCEDURE — 94640 AIRWAY INHALATION TREATMENT: CPT

## 2025-03-06 PROCEDURE — 80053 COMPREHEN METABOLIC PANEL: CPT

## 2025-03-06 PROCEDURE — 2500000003 HC RX 250 WO HCPCS: Performed by: STUDENT IN AN ORGANIZED HEALTH CARE EDUCATION/TRAINING PROGRAM

## 2025-03-06 PROCEDURE — 94761 N-INVAS EAR/PLS OXIMETRY MLT: CPT

## 2025-03-06 PROCEDURE — 85610 PROTHROMBIN TIME: CPT

## 2025-03-06 RX ORDER — GUAIFENESIN 600 MG/1
600 TABLET, EXTENDED RELEASE ORAL 2 TIMES DAILY
Status: DISCONTINUED | OUTPATIENT
Start: 2025-03-06 | End: 2025-03-11 | Stop reason: HOSPADM

## 2025-03-06 RX ORDER — METHADONE HYDROCHLORIDE 10 MG/ML
60 CONCENTRATE ORAL DAILY
Status: DISCONTINUED | OUTPATIENT
Start: 2025-03-06 | End: 2025-03-11 | Stop reason: HOSPADM

## 2025-03-06 RX ORDER — DEXMEDETOMIDINE HYDROCHLORIDE 4 UG/ML
.1-1.5 INJECTION, SOLUTION INTRAVENOUS CONTINUOUS
Status: DISCONTINUED | OUTPATIENT
Start: 2025-03-06 | End: 2025-03-08

## 2025-03-06 RX ORDER — ALBUTEROL SULFATE 0.83 MG/ML
SOLUTION RESPIRATORY (INHALATION)
Status: DISPENSED
Start: 2025-03-06 | End: 2025-03-06

## 2025-03-06 RX ORDER — METHADONE HYDROCHLORIDE 10 MG/ML
60 CONCENTRATE ORAL DAILY
Status: DISCONTINUED | OUTPATIENT
Start: 2025-03-06 | End: 2025-03-06 | Stop reason: SDUPTHER

## 2025-03-06 RX ORDER — IPRATROPIUM BROMIDE AND ALBUTEROL SULFATE 2.5; .5 MG/3ML; MG/3ML
1 SOLUTION RESPIRATORY (INHALATION) EVERY 4 HOURS PRN
Status: DISCONTINUED | OUTPATIENT
Start: 2025-03-06 | End: 2025-03-06

## 2025-03-06 RX ORDER — LEVALBUTEROL INHALATION SOLUTION 0.63 MG/3ML
0.63 SOLUTION RESPIRATORY (INHALATION) EVERY 4 HOURS PRN
Status: DISCONTINUED | OUTPATIENT
Start: 2025-03-06 | End: 2025-03-11 | Stop reason: HOSPADM

## 2025-03-06 RX ORDER — ETOMIDATE 2 MG/ML
INJECTION INTRAVENOUS
Status: DISCONTINUED
Start: 2025-03-06 | End: 2025-03-06 | Stop reason: WASHOUT

## 2025-03-06 RX ORDER — ALBUTEROL SULFATE 0.83 MG/ML
2.5 SOLUTION RESPIRATORY (INHALATION) EVERY 4 HOURS PRN
Status: DISCONTINUED | OUTPATIENT
Start: 2025-03-06 | End: 2025-03-06

## 2025-03-06 RX ORDER — ROCURONIUM BROMIDE 10 MG/ML
INJECTION, SOLUTION INTRAVENOUS
Status: DISCONTINUED
Start: 2025-03-06 | End: 2025-03-06 | Stop reason: WASHOUT

## 2025-03-06 RX ADMIN — THEOPHYLLINE ANHYDROUS 200 MG: 100 CAPSULE, EXTENDED RELEASE ORAL at 11:12

## 2025-03-06 RX ADMIN — SODIUM CHLORIDE, PRESERVATIVE FREE 10 ML: 5 INJECTION INTRAVENOUS at 21:04

## 2025-03-06 RX ADMIN — IPRATROPIUM BROMIDE AND ALBUTEROL SULFATE 1 DOSE: .5; 3 SOLUTION RESPIRATORY (INHALATION) at 05:46

## 2025-03-06 RX ADMIN — WATER 40 MG: 1 INJECTION INTRAMUSCULAR; INTRAVENOUS; SUBCUTANEOUS at 21:03

## 2025-03-06 RX ADMIN — WATER 40 MG: 1 INJECTION INTRAMUSCULAR; INTRAVENOUS; SUBCUTANEOUS at 13:58

## 2025-03-06 RX ADMIN — WATER 40 MG: 1 INJECTION INTRAMUSCULAR; INTRAVENOUS; SUBCUTANEOUS at 05:36

## 2025-03-06 RX ADMIN — IPRATROPIUM BROMIDE AND ALBUTEROL SULFATE 1 DOSE: .5; 3 SOLUTION RESPIRATORY (INHALATION) at 20:54

## 2025-03-06 RX ADMIN — SODIUM CHLORIDE, PRESERVATIVE FREE 10 ML: 5 INJECTION INTRAVENOUS at 08:44

## 2025-03-06 RX ADMIN — BUDESONIDE 500 MCG: 0.5 INHALANT RESPIRATORY (INHALATION) at 20:48

## 2025-03-06 RX ADMIN — CEFEPIME 2000 MG: 2 INJECTION, POWDER, FOR SOLUTION INTRAVENOUS at 08:47

## 2025-03-06 RX ADMIN — IPRATROPIUM BROMIDE AND ALBUTEROL SULFATE 1 DOSE: .5; 3 SOLUTION RESPIRATORY (INHALATION) at 13:10

## 2025-03-06 RX ADMIN — IPRATROPIUM BROMIDE AND ALBUTEROL SULFATE 1 DOSE: .5; 3 SOLUTION RESPIRATORY (INHALATION) at 03:35

## 2025-03-06 RX ADMIN — IPRATROPIUM BROMIDE AND ALBUTEROL SULFATE 1 DOSE: .5; 3 SOLUTION RESPIRATORY (INHALATION) at 05:37

## 2025-03-06 RX ADMIN — LEVALBUTEROL HYDROCHLORIDE 0.63 MG: 0.63 SOLUTION RESPIRATORY (INHALATION) at 11:05

## 2025-03-06 RX ADMIN — IPRATROPIUM BROMIDE AND ALBUTEROL SULFATE 1 DOSE: .5; 3 SOLUTION RESPIRATORY (INHALATION) at 08:21

## 2025-03-06 RX ADMIN — CEFEPIME 2000 MG: 2 INJECTION, POWDER, FOR SOLUTION INTRAVENOUS at 00:13

## 2025-03-06 RX ADMIN — GUAIFENESIN 600 MG: 600 TABLET ORAL at 13:58

## 2025-03-06 RX ADMIN — METHADONE HYDROCHLORIDE 60 MG: 10 CONCENTRATE ORAL at 10:58

## 2025-03-06 RX ADMIN — IPRATROPIUM BROMIDE AND ALBUTEROL SULFATE 1 DOSE: .5; 3 SOLUTION RESPIRATORY (INHALATION) at 01:17

## 2025-03-06 RX ADMIN — IPRATROPIUM BROMIDE AND ALBUTEROL SULFATE 1 DOSE: .5; 3 SOLUTION RESPIRATORY (INHALATION) at 17:28

## 2025-03-06 RX ADMIN — WATER 40 MG: 1 INJECTION INTRAMUSCULAR; INTRAVENOUS; SUBCUTANEOUS at 00:09

## 2025-03-06 RX ADMIN — OSELTAMIVIR 75 MG: 75 CAPSULE ORAL at 21:03

## 2025-03-06 RX ADMIN — DEXMEDETOMIDINE HYDROCHLORIDE 0.2 MCG/KG/HR: 400 INJECTION, SOLUTION INTRAVENOUS at 11:11

## 2025-03-06 RX ADMIN — GUAIFENESIN 600 MG: 600 TABLET ORAL at 21:03

## 2025-03-06 RX ADMIN — OSELTAMIVIR 75 MG: 75 CAPSULE ORAL at 08:42

## 2025-03-06 RX ADMIN — ENOXAPARIN SODIUM 40 MG: 100 INJECTION SUBCUTANEOUS at 08:41

## 2025-03-06 RX ADMIN — ARFORMOTEROL TARTRATE 15 MCG: 15 SOLUTION RESPIRATORY (INHALATION) at 20:48

## 2025-03-06 RX ADMIN — BUDESONIDE 500 MCG: 0.5 INHALANT RESPIRATORY (INHALATION) at 08:21

## 2025-03-06 ASSESSMENT — PAIN SCALES - GENERAL
PAINLEVEL_OUTOF10: 0
PAINLEVEL_OUTOF10: 7

## 2025-03-06 ASSESSMENT — PAIN DESCRIPTION - ORIENTATION: ORIENTATION: LEFT;RIGHT

## 2025-03-06 ASSESSMENT — PAIN DESCRIPTION - LOCATION: LOCATION: LEG

## 2025-03-06 NOTE — PROGRESS NOTES
Increased FIO2 to 100% due to ABG result  7.46/58/44.       03/06/25 0552   NIV Type   NIV Started/Stopped On   Equipment Type V60   Mode Bilevel   Mask Type Full face mask   Mask Size Medium   Assessment   Pulse (!) 114   Respirations 21   SpO2 92 %   Breath Sounds   Respiratory Pattern Regular   Breath Sounds Bilateral Diminished   Settings/Measurements   PIP Observed 17 cm H20   IPAP 12 cmH20   CPAP/EPAP 5 cmH2O   Vt (Measured) 1000 mL   Rate Ordered 12   FiO2  100 %   I Time/ I Time % 0.9 s   Minute Volume (L/min) 18.9 Liters   Mask Leak (lpm) 1 lpm   Patient's Home Machine No   Alarm Settings   Alarms On Y   Low Pressure (cmH2O) 3 cmH2O   High Pressure (cmH2O) 30 cmH2O   RR Low (bpm) 13   RR High (bpm) 40 br/min

## 2025-03-06 NOTE — PROGRESS NOTES
03/06/25 0336   NIV Type   Ventilator ID [id number]   NIV Started/Stopped On   Equipment Type V60   Mode Bilevel   Mask Type Full face mask   Mask Size Medium   Assessment   Pulse 73   Respirations 16   SpO2 95 %   Breath Sounds   Respiratory Pattern Regular   Breath Sounds Bilateral Diminished   Settings/Measurements   PIP Observed 18 cm H20   IPAP 16 cmH20   CPAP/EPAP 8 cmH2O   Vt (Measured) 813 mL   Rate Ordered 16   FiO2  45 %   I Time/ I Time % 0.9 s   Mask Leak (lpm) 7 lpm   Patient's Home Machine No   Alarm Settings   Alarms On Y   Low Pressure (cmH2O) 3 cmH2O   High Pressure (cmH2O) 30 cmH2O   RR Low (bpm) 17   RR High (bpm) 40 br/min

## 2025-03-06 NOTE — PLAN OF CARE
Problem: Discharge Planning  Goal: Discharge to home or other facility with appropriate resources  Outcome: Progressing     Problem: Safety - Adult  Goal: Free from fall injury  Outcome: Progressing     Problem: Respiratory - Adult  Goal: Achieves optimal ventilation and oxygenation  Outcome: Progressing     Problem: Infection - Adult  Goal: Absence of infection at discharge  Outcome: Progressing  Goal: Absence of infection during hospitalization  Outcome: Progressing

## 2025-03-06 NOTE — PROGRESS NOTES
Hospitalist Progress Note      PCP: None, None    Date of Admission: 3/5/2025    Chief Complaint: SOB    Hospital Course: 59 y.o. female with severe COPD (followed by Dr Ravi), chronic respiratory failure with hypoxia on 2 L O2 via NC at night, chronic pain syndrome on Methadone was admitted at [facility] b/w 2/22 to 2/28/25 for acute COPD exacerbation, acute on chronic respiratory failure with hypoxia and SIRS secondary to Pseudomonas pneumonia and respiratory panel negative.  She was written for a nebulizer, Levaquin and steroid taper.  She was tripod breathing on arrival to ER with worsening SOB.  She was 85% on RA in ER, tachypneic and in distress.   Admitted as inpatient to ICU for acute on chronic respiratory failure with hypoxia and hypercapnia, acute COPD exacerbation, Pseudomonas PNA and Influenza A.  Started on Bipap.  Started on IV steroids, nebs, IV Abx and Tamiflu.  Followed by CCM.    Subjective:  Patient is much more awake on Bipap this morning.  Still SOB.  Coughing.  Wants Methadone.  No CP, HA or abdominal pain.      Medications:  Reviewed    Infusion Medications    sodium chloride      sodium chloride       Scheduled Medications    methadone  60 mg Oral Daily    sodium chloride flush  5-40 mL IntraVENous 2 times per day    enoxaparin  40 mg SubCUTAneous Daily    methylPREDNISolone  40 mg IntraVENous Q6H    arformoterol tartrate  15 mcg Nebulization BID RT    budesonide  0.5 mg Nebulization BID RT    sodium chloride flush  5-40 mL IntraVENous 2 times per day    oseltamivir  75 mg Oral BID    cefepime  2,000 mg IntraVENous Q8H    famotidine  20 mg Oral BID    ipratropium 0.5 mg-albuterol 2.5 mg  1 Dose Inhalation Q4H RT     PRN Meds: ipratropium 0.5 mg-albuterol 2.5 mg, sodium chloride flush, sodium chloride, potassium chloride **OR** potassium alternative oral replacement **OR** potassium chloride, potassium chloride, magnesium sulfate, ondansetron **OR** ondansetron, polyethylene glycol,

## 2025-03-06 NOTE — CARE COORDINATION
03/06/25 1104   Readmission Assessment   Number of Days since last admission? 8-30 days   Previous Disposition Home Alone   Who is being Interviewed Patient   What was the patient's/caregiver's perception as to why they think they needed to return back to the hospital? Other (Comment)  (Pt got the flu which made breathing worse along with other symptoms.)   Did you visit your Primary Care Physician after you left the hospital, before you returned this time? No   Why weren't you able to visit your PCP? Other (Comment)  (Not enough time to)   Did you see a specialist, such as Cardiac, Pulmonary, Orthopedic Physician, etc. after you left the hospital? No   Who advised the patient to return to the hospital? Self-referral   Does the patient report anything that got in the way of taking their medications? No   In our efforts to provide the best possible care to you and others like you, can you think of anything that we could have done to help you after you left the hospital the first time, so that you might not have needed to return so soon? Other (Comment)  (Had nothing to do with previous care. Contracted Influenza)

## 2025-03-06 NOTE — PROGRESS NOTES
PULMONARY AND CRITICAL CARE MEDICINE PROGRESS NOTE    Subjective: Patient continues to require BiPAP.  ABG shows improvement in hypercapnia but remains hypoxic.  Complains of shortness of breath.  Looks anxious.    REVIEW OF SYSTEMS:   Constitutional symptoms: The patient denies fever, fatigue, night sweats, weight loss or weight gain.   HEENT: No vision changes. No tinnitus, Denies sinus pain. No hoarseness, or dysphagia.   Neck: Patient denies swelling in the neck.   Cardiovascular: Denies chest pain, palpitation, syncope.  Respiratory: Positive for shortness of breath or cough.   Gastrointestinal: Denies nausea, abdominal pain or change in bowel function.  Genitourinary: Denies obstructive symptoms. No history of incontinence.  Skin: No rashes or itching.   Muskuloskeletal: Denies weakness or bone pain.   Neurological: No headaches or seizures.   Psychiatric: Denies mood swings or depression.     MEDICATIONS:     Scheduled Meds:   methadone  60 mg Oral Daily    theophylline  200 mg Oral Daily    methylPREDNISolone  40 mg IntraVENous Q8H    albuterol        sodium chloride flush  5-40 mL IntraVENous 2 times per day    enoxaparin  40 mg SubCUTAneous Daily    arformoterol tartrate  15 mcg Nebulization BID RT    budesonide  0.5 mg Nebulization BID RT    sodium chloride flush  5-40 mL IntraVENous 2 times per day    oseltamivir  75 mg Oral BID    famotidine  20 mg Oral BID    ipratropium 0.5 mg-albuterol 2.5 mg  1 Dose Inhalation Q4H RT       Current Infusions:    dexmedeTOMIDine 0.2 mcg/kg/hr (03/06/25 1111)    sodium chloride      sodium chloride         PRN meds:  levalbuterol, albuterol, sodium chloride flush, sodium chloride, potassium chloride **OR** potassium alternative oral replacement **OR** potassium chloride, potassium chloride, magnesium sulfate, ondansetron **OR** ondansetron, polyethylene glycol, acetaminophen **OR** acetaminophen, sodium chloride flush, sodium chloride    PHYSICAL EXAM:  BP (!)

## 2025-03-06 NOTE — PROGRESS NOTES
03/06/25 0118   NIV Type   $NIV $Daily Charge   Ventilator ID [id number]   Suction Setup and Functional Yes   NIV Started/Stopped On   Equipment Type V60   Mode Bilevel   Mask Type Full face mask   Mask Size Medium   Assessment   Pulse 83   Respirations 15   SpO2 93 %   Breath Sounds   Respiratory Pattern Regular   Breath Sounds Bilateral Diminished   Settings/Measurements   PIP Observed 16 cm H20   IPAP 16 cmH20   CPAP/EPAP 8 cmH2O   Vt (Measured) 590 mL   Rate Ordered 16   FiO2  45 %   I Time/ I Time % 0.9 s   Minute Volume (L/min) 8.9 Liters   Mask Leak (lpm) 10 lpm   Patient's Home Machine No   Alarm Settings   Alarms On Y   Low Pressure (cmH2O) 3 cmH2O   High Pressure (cmH2O) 30 cmH2O   RR Low (bpm) 17   RR High (bpm) 40 br/min

## 2025-03-06 NOTE — PROGRESS NOTES
Physical/Occupational Therapy  Mary Rosario  PT/OT orders noted and appreciated. Per RN, L[place], pt not currently medically stable for evaluations at this time. RN assisting pt up to chair earlier this morning, but respiratory status unstable and pt returned to bed. Requests PT/OT HOLD at this time, pending rounds and possible plans for increased O2 needs. PT/OT will therefore HOLD at this time and will follow-up with pt as schedule allows.    Thank you,  Liu Hill, PT, DPT, [id number]  Akhil Carrasco OTR/L  [id number]

## 2025-03-06 NOTE — PROGRESS NOTES
Changed BIPAP setting to 12/5/45% ,  Because patient has reached tidal volume 1800 ML.       03/06/25 0359   Assessment   Pulse 83   Respirations 14   SpO2 96 %   Settings/Measurements   PIP Observed 13 cm H20   IPAP 12 cmH20   CPAP/EPAP 5 cmH2O   Vt (Measured) 1025 mL   Rate Ordered 12   FiO2  45 %   I Time/ I Time % 0.9 s   Minute Volume (L/min) 13.4 Liters   Mask Leak (lpm) 19 lpm   Patient's Home Machine No

## 2025-03-06 NOTE — PROGRESS NOTES
COMPLEX ROUNDS CHECKLIST    C Comfort - Pain Management/Sedation [] Propofol ( )  [] Fentanyl ( )  [] Precedex ( )  [] Versed ( )  [] PRN Pain Medication  [x] N/A  Giles Agitation Sedation Scale (RASS): Alert & Calm-Spontaneously pays attention to caregiver   O Organisms   *Active isolation*   [x] Droplet   M Mechanical Ventilation/Oxygenation [x]O2 Device: PAP (positive airway pressure)       O2 Flow Rate (L/min): 13 L/min   FiO2 : 100 %      B: Both Spontaneous Awakening and Breathing Trials  Did Patient Receive Sedative and/or Opioid IV Medications in the Last 24 Hours: No  Was Patient Receiving Mechanical Ventilation: No  Pulse: 88  SpO2: 94 %      P Prophylaxis [] ABX (Days on abx 2)  [] GI   [] VTE    [] Bowel regimen (Last BM (including prior to admit): 03/04/25)   L Lines/Labs [] Central Venous Access: No Central Lines (Line Days:0)  [] No Central Lines (Line Days:0)  [] No Central Lines (Line Days:0)  [] Urinary Catheter: None (Urinary Catheter Days: 0)   [] Routine Labs ordered for next day   E Enteral/Parenteral Nutrition [] Tube feed (Rate **, Goal **)  [] TPN   [x] PO Diet  [] NPO  [] Advance as tolerated   [] Awaiting/Following recommendation from SLP   X X-ray (Do we need one tomorrow?)   [] CXR   [] KUB  [] No   C  A  R  E Care Team comments/ Family Concerns Team Members Present During Rounds: Primary RN, Charge RN, Critical Care Provider, Hospitalist, Pharmacist, and Spiritual Care   Daily Goals/Plan of the day  Start Precedex  AiRVO  Theophylline    This note is completed during interdisciplinary rounds in collaboration with the provider. Please see the critical care and/or hospitalist note for additional clarification.

## 2025-03-06 NOTE — CARE COORDINATION
Case Management Assessment  Initial Evaluation    Date/Time of Evaluation: 3/6/2025 11:09 AM  Assessment Completed by: Denae Centeno RN    If patient is discharged prior to next notation, then this note serves as note for discharge by case management.    Patient Name: Mary Rosario                   YOB: 1965  Diagnosis: Influenza A [J10.1]  Acute exacerbation of chronic obstructive pulmonary disease (COPD) (HCC) [J44.1]  COPD exacerbation (HCC) [J44.1]  Acute respiratory failure with hypoxia (HCC) [J96.01]  Acute on chronic respiratory failure with hypoxia and hypercapnia (HCC) [J96.21, J96.22]                   Date / Time: 3/5/2025  5:08 AM    Patient Admission Status: Inpatient   Readmission Risk (Low < 19, Mod (19-27), High > 27): Readmission Risk Score: 22.6    Current PCP: None, None  PCP verified by CM? Yes, supposed to follow up with [facility] upon DC.     Chart Reviewed: Yes      History Provided by: Patient  Patient Orientation: Alert and Oriented    Patient Cognition: Alert    Hospitalization in the last 30 days (Readmission):  Yes    If yes, Readmission Assessment in CM Navigator will be completed.    Advance Directives:      Code Status: Full Code   Patient's Primary Decision Maker is: Legal Next of Kin    Primary Decision Maker: Marie Wheatley - Child - [phone]    Secondary Decision Maker: Trudi Giles - Child - [phone]    Secondary Decision Maker: Deepali Luong - Child - [phone]    Discharge Planning:    Patient lives with: Alone Type of Home: House  Primary Care Giver: Self  Patient Support Systems include: Children, Family Members   Current Financial resources: Medicaid  Current community resources: None  Current services prior to admission: None            Current DME:              Type of Home Care services:  None    ADLS  Prior functional level: Independent in ADLs/IADLs, Bathing, Toileting, Feeding, Housework, Cooking, Shopping, Dressing,

## 2025-03-06 NOTE — ACP (ADVANCE CARE PLANNING)
Advanced Care Planning Note.    Purpose of Encounter: Advanced care planning in light of acute on chronic respiratory failure with hypoxia and hypercapnia  Parties In Attendance: Patient, daughter on phone  Decisional Capacity: Yes  Subjective: Patient is less SOB on BiPap today  Objective: Cr 0.5  Goals of Care Determination: Patient wants full support (CPR, vent, surgery, HD, trach, PEG)  Plan:  Bipap, IV Abx, IV steroids, nebs, PT/OT, CCM consult  Code Status: Full code            Time spent on Advanced care Plannin minutes  Advanced Care Planning Documents: Completed advanced directives on chart, daughters share the POA.    Db Ramirez MD  3/6/2025 9:22 AM

## 2025-03-07 LAB
ALBUMIN SERPL-MCNC: 3.7 G/DL (ref 3.4–5)
ALBUMIN/GLOB SERPL: 1.2 {RATIO} (ref 1.1–2.2)
ALP SERPL-CCNC: 54 U/L (ref 40–129)
ALT SERPL-CCNC: 20 U/L (ref 10–40)
ANION GAP SERPL CALCULATED.3IONS-SCNC: 11 MMOL/L (ref 3–16)
AST SERPL-CCNC: 16 U/L (ref 15–37)
BASOPHILS # BLD: 0.1 K/UL (ref 0–0.2)
BASOPHILS NFR BLD: 0.5 %
BILIRUB SERPL-MCNC: 0.4 MG/DL (ref 0–1)
BUN SERPL-MCNC: 14 MG/DL (ref 7–20)
CALCIUM SERPL-MCNC: 10 MG/DL (ref 8.3–10.6)
CHLORIDE SERPL-SCNC: 97 MMOL/L (ref 99–110)
CO2 SERPL-SCNC: 31 MMOL/L (ref 21–32)
CREAT SERPL-MCNC: 0.5 MG/DL (ref 0.6–1.1)
DEPRECATED RDW RBC AUTO: 14.4 % (ref 12.4–15.4)
EOSINOPHIL # BLD: 0 K/UL (ref 0–0.6)
EOSINOPHIL NFR BLD: 0 %
GFR SERPLBLD CREATININE-BSD FMLA CKD-EPI: >90 ML/MIN/{1.73_M2}
GLUCOSE SERPL-MCNC: 122 MG/DL (ref 70–99)
HCT VFR BLD AUTO: 41 % (ref 36–48)
HGB BLD-MCNC: 13.3 G/DL (ref 12–16)
LYMPHOCYTES # BLD: 0.8 K/UL (ref 1–5.1)
LYMPHOCYTES NFR BLD: 5.1 %
MCH RBC QN AUTO: 28.3 PG (ref 26–34)
MCHC RBC AUTO-ENTMCNC: 32.4 G/DL (ref 31–36)
MCV RBC AUTO: 87.4 FL (ref 80–100)
MONOCYTES # BLD: 0.7 K/UL (ref 0–1.3)
MONOCYTES NFR BLD: 4.7 %
NEUTROPHILS # BLD: 14 K/UL (ref 1.7–7.7)
NEUTROPHILS NFR BLD: 89.7 %
PLATELET # BLD AUTO: 155 K/UL (ref 135–450)
PMV BLD AUTO: 8.4 FL (ref 5–10.5)
POTASSIUM SERPL-SCNC: 4.7 MMOL/L (ref 3.5–5.1)
PROT SERPL-MCNC: 6.7 G/DL (ref 6.4–8.2)
RBC # BLD AUTO: 4.7 M/UL (ref 4–5.2)
SODIUM SERPL-SCNC: 139 MMOL/L (ref 136–145)
WBC # BLD AUTO: 15.6 K/UL (ref 4–11)

## 2025-03-07 PROCEDURE — 80053 COMPREHEN METABOLIC PANEL: CPT

## 2025-03-07 PROCEDURE — 2500000003 HC RX 250 WO HCPCS: Performed by: INTERNAL MEDICINE

## 2025-03-07 PROCEDURE — 97530 THERAPEUTIC ACTIVITIES: CPT

## 2025-03-07 PROCEDURE — 6370000000 HC RX 637 (ALT 250 FOR IP): Performed by: INTERNAL MEDICINE

## 2025-03-07 PROCEDURE — 99291 CRITICAL CARE FIRST HOUR: CPT | Performed by: INTERNAL MEDICINE

## 2025-03-07 PROCEDURE — 94761 N-INVAS EAR/PLS OXIMETRY MLT: CPT

## 2025-03-07 PROCEDURE — 97161 PT EVAL LOW COMPLEX 20 MIN: CPT

## 2025-03-07 PROCEDURE — 2000000000 HC ICU R&B

## 2025-03-07 PROCEDURE — 97535 SELF CARE MNGMENT TRAINING: CPT

## 2025-03-07 PROCEDURE — 6360000002 HC RX W HCPCS: Performed by: STUDENT IN AN ORGANIZED HEALTH CARE EDUCATION/TRAINING PROGRAM

## 2025-03-07 PROCEDURE — 36415 COLL VENOUS BLD VENIPUNCTURE: CPT

## 2025-03-07 PROCEDURE — 85025 COMPLETE CBC W/AUTO DIFF WBC: CPT

## 2025-03-07 PROCEDURE — 94640 AIRWAY INHALATION TREATMENT: CPT

## 2025-03-07 PROCEDURE — 2700000000 HC OXYGEN THERAPY PER DAY

## 2025-03-07 PROCEDURE — 94660 CPAP INITIATION&MGMT: CPT

## 2025-03-07 PROCEDURE — 2500000003 HC RX 250 WO HCPCS: Performed by: STUDENT IN AN ORGANIZED HEALTH CARE EDUCATION/TRAINING PROGRAM

## 2025-03-07 PROCEDURE — 6360000002 HC RX W HCPCS: Performed by: INTERNAL MEDICINE

## 2025-03-07 PROCEDURE — 97165 OT EVAL LOW COMPLEX 30 MIN: CPT

## 2025-03-07 RX ORDER — KETOROLAC TROMETHAMINE 30 MG/ML
30 INJECTION, SOLUTION INTRAMUSCULAR; INTRAVENOUS EVERY 6 HOURS PRN
Status: DISCONTINUED | OUTPATIENT
Start: 2025-03-07 | End: 2025-03-11 | Stop reason: HOSPADM

## 2025-03-07 RX ADMIN — SODIUM CHLORIDE, PRESERVATIVE FREE 10 ML: 5 INJECTION INTRAVENOUS at 21:19

## 2025-03-07 RX ADMIN — KETOROLAC TROMETHAMINE 30 MG: 30 INJECTION, SOLUTION INTRAMUSCULAR at 09:13

## 2025-03-07 RX ADMIN — IPRATROPIUM BROMIDE AND ALBUTEROL SULFATE 1 DOSE: .5; 3 SOLUTION RESPIRATORY (INHALATION) at 07:48

## 2025-03-07 RX ADMIN — FAMOTIDINE 20 MG: 20 TABLET, FILM COATED ORAL at 09:13

## 2025-03-07 RX ADMIN — Medication 1 SPRAY: at 12:09

## 2025-03-07 RX ADMIN — WATER 40 MG: 1 INJECTION INTRAMUSCULAR; INTRAVENOUS; SUBCUTANEOUS at 14:54

## 2025-03-07 RX ADMIN — KETOROLAC TROMETHAMINE 30 MG: 30 INJECTION, SOLUTION INTRAMUSCULAR at 19:35

## 2025-03-07 RX ADMIN — WATER 40 MG: 1 INJECTION INTRAMUSCULAR; INTRAVENOUS; SUBCUTANEOUS at 06:08

## 2025-03-07 RX ADMIN — GUAIFENESIN 600 MG: 600 TABLET ORAL at 21:19

## 2025-03-07 RX ADMIN — ARFORMOTEROL TARTRATE 15 MCG: 15 SOLUTION RESPIRATORY (INHALATION) at 07:48

## 2025-03-07 RX ADMIN — IPRATROPIUM BROMIDE AND ALBUTEROL SULFATE 1 DOSE: .5; 3 SOLUTION RESPIRATORY (INHALATION) at 16:45

## 2025-03-07 RX ADMIN — OSELTAMIVIR 75 MG: 75 CAPSULE ORAL at 21:19

## 2025-03-07 RX ADMIN — WATER 40 MG: 1 INJECTION INTRAMUSCULAR; INTRAVENOUS; SUBCUTANEOUS at 21:19

## 2025-03-07 RX ADMIN — ARFORMOTEROL TARTRATE 15 MCG: 15 SOLUTION RESPIRATORY (INHALATION) at 20:03

## 2025-03-07 RX ADMIN — LEVALBUTEROL HYDROCHLORIDE 0.63 MG: 0.63 SOLUTION RESPIRATORY (INHALATION) at 14:17

## 2025-03-07 RX ADMIN — GUAIFENESIN 600 MG: 600 TABLET ORAL at 09:13

## 2025-03-07 RX ADMIN — FAMOTIDINE 20 MG: 20 TABLET, FILM COATED ORAL at 21:18

## 2025-03-07 RX ADMIN — OSELTAMIVIR 75 MG: 75 CAPSULE ORAL at 09:13

## 2025-03-07 RX ADMIN — LEVALBUTEROL HYDROCHLORIDE 0.63 MG: 0.63 SOLUTION RESPIRATORY (INHALATION) at 09:54

## 2025-03-07 RX ADMIN — IPRATROPIUM BROMIDE AND ALBUTEROL SULFATE 1 DOSE: .5; 3 SOLUTION RESPIRATORY (INHALATION) at 12:23

## 2025-03-07 RX ADMIN — ENOXAPARIN SODIUM 40 MG: 100 INJECTION SUBCUTANEOUS at 09:14

## 2025-03-07 RX ADMIN — IPRATROPIUM BROMIDE AND ALBUTEROL SULFATE 1 DOSE: .5; 3 SOLUTION RESPIRATORY (INHALATION) at 03:46

## 2025-03-07 RX ADMIN — SODIUM CHLORIDE, PRESERVATIVE FREE 10 ML: 5 INJECTION INTRAVENOUS at 09:14

## 2025-03-07 RX ADMIN — SODIUM CHLORIDE, PRESERVATIVE FREE 10 ML: 5 INJECTION INTRAVENOUS at 09:15

## 2025-03-07 RX ADMIN — THEOPHYLLINE ANHYDROUS 200 MG: 100 CAPSULE, EXTENDED RELEASE ORAL at 09:13

## 2025-03-07 RX ADMIN — BUDESONIDE 500 MCG: 0.5 INHALANT RESPIRATORY (INHALATION) at 19:52

## 2025-03-07 RX ADMIN — IPRATROPIUM BROMIDE AND ALBUTEROL SULFATE 1 DOSE: .5; 3 SOLUTION RESPIRATORY (INHALATION) at 00:28

## 2025-03-07 RX ADMIN — IPRATROPIUM BROMIDE AND ALBUTEROL SULFATE 1 DOSE: .5; 3 SOLUTION RESPIRATORY (INHALATION) at 19:51

## 2025-03-07 RX ADMIN — METHADONE HYDROCHLORIDE 60 MG: 10 CONCENTRATE ORAL at 09:46

## 2025-03-07 RX ADMIN — BUDESONIDE 500 MCG: 0.5 INHALANT RESPIRATORY (INHALATION) at 07:48

## 2025-03-07 ASSESSMENT — PAIN DESCRIPTION - ONSET: ONSET: GRADUAL

## 2025-03-07 ASSESSMENT — PAIN DESCRIPTION - DESCRIPTORS
DESCRIPTORS: ACHING
DESCRIPTORS: ACHING

## 2025-03-07 ASSESSMENT — PAIN SCALES - WONG BAKER: WONGBAKER_NUMERICALRESPONSE: NO HURT

## 2025-03-07 ASSESSMENT — PAIN SCALES - GENERAL
PAINLEVEL_OUTOF10: 5
PAINLEVEL_OUTOF10: 6
PAINLEVEL_OUTOF10: 0

## 2025-03-07 ASSESSMENT — PAIN DESCRIPTION - LOCATION
LOCATION: BACK
LOCATION: CHEST

## 2025-03-07 ASSESSMENT — PAIN DESCRIPTION - PAIN TYPE: TYPE: ACUTE PAIN

## 2025-03-07 ASSESSMENT — PAIN DESCRIPTION - ORIENTATION
ORIENTATION: LOWER
ORIENTATION: RIGHT;LEFT

## 2025-03-07 ASSESSMENT — PAIN DESCRIPTION - FREQUENCY: FREQUENCY: INTERMITTENT

## 2025-03-07 ASSESSMENT — PAIN - FUNCTIONAL ASSESSMENT: PAIN_FUNCTIONAL_ASSESSMENT: ACTIVITIES ARE NOT PREVENTED

## 2025-03-07 NOTE — PROGRESS NOTES
[facility] - Inpatient Rehabilitation Department   Phone: [phone]    Occupational Therapy    [x] Initial Evaluation            [] Daily Treatment Note         [x] Discharge Summary      Patient: Mary Rosario   : 1965   MRN: [id number]   Date of Service:  3/7/2025    Admitting Diagnosis:  COPD exacerbation (HCC)  Current Admission Summary:   Chief Complaint   Patient presents with    Shortness of Breath       Patient arrives from work via [facility] due to acute shortness of breath; hx for COPD          HISTORY OF PRESENT ILLNESS  Mary Rosario is a 59 y.o. female  who presents to the ED complaining of acute worsening shortness of breath tonight.  This feels like her typical COPD when it gets really bad.  She does have a 2 L nightly baseline oxygen requirement but nothing during the daytime typically.  Denies any chest pains at all.  She has not had a fever.  Denies any significant discolored sputum with her coughing paroxysms.  She arrived severely hypoxic by EMS, requiring a DuoNeb breathing treatment and 6 L of nasal cannula on the way here.  She was described as tripoding by EMS.  No abd pain or vomiting/diarrhea.     The patient was most recently admitted from  until  of this year for acute severe COPD exacerbation.  She was found to have Pseudomonas pneumonia and finished a course of Levaquin.  Past Medical History:  has a past medical history of Arthritis, Asthma, Chronic pain, COPD (chronic obstructive pulmonary disease) (HCC), COVID-19, Opiate addiction (HCC), and Pneumothorax.  Past Surgical History:  has a past surgical history that includes Hysterectomy;  section; Inner ear surgery; mastoidectomy; Abdomen surgery; bronchoscopy (N/A, 2023); bronchoscopy (N/A, 2023); bronchoscopy (2023); and bronchoscopy (N/A, 2024).    Discharge Recommendations: Mary Rosario scored a 23/24 on the AM-PAC ADL Inpatient form.  At this time, no  good mobility within limits of wires. Needing rest breaks with activity, Demo good awareness of energy conservation.   Impairments Requiring Therapeutic Intervention: none - eval with same day discharge  Prognosis: good  Clinical Assessment: Patient appears close to baseline for ADLs and mobility, Unable to ambulate longer than @ 4 feet of bed. Patient demo good awareness of energy conservation and is declining a shower chair. Patient able to perform ADLs indep within tolerance. No further OT indicated.   Safety Interventions: patient left in chair, call light within reach, nurse notified, and family/caregiver present    Plan  Frequency: Eval with same day discharge.  No follow up required.  Current Treatment Recommendations: not applicable, evaluation completed with same day discharge.    Goals  Patient Goals: Go home    Short Term Goals:  Time Frame: n/a  Patient eval with same day discharge.  No goals set as patient is at baseline status.    Above goals reviewed on 3/7/2025.  All goals are ongoing at this time unless indicated above.       Therapy Session Time     Individual Group Co-treatment   Time In    1337   Time Out    1405   Minutes    28        Timed Code Treatment Minutes: 13     Total Treatment Minutes:  28       Electronically Signed By: Shaylee Bello, OTR/L [id number]

## 2025-03-07 NOTE — PROGRESS NOTES
[facility] - Inpatient Rehabilitation Department   Phone: [phone]    Physical Therapy    [x] Initial Evaluation            [] Daily Treatment Note         [] Discharge Summary      Patient: Mary Rosario   : 1965   MRN: [id number]   Date of Service:  3/7/2025  Admitting Diagnosis: COPD exacerbation (HCC)  Current Admission Summary: Pt is a 58 y/o female who presents to the hospital with acute worsening SOB. Pt admitted as inpatient to ICU for acute on chronic respiratory failure with hypoxia and hypercapnia, acute COPD exacerbation, pseudomonas pneumonia, and influenza A.  Past Medical History:  has a past medical history of Arthritis, Asthma, Chronic pain, COPD (chronic obstructive pulmonary disease) (HCC), COVID-19, Opiate addiction (HCC), and Pneumothorax.  Past Surgical History:  has a past surgical history that includes Hysterectomy;  section; Inner ear surgery; mastoidectomy; Abdomen surgery; bronchoscopy (N/A, 2023); bronchoscopy (N/A, 2023); bronchoscopy (2023); and bronchoscopy (N/A, 2024).  Discharge Recommendations: Mary Rosario scored a 22/24 on the AM-PAC short mobility form.  At this time, no further PT is recommended upon discharge as pt is presenting near her baseline level of function.  Recommend patient returns to prior setting with prior services.    DME Required For Discharge: no DME required at discharge  Precautions/Restrictions: high fall risk  Weight Bearing Restrictions: no restrictions  [] Right Upper Extremity  [] Left Upper Extremity [] Right Lower Extremity  [] Left Lower Extremity     Required Braces/Orthotics: no braces required   [] Right  [] Left  Positional Restrictions: no positional restrictions    Pre-Admission Information   Lives With:  19 year old grandson                  Type of Home: apartment  Home Layout: one level  Home Access: level entry  Bathroom Layout: walk in shower  Bathroom Equipment: hand held shower

## 2025-03-07 NOTE — RT PROTOCOL NOTE
on this medication at home then do not decrease Frequency below that used at home.    0-3 - enter or revise RT bronchodilator order(s) to equivalent RT Bronchodilator order with Frequency of every 4 hours PRN for wheezing or increased work of breathing using Per Protocol order mode.        4-6 - enter or revise RT Bronchodilator order(s) to two equivalent RT bronchodilator orders with one order with BID Frequency and one order with Frequency of every 4 hours PRN wheezing or increased work of breathing using Per Protocol order mode.        7-10 - enter or revise RT Bronchodilator order(s) to two equivalent RT bronchodilator orders with one order with TID Frequency and one order with Frequency of every 4 hours PRN wheezing or increased work of breathing using Per Protocol order mode.       11-13 - enter or revise RT Bronchodilator order(s) to one equivalent RT bronchodilator order with QID Frequency and an Albuterol order with Frequency of every 4 hours PRN wheezing or increased work of breathing using Per Protocol order mode.      Greater than 13 - enter or revise RT Bronchodilator order(s) to one equivalent RT bronchodilator order with every 4 hours Frequency and an Albuterol order with Frequency of every 2 hours PRN wheezing or increased work of breathing using Per Protocol order mode.     RT to enter RT Home Evaluation for COPD & MDI Assessment order using Per Protocol order mode.    Electronically signed by Lou Dewey RCP on 3/7/2025 at 1:48 AM

## 2025-03-07 NOTE — PROGRESS NOTES
03/07/25 0147   RT Protocol   History Pulmonary Disease 2   Respiratory pattern 2   Breath sounds 2   Cough 0   Bronchodilator Assessment Score 6

## 2025-03-07 NOTE — PROGRESS NOTES
03/07/25 0748   Oxygen Therapy/Pulse Ox   O2 Device Heated high flow cannula   O2 Flow Rate (L/min) 60 L/min   FiO2  70 %   Pulse 85   Respirations 22   SpO2 95 %

## 2025-03-07 NOTE — PROGRESS NOTES
COMPLEX ROUNDS CHECKLIST    C Comfort - Pain Management/Sedation [] Propofol ( )  [] Fentanyl ( )  [] Precedex (Dose (mg/hr) Dexmedetomidine: 0 mg/hr)  [] Versed ( )  [x] PRN Pain Medication  [] N/A  Giles Agitation Sedation Scale (RASS): Alert & Calm-Spontaneously pays attention to caregiver   O Organisms   *Active isolation*   [x] Droplet   M Mechanical Ventilation/Oxygenation [x]O2 Device: Heated high flow cannula       O2 Flow Rate (L/min): 60 L/min   FiO2 : 70 %      B: Both Spontaneous Awakening and Breathing Trials  Did Patient Receive Sedative and/or Opioid IV Medications in the Last 24 Hours: No  Was Patient Receiving Mechanical Ventilation: No  Pulse: 85  SpO2: 95 %      P Prophylaxis [x] ABX (Days on abx 0)  [] GI   [] VTE    [] Bowel regimen (Last BM (including prior to admit): 03/04/25)   L Lines/Labs [x] Central Venous Access: No Central Lines (Line Days:0)  [] No Central Lines (Line Days:0)  [] No Central Lines (Line Days:0)  [] Urinary Catheter: None (Urinary Catheter Days: 0)   [x] Routine Labs ordered for next day   E Enteral/Parenteral Nutrition [] Tube feed (Rate **, Goal **)  [] TPN   [x] PO Diet  [] NPO  [] Advance as tolerated   [] Awaiting/Following recommendation from SLP   X X-ray (Do we need one tomorrow?)   [] CXR   [] KUB  [x] No   C  A  R  E Care Team comments/ Family Concerns Team Members Present During Rounds: Primary RN, Charge RN, Critical Care Provider, Pharmacist, and Dietician   Daily Goals/Plan of the day  Up to chair    This note is completed during interdisciplinary rounds in collaboration with the provider. Please see the critical care and/or hospitalist note for additional clarification.

## 2025-03-07 NOTE — ACP (ADVANCE CARE PLANNING)
Advanced Care Planning Note.    Purpose of Encounter: Advanced care planning in light of acute on chronic respiratory failure with hypoxia and hypercapnia  Parties In Attendance: Patient, daughter  Decisional Capacity: Yes  Subjective: Patient is coughing  Objective: Cr 0.5  Goals of Care Determination: Patient wants full support (CPR, vent, surgery, HD, does not want trach or PEG)  Plan:  Bipap, HFNC, Tamiflu, IV Abx, IV steroids, nebs, PT/OT, CCM consult  Code Status: Full code            Time spent on Advanced care Plannin minutes  Advanced Care Planning Documents: Completed advanced directives on chart, 3 daughters will share the POA.    Db Ramirez MD  3/7/2025 1:13 PM

## 2025-03-07 NOTE — PLAN OF CARE
Pt satisfied with Precedex. Feels less anxious.   Pt tolerating AirVo.   Purewick. Pt on bed rest. Up to chair once this shift.   Tolerating Clear liquid diet.   Report given to Meir ESPINOSA      Problem: Infection - Adult  Goal: Absence of infection during hospitalization  Outcome: Progressing     Problem: Infection - Adult  Goal: Absence of infection at discharge  Outcome: Progressing     Problem: Respiratory - Adult  Goal: Achieves optimal ventilation and oxygenation  Outcome: Progressing     Problem: Safety - Adult  Goal: Free from fall injury  Outcome: Progressing     Problem: Discharge Planning  Goal: Discharge to home or other facility with appropriate resources  Outcome: Progressing

## 2025-03-07 NOTE — PROGRESS NOTES
PULMONARY AND CRITICAL CARE MEDICINE PROGRESS NOTE    Subjective: Patient wore BiPAP last night.  In between BiPAP she is wearing Airvo at 50%.  States improved shortness of breath.  No cough.  Minimal wheezing.      REVIEW OF SYSTEMS:   Constitutional symptoms: The patient denies fever, fatigue, night sweats, weight loss or weight gain.   HEENT: No vision changes. No tinnitus, Denies sinus pain. No hoarseness, or dysphagia.   Neck: Patient denies swelling in the neck.   Cardiovascular: Denies chest pain, palpitation, syncope.  Respiratory: See above.   Gastrointestinal: Denies nausea, abdominal pain or change in bowel function.  Genitourinary: Denies obstructive symptoms. No history of incontinence.  Skin: No rashes or itching.   Muskuloskeletal: Denies weakness or bone pain.   Neurological: No headaches or seizures.   Psychiatric: Denies mood swings or depression.     MEDICATIONS:     Scheduled Meds:   methadone  60 mg Oral Daily    theophylline  200 mg Oral Daily    methylPREDNISolone  40 mg IntraVENous Q8H    guaiFENesin  600 mg Oral BID    sodium chloride flush  5-40 mL IntraVENous 2 times per day    enoxaparin  40 mg SubCUTAneous Daily    arformoterol tartrate  15 mcg Nebulization BID RT    budesonide  0.5 mg Nebulization BID RT    sodium chloride flush  5-40 mL IntraVENous 2 times per day    oseltamivir  75 mg Oral BID    famotidine  20 mg Oral BID    ipratropium 0.5 mg-albuterol 2.5 mg  1 Dose Inhalation Q4H RT       Current Infusions:    dexmedeTOMIDine Stopped (03/06/25 5713)    sodium chloride      sodium chloride         PRN meds:  ketorolac, sodium chloride, levalbuterol, sodium chloride flush, sodium chloride, potassium chloride **OR** potassium alternative oral replacement **OR** potassium chloride, potassium chloride, magnesium sulfate, ondansetron **OR** ondansetron, polyethylene glycol, acetaminophen **OR** acetaminophen, sodium chloride flush, sodium chloride    PHYSICAL EXAM:  /69    due to software limitations. If there are questions or concerns about the content of this note of information contained within the body of this dictation they should be addressed with the provider for clarification.

## 2025-03-07 NOTE — PROGRESS NOTES
Patent's O2 saturation is 98% on Heated High Flow NC 40L O2 at 50% FIO2. RT called and will try to get patient on High Flow NC prior to shift ending. Patient resting in bed comfortably denies pain or discomfort call light in reach.

## 2025-03-07 NOTE — PROGRESS NOTES
03/07/25 1227   Oxygen Therapy/Pulse Ox   O2 Device Heated high flow cannula   O2 Flow Rate (L/min) 40 L/min   FiO2  50 %   Pulse 90   Respirations 21   SpO2 91 %

## 2025-03-07 NOTE — PROGRESS NOTES
Hospitalist Progress Note      PCP: None, None    Date of Admission: 3/5/2025    Chief Complaint: SOB    Hospital Course: 59 y.o. female with severe COPD (followed by Dr Ravi), chronic respiratory failure with hypoxia on 2 L O2 via NC at night, chronic pain syndrome on Methadone was admitted at [facility] b/w 2/22 to 2/28/25 for acute COPD exacerbation, acute on chronic respiratory failure with hypoxia and SIRS secondary to Pseudomonas pneumonia and respiratory panel negative.  She was written for a nebulizer, Levaquin and steroid taper.  She was tripod breathing on arrival to ER with worsening SOB.  She was 85% on RA in ER, tachypneic and in distress.   Admitted as inpatient to ICU for acute on chronic respiratory failure with hypoxia and hypercapnia, acute COPD exacerbation, Pseudomonas PNA and Influenza A.  Started on Bipap.  Started on IV steroids, nebs, IV Abx and Tamiflu.  Followed by CCM.    Subjective:  Patient is on heated HFNC at 70%.   A little SOB.  Still coughing.  No CP, HA or abdominal pain.      Medications:  Reviewed    Infusion Medications    dexmedeTOMIDine Stopped (03/06/25 2233)    sodium chloride      sodium chloride       Scheduled Medications    methadone  60 mg Oral Daily    theophylline  200 mg Oral Daily    methylPREDNISolone  40 mg IntraVENous Q8H    guaiFENesin  600 mg Oral BID    sodium chloride flush  5-40 mL IntraVENous 2 times per day    enoxaparin  40 mg SubCUTAneous Daily    arformoterol tartrate  15 mcg Nebulization BID RT    budesonide  0.5 mg Nebulization BID RT    sodium chloride flush  5-40 mL IntraVENous 2 times per day    oseltamivir  75 mg Oral BID    famotidine  20 mg Oral BID    ipratropium 0.5 mg-albuterol 2.5 mg  1 Dose Inhalation Q4H RT     PRN Meds: ketorolac, sodium chloride, levalbuterol, sodium chloride flush, sodium chloride, potassium chloride **OR** potassium alternative oral replacement **OR** potassium chloride, potassium chloride, magnesium sulfate,  12:37 PM    BLOODU Negative 09/02/2024 12:37 PM    GLUCOSEU Negative 09/02/2024 12:37 PM       Radiology:  XR CHEST PORTABLE   Final Result   1. Stable small right pleural effusion.   2. Mild bibasilar atelectasis and parenchymal scarring.   3. No acute airspace consolidation.         XR CHEST PORTABLE   Final Result   No evidence of acute cardiopulmonary process.             IP CONSULT TO PULMONOLOGY    Assessment/Plan:    Active Hospital Problems    Diagnosis     COPD with acute exacerbation ([facility]) [J44.1]      Priority: Medium    Pain syndrome, chronic [G89.4]      Priority: Medium    Pseudomonas pneumonia ([facility]) [J15.1]     COPD exacerbation (HCC) [J44.1]     Acute on chronic respiratory failure with hypoxia and hypercapnia ([facility]) [J96.21, J96.22]      Acute on chronic respiratory failure with hypoxia and hypercapnia  Improving  On BiPap overnight  Currently on heated HFNC at 70%  S/P IVF  She will allow intubation if indicated  CCM consult appreciated  Acute COPD exacerbation  Wean Solumedrol 40 mg IV q6h to q8h  Continue Duoneb  Continue Brovana  Continue Pulmicort  Influenza A  Continue Tamiflu  Droplet isolation  Chronic pain syndrome  Continue Methadone   SIRS  S/P IVF  Serial LA negative  Secondary to Influenza A and Pseudomonas PNA  Pseudomonas PNA  S/P Cefepime IV       DVT Prophylaxis: Lovenox  Diet: ADULT DIET; Regular  Code Status: Full Code  PT/OT Eval Status: Requested    Dispo - Home with home PT/OT/SLP    Discussed with patient and CM.    Discussed extensively with daughter at bedside.    Anticipate slow recovery here.    Db Ramirez MD

## 2025-03-08 LAB
ALBUMIN SERPL-MCNC: 3.4 G/DL (ref 3.4–5)
ALBUMIN/GLOB SERPL: 1.2 {RATIO} (ref 1.1–2.2)
ALP SERPL-CCNC: 48 U/L (ref 40–129)
ALT SERPL-CCNC: 16 U/L (ref 10–40)
ANION GAP SERPL CALCULATED.3IONS-SCNC: 9 MMOL/L (ref 3–16)
AST SERPL-CCNC: 15 U/L (ref 15–37)
BASOPHILS # BLD: 0 K/UL (ref 0–0.2)
BASOPHILS NFR BLD: 0.1 %
BILIRUB SERPL-MCNC: 0.3 MG/DL (ref 0–1)
BUN SERPL-MCNC: 18 MG/DL (ref 7–20)
CALCIUM SERPL-MCNC: 9.5 MG/DL (ref 8.3–10.6)
CHLORIDE SERPL-SCNC: 100 MMOL/L (ref 99–110)
CO2 SERPL-SCNC: 29 MMOL/L (ref 21–32)
CREAT SERPL-MCNC: 0.5 MG/DL (ref 0.6–1.1)
DEPRECATED RDW RBC AUTO: 14.7 % (ref 12.4–15.4)
EOSINOPHIL # BLD: 0 K/UL (ref 0–0.6)
EOSINOPHIL NFR BLD: 0 %
GFR SERPLBLD CREATININE-BSD FMLA CKD-EPI: >90 ML/MIN/{1.73_M2}
GLUCOSE SERPL-MCNC: 143 MG/DL (ref 70–99)
HCT VFR BLD AUTO: 40.3 % (ref 36–48)
HGB BLD-MCNC: 13.4 G/DL (ref 12–16)
LYMPHOCYTES # BLD: 0.6 K/UL (ref 1–5.1)
LYMPHOCYTES NFR BLD: 4.8 %
MCH RBC QN AUTO: 28.5 PG (ref 26–34)
MCHC RBC AUTO-ENTMCNC: 33.3 G/DL (ref 31–36)
MCV RBC AUTO: 85.6 FL (ref 80–100)
MONOCYTES # BLD: 0.6 K/UL (ref 0–1.3)
MONOCYTES NFR BLD: 4.5 %
NEUTROPHILS # BLD: 11.4 K/UL (ref 1.7–7.7)
NEUTROPHILS NFR BLD: 90.6 %
PLATELET # BLD AUTO: 141 K/UL (ref 135–450)
PMV BLD AUTO: 8.4 FL (ref 5–10.5)
POTASSIUM SERPL-SCNC: 4.6 MMOL/L (ref 3.5–5.1)
PROT SERPL-MCNC: 6.3 G/DL (ref 6.4–8.2)
RBC # BLD AUTO: 4.71 M/UL (ref 4–5.2)
REPORT: NORMAL
SODIUM SERPL-SCNC: 138 MMOL/L (ref 136–145)
WBC # BLD AUTO: 12.5 K/UL (ref 4–11)

## 2025-03-08 PROCEDURE — 2500000003 HC RX 250 WO HCPCS: Performed by: INTERNAL MEDICINE

## 2025-03-08 PROCEDURE — 94761 N-INVAS EAR/PLS OXIMETRY MLT: CPT

## 2025-03-08 PROCEDURE — 6360000002 HC RX W HCPCS: Performed by: INTERNAL MEDICINE

## 2025-03-08 PROCEDURE — 94640 AIRWAY INHALATION TREATMENT: CPT

## 2025-03-08 PROCEDURE — 6370000000 HC RX 637 (ALT 250 FOR IP): Performed by: INTERNAL MEDICINE

## 2025-03-08 PROCEDURE — 2500000003 HC RX 250 WO HCPCS: Performed by: STUDENT IN AN ORGANIZED HEALTH CARE EDUCATION/TRAINING PROGRAM

## 2025-03-08 PROCEDURE — 85025 COMPLETE CBC W/AUTO DIFF WBC: CPT

## 2025-03-08 PROCEDURE — 97535 SELF CARE MNGMENT TRAINING: CPT

## 2025-03-08 PROCEDURE — 80053 COMPREHEN METABOLIC PANEL: CPT

## 2025-03-08 PROCEDURE — 6360000002 HC RX W HCPCS: Performed by: STUDENT IN AN ORGANIZED HEALTH CARE EDUCATION/TRAINING PROGRAM

## 2025-03-08 PROCEDURE — 94660 CPAP INITIATION&MGMT: CPT

## 2025-03-08 PROCEDURE — 99233 SBSQ HOSP IP/OBS HIGH 50: CPT | Performed by: INTERNAL MEDICINE

## 2025-03-08 PROCEDURE — 2000000000 HC ICU R&B

## 2025-03-08 PROCEDURE — 2700000000 HC OXYGEN THERAPY PER DAY

## 2025-03-08 RX ORDER — PREDNISONE 20 MG/1
40 TABLET ORAL DAILY
Status: DISCONTINUED | OUTPATIENT
Start: 2025-03-09 | End: 2025-03-11 | Stop reason: HOSPADM

## 2025-03-08 RX ADMIN — WATER 40 MG: 1 INJECTION INTRAMUSCULAR; INTRAVENOUS; SUBCUTANEOUS at 12:58

## 2025-03-08 RX ADMIN — IPRATROPIUM BROMIDE AND ALBUTEROL SULFATE 1 DOSE: .5; 3 SOLUTION RESPIRATORY (INHALATION) at 04:32

## 2025-03-08 RX ADMIN — GUAIFENESIN 600 MG: 600 TABLET ORAL at 09:27

## 2025-03-08 RX ADMIN — BUDESONIDE 500 MCG: 0.5 INHALANT RESPIRATORY (INHALATION) at 07:46

## 2025-03-08 RX ADMIN — GUAIFENESIN 600 MG: 600 TABLET ORAL at 20:29

## 2025-03-08 RX ADMIN — BUDESONIDE 500 MCG: 0.5 INHALANT RESPIRATORY (INHALATION) at 19:37

## 2025-03-08 RX ADMIN — FAMOTIDINE 20 MG: 20 TABLET, FILM COATED ORAL at 20:29

## 2025-03-08 RX ADMIN — ARFORMOTEROL TARTRATE 15 MCG: 15 SOLUTION RESPIRATORY (INHALATION) at 19:37

## 2025-03-08 RX ADMIN — SODIUM CHLORIDE, PRESERVATIVE FREE 10 ML: 5 INJECTION INTRAVENOUS at 20:30

## 2025-03-08 RX ADMIN — IPRATROPIUM BROMIDE AND ALBUTEROL SULFATE 1 DOSE: .5; 3 SOLUTION RESPIRATORY (INHALATION) at 00:24

## 2025-03-08 RX ADMIN — OSELTAMIVIR 75 MG: 75 CAPSULE ORAL at 09:29

## 2025-03-08 RX ADMIN — KETOROLAC TROMETHAMINE 30 MG: 30 INJECTION, SOLUTION INTRAMUSCULAR at 04:53

## 2025-03-08 RX ADMIN — ARFORMOTEROL TARTRATE 15 MCG: 15 SOLUTION RESPIRATORY (INHALATION) at 07:46

## 2025-03-08 RX ADMIN — IPRATROPIUM BROMIDE AND ALBUTEROL SULFATE 1 DOSE: .5; 3 SOLUTION RESPIRATORY (INHALATION) at 12:48

## 2025-03-08 RX ADMIN — THEOPHYLLINE ANHYDROUS 200 MG: 100 CAPSULE, EXTENDED RELEASE ORAL at 09:28

## 2025-03-08 RX ADMIN — OSELTAMIVIR 75 MG: 75 CAPSULE ORAL at 20:29

## 2025-03-08 RX ADMIN — IPRATROPIUM BROMIDE AND ALBUTEROL SULFATE 1 DOSE: .5; 3 SOLUTION RESPIRATORY (INHALATION) at 07:46

## 2025-03-08 RX ADMIN — FAMOTIDINE 20 MG: 20 TABLET, FILM COATED ORAL at 09:28

## 2025-03-08 RX ADMIN — WATER 40 MG: 1 INJECTION INTRAMUSCULAR; INTRAVENOUS; SUBCUTANEOUS at 04:53

## 2025-03-08 RX ADMIN — ENOXAPARIN SODIUM 40 MG: 100 INJECTION SUBCUTANEOUS at 09:28

## 2025-03-08 RX ADMIN — METHADONE HYDROCHLORIDE 60 MG: 10 CONCENTRATE ORAL at 09:29

## 2025-03-08 RX ADMIN — KETOROLAC TROMETHAMINE 30 MG: 30 INJECTION, SOLUTION INTRAMUSCULAR at 17:40

## 2025-03-08 RX ADMIN — IPRATROPIUM BROMIDE AND ALBUTEROL SULFATE 1 DOSE: .5; 3 SOLUTION RESPIRATORY (INHALATION) at 19:37

## 2025-03-08 RX ADMIN — SODIUM CHLORIDE, PRESERVATIVE FREE 10 ML: 5 INJECTION INTRAVENOUS at 09:29

## 2025-03-08 RX ADMIN — IPRATROPIUM BROMIDE AND ALBUTEROL SULFATE 1 DOSE: .5; 3 SOLUTION RESPIRATORY (INHALATION) at 17:07

## 2025-03-08 ASSESSMENT — PAIN SCALES - GENERAL
PAINLEVEL_OUTOF10: 6
PAINLEVEL_OUTOF10: 0
PAINLEVEL_OUTOF10: 7
PAINLEVEL_OUTOF10: 0

## 2025-03-08 ASSESSMENT — PAIN DESCRIPTION - DESCRIPTORS: DESCRIPTORS: ACHING

## 2025-03-08 ASSESSMENT — PAIN SCALES - WONG BAKER: WONGBAKER_NUMERICALRESPONSE: NO HURT

## 2025-03-08 NOTE — PROGRESS NOTES
PULMONARY AND CRITICAL CARE MEDICINE PROGRESS NOTE    Subjective: Patient continues to have improvement in shortness of breath and cough.  On 4 to 5 L nasal cannula and saturating well.  Minimal wheezing      REVIEW OF SYSTEMS:   Constitutional symptoms: The patient denies fever, fatigue, night sweats, weight loss or weight gain.   HEENT: No vision changes. No tinnitus, Denies sinus pain. No hoarseness, or dysphagia.   Neck: Patient denies swelling in the neck.   Cardiovascular: Denies chest pain, palpitation, syncope.  Respiratory: See above.   Gastrointestinal: Denies nausea, abdominal pain or change in bowel function.  Genitourinary: Denies obstructive symptoms. No history of incontinence.  Skin: No rashes or itching.   Muskuloskeletal: Denies weakness or bone pain.   Neurological: No headaches or seizures.   Psychiatric: Denies mood swings or depression.     MEDICATIONS:     Scheduled Meds:   methadone  60 mg Oral Daily    theophylline  200 mg Oral Daily    methylPREDNISolone  40 mg IntraVENous Q8H    guaiFENesin  600 mg Oral BID    sodium chloride flush  5-40 mL IntraVENous 2 times per day    enoxaparin  40 mg SubCUTAneous Daily    arformoterol tartrate  15 mcg Nebulization BID RT    budesonide  0.5 mg Nebulization BID RT    sodium chloride flush  5-40 mL IntraVENous 2 times per day    oseltamivir  75 mg Oral BID    famotidine  20 mg Oral BID    ipratropium 0.5 mg-albuterol 2.5 mg  1 Dose Inhalation Q4H RT       Current Infusions:    dexmedeTOMIDine Stopped (03/06/25 2233)    sodium chloride      sodium chloride         PRN meds:  ketorolac, sodium chloride, levalbuterol, sodium chloride flush, sodium chloride, potassium chloride **OR** potassium alternative oral replacement **OR** potassium chloride, potassium chloride, magnesium sulfate, ondansetron **OR** ondansetron, polyethylene glycol, acetaminophen **OR** acetaminophen, sodium chloride flush, sodium chloride    PHYSICAL EXAM:  BP (!) 143/75   Pulse  this technology due to software limitations. If there are questions or concerns about the content of this note of information contained within the body of this dictation they should be addressed with the provider for clarification.

## 2025-03-08 NOTE — PROGRESS NOTES
Hospitalist Progress Note      PCP: None, None    Date of Admission: 3/5/2025    Chief Complaint: SOB    Hospital Course: 59 y.o. female with severe COPD (followed by Dr Ravi), chronic respiratory failure with hypoxia on 2 L O2 via NC at night, chronic pain syndrome on Methadone was admitted at [facility] b/w 2/22 to 2/28/25 for acute COPD exacerbation, acute on chronic respiratory failure with hypoxia and SIRS secondary to Pseudomonas pneumonia and respiratory panel negative.  She was written for a nebulizer, Levaquin and steroid taper.  She was tripod breathing on arrival to ER with worsening SOB.  She was 85% on RA in ER, tachypneic and in distress.   Admitted as inpatient to ICU for acute on chronic respiratory failure with hypoxia and hypercapnia, acute COPD exacerbation, Pseudomonas PNA and Influenza A.  Started on Bipap.  Started on IV steroids, nebs, IV Abx and Tamiflu.  Followed by CCM.    Subjective: Patient down to 4.5 L of oxygen saturating well  No shortness of breath no chest pain    Medications:  Reviewed      Intake/Output Summary (Last 24 hours) at 3/8/2025 1443  Last data filed at 3/8/2025 0600  Gross per 24 hour   Intake 10 ml   Output 2600 ml   Net -2590 ml       Physical Exam Performed:    BP (!) 143/75   Pulse 95   Temp 96.8 °F (36 °C) (Temporal)   Resp 21   Ht 1.702 m (5' 7.01\")   Wt 63.2 kg (139 lb 5.3 oz)   SpO2 91%   BMI 21.82 kg/m²   General appearance:  On HFNC, awake, NAD  Eyes: Sclera clear, pupils equal  ENT: Moist mucus membranes, no thrush. Trachea midline.  Cardiovascular: RRR No murmur, gallop, rub. No edema in lower extremities  Respiratory: Distant BL wheezing     Gastrointestinal: Abdomen soft, non-tender, not distended, normal bowel sounds  Musculoskeletal: No cyanosis in digits, neck supple  Neurology: Grossly intact. Alert and oriented in time, place and person. No speech or motor deficits        Labs:   Recent Labs     03/06/25  0430 03/06/25  0506

## 2025-03-09 LAB
ALBUMIN SERPL-MCNC: 3.9 G/DL (ref 3.4–5)
ALBUMIN/GLOB SERPL: 1.5 {RATIO} (ref 1.1–2.2)
ALP SERPL-CCNC: 49 U/L (ref 40–129)
ALT SERPL-CCNC: 18 U/L (ref 10–40)
ANION GAP SERPL CALCULATED.3IONS-SCNC: 9 MMOL/L (ref 3–16)
AST SERPL-CCNC: 14 U/L (ref 15–37)
BACTERIA BLD CULT ORG #2: NORMAL
BACTERIA BLD CULT: NORMAL
BASOPHILS # BLD: 0 K/UL (ref 0–0.2)
BASOPHILS NFR BLD: 0.4 %
BILIRUB SERPL-MCNC: <0.2 MG/DL (ref 0–1)
BUN SERPL-MCNC: 18 MG/DL (ref 7–20)
CALCIUM SERPL-MCNC: 9.2 MG/DL (ref 8.3–10.6)
CHLORIDE SERPL-SCNC: 100 MMOL/L (ref 99–110)
CO2 SERPL-SCNC: 28 MMOL/L (ref 21–32)
CREAT SERPL-MCNC: 0.6 MG/DL (ref 0.6–1.1)
DEPRECATED RDW RBC AUTO: 14.5 % (ref 12.4–15.4)
EOSINOPHIL # BLD: 0 K/UL (ref 0–0.6)
EOSINOPHIL NFR BLD: 0.1 %
GFR SERPLBLD CREATININE-BSD FMLA CKD-EPI: >90 ML/MIN/{1.73_M2}
GLUCOSE SERPL-MCNC: 141 MG/DL (ref 70–99)
HCT VFR BLD AUTO: 39.4 % (ref 36–48)
HGB BLD-MCNC: 12.8 G/DL (ref 12–16)
LYMPHOCYTES # BLD: 1 K/UL (ref 1–5.1)
LYMPHOCYTES NFR BLD: 8.4 %
MCH RBC QN AUTO: 28.2 PG (ref 26–34)
MCHC RBC AUTO-ENTMCNC: 32.5 G/DL (ref 31–36)
MCV RBC AUTO: 86.7 FL (ref 80–100)
MONOCYTES # BLD: 0.6 K/UL (ref 0–1.3)
MONOCYTES NFR BLD: 4.7 %
NEUTROPHILS # BLD: 10.7 K/UL (ref 1.7–7.7)
NEUTROPHILS NFR BLD: 86.4 %
PLATELET # BLD AUTO: 169 K/UL (ref 135–450)
PMV BLD AUTO: 8.3 FL (ref 5–10.5)
POTASSIUM SERPL-SCNC: 4.5 MMOL/L (ref 3.5–5.1)
PROT SERPL-MCNC: 6.5 G/DL (ref 6.4–8.2)
RBC # BLD AUTO: 4.55 M/UL (ref 4–5.2)
SODIUM SERPL-SCNC: 137 MMOL/L (ref 136–145)
WBC # BLD AUTO: 12.4 K/UL (ref 4–11)

## 2025-03-09 PROCEDURE — 6360000002 HC RX W HCPCS: Performed by: INTERNAL MEDICINE

## 2025-03-09 PROCEDURE — 2700000000 HC OXYGEN THERAPY PER DAY

## 2025-03-09 PROCEDURE — 80053 COMPREHEN METABOLIC PANEL: CPT

## 2025-03-09 PROCEDURE — 94761 N-INVAS EAR/PLS OXIMETRY MLT: CPT

## 2025-03-09 PROCEDURE — 6370000000 HC RX 637 (ALT 250 FOR IP): Performed by: INTERNAL MEDICINE

## 2025-03-09 PROCEDURE — 94640 AIRWAY INHALATION TREATMENT: CPT

## 2025-03-09 PROCEDURE — 1200000000 HC SEMI PRIVATE

## 2025-03-09 PROCEDURE — 85025 COMPLETE CBC W/AUTO DIFF WBC: CPT

## 2025-03-09 PROCEDURE — 6360000002 HC RX W HCPCS: Performed by: STUDENT IN AN ORGANIZED HEALTH CARE EDUCATION/TRAINING PROGRAM

## 2025-03-09 PROCEDURE — 2500000003 HC RX 250 WO HCPCS: Performed by: STUDENT IN AN ORGANIZED HEALTH CARE EDUCATION/TRAINING PROGRAM

## 2025-03-09 PROCEDURE — 2500000003 HC RX 250 WO HCPCS: Performed by: INTERNAL MEDICINE

## 2025-03-09 RX ORDER — HYDROXYZINE HYDROCHLORIDE 10 MG/1
10 TABLET, FILM COATED ORAL 3 TIMES DAILY PRN
Status: DISCONTINUED | OUTPATIENT
Start: 2025-03-09 | End: 2025-03-11 | Stop reason: HOSPADM

## 2025-03-09 RX ADMIN — OSELTAMIVIR 75 MG: 75 CAPSULE ORAL at 21:05

## 2025-03-09 RX ADMIN — THEOPHYLLINE ANHYDROUS 200 MG: 100 CAPSULE, EXTENDED RELEASE ORAL at 10:54

## 2025-03-09 RX ADMIN — SODIUM CHLORIDE, PRESERVATIVE FREE 10 ML: 5 INJECTION INTRAVENOUS at 21:05

## 2025-03-09 RX ADMIN — ENOXAPARIN SODIUM 40 MG: 100 INJECTION SUBCUTANEOUS at 08:15

## 2025-03-09 RX ADMIN — IPRATROPIUM BROMIDE AND ALBUTEROL SULFATE 1 DOSE: .5; 3 SOLUTION RESPIRATORY (INHALATION) at 20:34

## 2025-03-09 RX ADMIN — BUDESONIDE 500 MCG: 0.5 INHALANT RESPIRATORY (INHALATION) at 20:34

## 2025-03-09 RX ADMIN — KETOROLAC TROMETHAMINE 30 MG: 30 INJECTION, SOLUTION INTRAMUSCULAR at 06:06

## 2025-03-09 RX ADMIN — IPRATROPIUM BROMIDE AND ALBUTEROL SULFATE 1 DOSE: .5; 3 SOLUTION RESPIRATORY (INHALATION) at 15:43

## 2025-03-09 RX ADMIN — GUAIFENESIN 600 MG: 600 TABLET ORAL at 21:05

## 2025-03-09 RX ADMIN — IPRATROPIUM BROMIDE AND ALBUTEROL SULFATE 1 DOSE: .5; 3 SOLUTION RESPIRATORY (INHALATION) at 04:41

## 2025-03-09 RX ADMIN — GUAIFENESIN 600 MG: 600 TABLET ORAL at 08:15

## 2025-03-09 RX ADMIN — BUDESONIDE 500 MCG: 0.5 INHALANT RESPIRATORY (INHALATION) at 11:07

## 2025-03-09 RX ADMIN — ARFORMOTEROL TARTRATE 15 MCG: 15 SOLUTION RESPIRATORY (INHALATION) at 11:08

## 2025-03-09 RX ADMIN — FAMOTIDINE 20 MG: 20 TABLET, FILM COATED ORAL at 08:15

## 2025-03-09 RX ADMIN — SODIUM CHLORIDE, PRESERVATIVE FREE 10 ML: 5 INJECTION INTRAVENOUS at 08:16

## 2025-03-09 RX ADMIN — IPRATROPIUM BROMIDE AND ALBUTEROL SULFATE 1 DOSE: .5; 3 SOLUTION RESPIRATORY (INHALATION) at 00:06

## 2025-03-09 RX ADMIN — IPRATROPIUM BROMIDE AND ALBUTEROL SULFATE 1 DOSE: .5; 3 SOLUTION RESPIRATORY (INHALATION) at 11:07

## 2025-03-09 RX ADMIN — PREDNISONE 40 MG: 20 TABLET ORAL at 08:15

## 2025-03-09 RX ADMIN — KETOROLAC TROMETHAMINE 30 MG: 30 INJECTION, SOLUTION INTRAMUSCULAR at 21:07

## 2025-03-09 RX ADMIN — ARFORMOTEROL TARTRATE 15 MCG: 15 SOLUTION RESPIRATORY (INHALATION) at 20:33

## 2025-03-09 RX ADMIN — FAMOTIDINE 20 MG: 20 TABLET, FILM COATED ORAL at 21:05

## 2025-03-09 RX ADMIN — METHADONE HYDROCHLORIDE 60 MG: 10 CONCENTRATE ORAL at 09:07

## 2025-03-09 RX ADMIN — OSELTAMIVIR 75 MG: 75 CAPSULE ORAL at 08:15

## 2025-03-09 ASSESSMENT — PAIN DESCRIPTION - LOCATION: LOCATION: BACK;CHEST

## 2025-03-09 ASSESSMENT — PAIN SCALES - GENERAL
PAINLEVEL_OUTOF10: 0
PAINLEVEL_OUTOF10: 6
PAINLEVEL_OUTOF10: 3
PAINLEVEL_OUTOF10: 6

## 2025-03-09 ASSESSMENT — PAIN DESCRIPTION - DESCRIPTORS: DESCRIPTORS: ACHING;DISCOMFORT

## 2025-03-09 ASSESSMENT — PAIN DESCRIPTION - ORIENTATION: ORIENTATION: LOWER;RIGHT

## 2025-03-09 NOTE — PROGRESS NOTES
Hospitalist Progress Note      PCP: None, None    Date of Admission: 3/5/2025    Chief Complaint: SOB    Hospital Course: 59 y.o. female with severe COPD (followed by Dr Ravi), chronic respiratory failure with hypoxia on 2 L O2 via NC at night, chronic pain syndrome on Methadone was admitted at [facility] b/w 2/22 to 2/28/25 for acute COPD exacerbation, acute on chronic respiratory failure with hypoxia and SIRS secondary to Pseudomonas pneumonia and respiratory panel negative.  She was written for a nebulizer, Levaquin and steroid taper.  She was tripod breathing on arrival to ER with worsening SOB.  She was 85% on RA in ER, tachypneic and in distress.   Admitted as inpatient to ICU for acute on chronic respiratory failure with hypoxia and hypercapnia, acute COPD exacerbation, Pseudomonas PNA and Influenza A.  Started on Bipap.  Started on IV steroids, nebs, IV Abx and Tamiflu.  Followed by CCM.    Subjective: Patient lying bed shortness of breath improved no nausea vomiting or chest pain    Medications:  Reviewed      Intake/Output Summary (Last 24 hours) at 3/9/2025 1638  Last data filed at 3/8/2025 1939  Gross per 24 hour   Intake 240 ml   Output --   Net 240 ml       Physical Exam Performed:    /68   Pulse 99   Temp 98.2 °F (36.8 °C) (Oral)   Resp 20   Ht 1.702 m (5' 7.01\")   Wt 68.2 kg (150 lb 6.4 oz)   SpO2 90%   BMI 23.55 kg/m²     General appearance:  Appears comfortable. AAOx3  HEENT: atraumatic, Pupils equal, muscous membranes moist, no masses appreciated  Cardiovascular: Regular rate and rhythm no murmurs appreciated  Respiratory: CTAB no wheezing  Gastrointestinal: Abdomen soft, non-tender, BS+  EXT: no edema  Neurology: no gross focal deficts  Psychiatry: Appropriate affect. Not agitated  Skin: Warm, dry, no rashes appreciated        Labs:   Recent Labs     03/07/25  0432 03/08/25  0441 03/09/25  0433   WBC 15.6* 12.5* 12.4*   HGB 13.3 13.4 12.8   HCT 41.0 40.3 39.4    141

## 2025-03-09 NOTE — PROGRESS NOTES
Pt requesting something 'mild' for anxiety. Perfect serve sent to Dr. Collins  0962: atarax ordered by Dr. Collins, pt now states she doesn't want to take it yet but will call this nurse when she needs it

## 2025-03-10 PROCEDURE — 1200000000 HC SEMI PRIVATE

## 2025-03-10 PROCEDURE — 2500000003 HC RX 250 WO HCPCS: Performed by: INTERNAL MEDICINE

## 2025-03-10 PROCEDURE — 6370000000 HC RX 637 (ALT 250 FOR IP): Performed by: INTERNAL MEDICINE

## 2025-03-10 PROCEDURE — 94640 AIRWAY INHALATION TREATMENT: CPT

## 2025-03-10 PROCEDURE — 6360000002 HC RX W HCPCS: Performed by: INTERNAL MEDICINE

## 2025-03-10 PROCEDURE — 94761 N-INVAS EAR/PLS OXIMETRY MLT: CPT

## 2025-03-10 PROCEDURE — 94680 O2 UPTK RST&XERS DIR SIMPLE: CPT

## 2025-03-10 PROCEDURE — 99232 SBSQ HOSP IP/OBS MODERATE 35: CPT | Performed by: STUDENT IN AN ORGANIZED HEALTH CARE EDUCATION/TRAINING PROGRAM

## 2025-03-10 PROCEDURE — 2700000000 HC OXYGEN THERAPY PER DAY

## 2025-03-10 PROCEDURE — 6360000002 HC RX W HCPCS: Performed by: STUDENT IN AN ORGANIZED HEALTH CARE EDUCATION/TRAINING PROGRAM

## 2025-03-10 RX ORDER — PREDNISONE 20 MG/1
40 TABLET ORAL DAILY
Qty: 6 TABLET | Refills: 0 | Status: SHIPPED | OUTPATIENT
Start: 2025-03-11 | End: 2025-03-14

## 2025-03-10 RX ADMIN — FAMOTIDINE 20 MG: 20 TABLET, FILM COATED ORAL at 20:38

## 2025-03-10 RX ADMIN — SODIUM CHLORIDE, PRESERVATIVE FREE 10 ML: 5 INJECTION INTRAVENOUS at 20:38

## 2025-03-10 RX ADMIN — ARFORMOTEROL TARTRATE 15 MCG: 15 SOLUTION RESPIRATORY (INHALATION) at 20:50

## 2025-03-10 RX ADMIN — BUDESONIDE 500 MCG: 0.5 INHALANT RESPIRATORY (INHALATION) at 20:50

## 2025-03-10 RX ADMIN — IPRATROPIUM BROMIDE AND ALBUTEROL SULFATE 1 DOSE: .5; 3 SOLUTION RESPIRATORY (INHALATION) at 07:45

## 2025-03-10 RX ADMIN — SODIUM CHLORIDE, PRESERVATIVE FREE 10 ML: 5 INJECTION INTRAVENOUS at 08:43

## 2025-03-10 RX ADMIN — PREDNISONE 40 MG: 20 TABLET ORAL at 08:42

## 2025-03-10 RX ADMIN — GUAIFENESIN 600 MG: 600 TABLET ORAL at 20:38

## 2025-03-10 RX ADMIN — THEOPHYLLINE ANHYDROUS 200 MG: 100 CAPSULE, EXTENDED RELEASE ORAL at 08:42

## 2025-03-10 RX ADMIN — IPRATROPIUM BROMIDE AND ALBUTEROL SULFATE 1 DOSE: .5; 3 SOLUTION RESPIRATORY (INHALATION) at 13:23

## 2025-03-10 RX ADMIN — BUDESONIDE 500 MCG: 0.5 INHALANT RESPIRATORY (INHALATION) at 07:45

## 2025-03-10 RX ADMIN — ARFORMOTEROL TARTRATE 15 MCG: 15 SOLUTION RESPIRATORY (INHALATION) at 07:45

## 2025-03-10 RX ADMIN — IPRATROPIUM BROMIDE AND ALBUTEROL SULFATE 1 DOSE: .5; 3 SOLUTION RESPIRATORY (INHALATION) at 05:27

## 2025-03-10 RX ADMIN — IPRATROPIUM BROMIDE AND ALBUTEROL SULFATE 1 DOSE: .5; 3 SOLUTION RESPIRATORY (INHALATION) at 00:27

## 2025-03-10 RX ADMIN — FAMOTIDINE 20 MG: 20 TABLET, FILM COATED ORAL at 08:42

## 2025-03-10 RX ADMIN — IPRATROPIUM BROMIDE AND ALBUTEROL SULFATE 1 DOSE: .5; 3 SOLUTION RESPIRATORY (INHALATION) at 20:50

## 2025-03-10 RX ADMIN — ENOXAPARIN SODIUM 40 MG: 100 INJECTION SUBCUTANEOUS at 08:42

## 2025-03-10 RX ADMIN — KETOROLAC TROMETHAMINE 30 MG: 30 INJECTION, SOLUTION INTRAMUSCULAR at 18:35

## 2025-03-10 RX ADMIN — IPRATROPIUM BROMIDE AND ALBUTEROL SULFATE 1 DOSE: .5; 3 SOLUTION RESPIRATORY (INHALATION) at 16:37

## 2025-03-10 RX ADMIN — GUAIFENESIN 600 MG: 600 TABLET ORAL at 08:42

## 2025-03-10 RX ADMIN — METHADONE HYDROCHLORIDE 60 MG: 10 CONCENTRATE ORAL at 08:43

## 2025-03-10 ASSESSMENT — PAIN SCALES - GENERAL: PAINLEVEL_OUTOF10: 6

## 2025-03-10 ASSESSMENT — PAIN DESCRIPTION - LOCATION: LOCATION: HEAD

## 2025-03-10 ASSESSMENT — PAIN DESCRIPTION - DESCRIPTORS: DESCRIPTORS: ACHING

## 2025-03-10 ASSESSMENT — PAIN - FUNCTIONAL ASSESSMENT: PAIN_FUNCTIONAL_ASSESSMENT: ACTIVITIES ARE NOT PREVENTED

## 2025-03-10 NOTE — PROGRESS NOTES
Patient in bed in high fowlers position, alert and oriented. Denies SOB, nausea and vomiting. Complaining of pain with a pain scale of 6/10. PRN Toradol given. Respiration even and unlabored with nasal cannula 3 liters. Scheduled medicine given. VS taken. No further intervention needed at this time. Bed lowered in position. Call light within reach. No further intervention needed at this time. Plan of care ongoing.

## 2025-03-10 NOTE — PROGRESS NOTES
Pulmonary and Critical Care Medicine    CC: f/u acute on chronic hypoxic/hypercapnic respiratory failure    Date: 3/10/2025  Admit Date:  3/5/2025  Consult Requesting Physician: Shiv Collins MD HPI     This is a 61 y/o F w/ a hx of COPD, lung nodules, tobacco use, chronic hypercapnic respiratory failure, presented with SOB.    Interm history:  She has been using NIV at night and tolerating and feels good using it  She is on 3L NC, she feels much better and feels ready to go home     ROS: negative except stated above    OBJECTIVE DATA       PHYSICAL EXAM:   Temp  Av.2 °F (36.8 °C)  Min: 97.7 °F (36.5 °C)  Max: 98.6 °F (37 °C)  Pulse  Av.5  Min: 86  Max: 106  BP  Min: 121/68  Max: 134/81  SpO2  Av.6 %  Min: 90 %  Max: 97 %    General: NAD  Neuro: A0x3  Lung: Clear, no wheezing, equal non labored    MEDICATIONS: Reviewed  Scheduled Meds:   predniSONE  40 mg Oral Daily    methadone  60 mg Oral Daily    theophylline  200 mg Oral Daily    guaiFENesin  600 mg Oral BID    sodium chloride flush  5-40 mL IntraVENous 2 times per day    enoxaparin  40 mg SubCUTAneous Daily    arformoterol tartrate  15 mcg Nebulization BID RT    budesonide  0.5 mg Nebulization BID RT    sodium chloride flush  5-40 mL IntraVENous 2 times per day    famotidine  20 mg Oral BID    ipratropium 0.5 mg-albuterol 2.5 mg  1 Dose Inhalation Q4H RT     Continuous Infusions:   sodium chloride      sodium chloride       PRN Meds:.hydrOXYzine HCl, ketorolac, sodium chloride, levalbuterol, sodium chloride flush, sodium chloride, potassium chloride **OR** potassium alternative oral replacement **OR** potassium chloride, potassium chloride, magnesium sulfate, ondansetron **OR** ondansetron, polyethylene glycol, acetaminophen **OR** acetaminophen, sodium chloride flush, sodium chloride     LABS: Reviewed.   Recent Labs     25  0441 25  0433    137   K 4.6 4.5    100   CO2 29 28   BUN 18 18   CREATININE 0.5* 0.6      Recent Labs     03/08/25  0441 03/09/25  0433   WBC 12.5* 12.4*   HGB 13.4 12.8   HCT 40.3 39.4   MCV 85.6 86.7    169     Lab Results   Component Value Date/Time    PROTIME 12.7 03/06/2025 04:30 AM    PROTIME 11.9 03/05/2025 05:08 AM    PROTIME 12.4 02/23/2025 06:36 AM    INR 0.93 03/06/2025 04:30 AM    INR 0.86 03/05/2025 05:08 AM    INR 0.90 02/23/2025 06:36 AM     Lab Results   Component Value Date/Time    FOE3UKR 36.6 03/06/2025 08:59 AM    BEART 12 03/06/2025 08:59 AM    H0OVAZXO 78 03/06/2025 08:59 AM    PHART 7.400 03/06/2025 08:59 AM    ZIR8IJS 59.1 03/06/2025 08:59 AM    PO2ART 44.1 03/06/2025 08:59 AM    KLP1HYF 38 03/06/2025 08:59 AM       Intake/Output Summary (Last 24 hours) at 3/10/2025 1347  Last data filed at 3/10/2025 0901  Gross per 24 hour   Intake 540 ml   Output --   Net 540 ml      In: 540 [P.O.:540]  Out: -      IMAGES: Reviewed       ASSESSMENT AND PLAN:     Acute exacerbation of severe COPD  Acute on chronic hypercapnic and hypoxic respiratory failure  Influenza A  History of Pseudomonas respiratory infection    Recommendations:  - she will need home 02 evaluation  - she is already set up for NIV, she is motivated to use it, signed script   - cause of chronic hypercapnic failure is COPD, DARRION is ruled out, and thus CPAP will be insufficient for the treatment   - on discharge continue dulera, spiriva, daliresp, stop theophylline   - follow up with Dr. Ravi on 3/18    Moderate risk 35 minutes     Arsen Torres MD  Pulmonary and Critical Care Medicine   [facility]

## 2025-03-10 NOTE — PROGRESS NOTES
03/10/25 1325   Resting (Room Air)   SpO2 87   HR 89   Resting (On O2)   SpO2 86   HR 96   O2 Device Nasal cannula   O2 Flow Rate (l/min) 1 l/min   Comments 2 L NC =  89%, HR 86, 3 LNC = 91% HR 83   During Walk (On O2)   SpO2 90   HR 96   O2 Device Nasal cannula   O2 Flow Rate (l/min) 3 l/min   Need Additional O2 Flow Rate Rows Yes   Walk/Assistance Device Ambulation   Rate of Dyspnea 1   After Walk   SpO2 92   HR 88   O2 Device Nasal cannula   O2 Flow Rate (l/min) 3 l/min   Rate of Dyspnea 0   Does the Patient Qualify for Home O2 Yes   Liter Flow at Rest 3   Liter Flow on Exertion 3   Does the Patient Need Portable Oxygen Tanks Yes

## 2025-03-10 NOTE — PROGRESS NOTES
Home bipap not getting delivered until tomorrow. Perfect serve sent to Dr. Torres. Discharge held until tomorow.

## 2025-03-10 NOTE — PLAN OF CARE
Problem: Discharge Planning  Goal: Discharge to home or other facility with appropriate resources  Outcome: Progressing     Problem: Safety - Adult  Goal: Free from fall injury  Outcome: Progressing     Problem: Respiratory - Adult  Goal: Achieves optimal ventilation and oxygenation  Outcome: Progressing     Problem: Infection - Adult  Goal: Absence of infection at discharge  Outcome: Progressing  Goal: Absence of infection during hospitalization  Outcome: Progressing     Problem: Pain  Goal: Verbalizes/displays adequate comfort level or baseline comfort level  Outcome: Progressing     Problem: Skin/Tissue Integrity  Goal: Skin integrity remains intact  Description: 1.  Monitor for areas of redness and/or skin breakdown  2.  Assess vascular access sites hourly  3.  Every 4-6 hours minimum:  Change oxygen saturation probe site  4.  Every 4-6 hours:  If on nasal continuous positive airway pressure, respiratory therapy assess nares and determine need for appliance change or resting period  Outcome: Progressing

## 2025-03-10 NOTE — PROGRESS NOTES
Physician Progress Note      PATIENT:               CLARITA DE SANTIAGO  CSN #:                  [id number]  :                       1965  ADMIT DATE:       3/5/2025 5:08 AM  DISCH DATE:  RESPONDING  PROVIDER #:        Shiv Collins MD          QUERY TEXT:    Pt admitted with Pseudomonas PNA, Influenza A. Pt noted to have WBC 13.1, HR   130's. If possible, please document in the progress notes and discharge   summary if you are evaluating and /or treating any of the following:    The medical record reflects the following:  Risk Factors: COPD exacerbation, Influenza A, Pseudomonas PNA  Clinical Indicators: Acute on chronic respiratory failure with hypoxia and   hypercapnia  Treatment: IVF, IV Maxipime, IV Solumedrol, Tamiflu, pulmonology consult  Options provided:  -- Sepsis due to pneumonia, present on admission  -- Acute organ dysfunction of acute on chronic respiratory failure with   hypoxia and hypercapnia is associated with sepsis  -- Pneumonia, without Sepsis  -- Other - I will add my own diagnosis  -- Disagree - Not applicable / Not valid  -- Disagree - Clinically unable to determine / Unknown  -- Refer to Clinical Documentation Reviewer    PROVIDER RESPONSE TEXT:    Acute on chronic hypoxic and hypercapnic respiratory failure secondary to   acute exacerbation of COPD secondary to influenza A    Query created by: Kelli Chatman on 3/10/2025 3:28 PM      Electronically signed by:  Shiv Collins MD 3/10/2025 6:51 PM

## 2025-03-10 NOTE — PROGRESS NOTES
Hospitalist Progress Note      PCP: None, None    Date of Admission: 3/5/2025    Chief Complaint: SOB    Hospital Course: 59 y.o. female with severe COPD (followed by Dr Ravi), chronic respiratory failure with hypoxia on 2 L O2 via NC at night, chronic pain syndrome on Methadone was admitted at [facility] b/w 2/22 to 2/28/25 for acute COPD exacerbation, acute on chronic respiratory failure with hypoxia and SIRS secondary to Pseudomonas pneumonia and respiratory panel negative.  She was written for a nebulizer, Levaquin and steroid taper.  She was tripod breathing on arrival to ER with worsening SOB.  She was 85% on RA in ER, tachypneic and in distress.   Admitted as inpatient to ICU for acute on chronic respiratory failure with hypoxia and hypercapnia, acute COPD exacerbation, Pseudomonas PNA and Influenza A.  Started on Bipap.  Started on IV steroids, nebs, IV Abx and Tamiflu.  Followed by CCM.    Subjective: Lying bed shortness of breath improved no nausea vomiting chest    Medications:  Reviewed      Intake/Output Summary (Last 24 hours) at 3/10/2025 1711  Last data filed at 3/10/2025 0901  Gross per 24 hour   Intake 540 ml   Output --   Net 540 ml       Physical Exam Performed:    /76   Pulse 88   Temp 98 °F (36.7 °C) (Oral)   Resp 17   Ht 1.702 m (5' 7.01\")   Wt 70 kg (154 lb 6.4 oz)   SpO2 94%   BMI 24.18 kg/m²     General appearance:  Appears comfortable. AAOx3  HEENT: atraumatic, Pupils equal, muscous membranes moist, no masses appreciated  Cardiovascular: Regular rate and rhythm no murmurs appreciated  Respiratory: CTAB no wheezing  Gastrointestinal: Abdomen soft, non-tender, BS+  EXT: no edema  Neurology: no gross focal deficts  Psychiatry: Appropriate affect. Not agitated  Skin: Warm, dry, no rashes appreciated        Labs:   Recent Labs     03/08/25  0441 03/09/25  0433   WBC 12.5* 12.4*   HGB 13.4 12.8   HCT 40.3 39.4    169     Recent Labs     03/08/25  0441 03/09/25  0433     137   K 4.6 4.5    100   CO2 29 28   BUN 18 18   CREATININE 0.5* 0.6   CALCIUM 9.5 9.2     Recent Labs     03/08/25  0441 03/09/25  0433   AST 15 14*   ALT 16 18   BILITOT 0.3 <0.2   ALKPHOS 48 49     No results for input(s): \"INR\" in the last 72 hours.    No results for input(s): \"CKTOTAL\", \"TROPHS\" in the last 72 hours.      Urinalysis:      Lab Results   Component Value Date/Time    NITRU Negative 09/02/2024 12:37 PM    WBCUA 5 09/02/2024 12:37 PM    BACTERIA Rare 09/02/2024 12:37 PM    RBCUA 1 09/02/2024 12:37 PM    BLOODU Negative 09/02/2024 12:37 PM    GLUCOSEU Negative 09/02/2024 12:37 PM       Radiology:  XR CHEST PORTABLE   Final Result   1. Stable small right pleural effusion.   2. Mild bibasilar atelectasis and parenchymal scarring.   3. No acute airspace consolidation.         XR CHEST PORTABLE   Final Result   No evidence of acute cardiopulmonary process.             IP CONSULT TO PULMONOLOGY    Assessment/Plan:      Acute on chronic hypoxic and hypercapnic respiratory failure secondary to acute exacerbation of COPD secondary to influenza A  Continue steroids discussed with pulm awaiting BiPAP set up    Chronic hypoxic and hypercapnic respiratory failure  Will need BiPAP to be arranged for at home prior to discharge      DVT Prophylaxis: Lovenox  Diet: ADULT DIET; Regular  Code Status: Full Code  PT/OT Eval Status: Requested      Shiv Collins MD

## 2025-03-10 NOTE — DISCHARGE INSTR - COC
Continuity of Care Form    Patient Name: Mary Rosario   :  1965  MRN:  [id number]    Admit date:  3/5/2025  Discharge date:  3/10/25     Code Status Order: Full Code   Advance Directives:     Admitting Physician:  Db Ramirez MD  PCP: None, None    Discharging Nurse: Sasha ESPINOSA  Discharging Hospital Unit/Room#: 5TN-5564/5564-01  Discharging Unit Phone Number: [phone]    Emergency Contact:   Extended Emergency Contact Information  Primary Emergency Contact: Trudi Giles  Home Phone: [phone]  Mobile Phone: [phone]  Relation: Child  Secondary Emergency Contact: Marie Wheatley  Home Phone: [phone]  Mobile Phone: [phone]  Relation: Child    Past Surgical History:  Past Surgical History:   Procedure Laterality Date    ABDOMEN SURGERY      BRONCHOSCOPY N/A 2023    BRONCHOSCOPY performed by Gregory Ravi MD at [facility] ENDOSCOPY    BRONCHOSCOPY N/A 2023    BRONCHOSCOPY ALVEOLAR LAVAGE performed by Gregory Ravi MD at [facility] ENDOSCOPY    BRONCHOSCOPY  2023    BRONCHOSCOPY THERAPUTIC ASPIRATION INITIAL performed by Gregory Ravi MD at [facility] ENDOSCOPY    BRONCHOSCOPY N/A 2024    BRONCHOSCOPY ALVEOLAR LAVAGE performed by Gergory Ravi MD at [facility] ENDOSCOPY     SECTION      HYSTERECTOMY (CERVIX STATUS UNKNOWN)      INNER EAR SURGERY      MASTOIDECTOMY         Immunization History:   Immunization History   Administered Date(s) Administered    COVID-19, PFIZER PURPLE top, DILUTE for use, (age 12 y+), 30mcg/0.3mL 2021, 2021, 2021    Hep A, HAVRIX, VAQTA, (age 19y+), IM, 1mL 2019    Influenza Virus Vaccine 2024    Pneumococcal, PCV20, PREVNAR 20, (age 6w+), IM, 0.5mL 2024       Active Problems:  Patient Active Problem List   Diagnosis Code    COVID-19 virus infection U07.1    COPD with acute exacerbation (HCC) J44.1    Acute on chronic respiratory failure with hypoxia (HCC)    Elimination:  Continence:   Bowel: Yes  Bladder: Yes  Urinary Catheter: None   Colostomy/Ileostomy/Ileal Conduit: No       Date of Last BM: 3/10/25    Intake/Output Summary (Last 24 hours) at 3/10/2025 1406  Last data filed at 3/10/2025 0901  Gross per 24 hour   Intake 540 ml   Output --   Net 540 ml     I/O last 3 completed shifts:  In: 540 [P.O.:540]  Out: -     Safety Concerns:     History of Falls (last 30 days)    Impairments/Disabilities:      None    Nutrition Therapy:  Current Nutrition Therapy:   - Oral Diet:  General    Routes of Feeding: Oral  Liquids: Thin Liquids  Daily Fluid Restriction: no  Last Modified Barium Swallow with Video (Video Swallowing Test): not done    Treatments at the Time of Hospital Discharge:   Respiratory Treatments:   Oxygen Therapy:  is on oxygen at 3 L/min per nasal cannula.  Ventilator:    - BiPAP   IPAP: 12 cmH20, CPAP/EPAP: 5 cmH2O only when sleeping    Rehab Therapies: Physical Therapy and Occupational Therapy  Weight Bearing Status/Restrictions: No weight bearing restrictions  Other Medical Equipment (for information only, NOT a DME order):    Other Treatments:     Patient's personal belongings (please select all that are sent with patient):  Dentures upper and lower, eyeglasses    RN SIGNATURE:  Electronically signed by Sasha Parrish RN on 3/10/25 at 2:09 PM EDT    CASE MANAGEMENT/SOCIAL WORK SECTION    Inpatient Status Date: 3/5/2025    Readmission Risk Assessment Score:  [facility] RISK OF UNPLANNED READMISSION 2.0             20.3 Total Score        Discharging to Facility/ Agency   [facility]  [address] [place] [address]  Phone:  [phone]  Fax: [phone]     / signature: Electronically signed by GARTH Etienne on 3/11/25 at 2:56 PM EDT    PHYSICIAN SECTION    Prognosis: {Prognosis:[id number]}    Condition at Discharge: { Patient Condition:[id number]}    Rehab Potential (if transferring to

## 2025-03-10 NOTE — CARE COORDINATION
Discharge Planning:     (KALEE) notified that Pulmonology is recommending a NIV at discharge. Per Pulmonology note  \"Patient requires NIV, with volume targeted modes that Bipap cannot provide in order to prevent elevated C02 levels, altered mental status and possible death\"    KALEE called Moises [phone] with [employer] with the referral. Moises stated he will connect with the Pulmonologist Dr. Jhaveri and work on the NIV.    Electronically signed by GARTH Etienne on 3/10/2025 at 11:17 AM       Update:      KALEE spoke with Moises from [employer] who informed the CM that NIV has been approved but will not be delivered until tomorrow.     KALEE also spoke with the patient who informed the CM that she is active with [employer] home oxygen at home and has her portable oxygen tank with her.     DME order for a shower chair has been placed. KALEE spoke with Moises from [facility] who stated the shower chair is not covered by her insurance. Patient aware.     Electronically signed by GARTH Etienne on 3/10/2025 at 4:01 PM

## 2025-03-10 NOTE — PROGRESS NOTES
CLINICAL PHARMACY NOTE: MEDS TO BEDS    Total # of Prescriptions Filled: 1   The following medications were delivered to the patient:  PREDNISONE 20MG TABS    Additional Documentation: Sasha ESPINOSA approved to deliver medication to patient room=signed  [facility]    Huseyin 24 not covered on ins karley ROTH. Theophylline er 12hr tablets are covered.

## 2025-03-11 VITALS
BODY MASS INDEX: 24.25 KG/M2 | RESPIRATION RATE: 20 BRPM | SYSTOLIC BLOOD PRESSURE: 130 MMHG | WEIGHT: 154.5 LBS | HEIGHT: 67 IN | TEMPERATURE: 97.7 F | OXYGEN SATURATION: 93 % | DIASTOLIC BLOOD PRESSURE: 69 MMHG | HEART RATE: 85 BPM

## 2025-03-11 PROCEDURE — 6360000002 HC RX W HCPCS: Performed by: STUDENT IN AN ORGANIZED HEALTH CARE EDUCATION/TRAINING PROGRAM

## 2025-03-11 PROCEDURE — 2700000000 HC OXYGEN THERAPY PER DAY

## 2025-03-11 PROCEDURE — 6370000000 HC RX 637 (ALT 250 FOR IP): Performed by: INTERNAL MEDICINE

## 2025-03-11 PROCEDURE — 6360000002 HC RX W HCPCS: Performed by: INTERNAL MEDICINE

## 2025-03-11 PROCEDURE — 94660 CPAP INITIATION&MGMT: CPT

## 2025-03-11 PROCEDURE — 94640 AIRWAY INHALATION TREATMENT: CPT

## 2025-03-11 PROCEDURE — 2500000003 HC RX 250 WO HCPCS: Performed by: STUDENT IN AN ORGANIZED HEALTH CARE EDUCATION/TRAINING PROGRAM

## 2025-03-11 PROCEDURE — 94761 N-INVAS EAR/PLS OXIMETRY MLT: CPT

## 2025-03-11 PROCEDURE — 2500000003 HC RX 250 WO HCPCS: Performed by: INTERNAL MEDICINE

## 2025-03-11 RX ORDER — HYDROXYZINE PAMOATE 25 MG/1
25 CAPSULE ORAL 3 TIMES DAILY PRN
Qty: 45 CAPSULE | Refills: 0 | Status: SHIPPED | OUTPATIENT
Start: 2025-03-11 | End: 2025-03-25

## 2025-03-11 RX ADMIN — BUDESONIDE 500 MCG: 0.5 INHALANT RESPIRATORY (INHALATION) at 07:50

## 2025-03-11 RX ADMIN — METHADONE HYDROCHLORIDE 60 MG: 10 CONCENTRATE ORAL at 08:44

## 2025-03-11 RX ADMIN — IPRATROPIUM BROMIDE AND ALBUTEROL SULFATE 1 DOSE: .5; 3 SOLUTION RESPIRATORY (INHALATION) at 00:29

## 2025-03-11 RX ADMIN — FAMOTIDINE 20 MG: 20 TABLET, FILM COATED ORAL at 08:16

## 2025-03-11 RX ADMIN — SODIUM CHLORIDE, PRESERVATIVE FREE 10 ML: 5 INJECTION INTRAVENOUS at 08:18

## 2025-03-11 RX ADMIN — PREDNISONE 40 MG: 20 TABLET ORAL at 08:16

## 2025-03-11 RX ADMIN — KETOROLAC TROMETHAMINE 30 MG: 30 INJECTION, SOLUTION INTRAMUSCULAR at 14:17

## 2025-03-11 RX ADMIN — IPRATROPIUM BROMIDE AND ALBUTEROL SULFATE 1 DOSE: .5; 3 SOLUTION RESPIRATORY (INHALATION) at 12:05

## 2025-03-11 RX ADMIN — IPRATROPIUM BROMIDE AND ALBUTEROL SULFATE 1 DOSE: .5; 3 SOLUTION RESPIRATORY (INHALATION) at 07:50

## 2025-03-11 RX ADMIN — GUAIFENESIN 600 MG: 600 TABLET ORAL at 08:16

## 2025-03-11 RX ADMIN — ENOXAPARIN SODIUM 40 MG: 100 INJECTION SUBCUTANEOUS at 08:17

## 2025-03-11 RX ADMIN — IPRATROPIUM BROMIDE AND ALBUTEROL SULFATE 1 DOSE: .5; 3 SOLUTION RESPIRATORY (INHALATION) at 04:13

## 2025-03-11 RX ADMIN — ARFORMOTEROL TARTRATE 15 MCG: 15 SOLUTION RESPIRATORY (INHALATION) at 07:50

## 2025-03-11 ASSESSMENT — PAIN SCALES - GENERAL: PAINLEVEL_OUTOF10: 6

## 2025-03-11 ASSESSMENT — PAIN DESCRIPTION - LOCATION: LOCATION: BACK

## 2025-03-11 ASSESSMENT — PAIN DESCRIPTION - DESCRIPTORS: DESCRIPTORS: ACHING;SHARP

## 2025-03-11 ASSESSMENT — PAIN DESCRIPTION - ORIENTATION: ORIENTATION: LOWER

## 2025-03-11 NOTE — PROGRESS NOTES
CLINICAL PHARMACY NOTE: MEDS TO BEDS    Total # of Prescriptions Filled: 1   The following medications were delivered to the patient:  HYDROXYZINE PAMOATE 25MG CAPS    Additional Documentation: Petty ESPINOSA approved to deliver medication to patient room=signed  [facility]

## 2025-03-11 NOTE — PROGRESS NOTES
03/11/25 0540   RT Protocol   History Pulmonary Disease 2   Respiratory pattern 2   Breath sounds 2   Cough 1   Indications for Bronchodilator Therapy On home bronchodilators   Bronchodilator Assessment Score 7

## 2025-03-11 NOTE — CARE COORDINATION
Case Management -  Discharge Note      Patient Name: Mary Rosario                   YOB: 1965  Room: [facility]5564/5564-01            Readmission Risk (Low < 19, Mod (19-27), High > 27): Readmission Risk Score: 20.3    Current PCP: None, None      (IMM) Important Message from Medicare:    Has pt received appropriate compliance notices before being discharged if required: yes  Compliance doc:  [] 2nd IMM; [] Code 44 [] Rojas  Date Given: 3/10/2025 Given By: KALEE    PT AM-PAC: 22 /24  OT AM-PAC: 23 /24    Durable Medical Equipment:    Patient has been provided the following (DME) Durable Medical Equipment as recommended by therapy services.    NIV and Nebulizer     DME Provider:  [facility]  [address] [place] [address]  Phone:  [phone]  Fax: [phone]     DME Order on file: Yes     Financial    Payor: AMEENA / Plan: AMEENA HAWK JUNG / Product Type: *No Product type* /     Pharmacy:  Potential assistance Purchasing Medications: No  Meds-to-Beds request: Yes      [id number] [place], [place] - [address] [phone] - F [phone] [address] [facility] [address]  Phone: [phone] Fax: [phone]      Notes:    Additional Case Management Notes: NIV and nebulizer has been delivered to the room. Patient is active with [facility] at home and has her portable oxygen tank with her. Family to transport the patient home.     Electronically signed by GARTH Etienne on 3/11/2025 at 2:55 PM

## 2025-03-11 NOTE — DISCHARGE INSTR - DIET

## 2025-03-11 NOTE — RT PROTOCOL NOTE
RT Nebulizer Bronchodilator Protocol Note    There is a bronchodilator order in the chart from a provider indicating to follow the RT Bronchodilator Protocol and there is an “Initiate RT Bronchodilator Protocol” order as well (see protocol at bottom of note).    CXR Findings:  No results found.    The findings from the last RT Protocol Assessment were as follows:  Smoking: Chronic pulmonary disease  Respiratory Pattern: Dyspnea on exertion or RR 21-25 bpm  Breath Sounds: Slightly diminished and/or crackles  Cough: Strong, productive  Indication for Bronchodilator Therapy: On home bronchodilators  Bronchodilator Assessment Score: 7    Aerosolized bronchodilator medication orders have been revised according to the RT Nebulizer Bronchodilator Protocol below.    Respiratory Therapist to perform RT Therapy Protocol Assessment initially then follow the protocol.  Repeat RT Therapy Protocol Assessment PRN for score 0-3 or on second treatment, BID, and PRN for scores above 3.    No Indications - adjust the frequency to every 6 hours PRN wheezing or bronchospasm, if no treatments needed after 48 hours then discontinue using Per Protocol order mode.     If indication present, adjust the RT bronchodilator orders based on the Bronchodilator Assessment Score as indicated below.  If a patient is on this medication at home then do not decrease Frequency below that used at home.    0-3 - enter or revise RT bronchodilator order(s) to equivalent RT Bronchodilator order with Frequency of every 4 hours PRN for wheezing or increased work of breathing using Per Protocol order mode.       4-6 - enter or revise RT Bronchodilator order(s) to two equivalent RT bronchodilator orders with one order with BID Frequency and one order with Frequency of every 4 hours PRN wheezing or increased work of breathing using Per Protocol order mode.         7-10 - enter or revise RT Bronchodilator order(s) to two equivalent RT bronchodilator orders with one  order with TID Frequency and one order with Frequency of every 4 hours PRN wheezing or increased work of breathing using Per Protocol order mode.       11-13 - enter or revise RT Bronchodilator order(s) to one equivalent RT bronchodilator order with QID Frequency and an Albuterol order with Frequency of every 4 hours PRN wheezing or increased work of breathing using Per Protocol order mode.      Greater than 13 - enter or revise RT Bronchodilator order(s) to one equivalent RT bronchodilator order with every 4 hours Frequency and an Albuterol order with Frequency of every 2 hours PRN wheezing or increased work of breathing using Per Protocol order mode.     RT to enter RT Home Evaluation for COPD & MDI Assessment order using Per Protocol order mode.    Electronically signed by Chel Wright RCP on 3/11/2025 at 5:41 AM

## 2025-03-11 NOTE — DISCHARGE SUMMARY
Hospital Medicine Discharge Summary    Patient: Mary Rosario     Gender: female  : 1965   Age: 60 y.o.  MRN: [id number]    Admitting Physician: Db Ramirez MD  Discharge Physician: Shiv Collins MD     Code Status: Full Code =    Admit Date: 3/5/2025   Discharge Date:   =3/11/2025    Disposition:  Home  Time spent arranging discharge: 31 minutes    Discharge Diagnoses:    Active Hospital Problems    Diagnosis Date Noted    COPD with acute exacerbation (HCC) [J44.1] 2022     Priority: Medium    Pain syndrome, chronic [G89.4] 2022     Priority: Medium    Pseudomonas pneumonia (HCC) [J15.1] 2025    COPD exacerbation (HCC) [J44.1] 05/10/2024    Acute on chronic respiratory failure with hypoxia and hypercapnia (HCC) [J96.21, J96.22] 2023       Condition at Discharge:  Stable    Hospital Course:   Acute on chronic hypoxic and hypercapnic respiratory failure secondary to acute exacerbation of COPD secondary to influenza A   Short course of prednisone patient set up for BiPAP nightly will follow-up PCP and pulmonology as outpatient    Discharge Exam:    /69   Pulse 85   Temp 97.7 °F (36.5 °C) (Oral)   Resp 20   Ht 1.702 m (5' 7.01\")   Wt 70.1 kg (154 lb 8 oz)   SpO2 93%   BMI 24.19 kg/m²   General appearance:  Appears comfortable. AAOx3  HEENT: atraumatic, Pupils equal, muscous membranes moist, no masses appreciated  Cardiovascular: Regular rate and rhythm no murmurs appreciated  Respiratory: CTAB no wheezing  Gastrointestinal: Abdomen soft, non-tender, BS+  EXT: no edema  Neurology: no gross focal deficts  Psychiatry: Appropriate affect. Not agitated  Skin: Warm, dry, no rashes appreciated    Discharge Medications:   Current Discharge Medication List        START taking these medications    Details   hydrOXYzine pamoate (VISTARIL) 25 MG capsule Take 1 capsule by mouth 3 times daily as needed for Itching  Qty: 45 capsule, Refills: 0           Current Discharge  posterior right lower lobe. Upper Abdomen: Hepatomegaly. Soft Tissues/Bones: Unremarkable.     Suboptimal opacification of the pulmonary arterial system with no central pulmonary embolism seen.  Emphysema without acute pulmonary abnormality.     XR CHEST PORTABLE  Result Date: 2/22/2025  EXAMINATION: ONE XRAY VIEW OF THE CHEST 2/22/2025 7:09 pm COMPARISON: 09/04/2024 HISTORY: ORDERING SYSTEM PROVIDED HISTORY: sob TECHNOLOGIST PROVIDED HISTORY: Reason for exam:->sob Reason for Exam: SOB FINDINGS: The heart is normal.  The pulmonary vessels are normal.  The lungs are mildly hyperinflated and emphysematous.  There mild linear densities scattered along the lung bases which is less prominent.  No effusion or consolidation is seen.     Chronic obstructive lung changes with mild discoid atelectasis or scarring which is less prominent with no obvious infiltrate.         Signed:    Shiv Collins MD   3/11/2025

## 2025-03-11 NOTE — PROGRESS NOTES
Patient discharge order noted. Patient is alert and oriented. Patient received the ordered DME at bedside. PIV removed without complications. Tele monitor removed.   Discharge instructions given to the patient. Emphasized on medication compliance and follow-up appointments.   Patient discharging to home with family.

## 2025-03-11 NOTE — PLAN OF CARE
Shift assessment completed. Routine vitals stable. Scheduled medications given. Patient is awake, alert and oriented. Respirations are easy and unlabored. Patient does not appear to be in distress, resting comfortably at this time. Call light within reach.    Problem: Discharge Planning  Goal: Discharge to home or other facility with appropriate resources  3/11/2025 1035 by Shell Starkey RN  Outcome: Progressing  Flowsheets (Taken 3/11/2025 1033)  Discharge to home or other facility with appropriate resources:   Identify barriers to discharge with patient and caregiver   Arrange for needed discharge resources and transportation as appropriate   Identify discharge learning needs (meds, wound care, etc)   Arrange for interpreters to assist at discharge as needed   Refer to discharge planning if patient needs post-hospital services based on physician order or complex needs related to functional status, cognitive ability or social support system  3/10/2025 2317 by Taylor Posadas RN  Outcome: Progressing     Problem: Safety - Adult  Goal: Free from fall injury  3/11/2025 1035 by Shell Starkey RN  Outcome: Progressing  Flowsheets (Taken 3/11/2025 1033)  Free From Fall Injury:   Based on caregiver fall risk screen, instruct family/caregiver to ask for assistance with transferring infant if caregiver noted to have fall risk factors   Instruct family/caregiver on patient safety  3/10/2025 2317 by Taylor Posadas RN  Outcome: Progressing     Problem: Respiratory - Adult  Goal: Achieves optimal ventilation and oxygenation  3/11/2025 1035 by Shell Starkey RN  Outcome: Progressing  Flowsheets (Taken 3/11/2025 1033)  Achieves optimal ventilation and oxygenation:   Assess for changes in respiratory status   Assess for changes in mentation and behavior   Position to facilitate oxygenation and minimize respiratory effort   Initiate smoking cessation protocol as indicated   Oxygen supplementation based on oxygen saturation

## 2025-03-13 NOTE — PROGRESS NOTES
Physician Progress Note      PATIENT:               CLARITA DE SANTIAGO  CSN #:                  [id number]  :                       1965  ADMIT DATE:       3/5/2025 5:08 AM  DISCH DATE:        3/11/2025 3:26 PM  RESPONDING  PROVIDER #:        Shiv Collins MD          QUERY TEXT:    Patient admitted with COPD exacerbation Documentation reflects \"admitted at   [facility] b/w  to 25 for acute COPD exacerbation, acute on chronic   respiratory failure with hypoxia and SIRS secondary to Pseudomonas pneumonia   and respiratory panel negative\" in H&P and subsequent documentation. CXR on   3/5: No evidence of acute cardiopulmonary process. Discharge summary noted   Pseudomonas pneumonia as discharge diagnosis. If possible, please document in   the progress notes and discharge summary if Pseudomonas pneumonia was:    The medical record reflects the following:  Risk Factors: severe COPD, Influenza A, former smoker  Clinical Indicators: CXR on 3/6: 1.Stable small right pleural effusion.2.Mild   bibasilar atelectasis and parenchymal scarring. 3.No acute airspace   consolidation.  Treatment: IV Maxipime, IV Levaquin, duonebs, IV Solumedrol, duonebs  Options provided:  -- Pseudomonas pneumonia confirmed present on admission after study  -- Pseudomonas pneumonia ruled out after study  -- Other - I will add my own diagnosis  -- Disagree - Not applicable / Not valid  -- Disagree - Clinically unable to determine / Unknown  -- Refer to Clinical Documentation Reviewer    PROVIDER RESPONSE TEXT:    Pseudomonas pneumonia ruled out after study.    Query created by: Kelli Chatman on 3/13/2025 9:41 AM      Electronically signed by:  Shiv Collins MD 3/13/2025 11:06 AM

## 2025-03-18 ENCOUNTER — OFFICE VISIT (OUTPATIENT)
Dept: PULMONOLOGY | Age: 60
End: 2025-03-18
Payer: COMMERCIAL

## 2025-03-18 VITALS
DIASTOLIC BLOOD PRESSURE: 72 MMHG | WEIGHT: 153.6 LBS | OXYGEN SATURATION: 91 % | HEART RATE: 110 BPM | HEIGHT: 67 IN | SYSTOLIC BLOOD PRESSURE: 118 MMHG | BODY MASS INDEX: 24.11 KG/M2

## 2025-03-18 DIAGNOSIS — J44.9 COPD, SEVERE (HCC): Primary | ICD-10-CM

## 2025-03-18 DIAGNOSIS — Z87.01 HISTORY OF PNEUMONIA: ICD-10-CM

## 2025-03-18 PROCEDURE — 99214 OFFICE O/P EST MOD 30 MIN: CPT | Performed by: INTERNAL MEDICINE

## 2025-03-18 PROCEDURE — G2211 COMPLEX E/M VISIT ADD ON: HCPCS | Performed by: INTERNAL MEDICINE

## 2025-03-18 RX ORDER — ROFLUMILAST 500 UG/1
500 TABLET ORAL DAILY
Qty: 30 TABLET | Refills: 5
Start: 2025-03-18 | End: 2025-03-18

## 2025-03-18 RX ORDER — ROFLUMILAST 250 UG/1
250 TABLET ORAL DAILY
Qty: 28 TABLET | Refills: 5
Start: 2025-03-18

## 2025-03-18 ASSESSMENT — ENCOUNTER SYMPTOMS
CHEST TIGHTNESS: 0
CONSTIPATION: 0
RHINORRHEA: 0
STRIDOR: 0
SINUS PRESSURE: 0
DIARRHEA: 0
WHEEZING: 0
ABDOMINAL DISTENTION: 0
APNEA: 0
VOICE CHANGE: 0
CHOKING: 0
COUGH: 0
SHORTNESS OF BREATH: 1
BLOOD IN STOOL: 0
SORE THROAT: 0
VOMITING: 0
BACK PAIN: 0
ABDOMINAL PAIN: 0
COLOR CHANGE: 0

## 2025-03-18 NOTE — PROGRESS NOTES
revealed no new lung nodules.  Will only need annual lung screening CT scan.    Dry nostrils, recommended saline sprays.  Possibly related to oxygen use.  Does not require mupirocin.    Patient has already received PCV 20, recommended influenza vaccination in the fall and RSV vaccination after she turns 61.    Return in about 3 months (around 6/18/2025).

## 2025-03-20 DIAGNOSIS — J44.1 COPD EXACERBATION (HCC): ICD-10-CM

## 2025-03-20 DIAGNOSIS — J44.1 CHRONIC OBSTRUCTIVE PULMONARY DISEASE WITH ACUTE EXACERBATION (HCC): ICD-10-CM

## 2025-03-20 RX ORDER — TIOTROPIUM BROMIDE 18 UG/1
CAPSULE ORAL; RESPIRATORY (INHALATION)
Qty: 90 CAPSULE | Refills: 3 | Status: SHIPPED | OUTPATIENT
Start: 2025-03-20

## 2025-03-20 RX ORDER — ALBUTEROL SULFATE 90 UG/1
2 INHALANT RESPIRATORY (INHALATION) EVERY 4 HOURS PRN
Qty: 54 G | Refills: 3 | Status: SHIPPED | OUTPATIENT
Start: 2025-03-20 | End: 2026-03-20

## 2025-03-20 RX ORDER — MOMETASONE FUROATE AND FORMOTEROL FUMARATE DIHYDRATE 200; 5 UG/1; UG/1
2 AEROSOL RESPIRATORY (INHALATION) EVERY 12 HOURS
Qty: 39 G | Refills: 3 | Status: SHIPPED | OUTPATIENT
Start: 2025-03-20

## 2025-03-20 RX ORDER — ALBUTEROL SULFATE 0.83 MG/ML
2.5 SOLUTION RESPIRATORY (INHALATION) EVERY 6 HOURS PRN
Qty: 120 EACH | Refills: 3 | Status: SHIPPED | OUTPATIENT
Start: 2025-03-20

## 2025-03-20 RX ORDER — GUAIFENESIN 600 MG/1
600 TABLET, EXTENDED RELEASE ORAL 2 TIMES DAILY
Qty: 60 TABLET | Refills: 3 | Status: SHIPPED | OUTPATIENT
Start: 2025-03-20 | End: 2025-07-18

## 2025-03-24 LAB
Lab: NORMAL
REPORT: NORMAL
THIS TEST SENT TO: NORMAL

## 2025-04-11 RX ORDER — ROFLUMILAST 250 UG/1
250 TABLET ORAL DAILY
Qty: 28 TABLET | Refills: 5 | Status: SHIPPED | OUTPATIENT
Start: 2025-04-11 | End: 2025-04-11 | Stop reason: CLARIF

## 2025-04-11 RX ORDER — ROFLUMILAST 250 UG/1
250 TABLET ORAL DAILY
Qty: 90 TABLET | Refills: 5 | Status: SHIPPED | OUTPATIENT
Start: 2025-04-11

## 2025-04-14 NOTE — CARE COORDINATION
Care Transitions Follow Up Call    Needs to be reviewed by the provider   Additional needs identified to be addressed with provider: No  none             Method of communication with provider : none      Care Transition Nurse contacted the patient by telephone to follow up after admission. Verified name and  with patient as identifiers. Addressed changes since last contact: none  Discussed follow-up appointments. If no appointment was previously scheduled, appointment scheduling offered: Yes. Is follow up appointment scheduled within 7 days of discharge? No. Agrees to schedule    CTN reviewed discharge instructions, medical action plan and red flags with patient and discussed any barriers to care and/or understanding of plan of care after discharge. Discussed appropriate site of care based on symptoms and resources available to patient including: PCP  Specialist  When to call 911. The patient agrees to contact the PCP office for questions related to their healthcare. Patients top risk factors for readmission: medical condition-m  PCP relationship  Interventions to address risk factors: Education of patient/family/caregiver/guardian to support self-management-  and Assessment and support for treatment adherence and medication management-     CTN provided contact information for future needs. Plan for follow-up call in 5-7 days based on severity of symptoms and risk factors. Plan for next call: symptom management-   self management-     States she's doing better. Reports using 1L O2 only at night. Reports SpO2 92-94% on room air. Describes incident of orthostatic hypotension the other day when bending over. Discussed using caution with change of positions. States she's eating and drinking adequately. Reports productive cough with white sputum. Denies CP or fever. She has not yet scheduled f/u. Agrees to do so. Reports taking medications as prescribed and denies questions or concerns at this time.
Unable to assess due to medical condition

## 2025-05-03 ENCOUNTER — HOSPITAL ENCOUNTER (EMERGENCY)
Age: 60
Discharge: HOME | End: 2025-05-03
Attending: EMERGENCY MEDICINE
Payer: COMMERCIAL

## 2025-05-03 ENCOUNTER — APPOINTMENT (OUTPATIENT)
Dept: GENERAL RADIOLOGY | Age: 60
End: 2025-05-03
Payer: COMMERCIAL

## 2025-05-03 VITALS
RESPIRATION RATE: 22 BRPM | HEIGHT: 67 IN | TEMPERATURE: 97.9 F | WEIGHT: 150 LBS | OXYGEN SATURATION: 98 % | SYSTOLIC BLOOD PRESSURE: 150 MMHG | BODY MASS INDEX: 23.54 KG/M2 | HEART RATE: 104 BPM | DIASTOLIC BLOOD PRESSURE: 77 MMHG

## 2025-05-03 DIAGNOSIS — J44.9 COPD, SEVERE (HCC): Primary | ICD-10-CM

## 2025-05-03 DIAGNOSIS — E83.42 HYPOMAGNESEMIA: ICD-10-CM

## 2025-05-03 DIAGNOSIS — J44.1 COPD EXACERBATION (HCC): Primary | ICD-10-CM

## 2025-05-03 LAB
ALBUMIN SERPL-MCNC: 4.1 G/DL (ref 3.4–5)
ALBUMIN/GLOB SERPL: 1.4 {RATIO} (ref 1.1–2.2)
ALP SERPL-CCNC: 69 U/L (ref 40–129)
ALT SERPL-CCNC: 11 U/L (ref 10–40)
ANION GAP SERPL CALCULATED.3IONS-SCNC: 10 MMOL/L (ref 3–16)
AST SERPL-CCNC: 19 U/L (ref 15–37)
BASOPHILS # BLD: 0 K/UL (ref 0–0.2)
BASOPHILS NFR BLD: 0.1 %
BILIRUB SERPL-MCNC: 0.7 MG/DL (ref 0–1)
BUN SERPL-MCNC: 7 MG/DL (ref 7–20)
CALCIUM SERPL-MCNC: 9.8 MG/DL (ref 8.3–10.6)
CHLORIDE SERPL-SCNC: 102 MMOL/L (ref 99–110)
CO2 SERPL-SCNC: 28 MMOL/L (ref 21–32)
CREAT SERPL-MCNC: 0.7 MG/DL (ref 0.6–1.1)
DEPRECATED RDW RBC AUTO: 14.1 % (ref 12.4–15.4)
EOSINOPHIL # BLD: 0 K/UL (ref 0–0.6)
EOSINOPHIL NFR BLD: 0 %
FLUAV RNA RESP QL NAA+PROBE: NOT DETECTED
FLUBV RNA RESP QL NAA+PROBE: NOT DETECTED
GFR SERPLBLD CREATININE-BSD FMLA CKD-EPI: >90 ML/MIN/{1.73_M2}
GLUCOSE SERPL-MCNC: 116 MG/DL (ref 70–99)
HCT VFR BLD AUTO: 39 % (ref 36–48)
HGB BLD-MCNC: 12.9 G/DL (ref 12–16)
LYMPHOCYTES # BLD: 0.4 K/UL (ref 1–5.1)
LYMPHOCYTES NFR BLD: 2.7 %
MAGNESIUM SERPL-MCNC: 1.59 MG/DL (ref 1.8–2.4)
MCH RBC QN AUTO: 28.6 PG (ref 26–34)
MCHC RBC AUTO-ENTMCNC: 33.1 G/DL (ref 31–36)
MCV RBC AUTO: 86.3 FL (ref 80–100)
MONOCYTES # BLD: 0.2 K/UL (ref 0–1.3)
MONOCYTES NFR BLD: 1.4 %
NEUTROPHILS # BLD: 14.6 K/UL (ref 1.7–7.7)
NEUTROPHILS NFR BLD: 95.8 %
PLATELET # BLD AUTO: 175 K/UL (ref 135–450)
PMV BLD AUTO: 8.3 FL (ref 5–10.5)
POTASSIUM SERPL-SCNC: 3.5 MMOL/L (ref 3.5–5.1)
PROT SERPL-MCNC: 7.1 G/DL (ref 6.4–8.2)
RBC # BLD AUTO: 4.53 M/UL (ref 4–5.2)
SARS-COV-2 RNA RESP QL NAA+PROBE: NOT DETECTED
SODIUM SERPL-SCNC: 140 MMOL/L (ref 136–145)
WBC # BLD AUTO: 15.2 K/UL (ref 4–11)

## 2025-05-03 PROCEDURE — 6360000002 HC RX W HCPCS: Performed by: EMERGENCY MEDICINE

## 2025-05-03 PROCEDURE — 6370000000 HC RX 637 (ALT 250 FOR IP): Performed by: EMERGENCY MEDICINE

## 2025-05-03 PROCEDURE — 85025 COMPLETE CBC W/AUTO DIFF WBC: CPT

## 2025-05-03 PROCEDURE — 96374 THER/PROPH/DIAG INJ IV PUSH: CPT

## 2025-05-03 PROCEDURE — 83735 ASSAY OF MAGNESIUM: CPT

## 2025-05-03 PROCEDURE — 2500000003 HC RX 250 WO HCPCS: Performed by: EMERGENCY MEDICINE

## 2025-05-03 PROCEDURE — 80053 COMPREHEN METABOLIC PANEL: CPT

## 2025-05-03 PROCEDURE — 71045 X-RAY EXAM CHEST 1 VIEW: CPT

## 2025-05-03 PROCEDURE — 94640 AIRWAY INHALATION TREATMENT: CPT

## 2025-05-03 PROCEDURE — 2700000000 HC OXYGEN THERAPY PER DAY

## 2025-05-03 PROCEDURE — 94761 N-INVAS EAR/PLS OXIMETRY MLT: CPT

## 2025-05-03 PROCEDURE — 96375 TX/PRO/DX INJ NEW DRUG ADDON: CPT

## 2025-05-03 PROCEDURE — 87636 SARSCOV2 & INF A&B AMP PRB: CPT

## 2025-05-03 PROCEDURE — 99284 EMERGENCY DEPT VISIT MOD MDM: CPT

## 2025-05-03 RX ORDER — ONDANSETRON 4 MG/1
4 TABLET, ORALLY DISINTEGRATING ORAL 3 TIMES DAILY PRN
Qty: 15 TABLET | Refills: 0 | Status: SHIPPED | OUTPATIENT
Start: 2025-05-03 | End: 2025-05-08

## 2025-05-03 RX ORDER — ONDANSETRON 2 MG/ML
4 INJECTION INTRAMUSCULAR; INTRAVENOUS
Status: DISCONTINUED | OUTPATIENT
Start: 2025-05-03 | End: 2025-05-03 | Stop reason: HOSPADM

## 2025-05-03 RX ORDER — HYDROXYZINE HYDROCHLORIDE 50 MG/1
50 TABLET, FILM COATED ORAL EVERY 6 HOURS PRN
Qty: 30 TABLET | Refills: 0 | Status: SHIPPED | OUTPATIENT
Start: 2025-05-03 | End: 2025-05-13

## 2025-05-03 RX ORDER — IPRATROPIUM BROMIDE AND ALBUTEROL SULFATE 2.5; .5 MG/3ML; MG/3ML
1 SOLUTION RESPIRATORY (INHALATION) ONCE
Status: COMPLETED | OUTPATIENT
Start: 2025-05-03 | End: 2025-05-03

## 2025-05-03 RX ORDER — MAGNESIUM OXIDE 400 MG/1
400 TABLET ORAL DAILY
Qty: 5 TABLET | Refills: 0 | Status: SHIPPED | OUTPATIENT
Start: 2025-05-03 | End: 2025-05-08

## 2025-05-03 RX ORDER — HYDROXYZINE PAMOATE 25 MG/1
25 CAPSULE ORAL ONCE
Status: COMPLETED | OUTPATIENT
Start: 2025-05-03 | End: 2025-05-03

## 2025-05-03 RX ORDER — LEVOFLOXACIN 500 MG/1
500 TABLET, FILM COATED ORAL DAILY
Qty: 7 TABLET | Refills: 0 | Status: SHIPPED | OUTPATIENT
Start: 2025-05-03 | End: 2025-05-10

## 2025-05-03 RX ORDER — PREDNISONE 10 MG/1
TABLET ORAL
Qty: 20 TABLET | Refills: 0 | Status: SHIPPED | OUTPATIENT
Start: 2025-05-03

## 2025-05-03 RX ORDER — LANOLIN ALCOHOL/MO/W.PET/CERES
400 CREAM (GRAM) TOPICAL DAILY
Status: DISCONTINUED | OUTPATIENT
Start: 2025-05-03 | End: 2025-05-03 | Stop reason: HOSPADM

## 2025-05-03 RX ADMIN — HYDROXYZINE PAMOATE 25 MG: 25 CAPSULE ORAL at 19:46

## 2025-05-03 RX ADMIN — IPRATROPIUM BROMIDE AND ALBUTEROL SULFATE 1 DOSE: .5; 2.5 SOLUTION RESPIRATORY (INHALATION) at 19:15

## 2025-05-03 RX ADMIN — ONDANSETRON 4 MG: 2 INJECTION, SOLUTION INTRAMUSCULAR; INTRAVENOUS at 19:45

## 2025-05-03 RX ADMIN — Medication 400 MG: at 19:46

## 2025-05-03 RX ADMIN — WATER 125 MG: 1 INJECTION INTRAMUSCULAR; INTRAVENOUS; SUBCUTANEOUS at 19:45

## 2025-05-04 NOTE — ED PROVIDER NOTES
depleting her magnesium.  Advised return for new or worsening symptoms.  Patient also requested refill of her hydroxyzine.    Is this patient to be included in the SEP-1 Core Measure due to severe sepsis or septic shock?   No   Exclusion criteria - the patient is NOT to be included for SEP-1 Core Measure due to:  May have criteria for sepsis, but does not meet criteria for severe sepsis or septic shock    Chronic Conditions: COPD  Disposition Considerations: Admission considered and decided against based on patient's clinical stability/improvement and lack of emergent findings.    I am the primary physician of Record.     FINAL IMPRESSION    1. COPD exacerbation (HCC)    2. Hypomagnesemia         DISPOSITION/PLAN   DISPOSITION Decision To Discharge 05/03/2025 07:42:17 PM   DISPOSITION CONDITION Stable            PATIENT REFERRED TO:   [facility]  [address]  [place] [address]  [phone]  Schedule an appointment as soon as possible for a visit in 2 days      [facility] Emergency Department  [address] [place] [address]  [phone]  Go to   immediately if symptoms worsen     DISCHARGE MEDICATIONS:   Discharge Medication List as of 5/3/2025  7:44 PM        START taking these medications    Details   magnesium oxide (MAG-OX) 400 MG tablet Take 1 tablet by mouth daily for 5 days, Disp-5 tablet, R-0Normal      hydrOXYzine HCl (ATARAX) 50 MG tablet Take 1 tablet by mouth every 6 hours as needed for Anxiety, Disp-30 tablet, R-0Normal            DISCONTINUED MEDICATIONS:   Discharge Medication List as of 5/3/2025  7:44 PM        STOP taking these medications       pantoprazole (PROTONIX) 40 MG tablet Comments:   Reason for Stopping:         ALBUTEROL IN Comments:   Reason for Stopping:                    (Please note that portions of this note were completed with a voice recognition program.  Efforts were made to edit the dictations

## 2025-05-21 ENCOUNTER — OFFICE VISIT (OUTPATIENT)
Age: 60
End: 2025-05-21
Payer: COMMERCIAL

## 2025-05-21 VITALS
OXYGEN SATURATION: 92 % | SYSTOLIC BLOOD PRESSURE: 110 MMHG | HEIGHT: 66 IN | BODY MASS INDEX: 24.17 KG/M2 | WEIGHT: 150.4 LBS | HEART RATE: 99 BPM | DIASTOLIC BLOOD PRESSURE: 58 MMHG

## 2025-05-21 DIAGNOSIS — Z76.89 ENCOUNTER TO ESTABLISH CARE: Primary | ICD-10-CM

## 2025-05-21 DIAGNOSIS — E83.42 HYPOMAGNESEMIA: ICD-10-CM

## 2025-05-21 DIAGNOSIS — Z76.89 ENCOUNTER TO ESTABLISH CARE: ICD-10-CM

## 2025-05-21 DIAGNOSIS — Z12.31 ENCOUNTER FOR SCREENING MAMMOGRAM FOR BREAST CANCER: ICD-10-CM

## 2025-05-21 DIAGNOSIS — R45.89 ANXIETY ABOUT HEALTH: ICD-10-CM

## 2025-05-21 DIAGNOSIS — J43.9 PULMONARY EMPHYSEMA, UNSPECIFIED EMPHYSEMA TYPE (HCC): ICD-10-CM

## 2025-05-21 DIAGNOSIS — R91.8 PULMONARY NODULES: ICD-10-CM

## 2025-05-21 DIAGNOSIS — Z12.11 SCREEN FOR COLON CANCER: ICD-10-CM

## 2025-05-21 PROBLEM — J96.00 ACUTE RESPIRATORY FAILURE (HCC): Status: RESOLVED | Noted: 2024-05-10 | Resolved: 2025-05-21

## 2025-05-21 PROBLEM — B44.89 ASPERGILLUS FUMIGATUS (HCC): Status: RESOLVED | Noted: 2023-04-18 | Resolved: 2025-05-21

## 2025-05-21 PROBLEM — J15.1 PSEUDOMONAS PNEUMONIA (HCC): Status: RESOLVED | Noted: 2025-03-05 | Resolved: 2025-05-21

## 2025-05-21 PROBLEM — J96.21 ACUTE ON CHRONIC RESPIRATORY FAILURE WITH HYPOXIA AND HYPERCAPNIA (HCC): Status: RESOLVED | Noted: 2023-04-16 | Resolved: 2025-05-21

## 2025-05-21 PROBLEM — J96.12 ACUTE ON CHRONIC RESPIRATORY ACIDOSIS (HCC): Status: RESOLVED | Noted: 2024-09-02 | Resolved: 2025-05-21

## 2025-05-21 PROBLEM — J44.1 COPD WITH ACUTE EXACERBATION (HCC): Status: RESOLVED | Noted: 2022-08-21 | Resolved: 2025-05-21

## 2025-05-21 PROBLEM — J96.02 ACUTE RESPIRATORY FAILURE WITH HYPOXIA AND HYPERCAPNIA (HCC): Status: RESOLVED | Noted: 2022-09-06 | Resolved: 2025-05-21

## 2025-05-21 PROBLEM — J96.21 ACUTE ON CHRONIC RESPIRATORY FAILURE WITH HYPOXIA (HCC): Status: RESOLVED | Noted: 2022-08-21 | Resolved: 2025-05-21

## 2025-05-21 PROBLEM — J44.1 COPD EXACERBATION (HCC): Status: RESOLVED | Noted: 2024-05-10 | Resolved: 2025-05-21

## 2025-05-21 PROBLEM — J96.02 ACUTE ON CHRONIC RESPIRATORY ACIDOSIS (HCC): Status: RESOLVED | Noted: 2024-09-02 | Resolved: 2025-05-21

## 2025-05-21 PROBLEM — Z99.81 ON HOME O2: Status: ACTIVE | Noted: 2025-05-21

## 2025-05-21 PROBLEM — J96.01 ACUTE RESPIRATORY FAILURE WITH HYPOXIA AND HYPERCAPNIA (HCC): Status: RESOLVED | Noted: 2022-09-06 | Resolved: 2025-05-21

## 2025-05-21 PROBLEM — T17.500A MUCUS PLUGGING OF BRONCHI: Status: RESOLVED | Noted: 2023-04-16 | Resolved: 2025-05-21

## 2025-05-21 PROBLEM — U07.1 COVID-19 VIRUS INFECTION: Status: RESOLVED | Noted: 2022-08-21 | Resolved: 2025-05-21

## 2025-05-21 PROBLEM — J96.22 ACUTE ON CHRONIC RESPIRATORY FAILURE WITH HYPOXIA AND HYPERCAPNIA (HCC): Status: RESOLVED | Noted: 2023-04-16 | Resolved: 2025-05-21

## 2025-05-21 LAB
CHOLEST SERPL-MCNC: 260 MG/DL (ref 0–199)
HDLC SERPL-MCNC: 39 MG/DL (ref 40–60)
LDLC SERPL CALC-MCNC: 181 MG/DL
MAGNESIUM SERPL-MCNC: 1.76 MG/DL (ref 1.8–2.4)
TRIGL SERPL-MCNC: 202 MG/DL (ref 0–150)
TSH SERPL DL<=0.005 MIU/L-ACNC: 0.9 UIU/ML (ref 0.27–4.2)
VLDLC SERPL CALC-MCNC: 40 MG/DL

## 2025-05-21 PROCEDURE — 99204 OFFICE O/P NEW MOD 45 MIN: CPT | Performed by: INTERNAL MEDICINE

## 2025-05-21 PROCEDURE — 90471 IMMUNIZATION ADMIN: CPT | Performed by: INTERNAL MEDICINE

## 2025-05-21 PROCEDURE — 90750 HZV VACC RECOMBINANT IM: CPT | Performed by: INTERNAL MEDICINE

## 2025-05-21 RX ORDER — HYDROXYZINE HYDROCHLORIDE 25 MG/1
25 TABLET, FILM COATED ORAL DAILY
Qty: 30 TABLET | Refills: 0 | Status: SHIPPED | OUTPATIENT
Start: 2025-05-21 | End: 2025-06-20

## 2025-05-21 ASSESSMENT — PATIENT HEALTH QUESTIONNAIRE - PHQ9
7. TROUBLE CONCENTRATING ON THINGS, SUCH AS READING THE NEWSPAPER OR WATCHING TELEVISION: NOT AT ALL
6. FEELING BAD ABOUT YOURSELF - OR THAT YOU ARE A FAILURE OR HAVE LET YOURSELF OR YOUR FAMILY DOWN: NOT AT ALL
3. TROUBLE FALLING OR STAYING ASLEEP: NOT AT ALL
9. THOUGHTS THAT YOU WOULD BE BETTER OFF DEAD, OR OF HURTING YOURSELF: NOT AT ALL
5. POOR APPETITE OR OVEREATING: NOT AT ALL
4. FEELING TIRED OR HAVING LITTLE ENERGY: SEVERAL DAYS
2. FEELING DOWN, DEPRESSED OR HOPELESS: NOT AT ALL
1. LITTLE INTEREST OR PLEASURE IN DOING THINGS: SEVERAL DAYS
SUM OF ALL RESPONSES TO PHQ QUESTIONS 1-9: 2
10. IF YOU CHECKED OFF ANY PROBLEMS, HOW DIFFICULT HAVE THESE PROBLEMS MADE IT FOR YOU TO DO YOUR WORK, TAKE CARE OF THINGS AT HOME, OR GET ALONG WITH OTHER PEOPLE: NOT DIFFICULT AT ALL
8. MOVING OR SPEAKING SO SLOWLY THAT OTHER PEOPLE COULD HAVE NOTICED. OR THE OPPOSITE, BEING SO FIGETY OR RESTLESS THAT YOU HAVE BEEN MOVING AROUND A LOT MORE THAN USUAL: NOT AT ALL
SUM OF ALL RESPONSES TO PHQ QUESTIONS 1-9: 2

## 2025-05-21 ASSESSMENT — ENCOUNTER SYMPTOMS
ABDOMINAL PAIN: 0
CONSTIPATION: 0
COUGH: 0
SORE THROAT: 0
SHORTNESS OF BREATH: 1

## 2025-05-21 NOTE — PATIENT INSTRUCTIONS
Vaccine Information Sheet: Zoster  given to Mary Rosario for review and verbalized understanding of material. Mary Rosario was given the opportunity to ask questions.   Documented vaccine given per order.     VIS given and made available within patient's chart for future reference.

## 2025-05-21 NOTE — PROGRESS NOTES
Mary Rosario (:  1965) is a 60 y.o. female here for evaluation of the following chief complaint(s):  New Patient and Anxiety (Discuss hydroxyzine  )      Subjective   60-year-old female with emphysema, ex-smoker, pulmonary nodules, hx of drug abuse (opioid addiction) on methadone, chronic anxiety presents to the clinic to establish care/anxiety.     Patient has severe emphysema for which she follows [facility] pulmonary.  Her current regimen is Dulera/Spiriva and for Daliresp.  She uses uses NIV at night.  Her last exacerbation was earlier this month, evaluated in the ER but did not get admitted.  She was admitted for COPD exacerbation/influenza A in the month of March.  She reports that her sister was diagnosed with COPD and she was a smoker as well.  She was diagnosed with COPD 4 years ago.    Her predominant complaint is anxiety episodes which happens couple times a week as she has been worried about her COPD, her overall health and she is afraid of intubation.  Patient's PHQ-9 score is 2 and ROMAIN-7 is 5.        Review of Systems   Constitutional:  Negative for fatigue and fever.   HENT:  Negative for postnasal drip and sore throat.    Respiratory:  Positive for shortness of breath. Negative for cough.         Chronic mucus production   Cardiovascular:  Negative for chest pain and leg swelling.   Gastrointestinal:  Negative for abdominal pain and constipation.   Genitourinary:  Negative for dysuria and hematuria.   Musculoskeletal:  Negative for arthralgias and myalgias.   Skin:  Negative for rash.   Neurological:  Negative for weakness and headaches.   Psychiatric/Behavioral:  Negative for hallucinations and suicidal ideas. The patient is nervous/anxious.           Objective   Physical Exam  Vitals reviewed.   Constitutional:       Appearance: Normal appearance. She is normal weight.   HENT:      Head: Normocephalic and atraumatic.      Mouth/Throat:      Mouth: Mucous membranes are moist.      Pharynx:

## 2025-05-22 ENCOUNTER — RESULTS FOLLOW-UP (OUTPATIENT)
Age: 60
End: 2025-05-22

## 2025-05-22 DIAGNOSIS — E78.00 HYPERCHOLESTEREMIA: Primary | ICD-10-CM

## 2025-05-22 DIAGNOSIS — Z12.11 SCREEN FOR COLON CANCER: ICD-10-CM

## 2025-05-22 DIAGNOSIS — E83.42 HYPOMAGNESEMIA: Primary | ICD-10-CM

## 2025-05-22 LAB
EST. AVERAGE GLUCOSE BLD GHB EST-MCNC: 114 MG/DL
HBA1C MFR BLD: 5.6 %

## 2025-05-22 RX ORDER — MAGNESIUM OXIDE 400 MG/1
400 TABLET ORAL DAILY
Qty: 10 TABLET | Refills: 0 | Status: SHIPPED | OUTPATIENT
Start: 2025-05-22 | End: 2025-06-01

## 2025-05-22 RX ORDER — ROSUVASTATIN CALCIUM 10 MG/1
10 TABLET, COATED ORAL NIGHTLY
Qty: 90 TABLET | Refills: 0 | Status: SHIPPED | OUTPATIENT
Start: 2025-05-22

## 2025-05-22 RX ORDER — BUDESONIDE AND FORMOTEROL FUMARATE DIHYDRATE 160; 4.5 UG/1; UG/1
2 AEROSOL RESPIRATORY (INHALATION) 2 TIMES DAILY
Qty: 10.2 G | Refills: 3 | Status: SHIPPED | OUTPATIENT
Start: 2025-05-22

## 2025-05-22 NOTE — TELEPHONE ENCOUNTER
Received fax from [employer] regarding patients Dulera being discontinued and provided alternatives AirDuo or Breyna

## 2025-05-23 LAB — A1AT SERPL-MCNC: 170 MG/DL (ref 90–200)

## 2025-05-23 RX ORDER — BUDESONIDE AND FORMOTEROL FUMARATE DIHYDRATE 160; 4.5 UG/1; UG/1
2 AEROSOL RESPIRATORY (INHALATION) 2 TIMES DAILY
Qty: 30.6 G | OUTPATIENT
Start: 2025-05-23

## 2025-06-10 ENCOUNTER — HOSPITAL ENCOUNTER (OUTPATIENT)
Dept: WOMENS IMAGING | Age: 60
Discharge: HOME | End: 2025-06-10
Payer: COMMERCIAL

## 2025-06-10 VITALS — BODY MASS INDEX: 23.23 KG/M2 | WEIGHT: 148 LBS | HEIGHT: 67 IN

## 2025-06-10 DIAGNOSIS — Z12.31 ENCOUNTER FOR SCREENING MAMMOGRAM FOR BREAST CANCER: ICD-10-CM

## 2025-06-10 PROCEDURE — 77063 BREAST TOMOSYNTHESIS BI: CPT

## 2025-06-17 ENCOUNTER — OFFICE VISIT (OUTPATIENT)
Dept: PULMONOLOGY | Age: 60
End: 2025-06-17
Payer: COMMERCIAL

## 2025-06-17 VITALS
WEIGHT: 151 LBS | OXYGEN SATURATION: 95 % | SYSTOLIC BLOOD PRESSURE: 124 MMHG | HEIGHT: 67 IN | BODY MASS INDEX: 23.7 KG/M2 | HEART RATE: 93 BPM | DIASTOLIC BLOOD PRESSURE: 72 MMHG

## 2025-06-17 DIAGNOSIS — J96.11 CHRONIC RESPIRATORY FAILURE WITH HYPOXIA AND HYPERCAPNIA (HCC): ICD-10-CM

## 2025-06-17 DIAGNOSIS — J44.9 COPD, SEVERE (HCC): Primary | ICD-10-CM

## 2025-06-17 DIAGNOSIS — Z87.01 HISTORY OF PNEUMONIA: ICD-10-CM

## 2025-06-17 DIAGNOSIS — J96.12 CHRONIC RESPIRATORY FAILURE WITH HYPOXIA AND HYPERCAPNIA (HCC): ICD-10-CM

## 2025-06-17 PROCEDURE — G2211 COMPLEX E/M VISIT ADD ON: HCPCS | Performed by: INTERNAL MEDICINE

## 2025-06-17 PROCEDURE — 99214 OFFICE O/P EST MOD 30 MIN: CPT | Performed by: INTERNAL MEDICINE

## 2025-06-17 RX ORDER — ROFLUMILAST 500 UG/1
500 TABLET ORAL DAILY
Qty: 30 TABLET | Refills: 3 | Status: SHIPPED | OUTPATIENT
Start: 2025-06-17

## 2025-06-17 RX ORDER — ROFLUMILAST 500 UG/1
500 TABLET ORAL DAILY
Qty: 90 TABLET | OUTPATIENT
Start: 2025-06-17

## 2025-06-17 ASSESSMENT — ENCOUNTER SYMPTOMS
WHEEZING: 0
DIARRHEA: 0
SORE THROAT: 0
STRIDOR: 0
VOICE CHANGE: 0
CONSTIPATION: 0
BACK PAIN: 0
ABDOMINAL PAIN: 0
COUGH: 1
VOMITING: 0
BLOOD IN STOOL: 0
CHEST TIGHTNESS: 0
COLOR CHANGE: 0
RHINORRHEA: 0
ABDOMINAL DISTENTION: 0
CHOKING: 0
APNEA: 0
SHORTNESS OF BREATH: 1
SINUS PRESSURE: 0

## 2025-06-17 NOTE — PROGRESS NOTES
Mary Rosario    YOB: 1965     Date of Service:  6/17/2025     Chief Complaint   Patient presents with    3 Month Follow-Up       HPI patient states that she has improved well since her last visit.  Decreased cough and mucus production-in fact had not required using any bronchodilator nebulizer lately.  States that she uses NIV at nighttime.    Allergies   Allergen Reactions    Penicillins Shortness Of Breath    Pantoprazole      delirium    Codeine Nausea And Vomiting    Doxycycline Rash     Blisters, sob.     Posaconazole Nausea And Vomiting     Diarrhea, projectile vomiting     Outpatient Medications Marked as Taking for the 6/17/25 encounter (Office Visit) with Gregory Ravi MD   Medication Sig Dispense Refill    Roflumilast (DALIRESP) 500 MCG tablet Take 1 tablet by mouth daily 30 tablet 3    hydrOXYzine HCl (ATARAX) 25 MG tablet Take 1 tablet by mouth daily 30 tablet 0    albuterol (PROVENTIL) (2.5 MG/3ML) 0.083% nebulizer solution Take 3 mLs by nebulization every 6 hours as needed for Wheezing 120 each 3    albuterol sulfate HFA (PROVENTIL;VENTOLIN;PROAIR) 108 (90 Base) MCG/ACT inhaler Inhale 2 puffs into the lungs every 4 hours as needed for Wheezing 54 g 3    DULERA 200-5 MCG/ACT inhaler Inhale 2 puffs into the lungs in the morning and 2 puffs in the evening. 39 g 3    tiotropium (SPIRIVA RESPIMAT) 2.5 MCG/ACT AERS inhaler Inhale 2 puffs into the lungs daily 12 g 3    Naproxen Sodium 220 MG CAPS Take 1 tablet by mouth 2 times daily as needed for Pain (Arthritis.)         Immunization History   Administered Date(s) Administered    COVID-19, PFIZER PURPLE top, DILUTE for use, (age 12 y+), 30mcg/0.3mL 04/01/2021, 04/22/2021, 12/23/2021    Hep A, HAVRIX, VAQTA, (age 19y+), IM, 1mL 09/25/2019    Influenza Virus Vaccine 01/19/2024    Pneumococcal, PCV20, PREVNAR 20, (age 6w+), IM, 0.5mL 01/19/2024    Zoster Recombinant (Shingrix) 05/21/2025       Past Medical History:   Diagnosis

## 2025-06-18 ENCOUNTER — HOSPITAL ENCOUNTER (OUTPATIENT)
Age: 60
Discharge: HOME | End: 2025-06-18

## 2025-06-18 ENCOUNTER — TELEPHONE (OUTPATIENT)
Dept: PULMONOLOGY | Age: 60
End: 2025-06-18

## 2025-06-18 NOTE — TELEPHONE ENCOUNTER
----- Message from Dr. Gregory Ravi MD sent at 6/17/2025  5:50 PM EDT -----  Please call patient and let her know that I have decided to do an ABG on room air which she can get some time soon.

## 2025-06-23 LAB — NONINV COLON CA DNA+OCC BLD SCRN STL QL: POSITIVE

## 2025-06-24 ENCOUNTER — RESULTS FOLLOW-UP (OUTPATIENT)
Age: 60
End: 2025-06-24

## 2025-06-24 DIAGNOSIS — R45.89 ANXIETY ABOUT HEALTH: ICD-10-CM

## 2025-06-24 RX ORDER — HYDROXYZINE HYDROCHLORIDE 25 MG/1
25 TABLET, FILM COATED ORAL DAILY
Qty: 30 TABLET | Refills: 0 | Status: SHIPPED | OUTPATIENT
Start: 2025-06-24 | End: 2025-07-24

## 2025-07-24 DIAGNOSIS — R45.89 ANXIETY ABOUT HEALTH: ICD-10-CM

## 2025-07-25 RX ORDER — HYDROXYZINE HYDROCHLORIDE 25 MG/1
25 TABLET, FILM COATED ORAL DAILY
Qty: 30 TABLET | Refills: 0 | Status: SHIPPED | OUTPATIENT
Start: 2025-07-25 | End: 2025-08-24

## 2025-07-25 NOTE — TELEPHONE ENCOUNTER
Medication:   Requested Prescriptions     Pending Prescriptions Disp Refills    hydrOXYzine HCl (ATARAX) 25 MG tablet [Pharmacy Med Name: HYDROXYZINE HCL 25MG TABS (WHITE)] 30 tablet 0     Sig: TAKE 1 TABLET BY MOUTH DAILY     Last Filled:  6/24/25    Last appt: 5/21/2025   Next appt: 8/20/2025    Last OARRS:        No data to display

## 2025-08-25 DIAGNOSIS — R45.89 ANXIETY ABOUT HEALTH: ICD-10-CM

## 2025-08-25 RX ORDER — HYDROXYZINE HYDROCHLORIDE 25 MG/1
25 TABLET, FILM COATED ORAL DAILY
Qty: 30 TABLET | Refills: 0 | Status: SHIPPED | OUTPATIENT
Start: 2025-08-25 | End: 2025-09-24

## 2025-09-02 DIAGNOSIS — E78.00 HYPERCHOLESTEREMIA: ICD-10-CM

## 2025-09-03 RX ORDER — ROSUVASTATIN CALCIUM 10 MG/1
10 TABLET, COATED ORAL NIGHTLY
Qty: 90 TABLET | Refills: 0 | OUTPATIENT
Start: 2025-09-03

## (undated) DEVICE — SINGLE USE BIOPSY VALVE MAJ-210: Brand: SINGLE USE BIOPSY VALVE (STERILE)

## (undated) DEVICE — SPECIMEN TRAP: Brand: ARGYLE

## (undated) DEVICE — SINGLE USE SUCTION VALVE MAJ-209: Brand: SINGLE USE SUCTION VALVE (STERILE)

## (undated) DEVICE — ENDOSCOPIC KIT 6X3/16 FT COLON W/ 1.1 OZ 2 GWN W/O BRSH

## (undated) DEVICE — CONMED CHANNEL MASTER PULMONARY AND PEDIATRIC CLEANING BRUSH, 160 CM X 2.0 MM: Brand: CONMED

## (undated) DEVICE — SYRINGE MED 50ML LUERLOCK TIP

## (undated) DEVICE — SYRINGE MED 10ML POLYPR LUERSLIP TIP FLAT TOP W/O SFTY DISP

## (undated) DEVICE — GOWN AURORA NONREINF LG: Brand: MEDLINE INDUSTRIES, INC.

## (undated) DEVICE — FORCEPS BX L100CM JAW DIA1.8MM CHN 2MM PULM W/ NDL DISP

## (undated) DEVICE — DOUBLE  SWIVEL ELBOW 15M - DOUBLE FLIP TOP CAP WITH SEAL - 22M/15F: Brand: DOUBLE  SWIVEL ELBOW 15M - DOUBLE FLIP TOP CAP WITH SEAL - 22M/15F

## (undated) DEVICE — RETRIEVER ENDOSCP L230CM SHTH DIA2.5MM NET 3X6CM STD FOR